# Patient Record
Sex: FEMALE | Race: WHITE | HISPANIC OR LATINO | Employment: UNEMPLOYED | URBAN - METROPOLITAN AREA
[De-identification: names, ages, dates, MRNs, and addresses within clinical notes are randomized per-mention and may not be internally consistent; named-entity substitution may affect disease eponyms.]

---

## 2017-01-09 DIAGNOSIS — L40.50 PSORIATIC ARTHRITIS: ICD-10-CM

## 2017-01-09 RX ORDER — CELECOXIB 200 MG/1
200 CAPSULE ORAL 2 TIMES DAILY
Qty: 60 CAPSULE | Refills: 2 | Status: SHIPPED | OUTPATIENT
Start: 2017-01-09 | End: 2017-04-13 | Stop reason: SDUPTHER

## 2017-02-03 RX ORDER — TRAMADOL HYDROCHLORIDE 50 MG/1
50 TABLET ORAL EVERY 8 HOURS PRN
Qty: 90 TABLET | Refills: 3 | Status: SHIPPED | OUTPATIENT
Start: 2017-02-03 | End: 2017-11-01 | Stop reason: SDUPTHER

## 2017-02-03 RX ORDER — TRAMADOL HYDROCHLORIDE 50 MG/1
50 TABLET ORAL 3 TIMES DAILY PRN
COMMUNITY
End: 2017-02-03 | Stop reason: SDUPTHER

## 2017-02-06 ENCOUNTER — OFFICE VISIT (OUTPATIENT)
Dept: RHEUMATOLOGY | Facility: CLINIC | Age: 37
End: 2017-02-06

## 2017-02-06 ENCOUNTER — LAB VISIT (OUTPATIENT)
Dept: LAB | Facility: HOSPITAL | Age: 37
End: 2017-02-06
Attending: INTERNAL MEDICINE

## 2017-02-06 VITALS
DIASTOLIC BLOOD PRESSURE: 81 MMHG | BODY MASS INDEX: 39.68 KG/M2 | SYSTOLIC BLOOD PRESSURE: 139 MMHG | HEART RATE: 75 BPM | WEIGHT: 293 LBS | HEIGHT: 72 IN

## 2017-02-06 DIAGNOSIS — G89.29 CHRONIC MIDLINE LOW BACK PAIN WITHOUT SCIATICA: ICD-10-CM

## 2017-02-06 DIAGNOSIS — E66.01 MORBID OBESITY WITH BMI OF 40.0-44.9, ADULT: Primary | ICD-10-CM

## 2017-02-06 DIAGNOSIS — E55.9 VITAMIN D DEFICIENCY: ICD-10-CM

## 2017-02-06 DIAGNOSIS — L40.50 PSORIATIC ARTHRITIS: ICD-10-CM

## 2017-02-06 DIAGNOSIS — M54.50 CHRONIC MIDLINE LOW BACK PAIN WITHOUT SCIATICA: ICD-10-CM

## 2017-02-06 PROCEDURE — 86480 TB TEST CELL IMMUN MEASURE: CPT

## 2017-02-06 PROCEDURE — 99999 PR PBB SHADOW E&M-EST. PATIENT-LVL III: CPT | Mod: PBBFAC,,, | Performed by: INTERNAL MEDICINE

## 2017-02-06 PROCEDURE — 99214 OFFICE O/P EST MOD 30 MIN: CPT | Mod: S$PBB,,, | Performed by: INTERNAL MEDICINE

## 2017-02-06 PROCEDURE — 99213 OFFICE O/P EST LOW 20 MIN: CPT | Mod: PBBFAC,PO | Performed by: INTERNAL MEDICINE

## 2017-02-06 PROCEDURE — 36415 COLL VENOUS BLD VENIPUNCTURE: CPT | Mod: PO

## 2017-02-06 RX ORDER — METHOTREXATE 25 MG/ML
25 INJECTION, SOLUTION INTRA-ARTERIAL; INTRAMUSCULAR; INTRAVENOUS
Qty: 10 ML | Refills: 2 | Status: SHIPPED | OUTPATIENT
Start: 2017-03-06 | End: 2017-11-01 | Stop reason: SDUPTHER

## 2017-02-06 RX ORDER — GABAPENTIN 300 MG/1
300 CAPSULE ORAL 2 TIMES DAILY
COMMUNITY
End: 2020-03-02 | Stop reason: ALTCHOICE

## 2017-02-06 NOTE — PATIENT INSTRUCTIONS
1. Increase methotrexate to 25mg/week (1 ml) , folic acid daily  2. See how you are in 3 months and if still flared we will discuss trial - BARICITINIB or Cosenytx at that time

## 2017-02-06 NOTE — MR AVS SNAPSHOT
[facility] Rheumatology  [address] [place] [address]  Phone: [phone]  Fax: [phone]                  Janice Lawrence   2017 10:30 AM   Office Visit    Description:  Female : 1980   Provider:  Nitza Walker MD   Department:  [facility] - Rheumatology           Reason for Visit     Psoriatic Arthritis     Psoriasis           Diagnoses this Visit        Comments    Morbid obesity with BMI of 40.0-44.9, adult    -  Primary     Psoriatic arthritis         Vitamin D deficiency         Chronic midline low back pain without sciatica                To Do List           Future Appointments        Provider Department Dept Phone    2017 12:40 PM SPECIMEN, [facility] [phone]    2017 1:15 PM LABORATORY, [facility] [phone]    2017 2:30 PM LABORATORY, [facility] [phone]    2017 3:00 PM Nitza Walker MD [facility] [phone]      Goals (5 Years of Data)     None       These Medications        Disp Refills Start End    methotrexate 25 mg/mL injection 10 mL 2 3/6/2017     Inject 1 mL (25 mg total) into the muscle every 7 days. - Intramuscular    Pharmacy: Prescriptions to [facility]  [place]lysses Ramon [place] [address] #: [phone]         [facility]     [facility] -  Assistance  Registered nurses in the [facility] provide clinical advisement, health education, appointment booking, and other advisory services.  Call for this free service at [phone].             Medications           Message regarding Medications     Verify the changes and/or additions to your medication regime listed below are the same as discussed with your clinician today.  If any of these changes or additions are incorrect, please notify your healthcare provider.        CHANGE how you are taking these medications     Start Taking  Instead of    methotrexate 25 mg/mL injection methotrexate 25 mg/mL injection    Dosage:  Inject 1 mL (25 mg total) into the muscle every 7 days. Dosage:  Inject 0.8 mLs (20 mg total) into the muscle every 7 days.    Reason for Change:  Reorder     Starting on: 3/6/2017            Verify that the below list of medications is an accurate representation of the medications you are currently taking.  If none reported, the list may be blank. If incorrect, please contact your healthcare provider. Carry this list with you in case of emergency.           Current Medications     adalimumab (HUMIRA) PnKt injection Inject 0.8 mLs (40 mg total) into the skin every 14 (fourteen) days.    celecoxib (CELEBREX) 200 MG capsule Take 1 capsule (200 mg total) by mouth 2 (two) times daily.    ergocalciferol (ERGOCALCIFEROL) 50,000 unit Cap Take 1 capsule (50,000 Units total) by mouth every 7 days.    esomeprazole (NEXIUM) 40 MG capsule Take 40 mg by mouth before breakfast.    fluoxetine (PROZAC) 40 MG capsule Take 1 capsule (40 mg total) by mouth once daily.    folic acid (FOLVITE) 1 MG tablet Take 1 tablet (1 mg total) by mouth once daily.    gabapentin (NEURONTIN) 300 MG capsule Take 300 mg by mouth 2 (two) times daily.    methotrexate 25 mg/mL injection Starting on Mar 06, 2017. Inject 1 mL (25 mg total) into the muscle every 7 days.    tramadol (ULTRAM) 50 mg tablet Take 1 tablet (50 mg total) by mouth every 8 (eight) hours as needed for Pain.           Clinical Reference Information           Your Vitals Were     BP Pulse Height Weight BMI    139/81 75 6' (1.829 m) 146.5 kg (322 lb 15.6 oz) 43.8 kg/m2      Blood Pressure          Most Recent Value    BP  139/81      Allergies as of 2/6/2017     No Known Allergies      Immunizations Administered on Date of Encounter - 2/6/2017     None      Orders Placed During Today's Visit     Future Labs/Procedures Expected by Expires    Quantiferon Gold TB  2/6/2017 4/7/2018      Instructions     1. Increase methotrexate to 25mg/week (1 ml) , folic acid daily  2. See how you are in 3 months and if still flared we will discuss trial - BARICITINIB or Cosenytx at that time       Smoking Cessation     If you would like to quit smoking:   You may be eligible for free services if you are a [place] resident and started smoking cigarettes before September 1, 1988.  Call the Smoking Cessation Trust ([facility]) toll free at ([phone] [phone] or ([phone] [phone].   Call 1-647-QUIT-NOW if you do not meet the above criteria.            Language Assistance Services     ATTENTION: Language assistance services are available, free of charge. Please call [phone].      ATENCIÓN: Si bartolo house, tiene a cole disposición servicios gratuitos de asistencia lingüística. Llame al [phone].     CHÚ Ý: N?u b?n nói Ti?ng Vi?t, có các d?ch v? h? tr? ngôn ng? mi?n phí dành cho b?n. G?i s? [phone].         [facility] - Rheumatology complies with applicable Federal civil rights laws and does not discriminate on the basis of race, color, national origin, age, disability, or sex.

## 2017-02-06 NOTE — PROGRESS NOTES
Subjective:       Patient ID: Janice Lawrence is a 36 y.o. female.    Chief Complaint: Psoriatic Arthritis and Psoriasis    HPI   Routine 3 month follow-up; slightly delayed because she had to reschedule appointment. Dr. Arellano diagnosed her with psoriatic arthritis and started treatment with MTX subq (failed PO MTX), failed enbrel and started Humira 7/2016. She was also on celebrex 200mg BID.    Last visit she noticed improvement but not 100% resolution in her joint pain and swelling in the hands  She has had at baseline minimal skin disease. Last visit she had swelling of MCPS, with PIP and DIP tenderness. AT this point I continued her Humira and methotrexate 20mg weekly, daily folic acid to give the Humira a little more time to work. Her sed rate and CRP were normal.     Since the last visit:  She hasn't noticed much more improvement with the humira. She has been taking it every 2 weeks. She has been doing the methotrexate injections 20ml weekly. Folic acid daily.  She continues to have joint pain, swelling in the hands, feet.  Feet constantly bother her all day. This is her main issue. She has been wearing plantar fasciitis inserts. She thinks she may have plantar fasciitis. She thinks the Humira wore off because after 2 months it started to wear off and she developed pain in her hands and feet again. She takes celebrex with improvement but feels it when it wears off.     Weight is stable.     Review of Systems   Constitutional: Negative for chills and fever.   HENT: Negative.    Eyes: Negative for redness.        Dry eyes     Gastrointestinal: Negative for abdominal pain, blood in stool, diarrhea and nausea.   Endocrine:        Increased hair loss    Genitourinary: Negative for dysuria.   Musculoskeletal: Positive for arthralgias (hand, foot pain.) and back pain (with herniated disk, started gabapentin for back pain. ).   Skin: Positive for rash (spots on the face and the lower scalp. ).          Objective:     Visit Vitals    /81    Pulse 75    Ht 6' (1.829 m)    Wt (!) 146.5 kg (322 lb 15.6 oz)    BMI 43.8 kg/m2        Physical Exam   Vitals reviewed.  Constitutional: She is oriented to person, place, and time and well-developed, well-nourished, and in no distress. No distress.   HENT:   Head: Normocephalic and atraumatic.   Right Ear: External ear normal.   Left Ear: External ear normal.   Mouth/Throat: Oropharynx is clear and moist.   Eyes: Conjunctivae are normal. Pupils are equal, round, and reactive to light. Right eye exhibits no discharge. Left eye exhibits no discharge. No scleral icterus.   Neck: Normal range of motion. Neck supple.   Cardiovascular: Normal rate, regular rhythm and normal heart sounds.    Pulmonary/Chest: Effort normal. No respiratory distress.   Abdominal: Soft. There is no tenderness.   Lymphadenopathy:     She has no cervical adenopathy.   Neurological: She is alert and oriented to person, place, and time. No cranial nerve deficit. She exhibits normal muscle tone.   Skin: Skin is warm and dry. Rash (left elbow dry patch (tiny), some lesions on the chin and the forehead) noted. She is not diaphoretic. No erythema.     Psychiatric: Mood, memory, affect and judgment normal.   Musculoskeletal: Normal range of motion. She exhibits tenderness (cmc joint right side, MCPs and PIPS and DIPS without any appreciable swelling). She exhibits no edema or deformity.   Bilateral positive MTP squeeze test  Shoulders, elbows normal  Left plantar fascia tenderness             LABS today ordered reviewed   Ref. Range 2/6/2017 12:00   WBC Latest Ref Range: 3.90 - 12.70 K/uL 9.35   RBC Latest Ref Range: 4.00 - 5.40 M/uL 4.46   Hemoglobin Latest Ref Range: 12.0 - 16.0 g/dL 11.8 (L)   Hematocrit Latest Ref Range: 37.0 - 48.5 % 36.4 (L)   MCV Latest Ref Range: 82 - 98 fL 82   MCH Latest Ref Range: 27.0 - 31.0 pg 26.5 (L)   MCHC Latest Ref Range: 32.0 - 36.0 % 32.4   RDW Latest Ref Range:  11.5 - 14.5 % 16.0 (H)   Platelets Latest Ref Range: 150 - 350 K/uL 363 (H)   MPV Latest Ref Range: 9.2 - 12.9 fL 8.4 (L)   Gran% Latest Ref Range: 38.0 - 73.0 % 53.1   Gran # Latest Ref Range: 1.8 - 7.7 K/uL 5.0   Lymph% Latest Ref Range: 18.0 - 48.0 % 36.5   Lymph # Latest Ref Range: 1.0 - 4.8 K/uL 3.4   Mono% Latest Ref Range: 4.0 - 15.0 % 5.5   Mono # Latest Ref Range: 0.3 - 1.0 K/uL 0.5   Eosinophil% Latest Ref Range: 0.0 - 8.0 % 4.4   Eos # Latest Ref Range: 0.0 - 0.5 K/uL 0.4   Basophil% Latest Ref Range: 0.0 - 1.9 % 0.5   Baso # Latest Ref Range: 0.00 - 0.20 K/uL 0.05   Sed Rate Latest Ref Range: 0 - 20 mm/Hr 4   Sodium Latest Ref Range: 136 - 145 mmol/L 140   Potassium Latest Ref Range: 3.5 - 5.1 mmol/L 4.4   Chloride Latest Ref Range: 95 - 110 mmol/L 106   CO2 Latest Ref Range: 23 - 29 mmol/L 24   Anion Gap Latest Ref Range: 8 - 16 mmol/L 10   BUN, Bld Latest Ref Range: 6 - 20 mg/dL 16   Creatinine Latest Ref Range: 0.5 - 1.4 mg/dL 0.8   eGFR if non African American Latest Ref Range: >60 mL/min/1.73 m^2 >60   eGFR if  Latest Ref Range: >60 mL/min/1.73 m^2 >60   Glucose Latest Ref Range: 70 - 110 mg/dL 87   Calcium Latest Ref Range: 8.7 - 10.5 mg/dL 9.7   Alkaline Phosphatase Latest Ref Range: 55 - 135 U/L 75   Total Protein Latest Ref Range: 6.0 - 8.4 g/dL 7.3   Albumin Latest Ref Range: 3.5 - 5.2 g/dL 4.1   Total Bilirubin Latest Ref Range: 0.1 - 1.0 mg/dL 0.2   AST Latest Ref Range: 10 - 40 U/L 24   ALT Latest Ref Range: 10 - 44 U/L 35     Sed rate 4      I personally viewed the xrays with my impressions below:  SI joints 5/2016 no involvement    Lumbar spine xrays normal    Arthritis survey no erosions      Assessment:       1. Morbid obesity with BMI of 40.0-44.9, adult    2. Psoriatic arthritis    3. Vitamin D deficiency    4. Chronic midline low back pain without sciatica          Psoriatic arthritis- currently with tenderness in the hands and feet without appreciable swelling,  plantar fasciitis, normal sed rate. Failed Enbrel, on Humira since 7/2016 and sub q methotrexate 20mg weekly, folic acid daily, celebrex 200mg BID.     Morbid obesity- worsening about inflammation and may be contributing to some of her fatigue, pain in her feet     Vit d deficiency- on ergo 50K weekly     Midline back pain -slightly aggravated, not related to PSA     Plan:   Increase methotrexate to 25mg weekly, increase folic acid to 2mg daily to combat hair loss    rtc in 3 months and if still having issue then will consider putting her on the Bar[place] trail. I am, however, confounded slightly because she has pain in setting of normalized inflammatory markers. Will need to re evaluate again in 3 months.    Aerobic exercise    Anti inflammatory diet discussed    Labs today and in 3 months to monitor for mtx toxicity     RTC in 3 months

## 2017-02-08 LAB
MITOGEN NIL: 9.98 IU/ML
NIL: 0.04 IU/ML
TB ANTIGEN NIL: 0.05 IU/ML
TB ANTIGEN: 0.09 IU/ML
TB GOLD: NEGATIVE

## 2017-02-09 ENCOUNTER — PATIENT MESSAGE (OUTPATIENT)
Dept: INTERNAL MEDICINE | Facility: CLINIC | Age: 37
End: 2017-02-09

## 2017-02-09 DIAGNOSIS — F41.9 ANXIETY: Primary | ICD-10-CM

## 2017-02-09 RX ORDER — BUSPIRONE HYDROCHLORIDE 10 MG/1
10 TABLET ORAL 2 TIMES DAILY
Qty: 60 TABLET | Refills: 1 | Status: SHIPPED | OUTPATIENT
Start: 2017-02-09 | End: 2017-04-13 | Stop reason: SDUPTHER

## 2017-04-13 DIAGNOSIS — F41.9 ANXIETY: ICD-10-CM

## 2017-04-13 DIAGNOSIS — L40.50 PSORIATIC ARTHRITIS: ICD-10-CM

## 2017-04-13 RX ORDER — BUSPIRONE HYDROCHLORIDE 10 MG/1
TABLET ORAL
Qty: 60 TABLET | Refills: 2 | Status: SHIPPED | OUTPATIENT
Start: 2017-04-13 | End: 2017-11-20 | Stop reason: DRUGHIGH

## 2017-04-13 RX ORDER — CELECOXIB 200 MG/1
CAPSULE ORAL
Qty: 60 CAPSULE | Refills: 1 | Status: SHIPPED | OUTPATIENT
Start: 2017-04-13 | End: 2017-07-20 | Stop reason: SDUPTHER

## 2017-05-01 ENCOUNTER — LAB VISIT (OUTPATIENT)
Dept: LAB | Facility: HOSPITAL | Age: 37
End: 2017-05-01
Attending: INTERNAL MEDICINE

## 2017-05-01 ENCOUNTER — OFFICE VISIT (OUTPATIENT)
Dept: RHEUMATOLOGY | Facility: CLINIC | Age: 37
End: 2017-05-01

## 2017-05-01 VITALS
HEART RATE: 67 BPM | BODY MASS INDEX: 39.68 KG/M2 | HEIGHT: 72 IN | DIASTOLIC BLOOD PRESSURE: 71 MMHG | WEIGHT: 293 LBS | SYSTOLIC BLOOD PRESSURE: 123 MMHG

## 2017-05-01 DIAGNOSIS — G89.29 CHRONIC MIDLINE LOW BACK PAIN WITHOUT SCIATICA: ICD-10-CM

## 2017-05-01 DIAGNOSIS — E66.01 MORBID OBESITY WITH BMI OF 40.0-44.9, ADULT: ICD-10-CM

## 2017-05-01 DIAGNOSIS — L40.50 PSORIATIC ARTHRITIS: ICD-10-CM

## 2017-05-01 DIAGNOSIS — Z79.899 HIGH RISK MEDICATION USE: ICD-10-CM

## 2017-05-01 DIAGNOSIS — M54.50 CHRONIC MIDLINE LOW BACK PAIN WITHOUT SCIATICA: ICD-10-CM

## 2017-05-01 DIAGNOSIS — L40.9 PSORIASIS: ICD-10-CM

## 2017-05-01 DIAGNOSIS — M65.9 TENOSYNOVITIS OF THUMB: ICD-10-CM

## 2017-05-01 DIAGNOSIS — E55.9 VITAMIN D DEFICIENCY: ICD-10-CM

## 2017-05-01 DIAGNOSIS — L40.50 PSORIATIC ARTHRITIS: Primary | ICD-10-CM

## 2017-05-01 LAB
ALBUMIN SERPL BCP-MCNC: 4 G/DL
ALP SERPL-CCNC: 71 U/L
ALT SERPL W/O P-5'-P-CCNC: 33 U/L
ANION GAP SERPL CALC-SCNC: 4 MMOL/L
AST SERPL-CCNC: 18 U/L
BASOPHILS # BLD AUTO: 0.04 K/UL
BASOPHILS NFR BLD: 0.4 %
BILIRUB SERPL-MCNC: 0.3 MG/DL
BUN SERPL-MCNC: 14 MG/DL
CALCIUM SERPL-MCNC: 9.3 MG/DL
CHLORIDE SERPL-SCNC: 106 MMOL/L
CO2 SERPL-SCNC: 28 MMOL/L
CREAT SERPL-MCNC: 0.8 MG/DL
CRP SERPL-MCNC: 4.1 MG/L
DIFFERENTIAL METHOD: ABNORMAL
EOSINOPHIL # BLD AUTO: 0.4 K/UL
EOSINOPHIL NFR BLD: 3.5 %
ERYTHROCYTE [DISTWIDTH] IN BLOOD BY AUTOMATED COUNT: 17.3 %
ERYTHROCYTE [SEDIMENTATION RATE] IN BLOOD BY WESTERGREN METHOD: 5 MM/HR
EST. GFR  (AFRICAN AMERICAN): >60 ML/MIN/1.73 M^2
EST. GFR  (NON AFRICAN AMERICAN): >60 ML/MIN/1.73 M^2
GLUCOSE SERPL-MCNC: 103 MG/DL
HCT VFR BLD AUTO: 36.4 %
HGB BLD-MCNC: 11.7 G/DL
LYMPHOCYTES # BLD AUTO: 3.1 K/UL
LYMPHOCYTES NFR BLD: 29.8 %
MCH RBC QN AUTO: 25.6 PG
MCHC RBC AUTO-ENTMCNC: 32.1 %
MCV RBC AUTO: 80 FL
MONOCYTES # BLD AUTO: 0.5 K/UL
MONOCYTES NFR BLD: 4.8 %
NEUTROPHILS # BLD AUTO: 6.5 K/UL
NEUTROPHILS NFR BLD: 61.8 %
PLATELET # BLD AUTO: 331 K/UL
PMV BLD AUTO: 8.4 FL
POTASSIUM SERPL-SCNC: 4.2 MMOL/L
PROT SERPL-MCNC: 7.2 G/DL
RBC # BLD AUTO: 4.57 M/UL
SODIUM SERPL-SCNC: 138 MMOL/L
WBC # BLD AUTO: 10.53 K/UL

## 2017-05-01 PROCEDURE — 85651 RBC SED RATE NONAUTOMATED: CPT | Mod: PO

## 2017-05-01 PROCEDURE — 99213 OFFICE O/P EST LOW 20 MIN: CPT | Mod: PBBFAC,PO | Performed by: INTERNAL MEDICINE

## 2017-05-01 PROCEDURE — 86140 C-REACTIVE PROTEIN: CPT

## 2017-05-01 PROCEDURE — 99999 PR PBB SHADOW E&M-EST. PATIENT-LVL III: CPT | Mod: PBBFAC,,, | Performed by: INTERNAL MEDICINE

## 2017-05-01 PROCEDURE — 80053 COMPREHEN METABOLIC PANEL: CPT | Mod: PO

## 2017-05-01 PROCEDURE — 36415 COLL VENOUS BLD VENIPUNCTURE: CPT | Mod: PO

## 2017-05-01 PROCEDURE — 85025 COMPLETE CBC W/AUTO DIFF WBC: CPT | Mod: PO

## 2017-05-01 PROCEDURE — 99214 OFFICE O/P EST MOD 30 MIN: CPT | Mod: S$PBB,,, | Performed by: INTERNAL MEDICINE

## 2017-05-01 RX ORDER — ERGOCALCIFEROL 1.25 MG/1
50000 CAPSULE ORAL
Qty: 12 CAPSULE | Refills: 2 | Status: SHIPPED | OUTPATIENT
Start: 2017-05-01 | End: 2019-02-18 | Stop reason: SDUPTHER

## 2017-05-01 NOTE — MR AVS SNAPSHOT
[facility] - Rheumatology  [address] [place] [address]  Phone: [phone]  Fax: [phone]                  Janice Lawrence   2017 3:00 PM   Office Visit    Description:  Female : 1980   Provider:  Nitza Walker MD   Department:  [facility] - Rheumatology           Reason for Visit     Psoriatic Arthritis     Vitamin D Deficiency           Diagnoses this Visit        Comments    Vitamin D deficiency                To Do List           Goals (5 Years of Data)     None       These Medications        Disp Refills Start End    ergocalciferol (ERGOCALCIFEROL) 50,000 unit Cap 12 capsule 2 2017     Take 1 capsule (50,000 Units total) by mouth every 7 days. - Oral    Pharmacy: Prescriptions to [facility] Ramon [place] [address] #: [phone]         [facility]     [facility] Care Line -  Assistance  Unless otherwise directed by your provider, please contact [facility], our nurse care line that is available for  assistance.     Registered nurses in the [facility] provide: appointment scheduling, clinical advisement, health education, and other advisory services.  Call: [phone] (toll free)               Medications           Message regarding Medications     Verify the changes and/or additions to your medication regime listed below are the same as discussed with your clinician today.  If any of these changes or additions are incorrect, please notify your healthcare provider.             Verify that the below list of medications is an accurate representation of the medications you are currently taking.  If none reported, the list may be blank. If incorrect, please contact your healthcare provider. Carry this list with you in case of emergency.           Current Medications     adalimumab (HUMIRA) PnKt injection Inject 0.8 mLs (40 mg total) into the skin every 14 (fourteen) days.    busPIRone (BUSPAR) 10 MG tablet  TAKE 1 TABLET (10 MG TOTAL) BY MOUTH 2 (TWO) TIMES DAILY.    celecoxib (CELEBREX) 200 MG capsule TAKE 1 CAPSULE (200 MG TOTAL) BY MOUTH 2 (TWO) TIMES DAILY.    ergocalciferol (ERGOCALCIFEROL) 50,000 unit Cap Take 1 capsule (50,000 Units total) by mouth every 7 days.    esomeprazole (NEXIUM) 40 MG capsule Take 40 mg by mouth before breakfast.    fluoxetine (PROZAC) 40 MG capsule Take 1 capsule (40 mg total) by mouth once daily.    folic acid (FOLVITE) 1 MG tablet Take 1 tablet (1 mg total) by mouth once daily.    gabapentin (NEURONTIN) 300 MG capsule Take 300 mg by mouth 2 (two) times daily.    methotrexate 25 mg/mL injection Inject 1 mL (25 mg total) into the muscle every 7 days.    tramadol (ULTRAM) 50 mg tablet Take 1 tablet (50 mg total) by mouth every 8 (eight) hours as needed for Pain.           Clinical Reference Information           Your Vitals Were     BP Pulse Height Weight BMI    123/71 67 6' (1.829 m) 141.6 kg (312 lb 2.7 oz) 42.34 kg/m2      Blood Pressure          Most Recent Value    BP  123/71      Allergies as of 5/1/2017     No Known Allergies      Immunizations Administered on Date of Encounter - 5/1/2017     None      Smoking Cessation     If you would like to quit smoking:   You may be eligible for free services if you are a [place] resident and started smoking cigarettes before September 1, 1988.  Call the Smoking Cessation Trust ([facility]) toll free at [phone] or (967) 411-6426.   Call 1-800-QUIT-NOW if you do not meet the above criteria.   Contact us via email: [email or web address]   View our website for more information: [email or web address]        Language Assistance Services     ATTENTION: Language assistance services are available, free of charge. Please call [phone].      ATENCIÓN: Si habla harsh, tiene a cole disposición servicios gratuitos de asistencia lingüística. Llame al [phone].     CHÚ Ý: N?u b?n nói Ti?ng Vi?t, có các d?ch v? h? tr? ngôn ng? mi?n  phí dành cho b?n. G?i s? [phone].         [facility] - Rheumatology complies with applicable Federal civil rights laws and does not discriminate on the basis of race, color, national origin, age, disability, or sex.

## 2017-05-01 NOTE — PROGRESS NOTES
"Subjective:       Patient ID: Janice Lawrence is a 37 y.o. female.    Chief Complaint: Psoriatic Arthritis and Vitamin D Deficiency    HPI   3 mo fup on psoriatic arthritis     Treatment course as follows:  2/2016 first visit with Dr. Arellano; had 2 years of symptoms prior treating with NSAIDS. Tipping point was development of shoulder and back pain- was found to have herniated disk and rotator cuff injury. 1 year PTA she developed l 2nd dactylitis, left knee pain. AM stiffness x 2 hours "all over" and "whole body pain."   Noted to have hx of psoriasis on face and scalp. Hx of tendinitis "Throughout." Dad with psoriatic arthritis .  He diagnosed her with PSA. Work-up showing -HLAB27, CCP, RF, JAVIER, elevated sed rate, crp. SI joint normal.   He started MTX.    2/21/2016 was on 15mg po MTX, continued to have pain in both hands and left knee and now ankle pain. 3 hours AM stiffness. Was on celebrex 200mg BID at the time.   Elbow tenderness and dactylitis 2nd digit    4/2016. Upped MTX to 20mg weekly, folic acid daily  Left ankle and left knee, dactylitis left finger.      5/2016 started on Enbrel and "Felt much better within first week" Stomach cramps with PO MTX.15 minutes of morning stiffness only. Dactylitis improving! Switched to sub q methotrexate.     6/30/2016  Feeling better but R hand down to the thumb still pain, skin not completely clear. Having some back pain.  Also had issues with dactylitis coming back. AM stiffness 60 minutes again. Swelling in the knee noted.   He stopped the Enbrel and began the Humira.    9/2016 Saw Dr. WASHINGTON  Some improvement with Humira after 2 months but not a significant improvement. No synovitis or dactylitis noted.     11/2016 Saw me  Noted skin to be better with Humira greatly improved morning stiffness but not 100% still  She had swollen R 2nd, 3rd, MCP and PIP with swelling, R wrist. Continued therapy     2/2017  Swelling in hands and feet. Plantar fasciitis was bothering " her. I increased MTX to 25mg weekly, folic acid 2mg daily for hair loss.     Since last visit:  She has noticed improvement with the hair falling out with folic acid daily. Pain unchanged at all and worsened. She has new pain in her knees, hands. No shoulder pain. She has foot pain she has been wearing shoes that the podiatrist recommended. IT has somewhat improved with the shoes but still not gone.   Visually she doesn't notice swelling as bad.   When she first started the humira she was able to tell the difference. She feels it is losing effectiveness.   Uninsured at the moment. She has been getting it for free with a prescription assistance program.     celebrex twice daily  Taking tramadol 3 times weekly   Doesn't like to take it because of addiction      Current Outpatient Prescriptions on File Prior to Visit   Medication Sig Dispense Refill    adalimumab (HUMIRA) PnKt injection Inject 0.8 mLs (40 mg total) into the skin every 14 (fourteen) days. 6 each 2    busPIRone (BUSPAR) 10 MG tablet TAKE 1 TABLET (10 MG TOTAL) BY MOUTH 2 (TWO) TIMES DAILY. 60 tablet 2    celecoxib (CELEBREX) 200 MG capsule TAKE 1 CAPSULE (200 MG TOTAL) BY MOUTH 2 (TWO) TIMES DAILY. 60 capsule 1    esomeprazole (NEXIUM) 40 MG capsule Take 40 mg by mouth before breakfast.      fluoxetine (PROZAC) 40 MG capsule Take 1 capsule (40 mg total) by mouth once daily. 90 capsule 3    folic acid (FOLVITE) 1 MG tablet Take 1 tablet (1 mg total) by mouth once daily. 90 tablet 2    gabapentin (NEURONTIN) 300 MG capsule Take 300 mg by mouth 2 (two) times daily.      methotrexate 25 mg/mL injection Inject 1 mL (25 mg total) into the muscle every 7 days. 10 mL 2    tramadol (ULTRAM) 50 mg tablet Take 1 tablet (50 mg total) by mouth every 8 (eight) hours as needed for Pain. 90 tablet 3    [DISCONTINUED] ergocalciferol (ERGOCALCIFEROL) 50,000 unit Cap Take 1 capsule (50,000 Units total) by mouth every 7 days. 12 capsule 2     No current  facility-administered medications on file prior to visit.          Review of Systems   Constitutional: Positive for unexpected weight change (lost 10 lbs since last visit!). Negative for chills and fever.   HENT: Negative.  Negative for mouth sores.    Eyes: Negative for redness.        Dry eyes     Gastrointestinal: Negative for abdominal pain, blood in stool, diarrhea and nausea.   Endocrine:        Hair loss improving with folic acid 2mg     Genitourinary: Negative for dysuria.   Musculoskeletal: Positive for arthralgias (hand, foot pain.) and back pain (with herniated disk, started gabapentin for back pain. ).   Skin: Positive for rash (posterior scalp). Negative for color change.         Objective:     /71  Pulse 67  Ht 6' (1.829 m)  Wt (!) 141.6 kg (312 lb 2.7 oz)  BMI 42.34 kg/m2     Physical Exam   Vitals reviewed.  Constitutional: She is oriented to person, place, and time and well-developed, well-nourished, and in no distress. No distress.   HENT:   Head: Normocephalic and atraumatic.   Right Ear: External ear normal.   Left Ear: External ear normal.   Eyes: Conjunctivae are normal. Pupils are equal, round, and reactive to light. Right eye exhibits no discharge. Left eye exhibits no discharge. No scleral icterus.   Neck: Normal range of motion. Neck supple.   Cardiovascular: Normal rate.    Pulmonary/Chest: Effort normal. No respiratory distress.   Neurological: She is alert and oriented to person, place, and time. No cranial nerve deficit. She exhibits normal muscle tone.   Skin: Skin is warm and dry. Rash (tiny patch of dry skin posterior scalp) noted. She is not diaphoretic. No erythema.     Psychiatric: Mood, memory, affect and judgment normal.   Musculoskeletal: Normal range of motion. She exhibits tenderness ( cmc joint right side, +finklesteins, tender R wrist, MCP and PIP 2,3 bilaterally. no other synovitis. ). She exhibits no edema or deformity.   Negative MTP Squeeze  Shoulders, elbows  normal  Bilateral plantar fascia and achilles tenderness     No tenderness at the epicondyle or patellar poles                 LABS today ordered reviewed       Ref. Range 5/1/2017 14:39   WBC Latest Ref Range: 3.90 - 12.70 K/uL 10.53   RBC Latest Ref Range: 4.00 - 5.40 M/uL 4.57   Hemoglobin Latest Ref Range: 12.0 - 16.0 g/dL 11.7 (L)   Hematocrit Latest Ref Range: 37.0 - 48.5 % 36.4 (L)   MCV Latest Ref Range: 82 - 98 fL 80 (L)   MCH Latest Ref Range: 27.0 - 31.0 pg 25.6 (L)   MCHC Latest Ref Range: 32.0 - 36.0 % 32.1   RDW Latest Ref Range: 11.5 - 14.5 % 17.3 (H)   Platelets Latest Ref Range: 150 - 350 K/uL 331   MPV Latest Ref Range: 9.2 - 12.9 fL 8.4 (L)   Sodium Latest Ref Range: 136 - 145 mmol/L 138   Potassium Latest Ref Range: 3.5 - 5.1 mmol/L 4.2   Chloride Latest Ref Range: 95 - 110 mmol/L 106   CO2 Latest Ref Range: 23 - 29 mmol/L 28   Anion Gap Latest Ref Range: 8 - 16 mmol/L 4 (L)   BUN, Bld Latest Ref Range: 6 - 20 mg/dL 14   Creatinine Latest Ref Range: 0.5 - 1.4 mg/dL 0.8   eGFR if non African American Latest Ref Range: >60 mL/min/1.73 m^2 >60   eGFR if  Latest Ref Range: >60 mL/min/1.73 m^2 >60   Glucose Latest Ref Range: 70 - 110 mg/dL 103   Calcium Latest Ref Range: 8.7 - 10.5 mg/dL 9.3   Alkaline Phosphatase Latest Ref Range: 55 - 135 U/L 71   Total Protein Latest Ref Range: 6.0 - 8.4 g/dL 7.2   Albumin Latest Ref Range: 3.5 - 5.2 g/dL 4.0   Total Bilirubin Latest Ref Range: 0.1 - 1.0 mg/dL 0.3   AST Latest Ref Range: 10 - 40 U/L 18   ALT Latest Ref Range: 10 - 44 U/L 33     X-ray Right Shoulder (2/15/16): There is no evidence for acute fracture or dislocation. The joint spaces appear to be well-maintained.      X-ray Hips (2/15/16): The bony pelvis is intact. Both femoral heads are well-seated within the acetabula. The joint spaces are well maintained. The SI joints are not widened.      X-ray Lumbar Spine (2/15/16): The vertebral bodies demonstrate normal height. There is  straightening of the normal lordotic curvature. The disk space heights are maintained. No significant facet arthropathy.      Arthritis Survey (2/15/16): The vertebral bodies of the cervical spine demonstrate normal height. No subluxation noted on the flexion view. The joint spaces of both hands, knees and both feet are well maintained. No erosive changes are suggested. No soft tissue calcifications are noted.      Abdominal US (2/24/16): Mild diffuse fatty infiltration of the liver. Mild hepatomegaly     MRI Lumbar spine (9/9/14): Broad midline and left-sided disc herniation with contact of the S1 nerve root to the left at L5-S1    Assessment:       1. Psoriatic arthritis    2. Vitamin D deficiency    3. Psoriasis    4. Morbid obesity with BMI of 40.0-44.9, adult    5. Chronic midline low back pain without sciatica    6. High risk medication use    7. Tenosynovitis of thumb          Psoriatic arthritis. HLA B27 negative. +fmhx, psoriasis (mild), dactylitis, asymmetric knee swelling (l knee), enthesitis at the plantar fascia and achilles tendon, shoulders.   Today, she continues to have skin, joint activity despite full dose MTX subcutaneous, Humira every other week and celebrex BID. Failed Enbrel. On Humira since 7/2016.  - Joints: tender bilateral wrists with R wrist swelling, MCP 2, PIP tender bilaterally without synovitis.  -Enthesis: achilles tendons and plantar fascia bilaterally  -Skin: minimal skin disease posterior scalp  -Dactylitis: no active dactylitis but may be developing L 2nd digit   -Sacroiliitis: none, normal SI joint films 2/2016  She has no insurance so I will try to get her into the Baricitinib study. I would like to do an MRI of her hand to evaluate if there is true inflammation given she has normalization of her inflammatory markers.     Morbid obesity- improving! Lost 10 lbs since last visit. Can cause worsening about inflammation and may be contributing to some of her fatigue, pain in her  feet     Vit d deficiency- on ergo 50K weekly, needs refills     Midline back pain -slightly aggravated, not related to PSA    R hand pain could be related to element of tenosynovitis which can also be due to active PSA. 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      Plan:   continue methotrexate to 25mg weekly sub q, folic acid 2mg daily  Stop Humira and evaluate enrollment into the study    Conservative management of thumb tenosynovitis-ice, splica, rest, nsaids for now  Follow-up pending arrangement of study    Keep losing weight!!    Refill vitamin D weekly    MB

## 2017-05-02 ENCOUNTER — PATIENT MESSAGE (OUTPATIENT)
Dept: RHEUMATOLOGY | Facility: CLINIC | Age: 37
End: 2017-05-02

## 2017-05-02 ENCOUNTER — TELEPHONE (OUTPATIENT)
Dept: RHEUMATOLOGY | Facility: CLINIC | Age: 37
End: 2017-05-02

## 2017-05-02 NOTE — TELEPHONE ENCOUNTER
----- Message from Kylee Guevara sent at 5/2/2017  1:39 PM CDT -----  Contact: pt  Please call pt at [phone] re the status of her script that was supposed to have been sent in yest for a steroid to her pharmacy [facility] [place] [address] - the pharmacy is stating it has not been received

## 2017-05-03 DIAGNOSIS — L40.50 PSORIATIC ARTHRITIS: Primary | ICD-10-CM

## 2017-05-03 RX ORDER — PREDNISONE 10 MG/1
TABLET ORAL
Qty: 32 TABLET | Refills: 0 | Status: SHIPPED | OUTPATIENT
Start: 2017-05-03 | End: 2017-07-31 | Stop reason: ALTCHOICE

## 2017-05-19 ENCOUNTER — PATIENT MESSAGE (OUTPATIENT)
Dept: RHEUMATOLOGY | Facility: CLINIC | Age: 37
End: 2017-05-19

## 2017-05-26 ENCOUNTER — PROCEDURE VISIT (OUTPATIENT)
Dept: RHEUMATOLOGY | Facility: CLINIC | Age: 37
End: 2017-05-26

## 2017-05-26 DIAGNOSIS — L40.50 PSORIATIC ARTHRITIS: Primary | ICD-10-CM

## 2017-05-26 PROCEDURE — 20600 DRAIN/INJ JOINT/BURSA W/O US: CPT | Mod: PBBFAC,PO | Performed by: PHYSICIAN ASSISTANT

## 2017-05-26 PROCEDURE — 20600 DRAIN/INJ JOINT/BURSA W/O US: CPT | Mod: S$PBB,,, | Performed by: PHYSICIAN ASSISTANT

## 2017-05-26 RX ORDER — BETAMETHASONE SODIUM PHOSPHATE AND BETAMETHASONE ACETATE 3; 3 MG/ML; MG/ML
1.5 INJECTION, SUSPENSION INTRA-ARTICULAR; INTRALESIONAL; INTRAMUSCULAR; SOFT TISSUE
Status: COMPLETED | OUTPATIENT
Start: 2017-05-26 | End: 2017-05-26

## 2017-05-26 RX ORDER — DICLOFENAC SODIUM 10 MG/G
2 GEL TOPICAL 4 TIMES DAILY
Qty: 300 G | Refills: 3 | Status: SHIPPED | OUTPATIENT
Start: 2017-05-26 | End: 2017-07-31 | Stop reason: ALTCHOICE

## 2017-05-26 RX ORDER — METHYLPREDNISOLONE ACETATE 80 MG/ML
10 INJECTION, SUSPENSION INTRA-ARTICULAR; INTRALESIONAL; INTRAMUSCULAR; SOFT TISSUE
Status: COMPLETED | OUTPATIENT
Start: 2017-05-26 | End: 2017-05-26

## 2017-05-26 RX ADMIN — BETAMETHASONE SODIUM PHOSPHATE AND BETAMETHASONE ACETATE 1.5 MG: 3; 3 INJECTION, SUSPENSION INTRA-ARTICULAR; INTRALESIONAL; INTRAMUSCULAR at 10:05

## 2017-05-26 RX ADMIN — METHYLPREDNISOLONE ACETATE 10.4 MG: 80 INJECTION, SUSPENSION INTRALESIONAL; INTRAMUSCULAR; INTRASYNOVIAL; SOFT TISSUE at 10:05

## 2017-05-26 NOTE — PROCEDURES
Procedures       Patient with PsA with right 1st mcp joint swelling and tenderness     Risk of joint injections discussed with the patient   Risks of joint injections, steroid injections, and aspirations include but are not limited to:   Allergic reaction  Infection  Bleeding  Bruising  Post injection flare of joint swelling and pain  Skin depigmentation  Local fat atrophy  Tendon rupture  Failure of symptoms to improve      Procedure note: After verbal consent was obtained. The right first  mcp joint  was prepared with sterile prep.  The skin was anesthetized with 1% ethyl chloride.  The mcp joint was then  injected with a combination of  1 mL of 1% lidocaine, 1.5mg celestone/soluspan , and 10 mg Depo-Medrol.  Hemostasis was obtained.  The patient tolerated procedure well with no complications.  discharge and icing instructions given to reina

## 2017-06-13 ENCOUNTER — TELEPHONE (OUTPATIENT)
Dept: RHEUMATOLOGY | Facility: CLINIC | Age: 37
End: 2017-06-13

## 2017-06-13 ENCOUNTER — PATIENT MESSAGE (OUTPATIENT)
Dept: RHEUMATOLOGY | Facility: CLINIC | Age: 37
End: 2017-06-13

## 2017-06-13 ENCOUNTER — LAB VISIT (OUTPATIENT)
Dept: LAB | Facility: HOSPITAL | Age: 37
End: 2017-06-13
Attending: INTERNAL MEDICINE

## 2017-06-13 DIAGNOSIS — L40.50 PSORIATIC ARTHRITIS: ICD-10-CM

## 2017-06-13 LAB
ALBUMIN SERPL BCP-MCNC: 3.9 G/DL
ALP SERPL-CCNC: 77 U/L
ALT SERPL W/O P-5'-P-CCNC: 32 U/L
ANION GAP SERPL CALC-SCNC: 10 MMOL/L
AST SERPL-CCNC: 27 U/L
BASOPHILS # BLD AUTO: 0.03 K/UL
BASOPHILS NFR BLD: 0.2 %
BILIRUB SERPL-MCNC: 0.6 MG/DL
BUN SERPL-MCNC: 13 MG/DL
CALCIUM SERPL-MCNC: 9.3 MG/DL
CHLORIDE SERPL-SCNC: 106 MMOL/L
CO2 SERPL-SCNC: 23 MMOL/L
CREAT SERPL-MCNC: 0.8 MG/DL
CRP SERPL-MCNC: 10.8 MG/L
DIFFERENTIAL METHOD: ABNORMAL
EOSINOPHIL # BLD AUTO: 0.5 K/UL
EOSINOPHIL NFR BLD: 3.8 %
ERYTHROCYTE [DISTWIDTH] IN BLOOD BY AUTOMATED COUNT: 18.2 %
ERYTHROCYTE [SEDIMENTATION RATE] IN BLOOD BY WESTERGREN METHOD: 9 MM/HR
EST. GFR  (AFRICAN AMERICAN): >60 ML/MIN/1.73 M^2
EST. GFR  (NON AFRICAN AMERICAN): >60 ML/MIN/1.73 M^2
GLUCOSE SERPL-MCNC: 105 MG/DL
HCT VFR BLD AUTO: 36.9 %
HGB BLD-MCNC: 12.1 G/DL
LYMPHOCYTES # BLD AUTO: 3 K/UL
LYMPHOCYTES NFR BLD: 25.2 %
MCH RBC QN AUTO: 26 PG
MCHC RBC AUTO-ENTMCNC: 32.8 %
MCV RBC AUTO: 79 FL
MONOCYTES # BLD AUTO: 0.6 K/UL
MONOCYTES NFR BLD: 4.6 %
NEUTROPHILS # BLD AUTO: 8 K/UL
NEUTROPHILS NFR BLD: 66.2 %
PLATELET # BLD AUTO: 361 K/UL
PMV BLD AUTO: 8.3 FL
POTASSIUM SERPL-SCNC: 4 MMOL/L
PROT SERPL-MCNC: 7.6 G/DL
RBC # BLD AUTO: 4.66 M/UL
SODIUM SERPL-SCNC: 139 MMOL/L
WBC # BLD AUTO: 12.05 K/UL

## 2017-06-13 PROCEDURE — 36415 COLL VENOUS BLD VENIPUNCTURE: CPT | Mod: PO

## 2017-06-13 PROCEDURE — 86140 C-REACTIVE PROTEIN: CPT

## 2017-06-13 PROCEDURE — 85025 COMPLETE CBC W/AUTO DIFF WBC: CPT | Mod: PO

## 2017-06-13 PROCEDURE — 85651 RBC SED RATE NONAUTOMATED: CPT | Mod: PO

## 2017-06-13 PROCEDURE — 80053 COMPREHEN METABOLIC PANEL: CPT | Mod: PO

## 2017-06-13 NOTE — TELEPHONE ENCOUNTER
----- Message from Wanda Arnold sent at 6/13/2017 11:17 AM CDT -----  Pt at [phone]//is calling regarding having blood work done today//she is off today//need lab work done before taking a medication//please call asap to discuss////vikas//khurram

## 2017-06-13 NOTE — TELEPHONE ENCOUNTER
----- Message from Nitza Walker MD sent at 6/13/2017 11:31 AM CDT -----  i just called nad left her a message indicating that you would call to schedule TERESA 4 to be done today. I will call her tomorrow with results and about the new medicine.  MB

## 2017-06-14 ENCOUNTER — TELEPHONE (OUTPATIENT)
Dept: RHEUMATOLOGY | Facility: CLINIC | Age: 37
End: 2017-06-14

## 2017-06-14 DIAGNOSIS — L40.50 PSORIATIC ARTHRITIS: Primary | ICD-10-CM

## 2017-06-14 RX ORDER — SULFASALAZINE 500 MG/1
TABLET, DELAYED RELEASE ORAL
Qty: 120 TABLET | Refills: 1 | Status: SHIPPED | OUTPATIENT
Start: 2017-06-14 | End: 2017-11-01

## 2017-06-14 NOTE — TELEPHONE ENCOUNTER
Labs returned  She has elevated CRP that corresponds to increase in activity           Plan:  Start sulfasalazine titrate up to 1000mg BID  She has no insurance for doctor's visits. Labs.  She will benefit from BARICITINIB study as she has minimal psoriasis and has 2 TN treatment failures, on methotrexate. I discussed with Natalia Ceron and study probably won't be open until July/August.  Patient will return in 3 months and if still active then she should enroll in the study.  Labs in 4 weeks  For sulfasalazine toxicity   MB

## 2017-06-14 NOTE — TELEPHONE ENCOUNTER
----- Message from Nitza Walker MD sent at 6/14/2017  8:07 AM CDT -----  Please schedule meera 4 in 4 weeks and fup with Nolvia Slaughter in 3 months.  MB    Labs scheduled 7-7.

## 2017-06-23 ENCOUNTER — PATIENT MESSAGE (OUTPATIENT)
Dept: RHEUMATOLOGY | Facility: CLINIC | Age: 37
End: 2017-06-23

## 2017-06-26 ENCOUNTER — TELEPHONE (OUTPATIENT)
Dept: RHEUMATOLOGY | Facility: CLINIC | Age: 37
End: 2017-06-26

## 2017-06-26 NOTE — TELEPHONE ENCOUNTER
----- Message from Nitza Walker MD sent at 6/26/2017  3:53 PM CDT -----  Can you set up appointment with Dr. WASHINGTON end august beginning September?   PSoriatic arthritis.

## 2017-07-07 ENCOUNTER — LAB VISIT (OUTPATIENT)
Dept: LAB | Facility: HOSPITAL | Age: 37
End: 2017-07-07
Attending: INTERNAL MEDICINE

## 2017-07-07 DIAGNOSIS — L40.50 PSORIATIC ARTHRITIS: ICD-10-CM

## 2017-07-07 LAB
ALBUMIN SERPL BCP-MCNC: 4 G/DL
ALP SERPL-CCNC: 89 U/L
ALT SERPL W/O P-5'-P-CCNC: 38 U/L
ANION GAP SERPL CALC-SCNC: 10 MMOL/L
AST SERPL-CCNC: 31 U/L
BASOPHILS # BLD AUTO: 0.03 K/UL
BASOPHILS NFR BLD: 0.3 %
BILIRUB SERPL-MCNC: 0.3 MG/DL
BUN SERPL-MCNC: 17 MG/DL
CALCIUM SERPL-MCNC: 9.4 MG/DL
CHLORIDE SERPL-SCNC: 104 MMOL/L
CO2 SERPL-SCNC: 24 MMOL/L
CREAT SERPL-MCNC: 0.8 MG/DL
CRP SERPL-MCNC: 7 MG/L
DIFFERENTIAL METHOD: ABNORMAL
EOSINOPHIL # BLD AUTO: 0.4 K/UL
EOSINOPHIL NFR BLD: 3.8 %
ERYTHROCYTE [DISTWIDTH] IN BLOOD BY AUTOMATED COUNT: 17.9 %
ERYTHROCYTE [SEDIMENTATION RATE] IN BLOOD BY WESTERGREN METHOD: 8 MM/HR
EST. GFR  (AFRICAN AMERICAN): >60 ML/MIN/1.73 M^2
EST. GFR  (NON AFRICAN AMERICAN): >60 ML/MIN/1.73 M^2
GLUCOSE SERPL-MCNC: 100 MG/DL
HCT VFR BLD AUTO: 37 %
HGB BLD-MCNC: 11.9 G/DL
LYMPHOCYTES # BLD AUTO: 2.3 K/UL
LYMPHOCYTES NFR BLD: 21 %
MCH RBC QN AUTO: 25.6 PG
MCHC RBC AUTO-ENTMCNC: 32.2 %
MCV RBC AUTO: 80 FL
MONOCYTES # BLD AUTO: 0.6 K/UL
MONOCYTES NFR BLD: 5.6 %
NEUTROPHILS # BLD AUTO: 7.5 K/UL
NEUTROPHILS NFR BLD: 69.3 %
PLATELET # BLD AUTO: 347 K/UL
PMV BLD AUTO: 8.2 FL
POTASSIUM SERPL-SCNC: 4.3 MMOL/L
PROT SERPL-MCNC: 7.6 G/DL
RBC # BLD AUTO: 4.65 M/UL
SODIUM SERPL-SCNC: 138 MMOL/L
WBC # BLD AUTO: 10.75 K/UL

## 2017-07-07 PROCEDURE — 36415 COLL VENOUS BLD VENIPUNCTURE: CPT | Mod: PO

## 2017-07-07 PROCEDURE — 86140 C-REACTIVE PROTEIN: CPT

## 2017-07-07 PROCEDURE — 85025 COMPLETE CBC W/AUTO DIFF WBC: CPT | Mod: PO

## 2017-07-07 PROCEDURE — 85651 RBC SED RATE NONAUTOMATED: CPT | Mod: PO

## 2017-07-07 PROCEDURE — 80053 COMPREHEN METABOLIC PANEL: CPT | Mod: PO

## 2017-07-20 DIAGNOSIS — L40.50 PSORIATIC ARTHRITIS: ICD-10-CM

## 2017-07-20 RX ORDER — CELECOXIB 200 MG/1
CAPSULE ORAL
Qty: 60 CAPSULE | Refills: 1 | Status: SHIPPED | OUTPATIENT
Start: 2017-07-20 | End: 2017-08-03

## 2017-07-26 ENCOUNTER — PATIENT MESSAGE (OUTPATIENT)
Dept: RHEUMATOLOGY | Facility: CLINIC | Age: 37
End: 2017-07-26

## 2017-07-27 NOTE — TELEPHONE ENCOUNTER
Please book an appointment to discuss different treatment options. If no openings available, please overbook with any new patient. Thanks.

## 2017-07-31 ENCOUNTER — OFFICE VISIT (OUTPATIENT)
Dept: RHEUMATOLOGY | Facility: CLINIC | Age: 37
End: 2017-07-31

## 2017-07-31 VITALS
HEART RATE: 77 BPM | DIASTOLIC BLOOD PRESSURE: 83 MMHG | BODY MASS INDEX: 41.8 KG/M2 | SYSTOLIC BLOOD PRESSURE: 143 MMHG | WEIGHT: 293 LBS

## 2017-07-31 DIAGNOSIS — Z79.899 HIGH RISK MEDICATION USE: ICD-10-CM

## 2017-07-31 DIAGNOSIS — L40.59 POLYARTICULAR PSORIATIC ARTHRITIS: Primary | ICD-10-CM

## 2017-07-31 DIAGNOSIS — E66.01 MORBID OBESITY WITH BMI OF 40.0-44.9, ADULT: ICD-10-CM

## 2017-07-31 PROCEDURE — 99999 PR PBB SHADOW E&M-EST. PATIENT-LVL III: CPT | Mod: PBBFAC,,, | Performed by: INTERNAL MEDICINE

## 2017-07-31 PROCEDURE — 99213 OFFICE O/P EST LOW 20 MIN: CPT | Mod: S$PBB,,, | Performed by: INTERNAL MEDICINE

## 2017-07-31 PROCEDURE — 99213 OFFICE O/P EST LOW 20 MIN: CPT | Mod: PBBFAC,PO | Performed by: INTERNAL MEDICINE

## 2017-07-31 NOTE — PROGRESS NOTES
RHEUMATOLOGY CLINIC FOLLOW UP VISIT  Chief complaints:-  My hands and back hurts.    HPI:-  Janice Nova a 37 y.o. pleasant female comes in for a follow up visit with above chief complaints. She is a patient of Dr. Arellano  With psoriatic arthritis who was seen by  in the past year . She was diagnosed with psoriasis and psoriatic arthritis by  and was started on MTX in 02/2016. Then started on enbrel which was d/c after treatment failure and was started on humira which seems to be ineffective.       Review of Systems   Constitutional: Positive for malaise/fatigue. Negative for chills, fever and weight loss.   HENT: Negative for ear discharge, ear pain, hearing loss, nosebleeds and sore throat.    Eyes: Negative for blurred vision, double vision, photophobia, discharge and redness.   Respiratory: Negative for cough, hemoptysis, sputum production and shortness of breath.    Cardiovascular: Negative for chest pain, palpitations and claudication.   Gastrointestinal: Negative for abdominal pain, constipation, diarrhea, melena, nausea and vomiting.   Genitourinary: Negative for dysuria, frequency, hematuria and urgency.   Musculoskeletal: Positive for back pain, joint pain, myalgias and neck pain. Negative for falls.   Skin: Positive for itching and rash.   Neurological: Positive for weakness. Negative for dizziness, tremors, sensory change, speech change, focal weakness, seizures, loss of consciousness and headaches.   Endo/Heme/Allergies: Negative for environmental allergies. Does not bruise/bleed easily.   Psychiatric/Behavioral: Negative for hallucinations and memory loss. The patient is nervous/anxious and has insomnia.        Past Medical History:   Diagnosis Date    Acid reflux     Anxiety     Arthritis     Depression        Past Surgical History:   Procedure Laterality Date    COSMETIC SURGERY          Social History   Substance  Use Topics    Smoking status: Current Every Day Smoker     Packs/day: 0.50     Years: 10.00     Types: Cigarettes    Smokeless tobacco: Never Used    Alcohol use 1.8 oz/week     3 Glasses of wine per week       Family History   Problem Relation Age of Onset    Cancer Mother     Breast cancer Mother     Diabetes Mellitus Maternal Grandfather     Cancer Paternal Grandmother     Psoriasis Father     Arthritis Father     Colon cancer Paternal Aunt     Ovarian cancer Neg Hx     Thrombophilia Neg Hx        No Known Allergies        Physical examination:-    Vitals:    07/31/17 1345   BP: (!) 143/83   Pulse: 77   Weight: (!) 139.8 kg (308 lb 3.3 oz)   PainSc:   7       Physical Exam   Constitutional: She is oriented to person, place, and time and well-developed, well-nourished, and in no distress. No distress.   HENT:   Head: Normocephalic and atraumatic.   Nose: Nose normal.   Mouth/Throat: Oropharynx is clear and moist. No oropharyngeal exudate.   Eyes: Conjunctivae and EOM are normal. Pupils are equal, round, and reactive to light. Right eye exhibits no discharge. Left eye exhibits no discharge. No scleral icterus.   Neck: Normal range of motion. Neck supple.   Cardiovascular: Normal rate and intact distal pulses.    Pulmonary/Chest: Effort normal. No respiratory distress. She exhibits no tenderness.   Abdominal: Soft. There is no tenderness.   Musculoskeletal:   Multiple tender points. No synovitis/dactylitis in small joints of hands or feet. Tender  Range of motion over large joints, but no effusion.    Lymphadenopathy:     She has no cervical adenopathy.   Neurological: She is alert and oriented to person, place, and time. No cranial nerve deficit.   Skin: Skin is warm. Rash noted. She is not diaphoretic. There is erythema. No pallor.   Rash present over the frontal area without involvement of the intertriginous areas. Some scaly rash over scalp. No rash over elbows/knee/umbilicus.    Psychiatric: Mood,  memory, affect and judgment normal.   Nursing note and vitals reviewed.      Labs:-  Results for JENNIFER MCCALLUM (MRN [id number]) as of 9/26/2016 17:42   Ref. Range 2/22/2016 12:27   Anti-SSA Antibody Latest Ref Range: 0.00 - 19.99 EU 2.05   Anti-SSA Interpretation Latest Ref Range: Negative  Negative   CCP Antibodies Latest Ref Range: <5.0 U/mL <0.5   Rheumatoid Factor Latest Ref Range: 0.0 - 15.0 IU/mL <10.0     Results for JENNIFER MCCALLUM (MRN [id number]) as of 9/26/2016 17:42   Ref. Range 7/25/2016 15:10 8/8/2016 13:39 8/8/2016 14:00   WBC Latest Ref Range: 3.90 - 12.70 K/uL 11.13     RBC Latest Ref Range: 4.00 - 5.40 M/uL 4.68     Hemoglobin Latest Ref Range: 12.0 - 16.0 g/dL 11.8 (L)     Hematocrit Latest Ref Range: 37.0 - 48.5 % 37.9     MCV Latest Ref Range: 82 - 98 fL 81 (L)     MCH Latest Ref Range: 27.0 - 31.0 pg 25.2 (L)     MCHC Latest Ref Range: 32.0 - 36.0 % 31.1 (L)     RDW Latest Ref Range: 11.5 - 14.5 % 17.6 (H)     Platelets Latest Ref Range: 150 - 350 K/uL 415 (H)     MPV Latest Ref Range: 9.2 - 12.9 fL 9.0 (L)     Gran% Latest Ref Range: 38.0 - 73.0 % 56.4     Gran # Latest Ref Range: 1.8 - 7.7 K/uL 6.3     Lymph% Latest Ref Range: 18.0 - 48.0 % 31.2     Lymph # Latest Ref Range: 1.0 - 4.8 K/uL 3.5     Mono% Latest Ref Range: 4.0 - 15.0 % 6.7     Mono # Latest Ref Range: 0.3 - 1.0 K/uL 0.8     Eosinophil% Latest Ref Range: 0.0 - 8.0 % 4.9     Eos # Latest Ref Range: 0.0 - 0.5 K/uL 0.5     Basophil% Latest Ref Range: 0.0 - 1.9 % 0.4     Baso # Latest Ref Range: 0.00 - 0.20 K/uL 0.04     Sed Rate Latest Ref Range: 0 - 20 mm/Hr 2     Sodium Latest Ref Range: 136 - 145 mmol/L 137     Potassium Latest Ref Range: 3.5 - 5.1 mmol/L 4.1     Chloride Latest Ref Range: 95 - 110 mmol/L 106     CO2 Latest Ref Range: 23 - 29 mmol/L 22 (L)     Anion Gap Latest Ref Range: 8 - 16 mmol/L 9     BUN, Bld Latest Ref Range: 6 - 20 mg/dL 17     Creatinine Latest Ref Range: 0.5 - 1.4 mg/dL 0.8      eGFR if non African American Latest Ref Range: >60 mL/min/1.73 m^2 >60.0     eGFR if African American Latest Ref Range: >60 mL/min/1.73 m^2 >60.0     Glucose Latest Ref Range: 70 - 110 mg/dL 83     Calcium Latest Ref Range: 8.7 - 10.5 mg/dL 9.4     Alkaline Phosphatase Latest Ref Range: 55 - 135 U/L 70     Total Protein Latest Ref Range: 6.0 - 8.4 g/dL 7.1     Albumin Latest Ref Range: 3.5 - 5.2 g/dL 3.9     Total Bilirubin Latest Ref Range: 0.1 - 1.0 mg/dL 0.3     AST Latest Ref Range: 10 - 40 U/L 23     ALT Latest Ref Range: 10 - 44 U/L 34     CRP Latest Ref Range: 0.0 - 8.2 mg/L 2.8     Hepatitis B Surface Ag Unknown   Negative   HIV 1/2 Ag/Ab Latest Ref Range: Negative    Negative   RPR Latest Ref Range: Non-reactive    Non-reactive   Chlamydia, Amplified DNA Unknown  Negative      Assessment/Plans:-

## 2017-07-31 NOTE — PROGRESS NOTES
"Subjective:       Patient ID: Janice Lawrence is a 37 y.o. female.    Chief Complaint: Psoriatic Arthritis    HPI     Janice Lawrence is a 37 y.o. female comes in for a follow up visit for psoriatic arthritis. She is a patient of Dr. Arellano  With psoriatic arthritis who was seen by  in the past year . She was diagnosed with psoriasis and psoriatic arthritis by  and was started on MTX in 02/2016. Then started on enbrel which was d/c after treatment failure and was started on Humira 40 mg q 14 days which has not been effective. Pt also uses MTX 25 mg SC weekly with folic acid daily. At the last f/u pt was been considered for Baricitinib trial but further enrollment was not completed. Pt sts that today she feels okay but for the past 1-2 months her left 2nd finger has been problematic but her elbows, ankles, toes have been an issue as well. Pt reports joint swelling as well. She uses Celebrex 200 mg which seems to not be as effective compared to the past.  Pt uses tramadol 50 mg prn. Pt was started on SSZ 2,000 mg daily in May but she has discontinued this due to gi upset and poor response. Her psoriasis does not appear active but pt notes spots along the hairline of her scalp and the left elbow.       Treatment course as follows:  2/2016 first visit with Dr. Arellano; had 2 years of symptoms prior treating with NSAIDS. Tipping point was development of shoulder and back pain- was found to have herniated disk and rotator cuff injury. 1 year PTA she developed l 2nd dactylitis, left knee pain. AM stiffness x 2 hours "all over" and "whole body pain."   Noted to have hx of psoriasis on face and scalp. Hx of tendinitis "Throughout." Dad with psoriatic arthritis .  He diagnosed her with PSA. Work-up showing -HLAB27, CCP, RF, JAVIER, elevated sed rate, crp. SI joint normal.   He started MTX.    2/21/2016 was on 15mg po MTX, continued to have pain in both hands and left knee and now ankle pain. 3 " hours AM stiffness. Was on celebrex 200mg BID at the time.   Elbow tenderness and dactylitis 2nd digit    4/2016. Upped MTX to 20mg weekly, folic acid daily  Left ankle and left knee, dactylitis left finger.         Current Outpatient Prescriptions on File Prior to Visit   Medication Sig Dispense Refill    busPIRone (BUSPAR) 10 MG tablet TAKE 1 TABLET (10 MG TOTAL) BY MOUTH 2 (TWO) TIMES DAILY. 60 tablet 2    celecoxib (CELEBREX) 200 MG capsule TAKE 1 CAPSULE (200 MG TOTAL) BY MOUTH 2 (TWO) TIMES DAILY. 60 capsule 1    ergocalciferol (ERGOCALCIFEROL) 50,000 unit Cap Take 1 capsule (50,000 Units total) by mouth every 7 days. 12 capsule 2    esomeprazole (NEXIUM) 40 MG capsule Take 40 mg by mouth before breakfast.      fluoxetine (PROZAC) 40 MG capsule Take 1 capsule (40 mg total) by mouth once daily. 90 capsule 3    folic acid (FOLVITE) 1 MG tablet Take 1 tablet (1 mg total) by mouth once daily. 90 tablet 2    gabapentin (NEURONTIN) 300 MG capsule Take 300 mg by mouth 2 (two) times daily.      methotrexate 25 mg/mL injection Inject 1 mL (25 mg total) into the muscle every 7 days. 10 mL 2    sulfaSALAzine (AZULFIDINE) 500 MG TbEC Week one: 1 tab daily, week 2: one tab twice daily, week 3: 2 tabs in am, 1 tab in irwin week 4: 2 tabs twice daily and continue 120 tablet 1    tramadol (ULTRAM) 50 mg tablet Take 1 tablet (50 mg total) by mouth every 8 (eight) hours as needed for Pain. 90 tablet 3    [DISCONTINUED] adalimumab (HUMIRA) PnKt injection Inject 0.8 mLs (40 mg total) into the skin every 14 (fourteen) days. 6 each 2    [DISCONTINUED] diclofenac sodium (VOLTAREN) 1 % Gel Apply 2 g topically 4 (four) times daily. 300 g 3    [DISCONTINUED] predniSONE (DELTASONE) 10 MG tablet 07xim3n, 16hyw1z, 71ema7u, 11qil0w, 5mgx3d and stop 32 tablet 0     No current facility-administered medications on file prior to visit.          Review of Systems   Constitutional: Negative for chills, fever and unexpected weight  change.   HENT: Negative.  Negative for mouth sores.    Eyes: Negative for redness.        Dry eyes     Gastrointestinal: Negative for abdominal pain, blood in stool, diarrhea and nausea.   Genitourinary: Negative for dysuria.   Musculoskeletal: Positive for arthralgias (hand, foot pain.) and back pain (with herniated disk, on gabapentin for back pain. ).   Skin: Positive for rash (posterior scalp and frontal area). Negative for color change.         Objective:     BP (!) 143/83   Pulse 77   Wt (!) 139.8 kg (308 lb 3.3 oz)   BMI 41.80 kg/m²      Physical Exam   Vitals reviewed.  Constitutional: She is oriented to person, place, and time and well-developed, well-nourished, and in no distress. No distress.   HENT:   Mouth/Throat: Oropharynx is clear and moist.   Eyes: Right eye exhibits no discharge. Left eye exhibits no discharge. No scleral icterus.   Neck: Normal range of motion. Neck supple.   Cardiovascular: Normal rate.    Pulmonary/Chest: Effort normal. No respiratory distress.   Neurological: She is alert and oriented to person, place, and time. No cranial nerve deficit. She exhibits normal muscle tone.   Skin: Skin is warm and dry. Rash (tiny patch of dry skin left elbow and frontal scalp) noted. She is not diaphoretic. No erythema.     Psychiatric: Mood, memory, affect and judgment normal.   Musculoskeletal: Normal range of motion. She exhibits deformity (pt's left 2nd finger with fusiform swelling from the mcp to the PIP but does not extend to the DIP). She exhibits no edema or tenderness.   Negative MTP Squeeze  Shoulders, elbows normal  Bilateral plantar fascia and achilles tenderness     No tenderness at the epicondyle or patellar poles             LABS reviewed    Results for JENNIFER MCCALLUM (MRN [id number]) as of 7/31/2017 17:38   Ref. Range 7/7/2017 08:39   WBC Latest Ref Range: 3.90 - 12.70 K/uL 10.75   RBC Latest Ref Range: 4.00 - 5.40 M/uL 4.65   Hemoglobin Latest Ref Range: 12.0 - 16.0  g/dL 11.9 (L)   Hematocrit Latest Ref Range: 37.0 - 48.5 % 37.0   MCV Latest Ref Range: 82 - 98 fL 80 (L)   MCH Latest Ref Range: 27.0 - 31.0 pg 25.6 (L)   MCHC Latest Ref Range: 32.0 - 36.0 % 32.2   RDW Latest Ref Range: 11.5 - 14.5 % 17.9 (H)   Platelets Latest Ref Range: 150 - 350 K/uL 347       Results for JENNIFER MCCALLUM (MRN [id number]) as of 7/31/2017 17:38   Ref. Range 7/7/2017 08:39   Sodium Latest Ref Range: 136 - 145 mmol/L 138   Potassium Latest Ref Range: 3.5 - 5.1 mmol/L 4.3   Chloride Latest Ref Range: 95 - 110 mmol/L 104   CO2 Latest Ref Range: 23 - 29 mmol/L 24   Anion Gap Latest Ref Range: 8 - 16 mmol/L 10   BUN, Bld Latest Ref Range: 6 - 20 mg/dL 17   Creatinine Latest Ref Range: 0.5 - 1.4 mg/dL 0.8   eGFR if non African American Latest Ref Range: >60 mL/min/1.73 m^2 >60   eGFR if  Latest Ref Range: >60 mL/min/1.73 m^2 >60   Glucose Latest Ref Range: 70 - 110 mg/dL 100   Calcium Latest Ref Range: 8.7 - 10.5 mg/dL 9.4   Alkaline Phosphatase Latest Ref Range: 55 - 135 U/L 89   Total Protein Latest Ref Range: 6.0 - 8.4 g/dL 7.6   Albumin Latest Ref Range: 3.5 - 5.2 g/dL 4.0   Total Bilirubin Latest Ref Range: 0.1 - 1.0 mg/dL 0.3   AST Latest Ref Range: 10 - 40 U/L 31   ALT Latest Ref Range: 10 - 44 U/L 38   CRP Latest Ref Range: 0.0 - 8.2 mg/L 7.0        Ref. Range 6/13/2017 12:45 6/13/2017 13:24 7/7/2017 08:39   Sed Rate Latest Ref Range: 0 - 20 mm/Hr 9  8       X-ray Right Shoulder (2/15/16): There is no evidence for acute fracture or dislocation. The joint spaces appear to be well-maintained.      X-ray Hips (2/15/16): The bony pelvis is intact. Both femoral heads are well-seated within the acetabula. The joint spaces are well maintained. The SI joints are not widened.      X-ray Lumbar Spine (2/15/16): The vertebral bodies demonstrate normal height. There is straightening of the normal lordotic curvature. The disk space heights are maintained. No significant facet  arthropathy.      Arthritis Survey (2/15/16): The vertebral bodies of the cervical spine demonstrate normal height. No subluxation noted on the flexion view. The joint spaces of both hands, knees and both feet are well maintained. No erosive changes are suggested. No soft tissue calcifications are noted.      Abdominal US (2/24/16): Mild diffuse fatty infiltration of the liver. Mild hepatomegaly     MRI Lumbar spine (9/9/14): Broad midline and left-sided disc herniation with contact of the S1 nerve root to the left at L5-S1    Assessment:       1. Polyarticular psoriatic arthritis    2. High risk medication use    3. Morbid obesity with BMI of 40.0-44.9, adult          Psoriatic arthritis. HLA B27 negative. +fmhx, psoriasis (mild), dactylitis, asymmetric knee swelling (l knee), enthesitis at the plantar fascia and achilles tendon, shoulders.    she continues to have skin, joint activity despite full dose MTX subcutaneous, Humira every other week and celebrex BID. Failed Enbrel. On Humira since 7/2016. Pt also reports GI side effects with SSZ, d/c'd after 2 months.   -Skin: minimal skin disease  <10% BSA  -Dactylitis?:  L 2nd digit     Morbid obesity   No medication side effects noted.   Vit d deficiency- on ergo 50K weekly       Plan:   continue methotrexate to 25mg weekly sub q, folic acid 2mg daily  Continue Humira but increase to weekly dosing for now. Will plan to enroll in Baricitinib trial, met with Antonia pisano. Pt will need to be off Humira 3 months prior to enrollement, will be notified when to d/c Humira  Continue Celebrex 200 mg daily for now  Keep up weight loss efforts   Continue gabapentin 300 mg in the am and 600 mg pm  RTC 3 months    Signed,   Jone Junior

## 2017-08-01 DIAGNOSIS — Z79.899 HIGH RISK MEDICATION USE: ICD-10-CM

## 2017-08-01 DIAGNOSIS — L40.59 POLYARTICULAR PSORIATIC ARTHRITIS: ICD-10-CM

## 2017-08-01 NOTE — TELEPHONE ENCOUNTER
LVM- Hello, this is [facility], we have received an order for Humira from your provider and are contacting you to see if you have insurance with active prescription coverage. If you do please provide us the processing information so that we may complete a benefits investigation and provide you with copay information, to set up pickup or shipment, and [facility] consultation. If you do not have coverage we can see what type of assistance you may qualify for and assist in enrolling you into the program.  feel free to give us a call with  any questions prior to hearing back from us at [phone]. thank you!_ 8/1/17MRN:   [id number]

## 2017-08-02 ENCOUNTER — PATIENT MESSAGE (OUTPATIENT)
Dept: RHEUMATOLOGY | Facility: CLINIC | Age: 37
End: 2017-08-02

## 2017-08-02 DIAGNOSIS — L40.50 PSORIATIC ARTHRITIS: Primary | ICD-10-CM

## 2017-08-02 DIAGNOSIS — F33.0 MILD EPISODE OF RECURRENT MAJOR DEPRESSIVE DISORDER: ICD-10-CM

## 2017-08-03 RX ORDER — IBUPROFEN AND FAMOTIDINE 26.6; 8 MG/1; MG/1
1 TABLET ORAL
Qty: 90 TABLET | Refills: 1 | Status: SHIPPED | OUTPATIENT
Start: 2017-08-03 | End: 2017-09-28 | Stop reason: SDUPTHER

## 2017-08-03 NOTE — TELEPHONE ENCOUNTER
Psych referral placed. Also check with patient if she wants to be seen at [facility] since she does not have insurance and paying out of pocket for her visits. She could always return back here once Antonia finds a suitable study for her. Thanks.

## 2017-08-17 ENCOUNTER — PATIENT MESSAGE (OUTPATIENT)
Dept: RHEUMATOLOGY | Facility: CLINIC | Age: 37
End: 2017-08-17

## 2017-08-18 ENCOUNTER — TELEPHONE (OUTPATIENT)
Dept: PSYCHIATRY | Facility: CLINIC | Age: 37
End: 2017-08-18

## 2017-08-18 NOTE — TELEPHONE ENCOUNTER
----- Message from Mehul Magana LPN sent at 8/17/2017  9:08 AM CDT -----  Good Morning,       Dr. WASHINGTON placed a referral for the patient on 8.3.17. If possible please assist her with scheduling an appointment.       Mehul MARRERO LPN

## 2017-09-12 ENCOUNTER — TELEPHONE (OUTPATIENT)
Dept: RHEUMATOLOGY | Facility: CLINIC | Age: 37
End: 2017-09-12

## 2017-09-12 NOTE — TELEPHONE ENCOUNTER
----- Message from Inocencia Nelson sent at 9/12/2017  9:37 AM CDT -----  Contact: self [phone]  States that a new dosage on humera needs to be sent to [facility] at [phone] option 4. Please call back at [phone]//thank you acc

## 2017-09-12 NOTE — TELEPHONE ENCOUNTER
Spoke with pt and since Dr. AWSHINGTON changed her Humira to once weekly at her last visit the Humira assistance program needs a new RX. Will check with program regarding RX sent in on 8.18.17

## 2017-09-12 NOTE — TELEPHONE ENCOUNTER
Spoke with [employer] [facility] and they did receive the new Humira RX on 8.18.17 for a weekly injection and patient will need to call in for a refill.

## 2017-09-28 DIAGNOSIS — L40.50 PSORIATIC ARTHRITIS: ICD-10-CM

## 2017-09-28 RX ORDER — CELECOXIB 200 MG/1
CAPSULE ORAL
Qty: 60 CAPSULE | Refills: 2 | OUTPATIENT
Start: 2017-09-28

## 2017-09-28 RX ORDER — IBUPROFEN AND FAMOTIDINE 26.6; 8 MG/1; MG/1
1 TABLET ORAL
Qty: 90 TABLET | Refills: 1 | Status: SHIPPED | OUTPATIENT
Start: 2017-09-28 | End: 2017-11-01

## 2017-10-31 ENCOUNTER — TELEPHONE (OUTPATIENT)
Dept: RHEUMATOLOGY | Facility: CLINIC | Age: 37
End: 2017-10-31

## 2017-11-01 ENCOUNTER — LAB VISIT (OUTPATIENT)
Dept: LAB | Facility: HOSPITAL | Age: 37
End: 2017-11-01
Attending: INTERNAL MEDICINE

## 2017-11-01 ENCOUNTER — OFFICE VISIT (OUTPATIENT)
Dept: RHEUMATOLOGY | Facility: CLINIC | Age: 37
End: 2017-11-01

## 2017-11-01 VITALS
BODY MASS INDEX: 41.74 KG/M2 | WEIGHT: 293 LBS | HEART RATE: 78 BPM | DIASTOLIC BLOOD PRESSURE: 80 MMHG | SYSTOLIC BLOOD PRESSURE: 137 MMHG

## 2017-11-01 DIAGNOSIS — L40.59 POLYARTICULAR PSORIATIC ARTHRITIS: ICD-10-CM

## 2017-11-01 DIAGNOSIS — Z79.899 HIGH RISK MEDICATION USE: ICD-10-CM

## 2017-11-01 DIAGNOSIS — L40.50 PSORIATIC ARTHRITIS: ICD-10-CM

## 2017-11-01 DIAGNOSIS — L40.9 PSORIASIS: ICD-10-CM

## 2017-11-01 DIAGNOSIS — L40.59 POLYARTICULAR PSORIATIC ARTHRITIS: Primary | ICD-10-CM

## 2017-11-01 LAB
ALBUMIN SERPL BCP-MCNC: 3.9 G/DL
ALP SERPL-CCNC: 98 U/L
ALT SERPL W/O P-5'-P-CCNC: 42 U/L
ANION GAP SERPL CALC-SCNC: 7 MMOL/L
AST SERPL-CCNC: 22 U/L
BASOPHILS # BLD AUTO: 0.03 K/UL
BASOPHILS NFR BLD: 0.3 %
BILIRUB SERPL-MCNC: 0.4 MG/DL
BUN SERPL-MCNC: 13 MG/DL
CALCIUM SERPL-MCNC: 9.8 MG/DL
CHLORIDE SERPL-SCNC: 104 MMOL/L
CO2 SERPL-SCNC: 26 MMOL/L
CREAT SERPL-MCNC: 0.8 MG/DL
CRP SERPL-MCNC: 9.7 MG/L
DIFFERENTIAL METHOD: ABNORMAL
EOSINOPHIL # BLD AUTO: 0.4 K/UL
EOSINOPHIL NFR BLD: 3.5 %
ERYTHROCYTE [DISTWIDTH] IN BLOOD BY AUTOMATED COUNT: 17.3 %
ERYTHROCYTE [SEDIMENTATION RATE] IN BLOOD BY WESTERGREN METHOD: 14 MM/HR
EST. GFR  (AFRICAN AMERICAN): >60 ML/MIN/1.73 M^2
EST. GFR  (NON AFRICAN AMERICAN): >60 ML/MIN/1.73 M^2
GLUCOSE SERPL-MCNC: 109 MG/DL
HCT VFR BLD AUTO: 37.4 %
HGB BLD-MCNC: 12.1 G/DL
LYMPHOCYTES # BLD AUTO: 3.1 K/UL
LYMPHOCYTES NFR BLD: 27 %
MCH RBC QN AUTO: 25.4 PG
MCHC RBC AUTO-ENTMCNC: 32.4 G/DL
MCV RBC AUTO: 78 FL
MONOCYTES # BLD AUTO: 0.5 K/UL
MONOCYTES NFR BLD: 4.1 %
NEUTROPHILS # BLD AUTO: 7.6 K/UL
NEUTROPHILS NFR BLD: 65.1 %
PLATELET # BLD AUTO: 396 K/UL
PMV BLD AUTO: 8.3 FL
POTASSIUM SERPL-SCNC: 4.6 MMOL/L
PROT SERPL-MCNC: 8.1 G/DL
RBC # BLD AUTO: 4.77 M/UL
SODIUM SERPL-SCNC: 137 MMOL/L
WBC # BLD AUTO: 11.59 K/UL

## 2017-11-01 PROCEDURE — 86140 C-REACTIVE PROTEIN: CPT

## 2017-11-01 PROCEDURE — 99999 PR PBB SHADOW E&M-EST. PATIENT-LVL III: CPT | Mod: PBBFAC,,, | Performed by: INTERNAL MEDICINE

## 2017-11-01 PROCEDURE — 99215 OFFICE O/P EST HI 40 MIN: CPT | Mod: S$PBB,,, | Performed by: INTERNAL MEDICINE

## 2017-11-01 PROCEDURE — 36415 COLL VENOUS BLD VENIPUNCTURE: CPT | Mod: PO

## 2017-11-01 PROCEDURE — 80053 COMPREHEN METABOLIC PANEL: CPT | Mod: PO

## 2017-11-01 PROCEDURE — 85025 COMPLETE CBC W/AUTO DIFF WBC: CPT | Mod: PO

## 2017-11-01 PROCEDURE — 99213 OFFICE O/P EST LOW 20 MIN: CPT | Mod: PBBFAC,PO | Performed by: INTERNAL MEDICINE

## 2017-11-01 PROCEDURE — 85651 RBC SED RATE NONAUTOMATED: CPT | Mod: PO

## 2017-11-01 RX ORDER — CELECOXIB 200 MG/1
200 CAPSULE ORAL 2 TIMES DAILY
Qty: 60 CAPSULE | Refills: 2 | Status: SHIPPED | OUTPATIENT
Start: 2017-11-01 | End: 2018-03-15 | Stop reason: SDUPTHER

## 2017-11-01 RX ORDER — ESOMEPRAZOLE MAGNESIUM 40 MG/1
40 CAPSULE, DELAYED RELEASE ORAL
Qty: 30 CAPSULE | Refills: 3 | Status: SHIPPED | OUTPATIENT
Start: 2017-11-01 | End: 2018-03-15 | Stop reason: SDUPTHER

## 2017-11-01 RX ORDER — CELECOXIB 200 MG/1
200 CAPSULE ORAL 2 TIMES DAILY
COMMUNITY
End: 2017-11-01 | Stop reason: SDUPTHER

## 2017-11-01 RX ORDER — TRAMADOL HYDROCHLORIDE 50 MG/1
50 TABLET ORAL
Qty: 30 TABLET | Refills: 0 | Status: SHIPPED | OUTPATIENT
Start: 2017-11-01 | End: 2018-02-22

## 2017-11-01 RX ORDER — METHOTREXATE 25 MG/ML
25 INJECTION, SOLUTION INTRA-ARTERIAL; INTRAMUSCULAR; INTRAVENOUS
Qty: 10 ML | Refills: 2 | Status: SHIPPED | OUTPATIENT
Start: 2017-11-01 | End: 2018-03-19 | Stop reason: SDUPTHER

## 2017-11-01 NOTE — PROGRESS NOTES
RHEUMATOLOGY CLINIC FOLLOW UP VISIT  Chief complaints:-  My hands and back hurts.    HPI:-  Janice Nova a 37 y.o. pleasant female comes in for a follow up visit with above chief complaints. She is a patient of Dr. Arellano  With psoriatic arthritis who was seen by  in the past year . She was diagnosed with psoriasis and psoriatic arthritis by  and was started on MTX in 02/2016. Then started on enbrel which was d/c after treatment failure and was started on humira which seems to be ineffective. So the dose was increased to every week . She has been taking the higher dose with 35 mg weekly methotrexate for 3 months . Reports some improvement , but not much. Still has pain, stiffness and swelling around joints and psoriasis rash under left elbow . Have been getting sudden scaly rash over vagina which was diagnosed as psoriasis by her Gyne.      Review of Systems   Constitutional: Positive for malaise/fatigue. Negative for chills, fever and weight loss.   HENT: Negative for ear discharge, ear pain, hearing loss, nosebleeds and sore throat.    Eyes: Negative for blurred vision, double vision, photophobia, discharge and redness.   Respiratory: Negative for cough, hemoptysis, sputum production and shortness of breath.    Cardiovascular: Negative for chest pain, palpitations and claudication.   Gastrointestinal: Negative for abdominal pain, constipation, diarrhea, melena, nausea and vomiting.   Genitourinary: Negative for dysuria, frequency, hematuria and urgency.   Musculoskeletal: Positive for back pain, joint pain, myalgias and neck pain. Negative for falls.   Skin: Positive for itching and rash.   Neurological: Positive for weakness. Negative for dizziness, tremors, sensory change, speech change, focal weakness, seizures, loss of consciousness and headaches.   Endo/Heme/Allergies: Negative for environmental allergies. Does not bruise/bleed  easily.   Psychiatric/Behavioral: Negative for hallucinations and memory loss. The patient is nervous/anxious and has insomnia.        Past Medical History:   Diagnosis Date    Acid reflux     Anxiety     Arthritis     Depression        Past Surgical History:   Procedure Laterality Date    COSMETIC SURGERY          Social History   Substance Use Topics    Smoking status: Current Every Day Smoker     Packs/day: 0.50     Years: 10.00     Types: Cigarettes    Smokeless tobacco: Never Used    Alcohol use 1.8 oz/week     3 Glasses of wine per week       Family History   Problem Relation Age of Onset    Cancer Mother     Breast cancer Mother     Diabetes Mellitus Maternal Grandfather     Cancer Paternal Grandmother     Psoriasis Father     Arthritis Father     Colon cancer Paternal Aunt     Ovarian cancer Neg Hx     Thrombophilia Neg Hx        No Known Allergies        Physical examination:-    Vitals:    11/01/17 1115   BP: 137/80   Pulse: 78   Weight: (!) 139.6 kg (307 lb 12.2 oz)   PainSc:   4   PainLoc: Generalized       Physical Exam   Constitutional: She is oriented to person, place, and time and well-developed, well-nourished, and in no distress. No distress.   HENT:   Head: Normocephalic and atraumatic.   Nose: Nose normal.   Mouth/Throat: Oropharynx is clear and moist. No oropharyngeal exudate.   Eyes: Conjunctivae and EOM are normal. Pupils are equal, round, and reactive to light. Right eye exhibits no discharge. Left eye exhibits no discharge. No scleral icterus.   Neck: Normal range of motion. Neck supple.   Cardiovascular: Normal rate and intact distal pulses.    Pulmonary/Chest: Effort normal. No respiratory distress. She exhibits no tenderness.   Abdominal: Soft. There is no tenderness.   Musculoskeletal:   Mild to moderate dactylitis over bilateral second finger. Tender  Range of motion over large joints, but no effusion.    Lymphadenopathy:     She has no cervical adenopathy.    Neurological: She is alert and oriented to person, place, and time. No cranial nerve deficit.   Skin: Skin is warm. Rash noted. She is not diaphoretic. There is erythema. No pallor.   Rash present over the frontal area without involvement of the intertriginous areas. Some scaly rash over scalp. No rash over elbows/knee/umbilicus.    Psychiatric: Mood, memory and affect normal.   Nursing note and vitals reviewed.    Assessment/Plans:-  # Polyarticular psoriatic arthritis:-  Active dactylitis. Discontinue humira and start otezla. Advised all adverse effects. Continue high dose methotrexate.   - CBC auto differential; Standing  - Comprehensive metabolic panel; Standing  - C-reactive protein; Standing  - Sedimentation rate, manual; Standing    # High risk medication use:-  High dose methotrexate injections every week . Hold medication if any infection. Safety and toxicity labs today.   - CBC auto differential; Standing  - Comprehensive metabolic panel; Standing  - C-reactive protein; Standing  - Sedimentation rate, manual; Standing     # RTC in 3 months.   Disclaimer: This note was prepared using voice recognition system and is likely to have sound alike errors .  Please call me with any questions

## 2017-11-01 NOTE — ASSESSMENT & PLAN NOTE
Active dactylitis. Discontinue humira and start otezla. Advised all adverse effects. Continue high dose methotrexate.

## 2017-11-03 ENCOUNTER — TELEPHONE (OUTPATIENT)
Dept: PHARMACY | Facility: CLINIC | Age: 37
End: 2017-11-03

## 2017-11-03 NOTE — TELEPHONE ENCOUNTER
Patient returned call placed.     Explained Otezla prescription was received with a very high copayment.    Told the patient I would be transferring her prescription to [facility] in [place] and they would be able assist her.    I emailed patient [facility] phone numbers, address, and email address as she was driving.  She is aware that she will need to speak to someone at [facility] concerning her prescription before it is shipped to her.    Patient's insurance did not require a PA as her prescription plan is only a discount plan.    Patient was very thankful for all the assistance and help provided to her.  It is my pleasure to assist Ms. Camacho!

## 2017-11-03 NOTE — TELEPHONE ENCOUNTER
[facility] received a prescription for Otezla from the [facility] in [place] as discussed with them. We will find out what assistance is available at this time and contact you. feel free to give us a call with  any questions at [phone]

## 2017-11-18 NOTE — PROGRESS NOTES
"Outpatient Psychiatry Initial Visit (MD/NP)    11/20/2017    Janice Lawrence, a 37 y.o. female, presenting for initial evaluation visit. Met with patient.    Reason for Encounter: Patient complains of anxiety, some depression    History of Present Illness: 38 y/o F presents for chronic anxiety, depression, for years. Endorses following symptoms daily/near daily - Nervous/anxious/edgy, not able to stop/control worrying, worrying too much about different things, trouble relaxing, restlessness, easily annoyed/irritable, feeling afraid as if something awful might happen. ALBERTO - 7 = 21. Also endorses problems falling & staying asleep, sad <1/2 time, driven to overeat, slight weight gain, concentration problems, decreased interest, anergia, restlessness. Has problems with chronic pain related to psoriatic arthritis. Has had pattern of pattern of brief relief from dmards then waning of effect. Describes anxiety triggered by pain and disability - work has gone down from 50 hours/week to 20 hours/week due to pain. Heart beats fast, sweats, might start talking too fast, concentration. To start otezla as next treatment + methotrexate IM. meds have been partially helpful.     PsychHx: Depression since teen then generally under control during 20's, worsened in 30's. Saw Dr. Wesley x 15 years then care through primary care. "prozac has always worked great" (though experienced some some emotional blunting). Other meds tried include wellbutrin, effexor. Remote SI, last months ago when in intense pain; Past suicide attempts in late teens (OD x 2 - never bad enough to end up in hospital) none since. Engaged in non-suicidal self-mutilation briefly as teen - cutting on legs. No psych admissions. No AVH. No delusions. Late teens - "super-great, super happy", promiscuous, drinking; all since early 20's. Never treated with mood stabilizers. "on/off treatment from [age]". 1x/year. Adherent with prozac - denies side effects.  "   Buspirone - prescribed/took x 7 months but didn't help.     Meds: psoriatic arthritis, obesity,     Social Hx: No developmental problems. Normal milestones. Single, no kids. 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 Difficulty with adjustment of lifestyle/living standard to finances with reduced work. Mom, good friends, 2 sisters, employees are supportive. Not  (never has been).     Review Of Systems:     GENERAL:  No weight gain or loss  SKIN:  No rashes or lacerations  HEAD:  No headaches  EYES:  No exophthalmos, jaundice or blindness  EARS:  No dizziness, tinnitus or hearing loss  NOSE:  No changes in smell  MOUTH & THROAT:  No dyskinetic movements or obvious goiter  CHEST:  No shortness of breath, hyperventilation or cough  CARDIOVASCULAR:  No tachycardia or chest pain  ABDOMEN:  No nausea, vomiting, pain, constipation or diarrhea  URINARY:  No frequency, dysuria or sexual dysfunction  ENDOCRINE:  No polydipsia, polyuria  MUSCULOSKELETAL:  No pain or stiffness of the joints  NEUROLOGIC:  No weakness, sensory changes, seizures, confusion, memory loss, tremor or other abnormal movements    Current Evaluation:     Nutritional Screening: Considering the patient's height and weight, medications, medical history and preferences, should a referral be made to the dietitian? no    Constitutional  Vitals:  Most recent vital signs, dated less than 90 days prior to this appointment, were not reviewed.  There were no vitals filed for this visit.     General:  unremarkable, age appropriate     Musculoskeletal  Muscle Strength/Tone:  no tremor, no tic   Gait & Station:  non-ataxic     Psychiatric  Appearance: casually dressed & groomed;   Behavior: calm,   Cooperation: cooperative with assessment  Speech: normal rate, volume, tone  Thought Process: linear, goal-directed  Thought Content: No suicidal or homicidal ideation; no delusions  Affect: normal range  Mood: euthymic  Perceptions: No  auditory or visual hallucinations  Level of Consciousness: alert throughout interview  Insight: fair  Cognition: Oriented to person, place, time, & situation  Memory: no apparent deficits to general clinical interview; not formally assessed  Attention/Concentration: no apparent deficits to general clinical interview; not formally assessed  Fund of Knowledge: average by vocabulary/education    Laboratory Data  Lab Visit on 11/01/2017   Component Date Value Ref Range Status    WBC 11/01/2017 11.59  3.90 - 12.70 K/uL Final    RBC 11/01/2017 4.77  4.00 - 5.40 M/uL Final    Hemoglobin 11/01/2017 12.1  12.0 - 16.0 g/dL Final    Hematocrit 11/01/2017 37.4  37.0 - 48.5 % Final    MCV 11/01/2017 78* 82 - 98 fL Final    MCH 11/01/2017 25.4* 27.0 - 31.0 pg Final    MCHC 11/01/2017 32.4  32.0 - 36.0 g/dL Final    RDW 11/01/2017 17.3* 11.5 - 14.5 % Final    Platelets 11/01/2017 396* 150 - 350 K/uL Final    MPV 11/01/2017 8.3* 9.2 - 12.9 fL Final    Gran # 11/01/2017 7.6  1.8 - 7.7 K/uL Final    Lymph # 11/01/2017 3.1  1.0 - 4.8 K/uL Final    Mono # 11/01/2017 0.5  0.3 - 1.0 K/uL Final    Eos # 11/01/2017 0.4  0.0 - 0.5 K/uL Final    Baso # 11/01/2017 0.03  0.00 - 0.20 K/uL Final    Gran% 11/01/2017 65.1  38.0 - 73.0 % Final    Lymph% 11/01/2017 27.0  18.0 - 48.0 % Final    Mono% 11/01/2017 4.1  4.0 - 15.0 % Final    Eosinophil% 11/01/2017 3.5  0.0 - 8.0 % Final    Basophil% 11/01/2017 0.3  0.0 - 1.9 % Final    Differential Method 11/01/2017 Automated   Final    Sodium 11/01/2017 137  136 - 145 mmol/L Final    Potassium 11/01/2017 4.6  3.5 - 5.1 mmol/L Final    Chloride 11/01/2017 104  95 - 110 mmol/L Final    CO2 11/01/2017 26  23 - 29 mmol/L Final    Glucose 11/01/2017 109  70 - 110 mg/dL Final    BUN, Bld 11/01/2017 13  6 - 20 mg/dL Final    Creatinine 11/01/2017 0.8  0.5 - 1.4 mg/dL Final    Calcium 11/01/2017 9.8  8.7 - 10.5 mg/dL Final    Total Protein 11/01/2017 8.1  6.0 - 8.4 g/dL Final     Albumin 11/01/2017 3.9  3.5 - 5.2 g/dL Final    Total Bilirubin 11/01/2017 0.4  0.1 - 1.0 mg/dL Final    Alkaline Phosphatase 11/01/2017 98  55 - 135 U/L Final    AST 11/01/2017 22  10 - 40 U/L Final    ALT 11/01/2017 42  10 - 44 U/L Final    Anion Gap 11/01/2017 7* 8 - 16 mmol/L Final    eGFR if African American 11/01/2017 >60  >60 mL/min/1.73 m^2 Final    eGFR if non African American 11/01/2017 >60  >60 mL/min/1.73 m^2 Final    CRP 11/01/2017 9.7* 0.0 - 8.2 mg/L Final    Sed Rate 11/01/2017 14  0 - 20 mm/Hr Final     Medications  Outpatient Encounter Prescriptions as of 11/20/2017   Medication Sig Dispense Refill    apremilast 10 mg (4)-20 mg (4)-30 mg (47) DsPk Initial: 10 mg in the morning. Day 2: 10 mg twice daily; Day 3: 10 mg in the morning and 20 mg in the evening; Day 4: 20 mg twice daily; Day 5: 20 mg in the morning and 30 mg in the evening. Maintenance dose: 30 mg twice daily starting on day 6 55 tablet 0    busPIRone (BUSPAR) 10 MG tablet TAKE 1 TABLET (10 MG TOTAL) BY MOUTH 2 (TWO) TIMES DAILY. 60 tablet 2    celecoxib (CELEBREX) 200 MG capsule Take 1 capsule (200 mg total) by mouth 2 (two) times daily. 60 capsule 2    ergocalciferol (ERGOCALCIFEROL) 50,000 unit Cap Take 1 capsule (50,000 Units total) by mouth every 7 days. 12 capsule 2    esomeprazole (NEXIUM) 40 MG capsule Take 1 capsule (40 mg total) by mouth before breakfast. 30 capsule 3    fluoxetine (PROZAC) 40 MG capsule Take 1 capsule (40 mg total) by mouth once daily. 90 capsule 3    folic acid (FOLVITE) 1 MG tablet Take 1 tablet (1 mg total) by mouth once daily. 90 tablet 2    gabapentin (NEURONTIN) 300 MG capsule Take 300 mg by mouth 2 (two) times daily.      methotrexate 25 mg/mL injection Inject 1 mL (25 mg total) into the muscle every 7 days. 10 mL 2    traMADol (ULTRAM) 50 mg tablet Take 1 tablet (50 mg total) by mouth every 24 hours as needed for Pain. 30 tablet 0     No facility-administered encounter medications on  file as of 11/20/2017.            Assessment - Diagnosis - Goals:     Impression: 36 y/o F with chronic depression, recently mild, worsening anxiety in recently years with symptoms tracking with physical symptoms of psoriatic arthritis/disability.     Dx: Generalized anxiety disorder; hx of MDD    Treatment Goals:  Specify outcomes written in observable, behavioral terms:   Improve coping skills, decrease anxiety by self-report    Treatment Plan/Recommendations:   · Trial of 60 mg fluoxetine daily.   · Consider psychotherapy for mood.   · Discussed risks, benefits, and alternatives to treatment plan documented above with patient. I answered all patient questions related to this plan and patient expressed understanding and agreement.   ·   Return to Clinic: 2 months    Counseling time: 10 minutes  Total time: 50 minutes    HENRRY Wolf MD

## 2017-11-20 ENCOUNTER — OFFICE VISIT (OUTPATIENT)
Dept: PSYCHIATRY | Facility: CLINIC | Age: 37
End: 2017-11-20

## 2017-11-20 DIAGNOSIS — F41.1 GAD (GENERALIZED ANXIETY DISORDER): Primary | ICD-10-CM

## 2017-11-20 PROCEDURE — 90792 PSYCH DIAG EVAL W/MED SRVCS: CPT | Mod: S$PBB,,, | Performed by: PSYCHIATRY & NEUROLOGY

## 2017-11-20 PROCEDURE — 90792 PSYCH DIAG EVAL W/MED SRVCS: CPT | Mod: PBBFAC,PO | Performed by: PSYCHIATRY & NEUROLOGY

## 2017-11-20 RX ORDER — FLUOXETINE HYDROCHLORIDE 20 MG/1
60 CAPSULE ORAL DAILY
Qty: 90 CAPSULE | Refills: 1 | Status: SHIPPED | OUTPATIENT
Start: 2017-11-20 | End: 2018-01-15 | Stop reason: ALTCHOICE

## 2018-01-10 ENCOUNTER — TELEPHONE (OUTPATIENT)
Dept: PHARMACY | Facility: CLINIC | Age: 38
End: 2018-01-10

## 2018-01-10 NOTE — TELEPHONE ENCOUNTER
Initial Otezla shipment 1/10/18. Address confirmed. Copay $0 (004). Planned start date: 1/12/18.   Reviewed [facility]'s process.

## 2018-01-10 NOTE — TELEPHONE ENCOUNTER
FOR DOCUMENTATION ONLY:  Otezla prior authorization approved x 1 year  1/10/18 through 1/10/19  Case ID: [id number]  $0 co-pay after co-pay savings card

## 2018-01-15 ENCOUNTER — OFFICE VISIT (OUTPATIENT)
Dept: PSYCHIATRY | Facility: CLINIC | Age: 38
End: 2018-01-15
Payer: COMMERCIAL

## 2018-01-15 DIAGNOSIS — F33.0 MILD EPISODE OF RECURRENT MAJOR DEPRESSIVE DISORDER: ICD-10-CM

## 2018-01-15 DIAGNOSIS — F41.9 ANXIETY: Primary | ICD-10-CM

## 2018-01-15 PROCEDURE — 99213 OFFICE O/P EST LOW 20 MIN: CPT | Mod: S$GLB,,, | Performed by: PSYCHIATRY & NEUROLOGY

## 2018-01-15 RX ORDER — SERTRALINE HYDROCHLORIDE 100 MG/1
TABLET, FILM COATED ORAL
Qty: 30 TABLET | Refills: 2 | Status: SHIPPED | OUTPATIENT
Start: 2018-01-15 | End: 2018-04-17 | Stop reason: SDUPTHER

## 2018-01-15 NOTE — PROGRESS NOTES
"Outpatient Psychiatry Follow-up Visit (MD/NP)    1/15/2018    Janice Lawrence, a 37 y.o. female, presenting for follow-up visit. Met with patient.    Reason for Encounter: Patient complains of anxiety, some depression    IntervalHx: Patient seen and interviewed for follow-up, about 2 months following initial visit. Endorses ongoing overworry, tension, apprehension. Worries about falling herself after seeing mother fall in October. Not getting better. Alberto-7 = 19; qids = 8. Generally minimally to modestly improved. Back to work. Mildly more motivated than previously. Adherent with higher dose of medication. "cloudy for a couple of weeks" then adjusted. Chilling out. Past trials - wellbutrin, lexapro, venlafaxine. Ongoing symptoms of psoriatic arthritis moderate. New meds - apremilast     Background: 38 y/o F presents for chronic anxiety, depression, for years. Endorses following symptoms daily/near daily - Nervous/anxious/edgy, not able to stop/control worrying, worrying too much about different things, trouble relaxing, restlessness, easily annoyed/irritable, feeling afraid as if something awful might happen. ALBERTO - 7 = 21. Also endorses problems falling & staying asleep, sad <1/2 time, driven to overeat, slight weight gain, concentration problems, decreased interest, anergia, restlessness. Has problems with chronic pain related to psoriatic arthritis. Has had pattern of pattern of brief relief from dmards then waning of effect. Describes anxiety triggered by pain and disability - work has gone down from 50 hours/week to 20 hours/week due to pain. Heart beats fast, sweats, might start talking too fast, concentration. To start otezla as next treatment + methotrexate IM. meds have been partially helpful. PsychHx: Depression since teen then generally under control during 20's, worsened in 30's. Saw Dr. Wesley x 15 years then care through primary care. "prozac has always worked great" (though experienced some " "some emotional blunting). Other meds tried include wellbutrin, effexor. Remote SI, last months ago when in intense pain; Past suicide attempts in late teens (OD x 2 - never bad enough to end up in hospital) none since. Engaged in non-suicidal self-mutilation briefly as teen - cutting on legs. No psych admissions. No AVH. No delusions. Late teens - "super-great, super happy", promiscuous, drinking; all since early 20's. Never treated with mood stabilizers. "on/off treatment from 17-20". 1x/year. Adherent with prozac - denies side effects.  Buspirone - prescribed/took x 7 months but didn't help. Meds: psoriatic arthritis, obesity, Social Hx: No developmental problems. Normal milestones. Single, no kids. 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 Difficulty with adjustment of lifestyle/living standard to finances with reduced work. Mom, good friends, 2 sisters, employees are supportive. Not  (never has been).     Review Of Systems:     GENERAL:  No weight gain or loss  SKIN:  No rashes or lacerations  HEAD:  No headaches  EYES:  No exophthalmos, jaundice or blindness  EARS:  No dizziness, tinnitus or hearing loss  NOSE:  No changes in smell  MOUTH & THROAT:  No dyskinetic movements or obvious goiter  CHEST:  No shortness of breath, hyperventilation or cough  CARDIOVASCULAR:  No tachycardia or chest pain  ABDOMEN:  No nausea, vomiting, pain, constipation or diarrhea  URINARY:  No frequency, dysuria or sexual dysfunction  ENDOCRINE:  No polydipsia, polyuria  MUSCULOSKELETAL:  No pain or stiffness of the joints  NEUROLOGIC:  No weakness, sensory changes, seizures, confusion, memory loss, tremor or other abnormal movements    Current Evaluation:     Nutritional Screening: Considering the patient's height and weight, medications, medical history and preferences, should a referral be made to the dietitian? no    Constitutional  Vitals:  Most recent vital signs, dated less than 90 days prior to " this appointment, were not reviewed.  There were no vitals filed for this visit.     General:  unremarkable, age appropriate     Musculoskeletal  Muscle Strength/Tone:  no tremor, no tic   Gait & Station:  non-ataxic     Psychiatric  Appearance: casually dressed & groomed;   Behavior: calm,   Cooperation: cooperative with assessment  Speech: normal rate, volume, tone  Thought Process: linear, goal-directed  Thought Content: No suicidal or homicidal ideation; no delusions  Affect: restricted  Mood: dysphoric  Perceptions: No auditory or visual hallucinations  Level of Consciousness: alert throughout interview  Insight: fair  Cognition: Oriented to person, place, time, & situation  Memory: no apparent deficits to general clinical interview; not formally assessed  Attention/Concentration: no apparent deficits to general clinical interview; not formally assessed  Fund of Knowledge: average by vocabulary/education    Laboratory Data  No visits with results within 1 Month(s) from this visit.   Latest known visit with results is:   Lab Visit on 11/01/2017   Component Date Value Ref Range Status    WBC 11/01/2017 11.59  3.90 - 12.70 K/uL Final    RBC 11/01/2017 4.77  4.00 - 5.40 M/uL Final    Hemoglobin 11/01/2017 12.1  12.0 - 16.0 g/dL Final    Hematocrit 11/01/2017 37.4  37.0 - 48.5 % Final    MCV 11/01/2017 78* 82 - 98 fL Final    MCH 11/01/2017 25.4* 27.0 - 31.0 pg Final    MCHC 11/01/2017 32.4  32.0 - 36.0 g/dL Final    RDW 11/01/2017 17.3* 11.5 - 14.5 % Final    Platelets 11/01/2017 396* 150 - 350 K/uL Final    MPV 11/01/2017 8.3* 9.2 - 12.9 fL Final    Gran # 11/01/2017 7.6  1.8 - 7.7 K/uL Final    Lymph # 11/01/2017 3.1  1.0 - 4.8 K/uL Final    Mono # 11/01/2017 0.5  0.3 - 1.0 K/uL Final    Eos # 11/01/2017 0.4  0.0 - 0.5 K/uL Final    Baso # 11/01/2017 0.03  0.00 - 0.20 K/uL Final    Gran% 11/01/2017 65.1  38.0 - 73.0 % Final    Lymph% 11/01/2017 27.0  18.0 - 48.0 % Final    Mono% 11/01/2017 4.1   4.0 - 15.0 % Final    Eosinophil% 11/01/2017 3.5  0.0 - 8.0 % Final    Basophil% 11/01/2017 0.3  0.0 - 1.9 % Final    Differential Method 11/01/2017 Automated   Final    Sodium 11/01/2017 137  136 - 145 mmol/L Final    Potassium 11/01/2017 4.6  3.5 - 5.1 mmol/L Final    Chloride 11/01/2017 104  95 - 110 mmol/L Final    CO2 11/01/2017 26  23 - 29 mmol/L Final    Glucose 11/01/2017 109  70 - 110 mg/dL Final    BUN, Bld 11/01/2017 13  6 - 20 mg/dL Final    Creatinine 11/01/2017 0.8  0.5 - 1.4 mg/dL Final    Calcium 11/01/2017 9.8  8.7 - 10.5 mg/dL Final    Total Protein 11/01/2017 8.1  6.0 - 8.4 g/dL Final    Albumin 11/01/2017 3.9  3.5 - 5.2 g/dL Final    Total Bilirubin 11/01/2017 0.4  0.1 - 1.0 mg/dL Final    Alkaline Phosphatase 11/01/2017 98  55 - 135 U/L Final    AST 11/01/2017 22  10 - 40 U/L Final    ALT 11/01/2017 42  10 - 44 U/L Final    Anion Gap 11/01/2017 7* 8 - 16 mmol/L Final    eGFR if African American 11/01/2017 >60  >60 mL/min/1.73 m^2 Final    eGFR if non African American 11/01/2017 >60  >60 mL/min/1.73 m^2 Final    CRP 11/01/2017 9.7* 0.0 - 8.2 mg/L Final    Sed Rate 11/01/2017 14  0 - 20 mm/Hr Final     Medications  Outpatient Encounter Prescriptions as of 1/15/2018   Medication Sig Dispense Refill    apremilast (OTEZLA) 30 mg Tab Take 30 mg by mouth 2 (two) times daily. 60 tablet 3    apremilast 10 mg (4)-20 mg (4)-30 mg (47) DsPk Initial: 10 mg in the morning. Day 2: 10 mg twice daily; Day 3: 10 mg in the morning and 20 mg in the evening; Day 4: 20 mg twice daily; Day 5: 20 mg in the morning and 30 mg in the evening. Maintenance dose: 30 mg twice daily starting on day 6 55 tablet 0    celecoxib (CELEBREX) 200 MG capsule Take 1 capsule (200 mg total) by mouth 2 (two) times daily. 60 capsule 2    ergocalciferol (ERGOCALCIFEROL) 50,000 unit Cap Take 1 capsule (50,000 Units total) by mouth every 7 days. 12 capsule 2    esomeprazole (NEXIUM) 40 MG capsule Take 1 capsule (40  mg total) by mouth before breakfast. 30 capsule 3    FLUoxetine (PROZAC) 20 MG capsule Take 3 capsules (60 mg total) by mouth once daily. 90 capsule 1    folic acid (FOLVITE) 1 MG tablet Take 1 tablet (1 mg total) by mouth once daily. 90 tablet 2    gabapentin (NEURONTIN) 300 MG capsule Take 300 mg by mouth 2 (two) times daily.      methotrexate 25 mg/mL injection Inject 1 mL (25 mg total) into the muscle every 7 days. 10 mL 2    traMADol (ULTRAM) 50 mg tablet Take 1 tablet (50 mg total) by mouth every 24 hours as needed for Pain. 30 tablet 0     No facility-administered encounter medications on file as of 1/15/2018.      Assessment - Diagnosis - Goals:     Impression: 38 y/o F with chronic depression, recently mild, worsening anxiety in recently years with symptoms tracking with physical symptoms of psoriatic arthritis/disability. Modest improvements from previously.      Dx: Generalized anxiety disorder; hx of MDD    Treatment Goals:  Specify outcomes written in observable, behavioral terms:   Improve coping skills, decrease anxiety by self-report    Treatment Plan/Recommendations:   · Trial of sertraline up to 100 daily.   · Consider psychotherapy for mood.   · Discussed risks, benefits, and alternatives to treatment plan documented above with patient. I answered all patient questions related to this plan and patient expressed understanding and agreement.     Return to Clinic: 2 months    Counseling time: 5 minutes  Total time: 20 minutes    HENRRY Wolf MD

## 2018-01-22 ENCOUNTER — DOCUMENTATION ONLY (OUTPATIENT)
Dept: RHEUMATOLOGY | Facility: CLINIC | Age: 38
End: 2018-01-22

## 2018-02-05 ENCOUNTER — HOSPITAL ENCOUNTER (OUTPATIENT)
Dept: RADIOLOGY | Facility: HOSPITAL | Age: 38
Discharge: HOME | End: 2018-02-05
Attending: INTERNAL MEDICINE
Payer: COMMERCIAL

## 2018-02-05 ENCOUNTER — OFFICE VISIT (OUTPATIENT)
Dept: RHEUMATOLOGY | Facility: CLINIC | Age: 38
End: 2018-02-05
Payer: COMMERCIAL

## 2018-02-05 VITALS
DIASTOLIC BLOOD PRESSURE: 77 MMHG | SYSTOLIC BLOOD PRESSURE: 129 MMHG | BODY MASS INDEX: 41.83 KG/M2 | WEIGHT: 293 LBS | HEART RATE: 76 BPM

## 2018-02-05 DIAGNOSIS — Z79.899 HIGH RISK MEDICATION USE: ICD-10-CM

## 2018-02-05 DIAGNOSIS — L40.59 POLYARTICULAR PSORIATIC ARTHRITIS: Primary | ICD-10-CM

## 2018-02-05 DIAGNOSIS — L40.59 POLYARTICULAR PSORIATIC ARTHRITIS: ICD-10-CM

## 2018-02-05 DIAGNOSIS — L40.9 PSORIASIS: ICD-10-CM

## 2018-02-05 PROCEDURE — 99999 PR PBB SHADOW E&M-EST. PATIENT-LVL III: CPT | Mod: PBBFAC,,, | Performed by: INTERNAL MEDICINE

## 2018-02-05 PROCEDURE — 3008F BODY MASS INDEX DOCD: CPT | Mod: S$GLB,,, | Performed by: INTERNAL MEDICINE

## 2018-02-05 PROCEDURE — 99214 OFFICE O/P EST MOD 30 MIN: CPT | Mod: S$GLB,,, | Performed by: INTERNAL MEDICINE

## 2018-02-05 PROCEDURE — 77077 JOINT SURVEY SINGLE VIEW: CPT | Mod: 26,,, | Performed by: RADIOLOGY

## 2018-02-05 PROCEDURE — 77077 JOINT SURVEY SINGLE VIEW: CPT | Mod: TC,PO

## 2018-02-05 NOTE — PROGRESS NOTES
RHEUMATOLOGY CLINIC FOLLOW UP VISIT  Chief complaints:-  My hands hurt.    HPI:-  Janice Nova a 37 y.o. pleasant female comes in for a follow up visit with above chief complaints. She was diagnosed with psoriatic arthritis by Dr. Arellano  Since she had skin psoriasis and inflammatory asymmteric polyarthritis. She was started on MTX in 02/2016. Then she failed both enbrel and humira. Couldn't go to otezla without trying 2 TNF's as advised by insurance company. She was started on otezla in last visit and could get it filled by pharmacy only 4 weeks back. She had mild GI upset from otezla which subsided after increasing the dose. She is tolerating it well now, but still has active PsA. Also has worsening skin rashes under left elbow and scalp.     Review of Systems   Constitutional: Positive for malaise/fatigue. Negative for chills, fever and weight loss.   HENT: Negative for ear discharge, ear pain, hearing loss, nosebleeds and sore throat.    Eyes: Negative for blurred vision, double vision, photophobia, discharge and redness.   Respiratory: Negative for cough, hemoptysis, sputum production and shortness of breath.    Cardiovascular: Negative for chest pain, palpitations and claudication.   Gastrointestinal: Negative for abdominal pain, constipation, diarrhea, melena, nausea and vomiting.   Genitourinary: Negative for dysuria, frequency, hematuria and urgency.   Musculoskeletal: Positive for back pain, joint pain, myalgias and neck pain. Negative for falls.   Skin: Positive for itching and rash.   Neurological: Positive for weakness. Negative for dizziness, tremors, sensory change, speech change, focal weakness, seizures, loss of consciousness and headaches.   Endo/Heme/Allergies: Negative for environmental allergies. Does not bruise/bleed easily.   Psychiatric/Behavioral: Negative for hallucinations and memory loss. The patient is nervous/anxious  and has insomnia.        Past Medical History:   Diagnosis Date    Acid reflux     Anxiety     Arthritis     Depression        Past Surgical History:   Procedure Laterality Date    COSMETIC SURGERY          Social History   Substance Use Topics    Smoking status: Current Every Day Smoker     Packs/day: 0.50     Years: 10.00     Types: Cigarettes    Smokeless tobacco: Never Used    Alcohol use 1.8 oz/week     3 Glasses of wine per week       Family History   Problem Relation Age of Onset    Cancer Mother     Breast cancer Mother     Diabetes Mellitus Maternal Grandfather     Cancer Paternal Grandmother     Psoriasis Father     Arthritis Father     Colon cancer Paternal Aunt     Ovarian cancer Neg Hx     Thrombophilia Neg Hx        No Known Allergies        Physical examination:-    Vitals:    02/05/18 1107   BP: 129/77   Pulse: 76   Weight: (!) 139.9 kg (308 lb 6.8 oz)   PainSc:   8   PainLoc: Generalized       Physical Exam   Constitutional: She is oriented to person, place, and time and well-developed, well-nourished, and in no distress. No distress.   HENT:   Head: Normocephalic and atraumatic.   Nose: Nose normal.   Mouth/Throat: Oropharynx is clear and moist. No oropharyngeal exudate.   Eyes: Conjunctivae and EOM are normal. Pupils are equal, round, and reactive to light. Right eye exhibits no discharge. Left eye exhibits no discharge. No scleral icterus.   Neck: Normal range of motion. Neck supple.   Cardiovascular: Normal rate and intact distal pulses.    Pulmonary/Chest: Effort normal. No respiratory distress. She exhibits no tenderness.   Abdominal: Soft. There is no tenderness.   Musculoskeletal:   Severe dactylitis over right thumb and left third finger.  Mild to moderate dactylitis over other fingers. Tenderness present over right wrists. Enthesitis over bilateral achilles and patellar tendons.  Tender  Range of motion over large joints, but no effusion.    Lymphadenopathy:     She has no  cervical adenopathy.   Neurological: She is alert and oriented to person, place, and time. No cranial nerve deficit.   Skin: Skin is warm. Rash noted. She is not diaphoretic. There is erythema. No pallor.   Rash present over the frontal area without involvement of the intertriginous areas. Some scaly rash over scalp. No rash over elbows/knee/umbilicus.    Psychiatric: Mood, memory and affect normal.   Nursing note and vitals reviewed.    Assessment/Plans:-  # Polyarticular psoriatic arthritis:-  Active dactylitis. Recently started on otezla. Couldn't evaluate efficacy since she hasn't been on it for long. Continue otezla maintenance dose for another month and if no improvement, switch to cosentyx. Continue high dose methotrexate. Arthritis survey to look for any erosions.   - CBC auto differential; Standing  - Comprehensive metabolic panel; Standing  - C-reactive protein; Standing  - Sedimentation rate, manual; Standing  - XR Arthritis Survey; Future    # High risk medication use:-  High dose methotrexate injections every week . Hold medication if any infection. Safety and toxicity labs today.   - CBC auto differential; Standing  - Comprehensive metabolic panel; Standing  - C-reactive protein; Standing  - Sedimentation rate, manual; Standing     # RTC in 6 weeks.   Disclaimer: This note was prepared using voice recognition system and is likely to have sound alike errors .  Please call me with any questions

## 2018-02-06 NOTE — TELEPHONE ENCOUNTER
Patient called to refill Otezla.  Patient could not provide dose count.  Ship 2/8/18 for 2/9/18 delivery.  Copay $0 in 004.  Patient confirmed no new allergies, or health conditions.  Verified address.  Patient started on Zoloft, so transferred call to [facility] for .  [place]

## 2018-02-20 ENCOUNTER — PATIENT MESSAGE (OUTPATIENT)
Dept: RHEUMATOLOGY | Facility: CLINIC | Age: 38
End: 2018-02-20

## 2018-02-20 DIAGNOSIS — L40.59 POLYARTICULAR PSORIATIC ARTHRITIS: Primary | ICD-10-CM

## 2018-02-20 DIAGNOSIS — L40.0 PLAQUE PSORIASIS: ICD-10-CM

## 2018-02-21 ENCOUNTER — PATIENT MESSAGE (OUTPATIENT)
Dept: RHEUMATOLOGY | Facility: CLINIC | Age: 38
End: 2018-02-21

## 2018-02-22 ENCOUNTER — TELEPHONE (OUTPATIENT)
Dept: PHARMACY | Facility: CLINIC | Age: 38
End: 2018-02-22

## 2018-02-22 ENCOUNTER — OFFICE VISIT (OUTPATIENT)
Dept: OBSTETRICS AND GYNECOLOGY | Facility: CLINIC | Age: 38
End: 2018-02-22
Payer: COMMERCIAL

## 2018-02-22 VITALS
SYSTOLIC BLOOD PRESSURE: 102 MMHG | DIASTOLIC BLOOD PRESSURE: 70 MMHG | WEIGHT: 293 LBS | BODY MASS INDEX: 39.68 KG/M2 | HEIGHT: 72 IN

## 2018-02-22 DIAGNOSIS — Z01.419 WELL WOMAN EXAM WITH ROUTINE GYNECOLOGICAL EXAM: Primary | ICD-10-CM

## 2018-02-22 DIAGNOSIS — Z11.3 SCREEN FOR STD (SEXUALLY TRANSMITTED DISEASE): ICD-10-CM

## 2018-02-22 PROCEDURE — 87491 CHLMYD TRACH DNA AMP PROBE: CPT

## 2018-02-22 PROCEDURE — 99395 PREV VISIT EST AGE 18-39: CPT | Mod: S$GLB,,, | Performed by: OBSTETRICS & GYNECOLOGY

## 2018-02-22 PROCEDURE — 99999 PR PBB SHADOW E&M-EST. PATIENT-LVL III: CPT | Mod: PBBFAC,,, | Performed by: OBSTETRICS & GYNECOLOGY

## 2018-02-22 NOTE — PROGRESS NOTES
Subjective:       Patient ID: Janice Lawrence is a 37 y.o. female.    Chief Complaint:  Annual Exam      History of Present Illness  HPI  Annual Exam-Premenopausal  Patient presents for annual exam. The patient has no complaints today. The patient is sexually active and uses condoms. GYN screening history: last pap: approximate date  and was normal and last mammogram: patient has never had a mammogram. Pt's mother was diagnosed with Stage IV Breast cancer at age 42 yo.  The patient wears seatbelts: yes. The patient participates in regular exercise: no. Has the patient ever been transfused or tattooed?: yes. The patient reports that there is not domestic violence in her life.  Menses are regular and periods last 5 days.  Denies excessive bleeding or cramping.  Pt was treated for Chlamydia (diagnosed by [facility] in 2017).  Requests IVAN.        GYN & OB History  Patient's last menstrual period was 2018.   Date of Last Pap: 2016    OB History    Para Term  AB Living   1 0     1     SAB TAB Ectopic Multiple Live Births                  # Outcome Date GA Lbr Nikolas/2nd Weight Sex Delivery Anes PTL Lv   1 AB                   Review of Systems  Review of Systems   Constitutional: Negative for activity change, appetite change, fatigue, fever and unexpected weight change.   Respiratory: Negative for shortness of breath.    Cardiovascular: Negative for chest pain, palpitations and leg swelling.   Gastrointestinal: Negative for abdominal pain, bloating, blood in stool, constipation, diarrhea, nausea and vomiting.   Genitourinary: Negative for dyspareunia, dysuria, flank pain, frequency, genital sores, hematuria, menorrhagia, menstrual problem, pelvic pain, vaginal bleeding, vaginal discharge, vaginal pain, dysmenorrhea, urinary incontinence and vaginal odor.   Musculoskeletal: Negative for back pain.   Neurological: Negative for syncope and headaches.   Breast: Negative for  breast mass, breast pain, nipple discharge and skin changes          Objective:    Physical Exam:   Constitutional: She is oriented to person, place, and time. She appears well-developed and well-nourished. No distress.    HENT:   Head: Normocephalic and atraumatic.    Eyes: EOM are normal. Pupils are equal, round, and reactive to light.    Neck: Normal range of motion. Neck supple.    Cardiovascular: Normal rate, regular rhythm and normal heart sounds.     Pulmonary/Chest: Effort normal and breath sounds normal.        Abdominal: Soft. Bowel sounds are normal. She exhibits no distension. There is no tenderness.     Genitourinary: Vagina normal and uterus normal. Pelvic exam was performed with patient supine. There is no rash, tenderness, lesion or injury on the right labia. There is no rash, tenderness, lesion or injury on the left labia. Uterus is not deviated, not enlarged and not tender. Cervix is normal. Right adnexum displays no mass, no tenderness and no fullness. Left adnexum displays no mass, no tenderness and no fullness. No erythema, tenderness or bleeding in the vagina. No foreign body in the vagina. No signs of injury around the vagina. No vaginal discharge found. Cervix exhibits no motion tenderness, no discharge and no friability.           Musculoskeletal: Normal range of motion and moves all extremeties. She exhibits no edema or tenderness.       Neurological: She is alert and oriented to person, place, and time.    Skin: Skin is warm and dry.    Psychiatric: She has a normal mood and affect. Her behavior is normal. Thought content normal.          Assessment:        1. Well woman exam with routine gynecological exam    2. Screen for STD (sexually transmitted disease)             Plan:      Well woman exam with routine gynecological exam  -     Mammo Digital Screening Bilat with CAD; Future; Expected date: 02/22/2018  -     Pt was counseled on cervical/vaginal screening guidelines and  recommendations.  Last pap NILM on 2016.  As per current ASCCP guidelines, next pap is due 2019.  -     Pt was advised on current breast cancer screening recommendations.  Mother was diagnosed with breast cancer (Stage IV) at age 40 yo.  Pt requests to proceed with breast exam today and baseline screening MMG.  -     Follow up with PCP for routine health maintenance needs.  -     Pt was counseled on contraception options.  Pt is taking potentially teratogenic/abortifacient medications (methotrexate) and is only using condoms as birth control.  Medications are being monitored by her Rheumatologist and pt reports that this MD is aware that pt is not using more reliable contraception.  Pt was encouraged to consider contraception.  Pt will think about options but declines contraception at this time.  Pt is aware of risks associated with pregnancy while using these medications.    Screen for STD (sexually transmitted disease)  -     C. trachomatis/N. gonorrhoeae by AMP DNA Cervix  -     S/p treatment for Chlamydia 12/2017.      Follow-up in about 1 year (around 2/22/2019).

## 2018-02-23 LAB
C TRACH DNA SPEC QL NAA+PROBE: NOT DETECTED
N GONORRHOEA DNA SPEC QL NAA+PROBE: NOT DETECTED

## 2018-03-05 NOTE — TELEPHONE ENCOUNTER
DOCUMENTATION ONLY:  Prior authorization for Cosentyx approved from 03/05/2018 to 03/05/2018.   Case ID# [id number]     Co-pay: $0    Patient Assistance IS NOT required.     Forward to clinical pharmacist for consult & shipment.       Cosentyx co-pay coupon card information:   RxBIN: [id number]  RxPCN: [id number]  RxGRP: [id number]  ID:  [id number]

## 2018-03-06 NOTE — TELEPHONE ENCOUNTER
Initial Cosentyx SensoReady Pens 300 mg consult completed on 3/6/18. Cosentyx SensoReady Pens 150mg will be shipped on 3/6/18 to arrive at patient's home on 3/7/18 via [employer]. $ 0 copay. Patient will start Cosentyx SensoReady Pens 300mg on 3/7/18. Address confirmed, CC on file. Confirmed 2 patient identifiers - name and . Therapy Appropriate.    - Cosentyx SensoReady Pens 150mg:  Inject 2 pen (300mg) every week x 5 weeks, then 2 pen (300mg) every 4 weeks.    -Storage: Refrigerate between 36-46 degrees Fahrenheit  Use within ONE HOUR of taking out of fridge.    -Injection technique:  - Wash hands before and after injection.  - Monthly RX will come with gauze, bandaids, and alcohol swabs.  - Patient may inject in either the tops of the thighs, abdomen- but at least 2 inches away from her belly button, or the outer part of her upper arm. Patient was instructed to rotate injections sites.  - Examine device to ensure no particulates, cloudiness, etc.  - Patient is to wipe down the injection site with the alcohol pad, wait to dry.  Gently squeeze the area of the cleaned skin and hold it firmly. Place the pen flat at a 90 degree angle against the raised area of skin that is being squeezed, and then push down firmly on the pen to start the injection. The 1st 'click' indicates the start and the second 'click' indicates that the injection is almost complete. A green indicator will fill the window when completed, and pen can be removed.  - Patient will use sharps container; once full, per [place] law, she/ he may lock the sharps container and place in her trash. She/ he can then contact the Pharmacy and we will replace the sharps at no additional charge.    -Potential Side effects:  Injection site reactions, diarrhea, cold like symptoms.  Patient advised to call if any signs of allergic reaction, infection, or use of live vaccines.    -DDI: Medication list reviewed and potential DDIs addressed.  Patient verbalized understanding.  Compliance stressed. Patient advised to keep a calendar marking dates of injections to ensure better compliance. Patient advised to call myself or provider should any questions arise. Patient plans to start Cosentyx SensoReady Pens on 3/7/18. Consultation included: indication; goals of treatment; administration; storage and handling; side effects; how to handle side effects; the importance of compliance; how to handle missed doses; the importance of laboratory monitoring; the importance of keeping all follow up appointments. Patient understands to report any medication changes to [facility] and provider. All questions answered and addressed to patients satisfaction. I will f/u with her in 1 week from start, [facility] to contact patient in 3 weeks for refills.

## 2018-03-15 RX ORDER — ESOMEPRAZOLE MAGNESIUM 40 MG/1
40 CAPSULE, DELAYED RELEASE ORAL
Qty: 30 CAPSULE | Refills: 3 | Status: SHIPPED | OUTPATIENT
Start: 2018-03-15 | End: 2018-07-17 | Stop reason: SDUPTHER

## 2018-03-15 RX ORDER — CELECOXIB 200 MG/1
200 CAPSULE ORAL 2 TIMES DAILY
Qty: 60 CAPSULE | Refills: 2 | Status: SHIPPED | OUTPATIENT
Start: 2018-03-15 | End: 2018-07-17 | Stop reason: SDUPTHER

## 2018-03-19 ENCOUNTER — OFFICE VISIT (OUTPATIENT)
Dept: RHEUMATOLOGY | Facility: CLINIC | Age: 38
End: 2018-03-19
Payer: COMMERCIAL

## 2018-03-19 ENCOUNTER — OFFICE VISIT (OUTPATIENT)
Dept: PSYCHIATRY | Facility: CLINIC | Age: 38
End: 2018-03-19
Payer: COMMERCIAL

## 2018-03-19 ENCOUNTER — HOSPITAL ENCOUNTER (OUTPATIENT)
Dept: RADIOLOGY | Facility: HOSPITAL | Age: 38
Discharge: HOME | End: 2018-03-19
Attending: OBSTETRICS & GYNECOLOGY
Payer: COMMERCIAL

## 2018-03-19 VITALS — WEIGHT: 293 LBS | BODY MASS INDEX: 39.68 KG/M2 | HEIGHT: 72 IN

## 2018-03-19 VITALS
WEIGHT: 293 LBS | BODY MASS INDEX: 39.68 KG/M2 | SYSTOLIC BLOOD PRESSURE: 155 MMHG | DIASTOLIC BLOOD PRESSURE: 70 MMHG | HEART RATE: 78 BPM | HEIGHT: 72 IN

## 2018-03-19 DIAGNOSIS — F41.1 GAD (GENERALIZED ANXIETY DISORDER): Primary | ICD-10-CM

## 2018-03-19 DIAGNOSIS — Z79.899 HIGH RISK MEDICATION USE: ICD-10-CM

## 2018-03-19 DIAGNOSIS — L40.50 PSORIATIC ARTHRITIS: ICD-10-CM

## 2018-03-19 DIAGNOSIS — Z01.419 WELL WOMAN EXAM WITH ROUTINE GYNECOLOGICAL EXAM: ICD-10-CM

## 2018-03-19 DIAGNOSIS — L40.59 POLYARTICULAR PSORIATIC ARTHRITIS: Primary | ICD-10-CM

## 2018-03-19 DIAGNOSIS — F33.0 MAJOR DEPRESSIVE DISORDER, RECURRENT EPISODE, MILD: ICD-10-CM

## 2018-03-19 DIAGNOSIS — L40.0 PLAQUE PSORIASIS: ICD-10-CM

## 2018-03-19 PROCEDURE — 77067 SCR MAMMO BI INCL CAD: CPT | Mod: TC,PO

## 2018-03-19 PROCEDURE — 99999 PR PBB SHADOW E&M-EST. PATIENT-LVL III: CPT | Mod: PBBFAC,,, | Performed by: INTERNAL MEDICINE

## 2018-03-19 PROCEDURE — 77067 SCR MAMMO BI INCL CAD: CPT | Mod: 26,,, | Performed by: RADIOLOGY

## 2018-03-19 PROCEDURE — 99214 OFFICE O/P EST MOD 30 MIN: CPT | Mod: S$GLB,,, | Performed by: INTERNAL MEDICINE

## 2018-03-19 PROCEDURE — 90791 PSYCH DIAGNOSTIC EVALUATION: CPT | Mod: S$GLB,,, | Performed by: SOCIAL WORKER

## 2018-03-19 RX ORDER — METHOTREXATE 25 MG/ML
25 INJECTION, SOLUTION INTRA-ARTERIAL; INTRAMUSCULAR; INTRAVENOUS
Qty: 10 ML | Refills: 2 | Status: SHIPPED | OUTPATIENT
Start: 2018-03-19 | End: 2018-08-08 | Stop reason: SDUPTHER

## 2018-03-19 RX ORDER — FOLIC ACID 1 MG/1
1 TABLET ORAL DAILY
Qty: 90 TABLET | Refills: 2 | Status: SHIPPED | OUTPATIENT
Start: 2018-03-19 | End: 2019-01-03 | Stop reason: SDUPTHER

## 2018-03-19 NOTE — ASSESSMENT & PLAN NOTE
Shows mild improvement with active dactylitis . Had been on cosentyx since 3 weeks. Completed two doses. Continue 300 mg cosentyx .

## 2018-03-19 NOTE — PROGRESS NOTES
RHEUMATOLOGY CLINIC FOLLOW UP VISIT  Chief complaints:-  arthritis    HPI:-  Janice Nova a 37 y.o. pleasant female comes in for a follow up visit with above chief complaints.    She was diagnosed with psoriatic arthritis by Dr. Arellano  Since she had skin psoriasis and inflammatory asymmteric polyarthritis. She was started on MTX in 02/2016. Then she failed both Enbrel and Humira. Most recently she tried Otezla but reported worsening arthritis and psoriasis.  She is now on Cosentyx 300 mg weekly loading dose ( has taken 2 injections so far). Pt reports feeling better and notes her psoriasis has improved but she is unsure if this is related to the medication or a trip to the beach.   No issues with Cosentyx.     Review of Systems   Constitutional: Negative for chills, diaphoresis, fever, malaise/fatigue and weight loss.   HENT: Negative for sore throat.    Eyes: Negative for blurred vision, double vision, photophobia, pain and redness.   Respiratory: Negative for cough, shortness of breath and wheezing.    Cardiovascular: Negative for chest pain and leg swelling.   Gastrointestinal: Negative for abdominal pain, diarrhea, nausea and vomiting.   Musculoskeletal: Positive for joint pain. Negative for back pain, falls, myalgias and neck pain.   Skin: Positive for rash.   Neurological: Negative for dizziness, tingling, seizures, weakness and headaches.       Past Medical History:   Diagnosis Date    Acid reflux     Anxiety     Arthritis     Depression        Past Surgical History:   Procedure Laterality Date    COSMETIC SURGERY          Social History   Substance Use Topics    Smoking status: Current Every Day Smoker     Packs/day: 0.50     Years: 10.00     Types: Cigarettes    Smokeless tobacco: Never Used    Alcohol use 1.8 oz/week     3 Glasses of wine per week       Family History   Problem Relation Age of Onset    Cancer Mother     Breast  cancer Mother     Diabetes Mellitus Maternal Grandfather     Cancer Paternal Grandmother     Psoriasis Father     Arthritis Father     Colon cancer Paternal Aunt     Ovarian cancer Neg Hx     Thrombophilia Neg Hx        Review of patient's allergies indicates:  No Known Allergies        Physical examination:-    Vitals:    03/19/18 1309   BP: (!) 155/70   Pulse: 78   Weight: (!) 140.9 kg (310 lb 10.1 oz)   Height: 6' (1.829 m)   PainSc:   4       Physical Exam   Constitutional: She is oriented to person, place, and time and well-developed, well-nourished, and in no distress. No distress.   HENT:   Head: Normocephalic and atraumatic.   Nose: Nose normal.   Mouth/Throat: Oropharynx is clear and moist. No oropharyngeal exudate.   Eyes: Conjunctivae and EOM are normal. Pupils are equal, round, and reactive to light. Right eye exhibits no discharge. Left eye exhibits no discharge. No scleral icterus.   Neck: Normal range of motion. Neck supple.   Cardiovascular: Normal rate and intact distal pulses.    Pulmonary/Chest: Effort normal. No respiratory distress. She exhibits no tenderness.   Abdominal: Soft. There is no tenderness.   Musculoskeletal:   No enthesitis.   No appreciable dactylitis but pt has right 2nd finger swelling around the PIP.    Lymphadenopathy:     She has no cervical adenopathy.   Neurological: She is alert and oriented to person, place, and time. No cranial nerve deficit.   Skin: Skin is warm. Rash noted. She is not diaphoretic. No erythema. No pallor.   Rash present over the elbows   Psychiatric: Mood, memory and affect normal.   Nursing note and vitals reviewed.      Labs:-  Results for orders placed or performed in visit on 02/05/18   CBC auto differential   Result Value Ref Range    WBC 11.03 3.90 - 12.70 K/uL    RBC 4.64 4.00 - 5.40 M/uL    Hemoglobin 11.5 (L) 12.0 - 16.0 g/dL    Hematocrit 36.0 (L) 37.0 - 48.5 %    MCV 78 (L) 82 - 98 fL    MCH 24.8 (L) 27.0 - 31.0 pg    MCHC 31.9 (L) 32.0  - 36.0 g/dL    RDW 17.1 (H) 11.5 - 14.5 %    Platelets 441 (H) 150 - 350 K/uL    MPV 8.3 (L) 9.2 - 12.9 fL    Gran # (ANC) 7.8 (H) 1.8 - 7.7 K/uL    Lymph # 2.3 1.0 - 4.8 K/uL    Mono # 0.4 0.3 - 1.0 K/uL    Eos # 0.5 0.0 - 0.5 K/uL    Baso # 0.03 0.00 - 0.20 K/uL    Gran% 70.5 38.0 - 73.0 %    Lymph% 20.8 18.0 - 48.0 %    Mono% 3.9 (L) 4.0 - 15.0 %    Eosinophil% 4.5 0.0 - 8.0 %    Basophil% 0.3 0.0 - 1.9 %    Differential Method Automated      Results for orders placed or performed in visit on 02/05/18   Comprehensive metabolic panel   Result Value Ref Range    Sodium 137 136 - 145 mmol/L    Potassium 4.2 3.5 - 5.1 mmol/L    Chloride 106 95 - 110 mmol/L    CO2 21 (L) 23 - 29 mmol/L    Glucose 97 70 - 110 mg/dL    BUN, Bld 14 6 - 20 mg/dL    Creatinine 0.7 0.5 - 1.4 mg/dL    Calcium 9.7 8.7 - 10.5 mg/dL    Total Protein 7.8 6.0 - 8.4 g/dL    Albumin 3.7 3.5 - 5.2 g/dL    Total Bilirubin 0.2 0.1 - 1.0 mg/dL    Alkaline Phosphatase 112 55 - 135 U/L    AST 24 10 - 40 U/L    ALT 32 10 - 44 U/L    Anion Gap 10 8 - 16 mmol/L    eGFR if African American >60 >60 mL/min/1.73 m^2    eGFR if non African American >60 >60 mL/min/1.73 m^2     Results for orders placed or performed in visit on 02/22/16   Vitamin D   Result Value Ref Range    Vit D, 25-Hydroxy 11 (L) 30 - 96 ng/mL     Results for orders placed or performed in visit on 02/22/16   Cyclic citrul peptide antibody, IgG   Result Value Ref Range    CCP Antibodies <0.5 <5.0 U/mL     Results for orders placed or performed in visit on 02/06/17   Quantiferon Gold TB   Result Value Ref Range    NIL 0.04 See text IU/mL    TB Antigen 0.09 See text IU/mL    TB Antigen - Nil 0.05 See text IU/mL    Mitogen - Nil 9.98 See text IU/mL    TB Gold Negative      Results for orders placed or performed in visit on 02/05/18   Sedimentation rate, manual   Result Value Ref Range    Sed Rate 21 (H) 0 - 20 mm/Hr     Results for orders placed or performed in visit on 02/05/18   C-reactive  protein   Result Value Ref Range    CRP 17.5 (H) 0.0 - 8.2 mg/L     Results for orders placed or performed in visit on 02/22/16   JAVIER   Result Value Ref Range    JAVIER Screen Negative <1:160 Negative <1:160     Results for orders placed or performed in visit on 02/22/16   Rheumatoid factor   Result Value Ref Range    Rheumatoid Factor <10.0 0.0 - 15.0 IU/mL     Results for orders placed or performed in visit on 02/22/16   Sjogrens syndrome-A extractable nuclear antibody   Result Value Ref Range    Anti-SSA Antibody 2.05 0.00 - 19.99 EU    Anti-SSA Interpretation Negative Negative       Results for orders placed or performed in visit on 02/22/16   Cyclic citrul peptide antibody, IgG   Result Value Ref Range    CCP Antibodies <0.5 <5.0 U/mL       Results for orders placed or performed in visit on 02/22/16   Urinalysis   Result Value Ref Range    Specimen UA Urine, Clean Catch     Color, UA Yellow Yellow, Straw, Ewa    Appearance, UA Clear Clear    pH, UA 6.0 5.0 - 8.0    Specific Gravity, UA 1.025 1.005 - 1.030    Protein, UA Negative Negative    Glucose, UA Negative Negative    Ketones, UA Negative Negative    Bilirubin (UA) Negative Negative    Occult Blood UA Trace (A) Negative    Nitrite, UA Negative Negative    Leukocytes, UA 1+ (A) Negative           Medication List with Changes/Refills   Current Medications    CELECOXIB (CELEBREX) 200 MG CAPSULE    TAKE 1 CAPSULE (200 MG TOTAL) BY MOUTH 2 (TWO) TIMES DAILY.    ERGOCALCIFEROL (ERGOCALCIFEROL) 50,000 UNIT CAP    Take 1 capsule (50,000 Units total) by mouth every 7 days.    ESOMEPRAZOLE (NEXIUM) 40 MG CAPSULE    Take 1 capsule (40 mg total) by mouth before breakfast.    GABAPENTIN (NEURONTIN) 300 MG CAPSULE    Take 300 mg by mouth 2 (two) times daily.    SECUKINUMAB (COSENTYX PEN, 2 PENS,) 150 MG/ML PNIJ    300 mg once weekly at weeks 0, 1, 2, 3, and 4 followed by 300 mg every 4 weeks    SERTRALINE (ZOLOFT) 100 MG TABLET    Take 1/2 tablet daily for the first  seven days then 1 tablet daily thereafter   Changed and/or Refilled Medications    Modified Medication Previous Medication    FOLIC ACID (FOLVITE) 1 MG TABLET folic acid (FOLVITE) 1 MG tablet       Take 1 tablet (1 mg total) by mouth once daily.    Take 1 tablet (1 mg total) by mouth once daily.    METHOTREXATE 25 MG/ML INJECTION methotrexate 25 mg/mL injection       Inject 1 mL (25 mg total) into the muscle every 7 days.    Inject 1 mL (25 mg total) into the muscle every 7 days.       Assessment/Plans:-  1. Polyarticular psoriatic arthritis  Pt reports improvement in skin and joint symptoms after starting Cosentyx 300 mg, she is currently on week 2 of the 5 week loading dose.   Previously reported dactylitis seems to have improved but still mild swelling around the PIP of the 2nd finger.   Continue MTX 25 mg weekly with daily folic acid  - folic acid (FOLVITE) 1 MG tablet; Take 1 tablet (1 mg total) by mouth once daily.  Dispense: 90 tablet; Refill: 2  - methotrexate 25 mg/mL injection; Inject 1 mL (25 mg total) into the muscle every 7 days.  Dispense: 10 mL; Refill: 2    2. Plaque psoriasis  Less than 25% BSA. Appears to be improving after stopping Otezla and starting Cosentyx     3. High risk medication use  Medication monitoring labs do not show toxicity  Repeat in 3 months ( May 2018)   Hold medication if any infection     # Follow-up in about 4 months (around 7/19/2018).

## 2018-03-20 ENCOUNTER — TELEPHONE (OUTPATIENT)
Dept: OBSTETRICS AND GYNECOLOGY | Facility: CLINIC | Age: 38
End: 2018-03-20

## 2018-03-20 NOTE — TELEPHONE ENCOUNTER
----- Message from Vaibhav Stinson MD sent at 3/20/2018  7:23 AM CDT -----  MMG NL but lifetime risk elevated.  Will need General Surgery referral to Mammogram clinic.  Will have nurse contact pt to assist in scheduling.

## 2018-03-21 ENCOUNTER — TELEPHONE (OUTPATIENT)
Dept: OBSTETRICS AND GYNECOLOGY | Facility: CLINIC | Age: 38
End: 2018-03-21

## 2018-03-21 NOTE — TELEPHONE ENCOUNTER
----- Message from Kathleen Rodriguez sent at 3/21/2018 10:36 AM CDT -----  Contact: Pt  Pt request call back to get name and number of provider she was referred to. .[phone] (home)

## 2018-03-22 ENCOUNTER — PATIENT MESSAGE (OUTPATIENT)
Dept: HEMATOLOGY/ONCOLOGY | Facility: CLINIC | Age: 38
End: 2018-03-22

## 2018-03-27 ENCOUNTER — TELEPHONE (OUTPATIENT)
Dept: PHARMACY | Facility: CLINIC | Age: 38
End: 2018-03-27

## 2018-03-28 NOTE — TELEPHONE ENCOUNTER
"Refill readiness for Cosentyx confirmed with patient; name/ confirmed; no missed doses; no new medications; no side effects noted; address confirmed for  shipment and 4/3 delivery    clinical follow-up conducted for cosentyx. Name/ confirmed. no missed doses; no new medications; no side effects noted. Patient understands to report any medication changes to [facility] and provider. All questions answered and addressed to patients satisfaction.  Patient states that "only 3 weeks in" she is seeing significant improvement and is very happy "elated".      NELSON Coronel.Ph.  Clinical Pharmacist  [facility]  Phone: [phone]        "

## 2018-04-12 NOTE — PROGRESS NOTES
"Psychiatry Initial Visit (PhD/LCSW)  Diagnostic Interview - CPT 94793    Date: 3/19/2018    Site: [place]    Referral source:  [facility] psychiatrist HENRRY Arguelles MD    Clinical status of patient: Outpatient    Janice Lawrence, a 38 y.o. female, for initial evaluation visit.  Met with patient.    Chief complaint/reason for encounter: depression, suicidal ideation, anxiety, sleep and somatic    History of present illness:   (Late entry for 3/19/18) 38 year old  female patient presented to establish psychotherapy; current patient of Dr. Blane MD for psychiatric medication management.  Patient reported depression stability with Prozac for many years.  Anxiety more prominent, not as well controlled.  Anxiety appearing generalized, ruminating worries about a wide range of things.  Reporting frequent symptoms of pressured speech, incessant worry, poor concentration, periods of racing heart rate, loss of interest, restlessness, and some period of depression; mild depression in recent years.  Significant stressor for patient is health-- psoriatic arthritis with chronic pain issues.  Reported history of brief period of cutting behavior in her teens; a couple of overdose suicide attempts also in her teens, but never requiring hospitalization or resulting in serious health damage.  Reported also history of various substance use, alcohol recurrently, from her teens, saying she now avoids it "when ill," but still drinks at times; marijuana endorsed as still occasional use; self-medicating.  History of ecstasy in her mid-teens.  Cigarettes from age 15 to present.  Said she has recently joined a smoking cessation program.  Denied any si/hi, psychosis, cognitive deficit, or substance abuse currently.  Identified therapeutic goals include reducing overall anxiety and depression and improving coping skills.      Pain: moderate levels of arthritis pain, not enumerated    Symptoms:   · Mood: " "depressed mood, diminished interest, fatigue and poor concentration  · Anxiety: decreased memory, excessive anxiety/worry, restlessness/keyed up, irritability, muscle tension and panic attacks  · Substance abuse: some history of use and potential abuse; denied any current abuse  · Cognitive functioning: denied  · Health behaviors:  Tobacco smoking; is trying to quit    Psychiatric history: prior suicide attempt(s), has participated in counseling/psychotherapy on an outpatient basis in the past and currently under psychiatric care    Medical history: See above; see medical encounter notes    Family history of psychiatric illness: not known    Social history (marriage, employment, etc.):  Grew up in [facility], the second of four siblings.  Raise by both biological parents, who remain together an in [place].  Described generally happy bus hectic childhood.  Father local; mother from [place].  Reported a Latin L[employer] upbringing; still active in her home [employer] [facility].  Denied any childhood abuse.  Education:  Two and a half semesters of college at [employer].  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  Single, never , no children.  "Plenty of nieces and nephews."  Remains connected with family.      Substance use:   Alcohol: heavy use mid-teens to mid-twenties; reported stopping but more recently having an occasional drink.  Said concern for medication interaction discourages her from drinking   Drugs: endorsed current occasional marijuana use.  Remote history of "party uppers" including ecstasy--teens to mid-twenties.    Tobacco: started smoking age 15.  Variable rate reported, but average of a half pack per day; reported current cessation program.   Caffeine: not reported    Current medications and drug reactions (include OTC, herbal): see medication list      Strengths and liabilities:  Strengths:  Expressive; some family support; " expresses motivation for change  Liability:  Patient has difficulty coping with stress of medication problems, including chronic pain.        Current Evaluation:     Mental Status Exam:  General Appearance:  age appropriate, well nourished, casually dressed, neatly groomed, obese   Speech: normal tone, normal rate, normal pitch, normal volume      Level of Cooperation: cooperative      Thought Processes: concrete, goal-directed   Mood: anxious      Thought Content: normal, no suicidality, no homicidality, delusions, or paranoia   Affect: shallow   Orientation: Oriented x3   Memory: recent and remote memory intact   Attention Span & Concentration: intact   Fund of General Knowledge: intact and appropriate to age and level of education   Abstract Reasoning: not formally assessed   Judgment & Insight: limited     Language  intact     Diagnostic Impression - Plan:       ICD-10-CM ICD-9-CM   1. ALBERTO (generalized anxiety disorder) F41.1 300.02   2. Major depressive disorder, recurrent episode, mild F33.0 296.31       Plan:individual psychotherapy and medication management by physician    Return to Clinic: as scheduled    Length of Service (minutes): 45

## 2018-04-17 RX ORDER — SERTRALINE HYDROCHLORIDE 100 MG/1
TABLET, FILM COATED ORAL
Qty: 30 TABLET | Refills: 1 | Status: SHIPPED | OUTPATIENT
Start: 2018-04-17 | End: 2018-06-22 | Stop reason: SDUPTHER

## 2018-04-23 ENCOUNTER — PATIENT MESSAGE (OUTPATIENT)
Dept: RHEUMATOLOGY | Facility: CLINIC | Age: 38
End: 2018-04-23

## 2018-04-23 ENCOUNTER — TELEPHONE (OUTPATIENT)
Dept: PHARMACY | Facility: CLINIC | Age: 38
End: 2018-04-23

## 2018-04-23 NOTE — TELEPHONE ENCOUNTER
Janice,   It is not uncommon for immune system to try and flare in between the doses. Please try to use NSAID medications if symptoms are severe until the next dose starts its action.   Regards

## 2018-04-25 ENCOUNTER — TELEPHONE (OUTPATIENT)
Dept: PHARMACY | Facility: CLINIC | Age: 38
End: 2018-04-25

## 2018-04-25 ENCOUNTER — OFFICE VISIT (OUTPATIENT)
Dept: PSYCHIATRY | Facility: CLINIC | Age: 38
End: 2018-04-25
Payer: COMMERCIAL

## 2018-04-25 DIAGNOSIS — F41.1 GAD (GENERALIZED ANXIETY DISORDER): Primary | ICD-10-CM

## 2018-04-25 DIAGNOSIS — F33.0 MAJOR DEPRESSIVE DISORDER, RECURRENT EPISODE, MILD: ICD-10-CM

## 2018-04-25 PROCEDURE — 90834 PSYTX W PT 45 MINUTES: CPT | Mod: S$GLB,,, | Performed by: SOCIAL WORKER

## 2018-04-26 NOTE — TELEPHONE ENCOUNTER
Refill readiness for Cosentyx confirmed with patient; name/ confirmed; no missed doses; no new medications; no side effects noted; address confirmed for  shipment and  delivery    NELSON Coronel.Ph.  Clinical Pharmacist  [facility]  Phone: [phone]

## 2018-04-30 ENCOUNTER — LAB VISIT (OUTPATIENT)
Dept: LAB | Facility: HOSPITAL | Age: 38
End: 2018-04-30
Attending: INTERNAL MEDICINE
Payer: COMMERCIAL

## 2018-04-30 DIAGNOSIS — L40.50 PSORIATIC ARTHRITIS: ICD-10-CM

## 2018-04-30 DIAGNOSIS — L40.9 PSORIASIS: ICD-10-CM

## 2018-04-30 DIAGNOSIS — Z79.899 HIGH RISK MEDICATION USE: ICD-10-CM

## 2018-04-30 DIAGNOSIS — L40.59 POLYARTICULAR PSORIATIC ARTHRITIS: ICD-10-CM

## 2018-04-30 LAB
ALBUMIN SERPL BCP-MCNC: 3.9 G/DL
ALP SERPL-CCNC: 77 U/L
ALT SERPL W/O P-5'-P-CCNC: 29 U/L
ANION GAP SERPL CALC-SCNC: 11 MMOL/L
AST SERPL-CCNC: 20 U/L
BASOPHILS # BLD AUTO: 0.05 K/UL
BASOPHILS NFR BLD: 0.5 %
BILIRUB SERPL-MCNC: 0.4 MG/DL
BUN SERPL-MCNC: 11 MG/DL
CALCIUM SERPL-MCNC: 9.3 MG/DL
CHLORIDE SERPL-SCNC: 105 MMOL/L
CO2 SERPL-SCNC: 21 MMOL/L
CREAT SERPL-MCNC: 0.8 MG/DL
CRP SERPL-MCNC: 7 MG/L
DIFFERENTIAL METHOD: ABNORMAL
EOSINOPHIL # BLD AUTO: 0.5 K/UL
EOSINOPHIL NFR BLD: 5.2 %
ERYTHROCYTE [DISTWIDTH] IN BLOOD BY AUTOMATED COUNT: 18.7 %
ERYTHROCYTE [SEDIMENTATION RATE] IN BLOOD BY WESTERGREN METHOD: 10 MM/HR
EST. GFR  (AFRICAN AMERICAN): >60 ML/MIN/1.73 M^2
EST. GFR  (NON AFRICAN AMERICAN): >60 ML/MIN/1.73 M^2
GLUCOSE SERPL-MCNC: 145 MG/DL
HCT VFR BLD AUTO: 35.5 %
HGB BLD-MCNC: 11.2 G/DL
LYMPHOCYTES # BLD AUTO: 2.9 K/UL
LYMPHOCYTES NFR BLD: 30.1 %
MCH RBC QN AUTO: 24.1 PG
MCHC RBC AUTO-ENTMCNC: 31.5 G/DL
MCV RBC AUTO: 76 FL
MONOCYTES # BLD AUTO: 0.2 K/UL
MONOCYTES NFR BLD: 2.4 %
NEUTROPHILS # BLD AUTO: 6 K/UL
NEUTROPHILS NFR BLD: 61.8 %
PLATELET # BLD AUTO: 386 K/UL
PMV BLD AUTO: 8.5 FL
POTASSIUM SERPL-SCNC: 3.9 MMOL/L
PROT SERPL-MCNC: 7.3 G/DL
RBC # BLD AUTO: 4.65 M/UL
SODIUM SERPL-SCNC: 137 MMOL/L
WBC # BLD AUTO: 9.65 K/UL

## 2018-04-30 PROCEDURE — 85651 RBC SED RATE NONAUTOMATED: CPT | Mod: PO

## 2018-04-30 PROCEDURE — 80053 COMPREHEN METABOLIC PANEL: CPT | Mod: PO

## 2018-04-30 PROCEDURE — 36415 COLL VENOUS BLD VENIPUNCTURE: CPT | Mod: PO

## 2018-04-30 PROCEDURE — 85025 COMPLETE CBC W/AUTO DIFF WBC: CPT | Mod: PO

## 2018-04-30 PROCEDURE — 86140 C-REACTIVE PROTEIN: CPT

## 2018-05-21 DIAGNOSIS — L40.0 PLAQUE PSORIASIS: ICD-10-CM

## 2018-05-21 DIAGNOSIS — L40.59 POLYARTICULAR PSORIATIC ARTHRITIS: ICD-10-CM

## 2018-05-23 ENCOUNTER — TELEPHONE (OUTPATIENT)
Dept: PHARMACY | Facility: CLINIC | Age: 38
End: 2018-05-23

## 2018-05-28 NOTE — PROGRESS NOTES
"Individual Psychotherapy (PhD/LCSW)    4/25/2018    Site:  [facility]         Therapeutic Intervention: Met with patient.  Outpatient - Insight oriented psychotherapy 45 min - CPT code 47829 and Outpatient - Supportive psychotherapy 45 min - CPT Code 26021    Chief complaint/reason for encounter: depression and anxiety and somatic complaints    Interval history and content of current session:   38 year old female patient returned for follow up psychotherapy to address generalized anxiety and recurrent depression.   Patient with major life stressor of chronic paian; trying to quit smoking.  Patient reported "life is actually going well" but psoriatric arthritis pain remains, though improved.  Said it appears to be responding well to immune suppressant shots she received April 5th.  Pain and rash resumed after about two and a half weeks, however.  Shots scheduled monthly.  She said her doctor told her cumulatively it should improve month to month.  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  Patient described her spiritual robinson as strong.  Denied any si/hi, psychosis, mood swings, or current substance abuse.  Supportive therapy provided.     Treatment plan:  · Target symptoms: depression, anxiety , somatic complaints--coping skills  · Why chosen therapy is appropriate versus another modality: relevant to diagnosis, patient responds to this modality  · Outcome monitoring methods: self-report, observation  · Therapeutic intervention type: insight oriented psychotherapy, supportive psychotherapy    Risk parameters:  Patient reports no suicidal ideation  Patient reports no homicidal ideation  Patient reports no self-injurious behavior  Patient reports no violent behavior    Verbal deficits: None    Patient's response to intervention:  The patient's response to intervention is accepting.    Progress toward goals and other mental status " changes:  The patient's progress toward goals is fair .    Diagnosis:     ICD-10-CM ICD-9-CM   1. ALBERTO (generalized anxiety disorder) F41.1 300.02   2. Major depressive disorder, recurrent episode, mild F33.0 296.31       Plan:  individual psychotherapy and medication management by physician    Return to clinic: as scheduled    Length of Service (minutes): 45

## 2018-05-29 ENCOUNTER — TELEPHONE (OUTPATIENT)
Dept: PHARMACY | Facility: CLINIC | Age: 38
End: 2018-05-29

## 2018-06-21 ENCOUNTER — TELEPHONE (OUTPATIENT)
Dept: PHARMACY | Facility: CLINIC | Age: 38
End: 2018-06-21

## 2018-06-22 RX ORDER — SERTRALINE HYDROCHLORIDE 100 MG/1
TABLET, FILM COATED ORAL
Qty: 30 TABLET | Refills: 1 | Status: SHIPPED | OUTPATIENT
Start: 2018-06-22 | End: 2018-08-20 | Stop reason: SDUPTHER

## 2018-06-26 ENCOUNTER — PATIENT MESSAGE (OUTPATIENT)
Dept: RHEUMATOLOGY | Facility: CLINIC | Age: 38
End: 2018-06-26

## 2018-06-27 ENCOUNTER — TELEPHONE (OUTPATIENT)
Dept: PHARMACY | Facility: CLINIC | Age: 38
End: 2018-06-27

## 2018-06-27 DIAGNOSIS — L40.0 PLAQUE PSORIASIS: ICD-10-CM

## 2018-06-27 DIAGNOSIS — L40.59 POLYARTICULAR PSORIATIC ARTHRITIS: Primary | ICD-10-CM

## 2018-06-27 NOTE — PROGRESS NOTES
Severe polyarticular psoriatic arthritis and moderate to severe plaque psoriasis failed methotrexate, enbrel, humira, otezla and now cosentyx. Switch to stelara.

## 2018-06-29 NOTE — TELEPHONE ENCOUNTER
DOCUMENTATION ONLY:  Prior authorization for Kelly approved from 6/29/18 to 6/29/19    Case Id: [id number]    Co-pay: $0

## 2018-07-17 ENCOUNTER — OFFICE VISIT (OUTPATIENT)
Dept: RHEUMATOLOGY | Facility: CLINIC | Age: 38
End: 2018-07-17
Payer: COMMERCIAL

## 2018-07-17 ENCOUNTER — LAB VISIT (OUTPATIENT)
Dept: LAB | Facility: HOSPITAL | Age: 38
End: 2018-07-17
Attending: INTERNAL MEDICINE
Payer: COMMERCIAL

## 2018-07-17 VITALS
DIASTOLIC BLOOD PRESSURE: 78 MMHG | SYSTOLIC BLOOD PRESSURE: 133 MMHG | HEART RATE: 80 BPM | WEIGHT: 293 LBS | HEIGHT: 72 IN | BODY MASS INDEX: 39.68 KG/M2

## 2018-07-17 DIAGNOSIS — Z79.899 HIGH RISK MEDICATION USE: ICD-10-CM

## 2018-07-17 DIAGNOSIS — L40.0 PLAQUE PSORIASIS: ICD-10-CM

## 2018-07-17 DIAGNOSIS — L40.59 POLYARTICULAR PSORIATIC ARTHRITIS: Primary | ICD-10-CM

## 2018-07-17 DIAGNOSIS — L40.59 POLYARTICULAR PSORIATIC ARTHRITIS: ICD-10-CM

## 2018-07-17 DIAGNOSIS — L40.50 PSORIATIC ARTHRITIS: ICD-10-CM

## 2018-07-17 DIAGNOSIS — L40.9 PSORIASIS: ICD-10-CM

## 2018-07-17 LAB
ALBUMIN SERPL BCP-MCNC: 4 G/DL
ALP SERPL-CCNC: 72 U/L
ALT SERPL W/O P-5'-P-CCNC: 32 U/L
ANION GAP SERPL CALC-SCNC: 10 MMOL/L
AST SERPL-CCNC: 23 U/L
BASOPHILS # BLD AUTO: 0.05 K/UL
BASOPHILS NFR BLD: 0.4 %
BILIRUB SERPL-MCNC: 0.3 MG/DL
BUN SERPL-MCNC: 15 MG/DL
CALCIUM SERPL-MCNC: 9.4 MG/DL
CHLORIDE SERPL-SCNC: 106 MMOL/L
CO2 SERPL-SCNC: 23 MMOL/L
CREAT SERPL-MCNC: 0.8 MG/DL
CRP SERPL-MCNC: 11.4 MG/L
DIFFERENTIAL METHOD: ABNORMAL
EOSINOPHIL # BLD AUTO: 0.5 K/UL
EOSINOPHIL NFR BLD: 4.7 %
ERYTHROCYTE [DISTWIDTH] IN BLOOD BY AUTOMATED COUNT: 17.5 %
ERYTHROCYTE [SEDIMENTATION RATE] IN BLOOD BY WESTERGREN METHOD: 10 MM/HR
EST. GFR  (AFRICAN AMERICAN): >60 ML/MIN/1.73 M^2
EST. GFR  (NON AFRICAN AMERICAN): >60 ML/MIN/1.73 M^2
GLUCOSE SERPL-MCNC: 114 MG/DL
HCT VFR BLD AUTO: 34.8 %
HGB BLD-MCNC: 11 G/DL
LYMPHOCYTES # BLD AUTO: 3 K/UL
LYMPHOCYTES NFR BLD: 27 %
MCH RBC QN AUTO: 24.2 PG
MCHC RBC AUTO-ENTMCNC: 31.6 G/DL
MCV RBC AUTO: 77 FL
MONOCYTES # BLD AUTO: 0.4 K/UL
MONOCYTES NFR BLD: 3.5 %
NEUTROPHILS # BLD AUTO: 7.3 K/UL
NEUTROPHILS NFR BLD: 64.4 %
PLATELET # BLD AUTO: 414 K/UL
PMV BLD AUTO: 8.2 FL
POTASSIUM SERPL-SCNC: 4.2 MMOL/L
PROT SERPL-MCNC: 7.6 G/DL
RBC # BLD AUTO: 4.54 M/UL
SODIUM SERPL-SCNC: 139 MMOL/L
WBC # BLD AUTO: 11.26 K/UL

## 2018-07-17 PROCEDURE — 3008F BODY MASS INDEX DOCD: CPT | Mod: CPTII,S$GLB,, | Performed by: INTERNAL MEDICINE

## 2018-07-17 PROCEDURE — 85025 COMPLETE CBC W/AUTO DIFF WBC: CPT | Mod: PO

## 2018-07-17 PROCEDURE — 80053 COMPREHEN METABOLIC PANEL: CPT | Mod: PO

## 2018-07-17 PROCEDURE — 99999 PR PBB SHADOW E&M-EST. PATIENT-LVL III: CPT | Mod: PBBFAC,,, | Performed by: INTERNAL MEDICINE

## 2018-07-17 PROCEDURE — 36415 COLL VENOUS BLD VENIPUNCTURE: CPT | Mod: PO

## 2018-07-17 PROCEDURE — 86140 C-REACTIVE PROTEIN: CPT

## 2018-07-17 PROCEDURE — 99214 OFFICE O/P EST MOD 30 MIN: CPT | Mod: S$GLB,,, | Performed by: INTERNAL MEDICINE

## 2018-07-17 PROCEDURE — 85651 RBC SED RATE NONAUTOMATED: CPT | Mod: PO

## 2018-07-17 RX ORDER — TACROLIMUS 1 MG/G
OINTMENT TOPICAL 2 TIMES DAILY
Qty: 30 G | Refills: 2 | Status: SHIPPED | OUTPATIENT
Start: 2018-07-17 | End: 2020-03-02 | Stop reason: SDUPTHER

## 2018-07-17 RX ORDER — CELECOXIB 200 MG/1
200 CAPSULE ORAL 2 TIMES DAILY
Qty: 60 CAPSULE | Refills: 3 | Status: SHIPPED | OUTPATIENT
Start: 2018-07-17 | End: 2018-11-12 | Stop reason: SDUPTHER

## 2018-07-17 RX ORDER — METHOTREXATE 25 MG/ML
INJECTION INTRA-ARTERIAL; INTRAMUSCULAR; INTRATHECAL; INTRAVENOUS
Refills: 2 | COMMUNITY
Start: 2018-07-12 | End: 2018-08-08 | Stop reason: SDUPTHER

## 2018-07-17 RX ORDER — ESOMEPRAZOLE MAGNESIUM 40 MG/1
40 CAPSULE, DELAYED RELEASE ORAL
Qty: 30 CAPSULE | Refills: 2 | Status: SHIPPED | OUTPATIENT
Start: 2018-07-17 | End: 2018-10-15 | Stop reason: SDUPTHER

## 2018-07-17 NOTE — ASSESSMENT & PLAN NOTE
Active dactylitis. Failed multiple medications, now on stelara . Give 8 more weeks on stelara before considering xeljanz. Continue high dose methotrexate.

## 2018-07-17 NOTE — PROGRESS NOTES
RHEUMATOLOGY CLINIC FOLLOW UP VISIT  Chief complaints:-  My hands hurt.    HPI:-  Janice Nova a 38 y.o. pleasant female comes in for a follow up visit with above chief complaints. She was diagnosed with psoriatic arthritis by Dr. Arellano  since she had skin psoriasis and inflammatory asymmteric polyarthritis. She was started on MTX in 02/2016. Then she failed both enbrel , humira, otezla, cosentyx and recently started stelara. Took the first dose last week. No allergic reactions. Haven't seen any improvement yet. In past she uses shows great responsewith a new biologic for a couple of months before she starts flaring again. She complains of worsening pain over her hands, wrists, shoulders, knees and feet. Her psoriasis is worsening with new patches over elbows.      Review of Systems   Constitutional: Positive for malaise/fatigue. Negative for chills, fever and weight loss.   HENT: Negative for ear discharge, ear pain, hearing loss, nosebleeds and sore throat.    Eyes: Negative for blurred vision, double vision, photophobia, discharge and redness.   Respiratory: Negative for cough, hemoptysis, sputum production and shortness of breath.    Cardiovascular: Negative for chest pain, palpitations and claudication.   Gastrointestinal: Negative for abdominal pain, constipation, diarrhea, melena, nausea and vomiting.   Genitourinary: Negative for dysuria, frequency, hematuria and urgency.   Musculoskeletal: Positive for back pain, joint pain, myalgias and neck pain. Negative for falls.   Skin: Positive for itching and rash.   Neurological: Positive for weakness. Negative for dizziness, tremors, sensory change, speech change, focal weakness, seizures, loss of consciousness and headaches.   Endo/Heme/Allergies: Negative for environmental allergies. Does not bruise/bleed easily.   Psychiatric/Behavioral: Negative for hallucinations and memory loss. The patient  is nervous/anxious and has insomnia.        Past Medical History:   Diagnosis Date    Acid reflux     Anxiety     Arthritis     Depression        Past Surgical History:   Procedure Laterality Date    COSMETIC SURGERY          Social History   Substance Use Topics    Smoking status: Current Every Day Smoker     Packs/day: 0.50     Years: 10.00     Types: Cigarettes    Smokeless tobacco: Never Used    Alcohol use 1.8 oz/week     3 Glasses of wine per week       Family History   Problem Relation Age of Onset    Cancer Mother     Breast cancer Mother     Diabetes Mellitus Maternal Grandfather     Cancer Paternal Grandmother     Psoriasis Father     Arthritis Father     Colon cancer Paternal Aunt     Ovarian cancer Neg Hx     Thrombophilia Neg Hx        No Known Allergies        Physical examination:-    Vitals:    07/17/18 1039   BP: 133/78   Pulse: 80   Weight: (!) 143 kg (315 lb 4.1 oz)   Height: 6' (1.829 m)   PainSc:   7   PainLoc: Wrist       Physical Exam   Constitutional: She is oriented to person, place, and time and well-developed, well-nourished, and in no distress. No distress.   HENT:   Head: Normocephalic and atraumatic.   Nose: Nose normal.   Mouth/Throat: Oropharynx is clear and moist. No oropharyngeal exudate.   Eyes: Conjunctivae and EOM are normal. Pupils are equal, round, and reactive to light. Right eye exhibits no discharge. Left eye exhibits no discharge. No scleral icterus.   Neck: Normal range of motion. Neck supple.   Cardiovascular: Normal rate and intact distal pulses.    Pulmonary/Chest: Effort normal. No respiratory distress. She exhibits no tenderness.   Abdominal: Soft. There is no tenderness.   Musculoskeletal:   Mild dactylitis over right thumb and left third finger.Tenderness present over right wrists. Enthesitis over bilateral achilles and patellar tendons.  Tender  Range of motion over large joints, but no effusion.    Lymphadenopathy:     She has no cervical  adenopathy.   Neurological: She is alert and oriented to person, place, and time. No cranial nerve deficit.   Skin: Skin is warm. Rash noted. She is not diaphoretic. There is erythema. No pallor.   Rash present over the frontal area without involvement of the intertriginous areas, bilateral elbows . Some scaly rash over scalp.    Psychiatric: Mood, memory and affect normal.   Nursing note and vitals reviewed.    Assessment/Plans:-  Polyarticular psoriatic arthritis  Active dactylitis. Failed multiple medications, now on stelara . Give 8 more weeks on stelara before considering xeljanz. Continue high dose methotrexate.     Plaque psoriasis  Active worsening rash.  Try Protopic instead of of triamcinolone cream.  - tacrolimus (PROTOPIC) 0.1 % ointment; Apply topically 2 (two) times daily.  Dispense: 30 g; Refill: 2    High risk medication use  High dose methotrexate injections every week along with stelara. Hold medication if any infection. Safety labs today.   # RTC in 12 weeks.   Disclaimer: This note was prepared using voice recognition system and is likely to have sound alike errors .  Please call me with any questions

## 2018-07-17 NOTE — ASSESSMENT & PLAN NOTE
High dose methotrexate injections every week along with stelara. Hold medication if any infection. Safety labs today.

## 2018-07-19 ENCOUNTER — PATIENT MESSAGE (OUTPATIENT)
Dept: RHEUMATOLOGY | Facility: CLINIC | Age: 38
End: 2018-07-19

## 2018-07-23 NOTE — TELEPHONE ENCOUNTER
Patient HIPPA validated (name/). Goals of therapy and [facility] service and refill policy declined. Medical history verified with patient. This is a new therapy for the patient. She will self administer the 1st dose and has been advised to have someone present in case of an allergic reaction. Co-morbidities or risk factors reviewed. Concurrent therapy dose verified. Pain level reported @ 7/10. Overall wellness as 3/10. The patient reports pain mostly in her hands and feet. The patient reports 4 days of missed work at this time. Patient does not seem to be high risk for adherence. Patient has been advised to keep follow-up MD and lab appointments. Side effects common and serious have been covered with the patient and he/she verbalized understanding. Administration technique and how to handle missed doses information provided.     QOL initial screen reported by patient as:   (1) Dress yourself, including tying shoelaces and doing buttons? With MUCH difficulty  (2) Get in and out of bed? With MUCH difficulty  (3) Lift a full cup or glass to your mouth? With NO difficulty  (4) Walk outdoors on flat ground? With NO difficulty  (5) Wash and dry your entire body? With NO difficulty  (6) Bend down to  clothing from the floor? With NOdifficulty  (7) Turn regular faucets (taps) on and off? With SOME difficulty  (8) Get in and out of a car? With NO difficult

## 2018-07-24 ENCOUNTER — TELEPHONE (OUTPATIENT)
Dept: PHARMACY | Facility: CLINIC | Age: 38
End: 2018-07-24

## 2018-07-24 NOTE — TELEPHONE ENCOUNTER
Called and spoke with patient notifying her PA approved for Tacrolimus 0.1% Ointment resulting in a $0.00 copayment.    Explained to patient we were ordering medication for Wednesday.  Patient would like prescription shipped to address on file, confirmed address and would like signature required.    Will ship Wednesday 7/25/18 for delivery 7/16/18.    PA Information:  [facility]  [phone]  PA Case ID # [id number]  Approval Dates: 7/24/18-7/24/19  Approved for Tacrolimus 0.1% Ointment 30 grams per 15 days    ThanksLaxmi  Patient Care Advocate   [facility]  Phone: [phone]  Fax: [phone]

## 2018-07-24 NOTE — TELEPHONE ENCOUNTER
Called and spoke with patient notifying her that PA required for generic Protopic Ointment.    Patient stated in the past she has tried Clobetasol on her scalp for the rash, Traimcinolone, Lotrisone cream and Fluocinonide for her skin rashes.  She said they always help for a few days then resume.    Notified patient I would notify her and provider once PA approval / denial returned.    Thanks,   Laxmi Lundberg  Patient Care Advocate   [facility]  Phone: [phone]  Fax: [phone]

## 2018-07-31 ENCOUNTER — TELEPHONE (OUTPATIENT)
Dept: PHARMACY | Facility: CLINIC | Age: 38
End: 2018-07-31

## 2018-07-31 NOTE — TELEPHONE ENCOUNTER
Stelara refill. $0(004). Shipping out 8/2/18. Address confirmed. Next dose due 8/9/18.   Declined full F/U. But did review administration. Her hands are too swollen and painful to self administer the medication. Her moth completes them for her. No issue with first dose. Stated it was painless. Mentioned she is just waiting to see some result (only 1 dose in) advised a fair trial on the medication is 3 months, so hopefully we will start to see some good results after this next injection. Advised that from here on out it with be every 12 weeks dosing, she acknowledged.

## 2018-08-08 RX ORDER — METHOTREXATE 25 MG/ML
INJECTION INTRA-ARTERIAL; INTRAMUSCULAR; INTRATHECAL; INTRAVENOUS
Qty: 10 ML | Refills: 2 | Status: SHIPPED | OUTPATIENT
Start: 2018-08-08 | End: 2019-02-18 | Stop reason: SDUPTHER

## 2018-08-20 RX ORDER — SERTRALINE HYDROCHLORIDE 100 MG/1
TABLET, FILM COATED ORAL
Qty: 30 TABLET | Refills: 1 | Status: SHIPPED | OUTPATIENT
Start: 2018-08-20 | End: 2018-10-26 | Stop reason: SDUPTHER

## 2018-09-18 RX ORDER — SERTRALINE HYDROCHLORIDE 100 MG/1
TABLET, FILM COATED ORAL
Qty: 30 TABLET | Refills: 1 | OUTPATIENT
Start: 2018-09-18

## 2018-09-27 ENCOUNTER — PATIENT MESSAGE (OUTPATIENT)
Dept: RHEUMATOLOGY | Facility: CLINIC | Age: 38
End: 2018-09-27

## 2018-10-09 ENCOUNTER — OFFICE VISIT (OUTPATIENT)
Dept: OPHTHALMOLOGY | Facility: CLINIC | Age: 38
End: 2018-10-09
Payer: COMMERCIAL

## 2018-10-09 DIAGNOSIS — H40.013 OPEN ANGLE WITH BORDERLINE FINDINGS OF BOTH EYES: Primary | ICD-10-CM

## 2018-10-09 DIAGNOSIS — L40.59 POLYARTICULAR PSORIATIC ARTHRITIS: ICD-10-CM

## 2018-10-09 DIAGNOSIS — Z83.511 FAMILY HISTORY OF OPEN-ANGLE GLAUCOMA: ICD-10-CM

## 2018-10-09 PROCEDURE — 92133 CPTRZD OPH DX IMG PST SGM ON: CPT | Mod: S$GLB,,, | Performed by: OPHTHALMOLOGY

## 2018-10-09 PROCEDURE — 99999 PR PBB SHADOW E&M-EST. PATIENT-LVL I: CPT | Mod: PBBFAC,,, | Performed by: OPHTHALMOLOGY

## 2018-10-09 PROCEDURE — 92004 COMPRE OPH EXAM NEW PT 1/>: CPT | Mod: S$GLB,,, | Performed by: OPHTHALMOLOGY

## 2018-10-09 NOTE — PROGRESS NOTES
HPI     Glaucoma Suspect      Additional comments: NP glaucoma eval, no drops              Comments     Pt was referred by her optometrist for glaucoma eval. Grandfather has   glaucoma but no other family members. Wearing contacts today. Was told her   ONH were large and her pressure was up. No ocular pain or irritation.          Last edited by Norbert Cespedes on 10/9/2018  2:26 PM. (History)            Assessment /Plan     For exam results, see Encounter Report.      ICD-10-CM ICD-9-CM    1. 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      2. Family history of open-angle glaucoma Z83.511 V19.11 Posterior Segment OCT Optic Nerve- Both eyes    No disease now.    3. Polyarticular psoriatic arthritis L40.59 696.0        Return to clinic 6 months with IOP check and CCT

## 2018-10-15 ENCOUNTER — TELEPHONE (OUTPATIENT)
Dept: PHARMACY | Facility: CLINIC | Age: 38
End: 2018-10-15

## 2018-10-15 RX ORDER — ESOMEPRAZOLE MAGNESIUM 40 MG/1
CAPSULE, DELAYED RELEASE ORAL
Qty: 30 CAPSULE | Refills: 2 | Status: SHIPPED | OUTPATIENT
Start: 2018-10-15 | End: 2019-01-10 | Stop reason: SDUPTHER

## 2018-10-16 RX ORDER — SERTRALINE HYDROCHLORIDE 100 MG/1
TABLET, FILM COATED ORAL
Qty: 30 TABLET | Refills: 1 | OUTPATIENT
Start: 2018-10-16

## 2018-10-27 RX ORDER — SERTRALINE HYDROCHLORIDE 100 MG/1
100 TABLET, FILM COATED ORAL DAILY
Qty: 30 TABLET | Refills: 1 | Status: SHIPPED | OUTPATIENT
Start: 2018-10-27 | End: 2018-10-31 | Stop reason: SDUPTHER

## 2018-10-30 ENCOUNTER — TELEPHONE (OUTPATIENT)
Dept: PHARMACY | Facility: CLINIC | Age: 38
End: 2018-10-30

## 2018-10-31 ENCOUNTER — OFFICE VISIT (OUTPATIENT)
Dept: PSYCHIATRY | Facility: CLINIC | Age: 38
End: 2018-10-31
Payer: COMMERCIAL

## 2018-10-31 DIAGNOSIS — F32.4 MAJOR DEPRESSIVE DISORDER IN PARTIAL REMISSION, UNSPECIFIED WHETHER RECURRENT: Primary | ICD-10-CM

## 2018-10-31 DIAGNOSIS — T43.205A ANTIDEPRESSANT DISCONTINUATION SYNDROME, INITIAL ENCOUNTER: ICD-10-CM

## 2018-10-31 PROCEDURE — 99999 PR PBB SHADOW E&M-EST. PATIENT-LVL I: CPT | Mod: PBBFAC,,, | Performed by: PSYCHIATRY & NEUROLOGY

## 2018-10-31 PROCEDURE — 99214 OFFICE O/P EST MOD 30 MIN: CPT | Mod: S$GLB,,, | Performed by: PSYCHIATRY & NEUROLOGY

## 2018-10-31 RX ORDER — SERTRALINE HYDROCHLORIDE 100 MG/1
100 TABLET, FILM COATED ORAL DAILY
Qty: 60 TABLET | Refills: 5 | Status: SHIPPED | OUTPATIENT
Start: 2018-10-31 | End: 2018-12-05 | Stop reason: SDUPTHER

## 2018-10-31 NOTE — PROGRESS NOTES
"Outpatient Psychiatry Follow-up Visit (MD/NP)    10/31/2018    Janice Lawrence, a 38 y.o. female, presenting for follow-up visit. Met with patient.    Reason for Encounter: Patient complains of anxiety, some depression    IntervalHx: Patient seen and interviewed for follow-up, about 9 months following last visit. Reports moods have been worse in past week, constitutional symptoms also present. Had discontinuation syndrome. Prior to this moods were generally ok. Has ongoing arthritis problems. Started stelara which has helped her psoriasis, but not arthritis. On it for about 3 months. No other new health problems. Denies medication side effects while on meds, only discontinuation syndrome off them. No new stressors.     Background: 36 y/o F presents for chronic anxiety, depression, for years. Endorses following symptoms daily/near daily - Nervous/anxious/edgy, not able to stop/control worrying, worrying too much about different things, trouble relaxing, restlessness, easily annoyed/irritable, feeling afraid as if something awful might happen. ALBERTO - 7 = 21. Also endorses problems falling & staying asleep, sad <1/2 time, driven to overeat, slight weight gain, concentration problems, decreased interest, anergia, restlessness. Has problems with chronic pain related to psoriatic arthritis. Has had pattern of pattern of brief relief from dmards then waning of effect. Describes anxiety triggered by pain and disability - work has gone down from 50 hours/week to 20 hours/week due to pain. Heart beats fast, sweats, might start talking too fast, concentration. To start otezla as next treatment + methotrexate IM. meds have been partially helpful. PsychHx: Depression since teen then generally under control during 20's, worsened in 30's. Saw Dr. Wesley x 15 years then care through primary care. "prozac has always worked great" (though experienced some some emotional blunting). Other meds tried include wellbutrin, " "effexor. Remote SI, last months ago when in intense pain; Past suicide attempts in late teens (OD x 2 - never bad enough to end up in hospital) none since. Engaged in non-suicidal self-mutilation briefly as teen - cutting on legs. No psych admissions. No AVH. No delusions. Late teens - "super-great, super happy", promiscuous, drinking; all since early 20's. Never treated with mood stabilizers. "on/off treatment from [age]". 1x/year. Adherent with prozac - denies side effects.  Buspirone - prescribed/took x 7 months but didn't help. Meds: psoriatic arthritis, obesity, Social Hx: No developmental problems. Normal milestones. Single, no kids. 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 Difficulty with adjustment of lifestyle/living standard to finances with reduced work. Mom, good friends, 2 sisters, employees are supportive. Not  (never has been).     Review Of Systems:     GENERAL:  No weight gain or loss  SKIN:  No rashes or lacerations  HEAD:  No headaches  CHEST:  No shortness of breath, hyperventilation or cough  CARDIOVASCULAR:  No tachycardia or chest pain  ABDOMEN:  No nausea, vomiting, pain, constipation or diarrhea  URINARY:  No frequency, dysuria or sexual dysfunction  ENDOCRINE:  No polydipsia, polyuria  MUSCULOSKELETAL:  No pain or stiffness of the joints  NEUROLOGIC:  No weakness, sensory changes, seizures, confusion, memory loss, tremor or other abnormal movements    Current Evaluation:     Nutritional Screening: Considering the patient's height and weight, medications, medical history and preferences, should a referral be made to the dietitian? no    Constitutional  Vitals:  Most recent vital signs, dated less than 90 days prior to this appointment, were not reviewed.  There were no vitals filed for this visit.     General:  unremarkable, age appropriate     Musculoskeletal  Muscle Strength/Tone:  no tremor, no tic   Gait & Station:  non-ataxic     Psychiatric  Appearance: " casually dressed & groomed;   Behavior: calm,   Cooperation: cooperative with assessment  Speech: normal rate, volume, tone  Thought Process: linear, goal-directed  Thought Content: No suicidal or homicidal ideation; no delusions  Affect: restricted  Mood: dysphoric  Perceptions: No auditory or visual hallucinations  Level of Consciousness: alert throughout interview  Insight: fair  Cognition: Oriented to person, place, time, & situation  Memory: no apparent deficits to general clinical interview; not formally assessed  Attention/Concentration: no apparent deficits to general clinical interview; not formally assessed  Fund of Knowledge: average by vocabulary/education    Laboratory Data  No visits with results within 1 Month(s) from this visit.   Latest known visit with results is:   Lab Visit on 07/17/2018   Component Date Value Ref Range Status    WBC 07/17/2018 11.26  3.90 - 12.70 K/uL Final    RBC 07/17/2018 4.54  4.00 - 5.40 M/uL Final    Hemoglobin 07/17/2018 11.0* 12.0 - 16.0 g/dL Final    Hematocrit 07/17/2018 34.8* 37.0 - 48.5 % Final    MCV 07/17/2018 77* 82 - 98 fL Final    MCH 07/17/2018 24.2* 27.0 - 31.0 pg Final    MCHC 07/17/2018 31.6* 32.0 - 36.0 g/dL Final    RDW 07/17/2018 17.5* 11.5 - 14.5 % Final    Platelets 07/17/2018 414* 150 - 350 K/uL Final    MPV 07/17/2018 8.2* 9.2 - 12.9 fL Final    Gran # (ANC) 07/17/2018 7.3  1.8 - 7.7 K/uL Final    Lymph # 07/17/2018 3.0  1.0 - 4.8 K/uL Final    Mono # 07/17/2018 0.4  0.3 - 1.0 K/uL Final    Eos # 07/17/2018 0.5  0.0 - 0.5 K/uL Final    Baso # 07/17/2018 0.05  0.00 - 0.20 K/uL Final    Gran% 07/17/2018 64.4  38.0 - 73.0 % Final    Lymph% 07/17/2018 27.0  18.0 - 48.0 % Final    Mono% 07/17/2018 3.5* 4.0 - 15.0 % Final    Eosinophil% 07/17/2018 4.7  0.0 - 8.0 % Final    Basophil% 07/17/2018 0.4  0.0 - 1.9 % Final    Differential Method 07/17/2018 Automated   Final    Sodium 07/17/2018 139  136 - 145 mmol/L Final    Potassium  07/17/2018 4.2  3.5 - 5.1 mmol/L Final    Chloride 07/17/2018 106  95 - 110 mmol/L Final    CO2 07/17/2018 23  23 - 29 mmol/L Final    Glucose 07/17/2018 114* 70 - 110 mg/dL Final    BUN, Bld 07/17/2018 15  6 - 20 mg/dL Final    Creatinine 07/17/2018 0.8  0.5 - 1.4 mg/dL Final    Calcium 07/17/2018 9.4  8.7 - 10.5 mg/dL Final    Total Protein 07/17/2018 7.6  6.0 - 8.4 g/dL Final    Albumin 07/17/2018 4.0  3.5 - 5.2 g/dL Final    Total Bilirubin 07/17/2018 0.3  0.1 - 1.0 mg/dL Final    Alkaline Phosphatase 07/17/2018 72  55 - 135 U/L Final    AST 07/17/2018 23  10 - 40 U/L Final    ALT 07/17/2018 32  10 - 44 U/L Final    Anion Gap 07/17/2018 10  8 - 16 mmol/L Final    eGFR if African American 07/17/2018 >60  >60 mL/min/1.73 m^2 Final    eGFR if non African American 07/17/2018 >60  >60 mL/min/1.73 m^2 Final    CRP 07/17/2018 11.4* 0.0 - 8.2 mg/L Final    Sed Rate 07/17/2018 10  0 - 20 mm/Hr Final     Medications  Outpatient Encounter Medications as of 10/31/2018   Medication Sig Dispense Refill    celecoxib (CELEBREX) 200 MG capsule TAKE 1 CAPSULE (200 MG TOTAL) BY MOUTH 2 (TWO) TIMES DAILY. 60 capsule 3    ergocalciferol (ERGOCALCIFEROL) 50,000 unit Cap Take 1 capsule (50,000 Units total) by mouth every 7 days. 12 capsule 2    esomeprazole (NEXIUM) 40 MG capsule TAKE ONE CAPSULE BY MOUTH EVERY DAY BEFORE breakfast 30 capsule 2    folic acid (FOLVITE) 1 MG tablet Take 1 tablet (1 mg total) by mouth once daily. 90 tablet 2    gabapentin (NEURONTIN) 300 MG capsule Take 300 mg by mouth 2 (two) times daily.      methotrexate, PF, 25 mg/mL Soln INJECT 1 ML (25 MG TOTAL) INTO THE MUSCLE EVERY 7 DAYS. 10 mL 2    sertraline (ZOLOFT) 100 MG tablet Take 1 tablet (100 mg total) by mouth once daily. 30 tablet 1    tacrolimus (PROTOPIC) 0.1 % ointment Apply topically 2 (two) times daily. 30 g 2    ustekinumab (STELARA) 90 mg/mL Syrg syringe Inject 90 mg into the skin at 0 and 4 weeks, and then inject into  the skin every 12 weeks thereafter 3 mL 2     No facility-administered encounter medications on file as of 10/31/2018.      Assessment - Diagnosis - Goals:     Impression: 36 y/o F with chronic depression, recently mild, worsening anxiety in recently years with symptoms tracking with physical symptoms of psoriatic arthritis/disability. Modest improvements from previously. Had discontinuation syndrome off antidepressant. Moods euthymic on meds.     Dx: Generalized anxiety disorder; hx of MDD    Treatment Goals:  Specify outcomes written in observable, behavioral terms:   Improve coping skills, decrease anxiety by self-report    Treatment Plan/Recommendations:   · sertraline 100 daily.   · Discussed risks, benefits, and alternatives to treatment plan documented above with patient. I answered all patient questions related to this plan and patient expressed understanding and agreement.     Return to Clinic: 2 months    Counseling time: 10 minutes  Total time: 25 minutes    HENRRY Wolf MD

## 2018-11-12 RX ORDER — CELECOXIB 200 MG/1
CAPSULE ORAL
Qty: 60 CAPSULE | Refills: 3 | Status: SHIPPED | OUTPATIENT
Start: 2018-11-12 | End: 2019-03-07 | Stop reason: SDUPTHER

## 2018-11-13 ENCOUNTER — OFFICE VISIT (OUTPATIENT)
Dept: RHEUMATOLOGY | Facility: CLINIC | Age: 38
End: 2018-11-13
Payer: COMMERCIAL

## 2018-11-13 ENCOUNTER — LAB VISIT (OUTPATIENT)
Dept: LAB | Facility: HOSPITAL | Age: 38
End: 2018-11-13
Attending: INTERNAL MEDICINE
Payer: COMMERCIAL

## 2018-11-13 VITALS
SYSTOLIC BLOOD PRESSURE: 133 MMHG | WEIGHT: 293 LBS | BODY MASS INDEX: 39.68 KG/M2 | DIASTOLIC BLOOD PRESSURE: 71 MMHG | HEIGHT: 72 IN | HEART RATE: 89 BPM

## 2018-11-13 DIAGNOSIS — L40.0 PLAQUE PSORIASIS: ICD-10-CM

## 2018-11-13 DIAGNOSIS — G89.29 CHRONIC MIDLINE LOW BACK PAIN WITHOUT SCIATICA: ICD-10-CM

## 2018-11-13 DIAGNOSIS — D84.9 IMMUNOSUPPRESSED STATUS: ICD-10-CM

## 2018-11-13 DIAGNOSIS — L40.59 POLYARTICULAR PSORIATIC ARTHRITIS: Primary | ICD-10-CM

## 2018-11-13 DIAGNOSIS — M54.50 CHRONIC MIDLINE LOW BACK PAIN WITHOUT SCIATICA: ICD-10-CM

## 2018-11-13 DIAGNOSIS — L40.59 POLYARTICULAR PSORIATIC ARTHRITIS: ICD-10-CM

## 2018-11-13 LAB
ALBUMIN SERPL BCP-MCNC: 3.9 G/DL
ALP SERPL-CCNC: 74 U/L
ALT SERPL W/O P-5'-P-CCNC: 23 U/L
ANION GAP SERPL CALC-SCNC: 9 MMOL/L
AST SERPL-CCNC: 17 U/L
BASOPHILS # BLD AUTO: 0.04 K/UL
BASOPHILS NFR BLD: 0.3 %
BILIRUB SERPL-MCNC: 0.4 MG/DL
BUN SERPL-MCNC: 13 MG/DL
CALCIUM SERPL-MCNC: 9.7 MG/DL
CHLORIDE SERPL-SCNC: 105 MMOL/L
CO2 SERPL-SCNC: 25 MMOL/L
CREAT SERPL-MCNC: 0.8 MG/DL
CRP SERPL-MCNC: 7.9 MG/L
DIFFERENTIAL METHOD: ABNORMAL
EOSINOPHIL # BLD AUTO: 0.6 K/UL
EOSINOPHIL NFR BLD: 4.2 %
ERYTHROCYTE [DISTWIDTH] IN BLOOD BY AUTOMATED COUNT: 17.2 %
ERYTHROCYTE [SEDIMENTATION RATE] IN BLOOD BY WESTERGREN METHOD: 12 MM/HR
EST. GFR  (AFRICAN AMERICAN): >60 ML/MIN/1.73 M^2
EST. GFR  (NON AFRICAN AMERICAN): >60 ML/MIN/1.73 M^2
GLUCOSE SERPL-MCNC: 85 MG/DL
HCT VFR BLD AUTO: 33.9 %
HGB BLD-MCNC: 10.7 G/DL
LYMPHOCYTES # BLD AUTO: 3.6 K/UL
LYMPHOCYTES NFR BLD: 26.9 %
MCH RBC QN AUTO: 23.8 PG
MCHC RBC AUTO-ENTMCNC: 31.6 G/DL
MCV RBC AUTO: 76 FL
MONOCYTES # BLD AUTO: 0.5 K/UL
MONOCYTES NFR BLD: 3.5 %
NEUTROPHILS # BLD AUTO: 8.8 K/UL
NEUTROPHILS NFR BLD: 65.1 %
PLATELET # BLD AUTO: 449 K/UL
PMV BLD AUTO: 8.3 FL
POTASSIUM SERPL-SCNC: 4 MMOL/L
PROT SERPL-MCNC: 7.6 G/DL
RBC # BLD AUTO: 4.49 M/UL
SODIUM SERPL-SCNC: 139 MMOL/L
WBC # BLD AUTO: 13.54 K/UL

## 2018-11-13 PROCEDURE — 99999 PR PBB SHADOW E&M-EST. PATIENT-LVL IV: CPT | Mod: PBBFAC,,, | Performed by: INTERNAL MEDICINE

## 2018-11-13 PROCEDURE — 90471 IMMUNIZATION ADMIN: CPT | Mod: S$GLB,,, | Performed by: INTERNAL MEDICINE

## 2018-11-13 PROCEDURE — 90662 IIV NO PRSV INCREASED AG IM: CPT | Mod: S$GLB,,, | Performed by: INTERNAL MEDICINE

## 2018-11-13 PROCEDURE — 85651 RBC SED RATE NONAUTOMATED: CPT | Mod: PO

## 2018-11-13 PROCEDURE — 36415 COLL VENOUS BLD VENIPUNCTURE: CPT | Mod: PO

## 2018-11-13 PROCEDURE — 99215 OFFICE O/P EST HI 40 MIN: CPT | Mod: 25,S$GLB,, | Performed by: INTERNAL MEDICINE

## 2018-11-13 PROCEDURE — 80053 COMPREHEN METABOLIC PANEL: CPT | Mod: PO

## 2018-11-13 PROCEDURE — 86140 C-REACTIVE PROTEIN: CPT

## 2018-11-13 PROCEDURE — 85025 COMPLETE CBC W/AUTO DIFF WBC: CPT | Mod: PO

## 2018-11-13 PROCEDURE — 3008F BODY MASS INDEX DOCD: CPT | Mod: CPTII,S$GLB,, | Performed by: INTERNAL MEDICINE

## 2018-11-13 RX ORDER — TRAMADOL HYDROCHLORIDE 50 MG/1
50 TABLET ORAL EVERY 12 HOURS PRN
Qty: 60 TABLET | Refills: 0 | Status: SHIPPED | OUTPATIENT
Start: 2018-11-13 | End: 2018-12-21 | Stop reason: SDUPTHER

## 2018-11-13 NOTE — PROGRESS NOTES
RHEUMATOLOGY CLINIC FOLLOW UP VISIT  Chief complaints:-  My hands hurt.    HPI:-  Janice Nova a 38 y.o. pleasant female comes in for a follow up visit with above chief complaints. She was diagnosed with psoriatic arthritis by Dr. Arellano  since she had skin psoriasis and inflammatory asymmteric polyarthritis. She was started on MTX in 02/2016. Then she failed both enbrel , humira, otezla, cosentyx and recently started stelara- completed 12 weeks.  Her skin has responded very well with only 1 patch left on her left leg.  But the joints are still hurting.  They are not swelling that much but the pain is still present.      Review of Systems   Constitutional: Positive for malaise/fatigue. Negative for chills, fever and weight loss.   HENT: Negative for ear discharge, ear pain, hearing loss, nosebleeds and sore throat.    Eyes: Negative for blurred vision, double vision, photophobia, discharge and redness.   Respiratory: Negative for cough, hemoptysis, sputum production and shortness of breath.    Cardiovascular: Negative for chest pain, palpitations and claudication.   Gastrointestinal: Negative for abdominal pain, constipation, diarrhea, melena, nausea and vomiting.   Genitourinary: Negative for dysuria, frequency, hematuria and urgency.   Musculoskeletal: Positive for back pain, joint pain, myalgias and neck pain. Negative for falls.   Skin: Positive for itching and rash.   Neurological: Positive for weakness. Negative for dizziness, tremors, sensory change, speech change, focal weakness, seizures, loss of consciousness and headaches.   Endo/Heme/Allergies: Negative for environmental allergies. Does not bruise/bleed easily.   Psychiatric/Behavioral: Negative for hallucinations and memory loss. The patient is nervous/anxious and has insomnia.        Past Medical History:   Diagnosis Date    Acid reflux     Anxiety     Arthritis     Depression         Past Surgical History:   Procedure Laterality Date    COSMETIC SURGERY          Social History     Tobacco Use    Smoking status: Current Every Day Smoker     Packs/day: 0.50     Years: 10.00     Pack years: 5.00     Types: Cigarettes    Smokeless tobacco: Never Used   Substance Use Topics    Alcohol use: Yes     Alcohol/week: 1.8 oz     Types: 3 Glasses of wine per week    Drug use: Yes     Frequency: 2.0 times per week     Types: Marijuana       Family History   Problem Relation Age of Onset    Cancer Mother     Breast cancer Mother     Diabetes Mellitus Maternal Grandfather     Cancer Paternal Grandmother     Psoriasis Father     Arthritis Father     Colon cancer Paternal Aunt     Ovarian cancer Neg Hx     Thrombophilia Neg Hx        No Known Allergies        Physical examination:-    Vitals:    11/13/18 1442   BP: 133/71   Pulse: 89   Weight: (!) 146.4 kg (322 lb 12.1 oz)   Height: 6' (1.829 m)   PainSc:   8       Physical Exam   Constitutional: She is oriented to person, place, and time and well-developed, well-nourished, and in no distress. No distress.   HENT:   Head: Normocephalic and atraumatic.   Nose: Nose normal.   Mouth/Throat: Oropharynx is clear and moist. No oropharyngeal exudate.   Eyes: Conjunctivae and EOM are normal. Pupils are equal, round, and reactive to light. Right eye exhibits no discharge. Left eye exhibits no discharge. No scleral icterus.   Neck: Normal range of motion. Neck supple.   Cardiovascular: Normal rate and intact distal pulses.   Pulmonary/Chest: Effort normal. No respiratory distress. She exhibits no tenderness.   Abdominal: Soft. There is no tenderness.   Musculoskeletal:   Mild dactylitis over right thumb and left third finger.Tenderness present over right wrists. Enthesitis over bilateral achilles and patellar tendons.  Tender  Range of motion over large joints, but no effusion.    Lymphadenopathy:     She has no cervical adenopathy.   Neurological: She  is alert and oriented to person, place, and time. No cranial nerve deficit.   Skin: Skin is warm. Rash noted. She is not diaphoretic. No erythema. No pallor.   All rashes resolved except one patch over left leg.    Psychiatric: Mood, memory and affect normal.   Nursing note and vitals reviewed.    Assessment/Plans:-  # Polyarticular psoriatic arthritis  20-30% improvement since starting Stelara.  Since it has been only 12 weeks I advised her to wait until 24 weeks based on the clinical trials on Stelara.  Advised to take tramadol as needed for pain along with methotrexate and Celebrex.  If no improvement then consider switching to Xeljanz.    - traMADol (ULTRAM) 50 mg tablet; Take 1 tablet (50 mg total) by mouth every 12 (twelve) hours as needed for Pain.  Dispense: 60 tablet; Refill: 0  - CBC auto differential; Standing  - Comprehensive metabolic panel; Standing  - C-reactive protein; Standing  - Sedimentation rate; Standing    # Immunosuppressed status  HD flu vaccine today.   - CBC auto differential; Standing  - Influenza - High Dose (65+) (PF) (IM)     # Plaque psoriasis  Significant improvement on Stelara .  Continue the same.     # High risk medication use  High dose methotrexate injections every week along with stelara. Hold medication if any infection. Safety labs today.   # RTC in 12 weeks.   Disclaimer: This note was prepared using voice recognition system and is likely to have sound alike errors .  Please call me with any questions

## 2018-11-13 NOTE — PROGRESS NOTES
Administered 0.5cc Influenza  Vaccination to  left Deltoid. Pt. Tolerated well. No acute reaction noted to site. Pt. Instructed on S/S to report. Pt. Verbalized understanding. Pt waited 15 minutes.    Lot: [id number]  Exp: 06/19

## 2018-12-05 RX ORDER — SERTRALINE HYDROCHLORIDE 100 MG/1
TABLET, FILM COATED ORAL
Qty: 30 TABLET | Refills: 3 | Status: SHIPPED | OUTPATIENT
Start: 2018-12-05 | End: 2019-04-29 | Stop reason: SDUPTHER

## 2018-12-11 ENCOUNTER — OFFICE VISIT (OUTPATIENT)
Dept: OBSTETRICS AND GYNECOLOGY | Facility: CLINIC | Age: 38
End: 2018-12-11
Payer: COMMERCIAL

## 2018-12-11 VITALS
DIASTOLIC BLOOD PRESSURE: 86 MMHG | HEIGHT: 72 IN | SYSTOLIC BLOOD PRESSURE: 130 MMHG | BODY MASS INDEX: 39.68 KG/M2 | WEIGHT: 293 LBS

## 2018-12-11 DIAGNOSIS — B96.89 BV (BACTERIAL VAGINOSIS): Primary | ICD-10-CM

## 2018-12-11 DIAGNOSIS — N76.0 BV (BACTERIAL VAGINOSIS): Primary | ICD-10-CM

## 2018-12-11 DIAGNOSIS — Z11.3 SCREEN FOR STD (SEXUALLY TRANSMITTED DISEASE): ICD-10-CM

## 2018-12-11 PROCEDURE — 87491 CHLMYD TRACH DNA AMP PROBE: CPT

## 2018-12-11 PROCEDURE — 99213 OFFICE O/P EST LOW 20 MIN: CPT | Mod: S$GLB,,, | Performed by: OBSTETRICS & GYNECOLOGY

## 2018-12-11 PROCEDURE — 87210 SMEAR WET MOUNT SALINE/INK: CPT | Mod: QW,S$GLB,, | Performed by: OBSTETRICS & GYNECOLOGY

## 2018-12-11 PROCEDURE — 99999 PR PBB SHADOW E&M-EST. PATIENT-LVL II: CPT | Mod: PBBFAC,,, | Performed by: OBSTETRICS & GYNECOLOGY

## 2018-12-11 PROCEDURE — 3008F BODY MASS INDEX DOCD: CPT | Mod: CPTII,S$GLB,, | Performed by: OBSTETRICS & GYNECOLOGY

## 2018-12-11 RX ORDER — METRONIDAZOLE 500 MG/1
500 TABLET ORAL 2 TIMES DAILY
Qty: 14 TABLET | Refills: 0 | Status: SHIPPED | OUTPATIENT
Start: 2018-12-11 | End: 2018-12-18

## 2018-12-11 NOTE — PROGRESS NOTES
Subjective:       Patient ID: Janice Lawrence is a 38 y.o. female.    Chief Complaint:  Vaginal Discharge and STD CHECK      History of Present Illness  HPI  Pt complains of vaginal discharge and discomfort for the past week.  Requests STD screening.  Pt is not sexually active any more and denies known exposure.    GYN & OB History  Patient's last menstrual period was 2018.   Date of Last Pap: 2016    OB History    Para Term  AB Living   1 0     1     SAB TAB Ectopic Multiple Live Births                  # Outcome Date GA Lbr Nikolas/2nd Weight Sex Delivery Anes PTL Lv   1 AB                   Review of Systems  Review of Systems   Constitutional: Negative for activity change, appetite change, fatigue, fever and unexpected weight change.   Respiratory: Negative for shortness of breath.    Cardiovascular: Negative for chest pain, palpitations and leg swelling.   Gastrointestinal: Negative for abdominal pain, bloating, blood in stool, constipation, diarrhea, nausea and vomiting.   Genitourinary: Positive for urgency, vaginal discharge and vaginal pain. Negative for dyspareunia, dysuria, flank pain, frequency, genital sores, menorrhagia, menstrual problem, pelvic pain, vaginal bleeding, dysmenorrhea, urinary incontinence and vaginal odor.   Musculoskeletal: Negative for back pain.   Neurological: Negative for syncope and headaches.           Objective:    Physical Exam:   Constitutional: She is oriented to person, place, and time. She appears well-developed and well-nourished. No distress.       Cardiovascular: Normal rate, regular rhythm and normal heart sounds.     Pulmonary/Chest: Effort normal and breath sounds normal.        Abdominal: Soft. Bowel sounds are normal. She exhibits no distension. There is no tenderness.     Genitourinary: Uterus normal. Pelvic exam was performed with patient supine. There is no rash, tenderness, lesion or injury on the right labia. There is no rash,  tenderness, lesion or injury on the left labia. Uterus is not deviated, not enlarged and not tender. Cervix is normal. Right adnexum displays no mass, no tenderness and no fullness. Left adnexum displays no mass, no tenderness and no fullness. There is erythema in the vagina. No tenderness or bleeding in the vagina. No foreign body in the vagina. No signs of injury around the vagina. Vaginal discharge found. Cervix exhibits no motion tenderness, no discharge and no friability.   Genitourinary Comments: Wet prep: many clue cells, negative for trichomonas or yeast           Musculoskeletal: Normal range of motion and moves all extremeties. She exhibits no edema or tenderness.       Neurological: She is alert and oriented to person, place, and time.    Skin: Skin is warm and dry.    Psychiatric: She has a normal mood and affect. Her behavior is normal. Thought content normal.          Assessment:        1. BV (bacterial vaginosis)    2. Screen for STD (sexually transmitted disease)             Plan:      BV (bacterial vaginosis)  -     POCT Wet Prep  -     metroNIDAZOLE (FLAGYL) 500 MG tablet; Take 1 tablet (500 mg total) by mouth 2 (two) times daily. for 7 days  Dispense: 14 tablet; Refill: 0  -     Medication details, dosing, risks, side-effects, and interactions were discussed.    Screen for STD (sexually transmitted disease)  -     C. trachomatis/N. gonorrhoeae by AMP DNA      Follow-up if symptoms worsen or fail to improve.

## 2018-12-13 ENCOUNTER — PATIENT MESSAGE (OUTPATIENT)
Dept: OBSTETRICS AND GYNECOLOGY | Facility: CLINIC | Age: 38
End: 2018-12-13

## 2018-12-13 LAB
C TRACH DNA SPEC QL NAA+PROBE: NOT DETECTED
N GONORRHOEA DNA SPEC QL NAA+PROBE: NOT DETECTED

## 2018-12-21 DIAGNOSIS — L40.59 POLYARTICULAR PSORIATIC ARTHRITIS: ICD-10-CM

## 2018-12-21 DIAGNOSIS — G89.29 CHRONIC MIDLINE LOW BACK PAIN WITHOUT SCIATICA: ICD-10-CM

## 2018-12-21 DIAGNOSIS — M54.50 CHRONIC MIDLINE LOW BACK PAIN WITHOUT SCIATICA: ICD-10-CM

## 2018-12-21 RX ORDER — TRAMADOL HYDROCHLORIDE 50 MG/1
50 TABLET ORAL EVERY 12 HOURS PRN
Qty: 60 TABLET | Refills: 0 | Status: SHIPPED | OUTPATIENT
Start: 2018-12-21 | End: 2019-01-23 | Stop reason: SDUPTHER

## 2019-01-03 DIAGNOSIS — L40.59 POLYARTICULAR PSORIATIC ARTHRITIS: ICD-10-CM

## 2019-01-03 RX ORDER — FOLIC ACID 1 MG/1
1 TABLET ORAL DAILY
Qty: 90 TABLET | Refills: 3 | Status: SHIPPED | OUTPATIENT
Start: 2019-01-03 | End: 2019-12-20

## 2019-01-10 RX ORDER — ESOMEPRAZOLE MAGNESIUM 40 MG/1
CAPSULE, DELAYED RELEASE ORAL
Qty: 30 CAPSULE | Refills: 2 | Status: SHIPPED | OUTPATIENT
Start: 2019-01-10 | End: 2019-04-01 | Stop reason: SDUPTHER

## 2019-01-17 ENCOUNTER — TELEPHONE (OUTPATIENT)
Dept: PHARMACY | Facility: CLINIC | Age: 39
End: 2019-01-17

## 2019-01-23 DIAGNOSIS — L40.59 POLYARTICULAR PSORIATIC ARTHRITIS: ICD-10-CM

## 2019-01-23 DIAGNOSIS — M54.50 CHRONIC MIDLINE LOW BACK PAIN WITHOUT SCIATICA: ICD-10-CM

## 2019-01-23 DIAGNOSIS — G89.29 CHRONIC MIDLINE LOW BACK PAIN WITHOUT SCIATICA: ICD-10-CM

## 2019-01-23 RX ORDER — TRAMADOL HYDROCHLORIDE 50 MG/1
TABLET ORAL
Qty: 60 TABLET | Refills: 0 | Status: SHIPPED | OUTPATIENT
Start: 2019-01-23 | End: 2019-03-01 | Stop reason: SDUPTHER

## 2019-01-24 ENCOUNTER — TELEPHONE (OUTPATIENT)
Dept: PHARMACY | Facility: CLINIC | Age: 39
End: 2019-01-24

## 2019-02-18 ENCOUNTER — OFFICE VISIT (OUTPATIENT)
Dept: RHEUMATOLOGY | Facility: CLINIC | Age: 39
End: 2019-02-18
Payer: COMMERCIAL

## 2019-02-18 ENCOUNTER — LAB VISIT (OUTPATIENT)
Dept: LAB | Facility: HOSPITAL | Age: 39
End: 2019-02-18
Attending: INTERNAL MEDICINE
Payer: COMMERCIAL

## 2019-02-18 VITALS
HEART RATE: 80 BPM | DIASTOLIC BLOOD PRESSURE: 88 MMHG | SYSTOLIC BLOOD PRESSURE: 151 MMHG | HEIGHT: 72 IN | BODY MASS INDEX: 39.68 KG/M2 | WEIGHT: 293 LBS

## 2019-02-18 DIAGNOSIS — E66.01 MORBID OBESITY WITH BMI OF 40.0-44.9, ADULT: ICD-10-CM

## 2019-02-18 DIAGNOSIS — Z79.899 HIGH RISK MEDICATION USE: ICD-10-CM

## 2019-02-18 DIAGNOSIS — M25.561 CHRONIC PAIN OF BOTH KNEES: ICD-10-CM

## 2019-02-18 DIAGNOSIS — L40.59 POLYARTICULAR PSORIATIC ARTHRITIS: ICD-10-CM

## 2019-02-18 DIAGNOSIS — L40.0 PLAQUE PSORIASIS: ICD-10-CM

## 2019-02-18 DIAGNOSIS — D84.9 IMMUNOSUPPRESSED STATUS: ICD-10-CM

## 2019-02-18 DIAGNOSIS — L40.59 POLYARTICULAR PSORIATIC ARTHRITIS: Primary | ICD-10-CM

## 2019-02-18 DIAGNOSIS — G89.29 CHRONIC PAIN OF BOTH KNEES: ICD-10-CM

## 2019-02-18 DIAGNOSIS — M25.562 CHRONIC PAIN OF BOTH KNEES: ICD-10-CM

## 2019-02-18 DIAGNOSIS — E55.9 VITAMIN D DEFICIENCY: ICD-10-CM

## 2019-02-18 LAB
ALBUMIN SERPL BCP-MCNC: 4.2 G/DL
ALP SERPL-CCNC: 84 U/L
ALT SERPL W/O P-5'-P-CCNC: 28 U/L
ANION GAP SERPL CALC-SCNC: 11 MMOL/L
AST SERPL-CCNC: 25 U/L
BASOPHILS # BLD AUTO: 0.07 K/UL
BASOPHILS NFR BLD: 0.6 %
BILIRUB SERPL-MCNC: 0.4 MG/DL
BUN SERPL-MCNC: 14 MG/DL
CALCIUM SERPL-MCNC: 9.8 MG/DL
CHLORIDE SERPL-SCNC: 105 MMOL/L
CO2 SERPL-SCNC: 23 MMOL/L
CREAT SERPL-MCNC: 0.7 MG/DL
CRP SERPL-MCNC: 10 MG/L
DIFFERENTIAL METHOD: ABNORMAL
EOSINOPHIL # BLD AUTO: 0.6 K/UL
EOSINOPHIL NFR BLD: 5.2 %
ERYTHROCYTE [DISTWIDTH] IN BLOOD BY AUTOMATED COUNT: 18.2 %
ERYTHROCYTE [SEDIMENTATION RATE] IN BLOOD BY WESTERGREN METHOD: 46 MM/HR
EST. GFR  (AFRICAN AMERICAN): >60 ML/MIN/1.73 M^2
EST. GFR  (NON AFRICAN AMERICAN): >60 ML/MIN/1.73 M^2
GLUCOSE SERPL-MCNC: 85 MG/DL
HCT VFR BLD AUTO: 36.6 %
HGB BLD-MCNC: 11.1 G/DL
IMM GRANULOCYTES # BLD AUTO: 0.05 K/UL
IMM GRANULOCYTES NFR BLD AUTO: 0.4 %
LYMPHOCYTES # BLD AUTO: 3.6 K/UL
LYMPHOCYTES NFR BLD: 32 %
MCH RBC QN AUTO: 22.9 PG
MCHC RBC AUTO-ENTMCNC: 30.3 G/DL
MCV RBC AUTO: 76 FL
MONOCYTES # BLD AUTO: 0.5 K/UL
MONOCYTES NFR BLD: 4.5 %
NEUTROPHILS # BLD AUTO: 6.5 K/UL
NEUTROPHILS NFR BLD: 57.7 %
NRBC BLD-RTO: 0 /100 WBC
PLATELET # BLD AUTO: 509 K/UL
PMV BLD AUTO: 8.1 FL
POTASSIUM SERPL-SCNC: 4.1 MMOL/L
PROT SERPL-MCNC: 8.2 G/DL
RBC # BLD AUTO: 4.84 M/UL
SODIUM SERPL-SCNC: 139 MMOL/L
WBC # BLD AUTO: 11.17 K/UL

## 2019-02-18 PROCEDURE — 85652 RBC SED RATE AUTOMATED: CPT

## 2019-02-18 PROCEDURE — 80053 COMPREHEN METABOLIC PANEL: CPT

## 2019-02-18 PROCEDURE — 85025 COMPLETE CBC W/AUTO DIFF WBC: CPT

## 2019-02-18 PROCEDURE — 99215 PR OFFICE/OUTPT VISIT, EST, LEVL V, 40-54 MIN: ICD-10-PCS | Mod: 25,S$GLB,, | Performed by: INTERNAL MEDICINE

## 2019-02-18 PROCEDURE — 99999 PR PBB SHADOW E&M-EST. PATIENT-LVL III: ICD-10-PCS | Mod: PBBFAC,,, | Performed by: INTERNAL MEDICINE

## 2019-02-18 PROCEDURE — 20610 DRAIN/INJ JOINT/BURSA W/O US: CPT | Mod: 50,S$GLB,, | Performed by: INTERNAL MEDICINE

## 2019-02-18 PROCEDURE — 3008F BODY MASS INDEX DOCD: CPT | Mod: CPTII,S$GLB,, | Performed by: INTERNAL MEDICINE

## 2019-02-18 PROCEDURE — 36415 COLL VENOUS BLD VENIPUNCTURE: CPT

## 2019-02-18 PROCEDURE — 99215 OFFICE O/P EST HI 40 MIN: CPT | Mod: 25,S$GLB,, | Performed by: INTERNAL MEDICINE

## 2019-02-18 PROCEDURE — 99999 PR PBB SHADOW E&M-EST. PATIENT-LVL III: CPT | Mod: PBBFAC,,, | Performed by: INTERNAL MEDICINE

## 2019-02-18 PROCEDURE — 20610 PR DRAIN/INJECT LARGE JOINT/BURSA: ICD-10-PCS | Mod: 50,S$GLB,, | Performed by: INTERNAL MEDICINE

## 2019-02-18 PROCEDURE — 3008F PR BODY MASS INDEX (BMI) DOCUMENTED: ICD-10-PCS | Mod: CPTII,S$GLB,, | Performed by: INTERNAL MEDICINE

## 2019-02-18 PROCEDURE — 86140 C-REACTIVE PROTEIN: CPT

## 2019-02-18 RX ORDER — TRIAMCINOLONE ACETONIDE 40 MG/ML
40 INJECTION, SUSPENSION INTRA-ARTICULAR; INTRAMUSCULAR ONCE
Status: COMPLETED | OUTPATIENT
Start: 2019-02-18 | End: 2019-02-18

## 2019-02-18 RX ORDER — ERGOCALCIFEROL 1.25 MG/1
50000 CAPSULE ORAL
Qty: 12 CAPSULE | Refills: 3 | Status: SHIPPED | OUTPATIENT
Start: 2019-02-18 | End: 2020-01-10

## 2019-02-18 RX ORDER — METHOTREXATE 25 MG/ML
INJECTION INTRA-ARTERIAL; INTRAMUSCULAR; INTRATHECAL; INTRAVENOUS
Qty: 10 ML | Refills: 2 | Status: SHIPPED | OUTPATIENT
Start: 2019-02-18 | End: 2019-05-13 | Stop reason: SDUPTHER

## 2019-02-18 RX ADMIN — TRIAMCINOLONE ACETONIDE 40 MG: 40 INJECTION, SUSPENSION INTRA-ARTICULAR; INTRAMUSCULAR at 06:02

## 2019-02-19 NOTE — PROGRESS NOTES
RHEUMATOLOGY CLINIC FOLLOW UP VISIT  Chief complaints:-  My knees hurt.    HPI:-  Janice Nova a 38 y.o. pleasant female comes in for a follow up visit with above chief complaints.  She reports that the Stelara has been showing significant improvement in both her psoriasis and psoriatic arthritis.  She has failed methotrexate, Enbrel, Humira, Otezla, cosentyx.  Other than pain around her knee joints she reports doing really today.  Knee pain-chronic, achy, progressive, stiff, associated with swelling, predominant pain over the suprapatellar region.  Her psoriasis have been improving with very minor patches her elbows.      Review of Systems   Constitutional: Negative for chills, fever, malaise/fatigue and weight loss.   HENT: Negative for ear discharge, ear pain, hearing loss, nosebleeds and sore throat.    Eyes: Negative for blurred vision, double vision, photophobia, discharge and redness.   Respiratory: Negative for cough, hemoptysis, sputum production and shortness of breath.    Cardiovascular: Negative for chest pain, palpitations and claudication.   Gastrointestinal: Negative for abdominal pain, constipation, diarrhea, melena, nausea and vomiting.   Genitourinary: Negative for dysuria, frequency, hematuria and urgency.   Musculoskeletal: Positive for joint pain. Negative for back pain, falls, myalgias and neck pain.   Skin: Negative for itching and rash.   Neurological: Negative for dizziness, tremors, sensory change, speech change, focal weakness, seizures, loss of consciousness, weakness and headaches.   Endo/Heme/Allergies: Negative for environmental allergies. Does not bruise/bleed easily.   Psychiatric/Behavioral: Negative for hallucinations and memory loss. The patient is not nervous/anxious and does not have insomnia.        Past Medical History:   Diagnosis Date    Acid reflux     Anxiety     Arthritis     Depression        Past  Surgical History:   Procedure Laterality Date    COSMETIC SURGERY          Social History     Tobacco Use    Smoking status: Former Smoker     Packs/day: 0.50     Years: 10.00     Pack years: 5.00     Types: Cigarettes    Smokeless tobacco: Never Used   Substance Use Topics    Alcohol use: Yes     Alcohol/week: 1.8 oz     Types: 3 Glasses of wine per week    Drug use: Yes     Frequency: 2.0 times per week     Types: Marijuana       Family History   Problem Relation Age of Onset    Cancer Mother     Breast cancer Mother     Diabetes Mellitus Maternal Grandfather     Cancer Paternal Grandmother     Psoriasis Father     Arthritis Father     Colon cancer Paternal Aunt     Ovarian cancer Neg Hx     Thrombophilia Neg Hx        No Known Allergies        Physical examination:-    Vitals:    02/18/19 1224   BP: (!) 151/88   Pulse: 80   Weight: (!) 145.1 kg (319 lb 14.2 oz)   Height: 6' (1.829 m)   PainSc:   5   PainLoc: Knee       Physical Exam   Constitutional: She is oriented to person, place, and time and well-developed, well-nourished, and in no distress. No distress.   HENT:   Head: Normocephalic and atraumatic.   Nose: Nose normal.   Mouth/Throat: Oropharynx is clear and moist. No oropharyngeal exudate.   Eyes: Conjunctivae and EOM are normal. Pupils are equal, round, and reactive to light. Right eye exhibits no discharge. Left eye exhibits no discharge. No scleral icterus.   Neck: Normal range of motion. Neck supple.   Cardiovascular: Normal rate and intact distal pulses.   Pulmonary/Chest: Effort normal. No respiratory distress. She exhibits no tenderness.   Abdominal: Soft. There is no tenderness.   Musculoskeletal:   Significant improvement of dactylitis and enthesitis of small joints except persistent enthesitis quadriceps tendon insertion knees with mild effusion bilateral knees.   Lymphadenopathy:     She has no cervical adenopathy.   Neurological: She is alert and oriented to person, place, and  time. No cranial nerve deficit.   Skin: Skin is warm. Rash noted. She is not diaphoretic. No erythema. No pallor.   All rashes resolved except one patch over left leg.    Psychiatric: Mood, memory and affect normal.   Nursing note and vitals reviewed.    Assessment/Plans:-  # Polyarticular psoriatic arthritis  Looks like Stelara is working on both her psoriasis and psoriatic arthritis since there is dactylitis and skin rash.  But she still has multiple enthesitis.  Since the knee joints have effusion I inject them with Kenalog today.  If no improvement in 4 weeks then I will consider starting Taltz.  - CBC auto differential; Standing  - Comprehensive metabolic panel; Standing  - C-reactive protein; Standing  - Sedimentation rate; Standing  - ustekinumab (STELARA) 90 mg/mL Syrg syringe; Inject 90 mg into the skin at 0 and 4 weeks, and then inject into the skin every 12 weeks thereafter  Dispense: 3 mL; Refill: 2  - methotrexate, PF, 25 mg/mL Soln; INJECT 1 ML (25 MG TOTAL) INTO THE MUSCLE EVERY 7 DAYS.  Dispense: 10 mL; Refill: 2  - QUANTIFERON GOLD TB; Standing    # Immunosuppressed status  Compromised immune system secondary to autoimmune disease and use of immunosuppressive drugs. Monitor carefully for infections. Advised to get immediate medical care if any infection. Also advised strict adherence to age appropriate vaccinations and cancer screenings with PCP.      # Plaque psoriasis  Significant improvement on Stelara .  Continue the same.   - ustekinumab (STELARA) 90 mg/mL Syrg syringe; Inject 90 mg into the skin at 0 and 4 weeks, and then inject into the skin every 12 weeks thereafter  Dispense: 3 mL; Refill: 2  - methotrexate, PF, 25 mg/mL Soln; INJECT 1 ML (25 MG TOTAL) INTO THE MUSCLE EVERY 7 DAYS.  Dispense: 10 mL; Refill: 2  - QUANTIFERON GOLD TB; Standing    # High risk medication use  High dose methotrexate injections every week along with stelara. Hold medication if any infection. Safety labs today.      PROCEDURE NOTE:-  Name of the procedure: Injection of bilateral knees with kenalog .   Patient consent:   Indication, risks(including skin depigmentation, fat atrophy, infection, bleeding, nerve or tendon injury) and alternative to the procedure were explained to to the patient. Patient was given opportunity to ask questions . Then patient gave a verbal consent for the procedure.   Pertinent lab values: None indicated  Type of anesthesia: 2% Lidocaine   Description of procedure : Using sterile technique, the skin over both knees were cleaned with alcohol swab and then with chlorhexidine solution. After applying cold spray , the needle was inserted into the skin and into the joint space without any resistance with intermittent lidocaine injection and then 40 mg kenalog was injected into the joint space without any resistance.   Complication: None  Estimated blood loss: None  Disposition: Patient tolerated the procedure without any complains and the site was dressed.There were no complications.  Discharge instructions of icing and stretching given.    # RTC in 12 weeks.   Disclaimer: This note was prepared using voice recognition system and is likely to have sound alike errors .  Please call me with any questions

## 2019-02-20 ENCOUNTER — PATIENT MESSAGE (OUTPATIENT)
Dept: RHEUMATOLOGY | Facility: CLINIC | Age: 39
End: 2019-02-20

## 2019-03-01 DIAGNOSIS — G89.29 CHRONIC MIDLINE LOW BACK PAIN WITHOUT SCIATICA: ICD-10-CM

## 2019-03-01 DIAGNOSIS — L40.59 POLYARTICULAR PSORIATIC ARTHRITIS: ICD-10-CM

## 2019-03-01 DIAGNOSIS — M54.50 CHRONIC MIDLINE LOW BACK PAIN WITHOUT SCIATICA: ICD-10-CM

## 2019-03-04 RX ORDER — TRAMADOL HYDROCHLORIDE 50 MG/1
50 TABLET ORAL EVERY 12 HOURS PRN
Qty: 60 TABLET | Refills: 0 | Status: SHIPPED | OUTPATIENT
Start: 2019-03-04 | End: 2019-04-01 | Stop reason: SDUPTHER

## 2019-03-07 ENCOUNTER — PATIENT MESSAGE (OUTPATIENT)
Dept: OBSTETRICS AND GYNECOLOGY | Facility: CLINIC | Age: 39
End: 2019-03-07

## 2019-03-07 RX ORDER — CELECOXIB 200 MG/1
CAPSULE ORAL
Qty: 60 CAPSULE | Refills: 3 | Status: SHIPPED | OUTPATIENT
Start: 2019-03-07 | End: 2019-07-02 | Stop reason: SDUPTHER

## 2019-04-01 DIAGNOSIS — L40.59 POLYARTICULAR PSORIATIC ARTHRITIS: ICD-10-CM

## 2019-04-01 DIAGNOSIS — M54.50 CHRONIC MIDLINE LOW BACK PAIN WITHOUT SCIATICA: ICD-10-CM

## 2019-04-01 DIAGNOSIS — G89.29 CHRONIC MIDLINE LOW BACK PAIN WITHOUT SCIATICA: ICD-10-CM

## 2019-04-01 RX ORDER — TRAMADOL HYDROCHLORIDE 50 MG/1
TABLET ORAL
Qty: 60 TABLET | Refills: 0 | Status: SHIPPED | OUTPATIENT
Start: 2019-04-01 | End: 2019-05-28 | Stop reason: SDUPTHER

## 2019-04-01 RX ORDER — ESOMEPRAZOLE MAGNESIUM 40 MG/1
CAPSULE, DELAYED RELEASE ORAL
Qty: 30 CAPSULE | Refills: 2 | Status: SHIPPED | OUTPATIENT
Start: 2019-04-01 | End: 2019-07-02 | Stop reason: SDUPTHER

## 2019-04-29 ENCOUNTER — OFFICE VISIT (OUTPATIENT)
Dept: OPHTHALMOLOGY | Facility: CLINIC | Age: 39
End: 2019-04-29
Payer: COMMERCIAL

## 2019-04-29 ENCOUNTER — OFFICE VISIT (OUTPATIENT)
Dept: PSYCHIATRY | Facility: CLINIC | Age: 39
End: 2019-04-29
Payer: COMMERCIAL

## 2019-04-29 VITALS
WEIGHT: 293 LBS | DIASTOLIC BLOOD PRESSURE: 79 MMHG | HEART RATE: 85 BPM | BODY MASS INDEX: 43.35 KG/M2 | SYSTOLIC BLOOD PRESSURE: 105 MMHG

## 2019-04-29 DIAGNOSIS — L40.59 POLYARTICULAR PSORIATIC ARTHRITIS: ICD-10-CM

## 2019-04-29 DIAGNOSIS — F41.9 ANXIETY: ICD-10-CM

## 2019-04-29 DIAGNOSIS — H40.013 OPEN ANGLE WITH BORDERLINE FINDINGS OF BOTH EYES: Primary | ICD-10-CM

## 2019-04-29 DIAGNOSIS — F32.4 MAJOR DEPRESSIVE DISORDER IN PARTIAL REMISSION, UNSPECIFIED WHETHER RECURRENT: Primary | ICD-10-CM

## 2019-04-29 DIAGNOSIS — Z83.511 FAMILY HISTORY OF OPEN-ANGLE GLAUCOMA: ICD-10-CM

## 2019-04-29 PROCEDURE — 76514 PR  US, EYE, FOR CORNEAL THICKNESS: ICD-10-PCS | Mod: S$GLB,,, | Performed by: OPHTHALMOLOGY

## 2019-04-29 PROCEDURE — 3008F PR BODY MASS INDEX (BMI) DOCUMENTED: ICD-10-PCS | Mod: CPTII,S$GLB,, | Performed by: PSYCHIATRY & NEUROLOGY

## 2019-04-29 PROCEDURE — 99213 PR OFFICE/OUTPT VISIT, EST, LEVL III, 20-29 MIN: ICD-10-PCS | Mod: S$GLB,,, | Performed by: PSYCHIATRY & NEUROLOGY

## 2019-04-29 PROCEDURE — 99999 PR PBB SHADOW E&M-EST. PATIENT-LVL II: ICD-10-PCS | Mod: PBBFAC,,, | Performed by: PSYCHIATRY & NEUROLOGY

## 2019-04-29 PROCEDURE — 76514 ECHO EXAM OF EYE THICKNESS: CPT | Mod: S$GLB,,, | Performed by: OPHTHALMOLOGY

## 2019-04-29 PROCEDURE — 99213 OFFICE O/P EST LOW 20 MIN: CPT | Mod: S$GLB,,, | Performed by: PSYCHIATRY & NEUROLOGY

## 2019-04-29 PROCEDURE — 92012 INTRM OPH EXAM EST PATIENT: CPT | Mod: S$GLB,,, | Performed by: OPHTHALMOLOGY

## 2019-04-29 PROCEDURE — 99999 PR PBB SHADOW E&M-EST. PATIENT-LVL II: CPT | Mod: PBBFAC,,, | Performed by: PSYCHIATRY & NEUROLOGY

## 2019-04-29 PROCEDURE — 99999 PR PBB SHADOW E&M-EST. PATIENT-LVL II: CPT | Mod: PBBFAC,,, | Performed by: OPHTHALMOLOGY

## 2019-04-29 PROCEDURE — 92012 PR EYE EXAM, EST PATIENT,INTERMED: ICD-10-PCS | Mod: S$GLB,,, | Performed by: OPHTHALMOLOGY

## 2019-04-29 PROCEDURE — 3008F BODY MASS INDEX DOCD: CPT | Mod: CPTII,S$GLB,, | Performed by: PSYCHIATRY & NEUROLOGY

## 2019-04-29 PROCEDURE — 99999 PR PBB SHADOW E&M-EST. PATIENT-LVL II: ICD-10-PCS | Mod: PBBFAC,,, | Performed by: OPHTHALMOLOGY

## 2019-04-29 RX ORDER — SERTRALINE HYDROCHLORIDE 100 MG/1
100 TABLET, FILM COATED ORAL DAILY
Qty: 30 TABLET | Refills: 5 | Status: SHIPPED | OUTPATIENT
Start: 2019-04-29 | End: 2020-03-09

## 2019-04-29 NOTE — PROGRESS NOTES
"Outpatient Psychiatry Follow-up Visit (MD/NP)    4/29/2019    Janice Lawrence, a 39 y.o. female, presenting for follow-up visit. Met with patient.    Reason for Encounter: Follow-up    IntervalHx: Patient seen and interviewed for follow-up, about 6 months following last visit. Anxiety mostly mild, occasionally moderate, especially   Psoriatic arthritis medication seems to be working well. 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 No new meds. No other new health problems. Sertraline adherent. Denies side effects. Continues to participate in psychotherapy.     Background: 38 y/o F presents for chronic anxiety, depression, for years. Endorses following symptoms daily/near daily - Nervous/anxious/edgy, not able to stop/control worrying, worrying too much about different things, trouble relaxing, restlessness, easily annoyed/irritable, feeling afraid as if something awful might happen. ALBERTO - 7 = 21. Also endorses problems falling & staying asleep, sad <1/2 time, driven to overeat, slight weight gain, concentration problems, decreased interest, anergia, restlessness. Has problems with chronic pain related to psoriatic arthritis. Has had pattern of pattern of brief relief from dmards then waning of effect. Describes anxiety triggered by pain and disability - work has gone down from 50 hours/week to 20 hours/week due to pain. Heart beats fast, sweats, might start talking too fast, concentration. To start otezla as next treatment + methotrexate IM. meds have been partially helpful. PsychHx: Depression since teen then generally under control during 20's, worsened in 30's. Saw Dr. Wesley x 15 years then care through primary care. "prozac has always worked great" (though experienced some some emotional blunting). Other meds tried include wellbutrin, effexor. Remote SI, last months ago when in intense " "pain; Past suicide attempts in late teens (OD x 2 - never bad enough to end up in hospital) none since. Engaged in non-suicidal self-mutilation briefly as teen - cutting on legs. No psych admissions. No AVH. No delusions. Late teens - "super-great, super happy", promiscuous, drinking; all since early 20's. Never treated with mood stabilizers. "on/off treatment from [age]". 1x/year. Adherent with prozac - denies side effects.  Buspirone - prescribed/took x 7 months but didn't help. Meds: psoriatic arthritis, obesity, Social Hx: No developmental problems. Normal milestones. Single, no kids. 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 Difficulty with adjustment of lifestyle/living standard to finances with reduced work. Mom, good friends, 2 sisters, employees are supportive. Not  (never has been).     Review Of Systems:     GENERAL:  No weight gain or loss  SKIN:  No rashes or lacerations  HEAD:  No headaches  CHEST:  No shortness of breath, hyperventilation or cough  CARDIOVASCULAR:  No tachycardia or chest pain  ABDOMEN:  No nausea, vomiting, pain, constipation or diarrhea  URINARY:  No frequency, dysuria or sexual dysfunction  ENDOCRINE:  No polydipsia, polyuria  MUSCULOSKELETAL:  No pain or stiffness of the joints  NEUROLOGIC:  No weakness, sensory changes, seizures, confusion, memory loss, tremor or other abnormal movements    Current Evaluation:     Nutritional Screening: Considering the patient's height and weight, medications, medical history and preferences, should a referral be made to the dietitian? no    Constitutional  Vitals:  Most recent vital signs, dated less than 90 days prior to this appointment, were not reviewed.    Vitals:    04/29/19 1349   BP: 105/79   Pulse: 85   Weight: (!) 145 kg (319 lb 10.7 oz)        General:  unremarkable, age appropriate     Musculoskeletal  Muscle Strength/Tone:  no tremor, no tic   Gait & Station:  non-ataxic     Psychiatric  Appearance: " casually dressed & groomed;   Behavior: calm,   Cooperation: cooperative with assessment  Speech: normal rate, volume, tone  Thought Process: linear, goal-directed  Thought Content: No suicidal or homicidal ideation; no delusions  Affect: restricted  Mood: dysphoric  Perceptions: No auditory or visual hallucinations  Level of Consciousness: alert throughout interview  Insight: fair  Cognition: Oriented to person, place, time, & situation  Memory: no apparent deficits to general clinical interview; not formally assessed  Attention/Concentration: no apparent deficits to general clinical interview; not formally assessed  Fund of Knowledge: average by vocabulary/education    Laboratory Data  No visits with results within 1 Month(s) from this visit.   Latest known visit with results is:   Lab Visit on 02/18/2019   Component Date Value Ref Range Status    NIL 02/18/2019 0.010  See text IU/mL Final    TB1 - Nil 02/18/2019 0.010  See text IU/mL Final    TB2 - Nil 02/18/2019 0.010  See text IU/mL Final    Mitogen - Nil 02/18/2019 0.340  See text IU/mL Final    TB Gold Plus 02/18/2019 Indeterminate*  Final     Medications  Outpatient Encounter Medications as of 4/29/2019   Medication Sig Dispense Refill    celecoxib (CELEBREX) 200 MG capsule TAKE ONE CAPSULE BY MOUTH TWICE DAILY 60 capsule 3    ergocalciferol (ERGOCALCIFEROL) 50,000 unit Cap Take 1 capsule (50,000 Units total) by mouth every 7 days. 12 capsule 3    esomeprazole (NEXIUM) 40 MG capsule TAKE ONE CAPSULE BY MOUTH EVERY DAY BEFORE breakfast 30 capsule 2    folic acid (FOLVITE) 1 MG tablet Take 1 tablet (1 mg total) by mouth once daily. 90 tablet 3    gabapentin (NEURONTIN) 300 MG capsule Take 300 mg by mouth 2 (two) times daily.      methotrexate, PF, 25 mg/mL Soln INJECT 1 ML (25 MG TOTAL) INTO THE MUSCLE EVERY 7 DAYS. 10 mL 2    sertraline (ZOLOFT) 100 MG tablet TAKE ONE TABLET BY MOUTH EVERY DAY 30 tablet 3    tacrolimus (PROTOPIC) 0.1 % ointment  Apply topically 2 (two) times daily. 30 g 2    traMADol (ULTRAM) 50 mg tablet TAKE ONE TABLET BY MOUTH EVERY TWELVE HOURS AS NEEDED FOR PAIN 60 tablet 0    ustekinumab (STELARA) 90 mg/mL Syrg syringe Inject 90 mg into the skin at 0 and 4 weeks, and then inject into the skin every 12 weeks thereafter 3 mL 2     No facility-administered encounter medications on file as of 4/29/2019.      Assessment - Diagnosis - Goals:     Impression: 38 y/o F with chronic depression, recently mild, worsening anxiety in recently years with symptoms tracking with physical symptoms of psoriatic arthritis/disability. Modest improvements from previously. Had discontinuation syndrome off antidepressant. Moods euthymic on meds and tolerating well.     Dx: Generalized anxiety disorder; hx of MDD    Treatment Goals:  Specify outcomes written in observable, behavioral terms:   Improve coping skills, decrease anxiety by self-report    Treatment Plan/Recommendations:   · sertraline 100 daily.   · Discussed risks, benefits, and alternatives to treatment plan documented above with patient. I answered all patient questions related to this plan and patient expressed understanding and agreement.   · Continue psychotherapy prn.     Return to Clinic: 2 months    Counseling time: 5 minutes  Total time: 20 minutes    HENRRY Wolf MD

## 2019-04-29 NOTE — PROGRESS NOTES
HPI     Glaucoma Suspect      Additional comments: 6 Months IOP CHECK & CCT              Comments     Patient States Vision Stable & No Discomfort.    Fhx glaucoma-grandfather          Last edited by Michelle Lomeli on 4/29/2019 11:29 AM. (History)            Assessment /Plan     For exam results, see Encounter Report.      ICD-10-CM ICD-9-CM    1. Open angle with borderline findings of both eyes H40.013 365.01 iop , CCT , and gOCT all normal - follow   2. Family history of open-angle glaucoma Z83.511 V19.11 follow   3. Polyarticular psoriatic arthritis L40.59 696.0 stable   Advised to use gel q hs and systane ultra when not wears lens, rewetting drops also 2-3 times per day    Will release back to [facility] on Dr Martina Stovall

## 2019-05-13 DIAGNOSIS — L40.59 POLYARTICULAR PSORIATIC ARTHRITIS: ICD-10-CM

## 2019-05-13 DIAGNOSIS — L40.0 PLAQUE PSORIASIS: ICD-10-CM

## 2019-05-13 RX ORDER — METHOTREXATE 25 MG/ML
INJECTION, SOLUTION INTRA-ARTERIAL; INTRAMUSCULAR; INTRAVENOUS
Qty: 10 ML | Refills: 1 | Status: SHIPPED | OUTPATIENT
Start: 2019-05-13 | End: 2019-07-07 | Stop reason: SDUPTHER

## 2019-05-20 ENCOUNTER — OFFICE VISIT (OUTPATIENT)
Dept: RHEUMATOLOGY | Facility: CLINIC | Age: 39
End: 2019-05-20
Payer: COMMERCIAL

## 2019-05-20 ENCOUNTER — LAB VISIT (OUTPATIENT)
Dept: LAB | Facility: HOSPITAL | Age: 39
End: 2019-05-20
Attending: INTERNAL MEDICINE
Payer: COMMERCIAL

## 2019-05-20 VITALS
HEIGHT: 72 IN | HEART RATE: 86 BPM | SYSTOLIC BLOOD PRESSURE: 141 MMHG | DIASTOLIC BLOOD PRESSURE: 82 MMHG | WEIGHT: 293 LBS | BODY MASS INDEX: 39.68 KG/M2

## 2019-05-20 DIAGNOSIS — L40.0 PLAQUE PSORIASIS: ICD-10-CM

## 2019-05-20 DIAGNOSIS — L40.59 POLYARTICULAR PSORIATIC ARTHRITIS: ICD-10-CM

## 2019-05-20 DIAGNOSIS — D84.9 IMMUNOSUPPRESSED STATUS: ICD-10-CM

## 2019-05-20 DIAGNOSIS — Z79.899 HIGH RISK MEDICATION USE: ICD-10-CM

## 2019-05-20 DIAGNOSIS — L40.59 POLYARTICULAR PSORIATIC ARTHRITIS: Primary | ICD-10-CM

## 2019-05-20 DIAGNOSIS — E66.01 MORBID OBESITY WITH BMI OF 40.0-44.9, ADULT: ICD-10-CM

## 2019-05-20 LAB
ALBUMIN SERPL BCP-MCNC: 4 G/DL (ref 3.5–5.2)
ALP SERPL-CCNC: 104 U/L (ref 55–135)
ALT SERPL W/O P-5'-P-CCNC: 31 U/L (ref 10–44)
ANION GAP SERPL CALC-SCNC: 8 MMOL/L (ref 8–16)
AST SERPL-CCNC: 22 U/L (ref 10–40)
BASOPHILS # BLD AUTO: 0.05 K/UL (ref 0–0.2)
BASOPHILS NFR BLD: 0.4 % (ref 0–1.9)
BILIRUB SERPL-MCNC: 0.3 MG/DL (ref 0.1–1)
BUN SERPL-MCNC: 15 MG/DL (ref 6–20)
CALCIUM SERPL-MCNC: 9.6 MG/DL (ref 8.7–10.5)
CHLORIDE SERPL-SCNC: 105 MMOL/L (ref 95–110)
CO2 SERPL-SCNC: 25 MMOL/L (ref 23–29)
CREAT SERPL-MCNC: 0.8 MG/DL (ref 0.5–1.4)
CRP SERPL-MCNC: 7.1 MG/L (ref 0–8.2)
DIFFERENTIAL METHOD: ABNORMAL
EOSINOPHIL # BLD AUTO: 0.5 K/UL (ref 0–0.5)
EOSINOPHIL NFR BLD: 4.3 % (ref 0–8)
ERYTHROCYTE [DISTWIDTH] IN BLOOD BY AUTOMATED COUNT: 19.1 % (ref 11.5–14.5)
ERYTHROCYTE [SEDIMENTATION RATE] IN BLOOD BY WESTERGREN METHOD: 17 MM/HR (ref 0–36)
EST. GFR  (AFRICAN AMERICAN): >60 ML/MIN/1.73 M^2
EST. GFR  (NON AFRICAN AMERICAN): >60 ML/MIN/1.73 M^2
GLUCOSE SERPL-MCNC: 80 MG/DL (ref 70–110)
HCT VFR BLD AUTO: 33.9 % (ref 37–48.5)
HGB BLD-MCNC: 10.1 G/DL (ref 12–16)
IMM GRANULOCYTES # BLD AUTO: 0.05 K/UL (ref 0–0.04)
IMM GRANULOCYTES NFR BLD AUTO: 0.4 % (ref 0–0.5)
LYMPHOCYTES # BLD AUTO: 3.3 K/UL (ref 1–4.8)
LYMPHOCYTES NFR BLD: 27.8 % (ref 18–48)
MCH RBC QN AUTO: 22.5 PG (ref 27–31)
MCHC RBC AUTO-ENTMCNC: 29.8 G/DL (ref 32–36)
MCV RBC AUTO: 76 FL (ref 82–98)
MONOCYTES # BLD AUTO: 0.5 K/UL (ref 0.3–1)
MONOCYTES NFR BLD: 4.4 % (ref 4–15)
NEUTROPHILS # BLD AUTO: 7.5 K/UL (ref 1.8–7.7)
NEUTROPHILS NFR BLD: 63.1 % (ref 38–73)
NRBC BLD-RTO: 0 /100 WBC
PLATELET # BLD AUTO: 515 K/UL (ref 150–350)
PMV BLD AUTO: 8.3 FL (ref 9.2–12.9)
POTASSIUM SERPL-SCNC: 4.5 MMOL/L (ref 3.5–5.1)
PROT SERPL-MCNC: 7.5 G/DL (ref 6–8.4)
RBC # BLD AUTO: 4.49 M/UL (ref 4–5.4)
SODIUM SERPL-SCNC: 138 MMOL/L (ref 136–145)
WBC # BLD AUTO: 11.89 K/UL (ref 3.9–12.7)

## 2019-05-20 PROCEDURE — 36415 COLL VENOUS BLD VENIPUNCTURE: CPT

## 2019-05-20 PROCEDURE — 86140 C-REACTIVE PROTEIN: CPT

## 2019-05-20 PROCEDURE — 99999 PR PBB SHADOW E&M-EST. PATIENT-LVL III: ICD-10-PCS | Mod: PBBFAC,,, | Performed by: INTERNAL MEDICINE

## 2019-05-20 PROCEDURE — 3008F PR BODY MASS INDEX (BMI) DOCUMENTED: ICD-10-PCS | Mod: CPTII,S$GLB,, | Performed by: INTERNAL MEDICINE

## 2019-05-20 PROCEDURE — 80053 COMPREHEN METABOLIC PANEL: CPT

## 2019-05-20 PROCEDURE — 99215 OFFICE O/P EST HI 40 MIN: CPT | Mod: S$GLB,,, | Performed by: INTERNAL MEDICINE

## 2019-05-20 PROCEDURE — 85652 RBC SED RATE AUTOMATED: CPT

## 2019-05-20 PROCEDURE — 3008F BODY MASS INDEX DOCD: CPT | Mod: CPTII,S$GLB,, | Performed by: INTERNAL MEDICINE

## 2019-05-20 PROCEDURE — 85025 COMPLETE CBC W/AUTO DIFF WBC: CPT

## 2019-05-20 PROCEDURE — 99999 PR PBB SHADOW E&M-EST. PATIENT-LVL III: CPT | Mod: PBBFAC,,, | Performed by: INTERNAL MEDICINE

## 2019-05-20 PROCEDURE — 99215 PR OFFICE/OUTPT VISIT, EST, LEVL V, 40-54 MIN: ICD-10-PCS | Mod: S$GLB,,, | Performed by: INTERNAL MEDICINE

## 2019-05-20 RX ORDER — MUPIROCIN 20 MG/G
OINTMENT TOPICAL 3 TIMES DAILY
Qty: 1 TUBE | Refills: 3 | Status: SHIPPED | OUTPATIENT
Start: 2019-05-20 | End: 2020-03-02 | Stop reason: ALTCHOICE

## 2019-05-20 NOTE — PROGRESS NOTES
RHEUMATOLOGY CLINIC FOLLOW UP VISIT  Chief complaints:-  My left knee hurts    HPI:-  Janice Nova a 39 y.o. pleasant female comes in for a follow up visit with above chief complaints.  She reports that the Stelara has been showing significant improvement in both her psoriasis and psoriatic arthritis.  She has failed methotrexate, Enbrel, Humira, Otezla, cosentyx.  Other than pain around her left knee she reports doing really today.  Knee pain-chronic, achy, progressive, stiff, associated with swelling, predominant pain over the suprapatellar region, not responding to kenalog injection.  Her psoriasis have been improving with very minor patches her elbows.      Review of Systems   Constitutional: Negative for chills, fever, malaise/fatigue and weight loss.   HENT: Negative for ear discharge, ear pain, hearing loss, nosebleeds and sore throat.    Eyes: Negative for blurred vision, double vision, photophobia, discharge and redness.   Respiratory: Negative for cough, hemoptysis, sputum production and shortness of breath.    Cardiovascular: Negative for chest pain, palpitations and claudication.   Gastrointestinal: Negative for abdominal pain, constipation, diarrhea, melena, nausea and vomiting.   Genitourinary: Negative for dysuria, frequency, hematuria and urgency.   Musculoskeletal: Positive for joint pain. Negative for back pain, falls, myalgias and neck pain.   Skin: Negative for itching and rash.   Neurological: Negative for dizziness, tremors, sensory change, speech change, focal weakness, seizures, loss of consciousness, weakness and headaches.   Endo/Heme/Allergies: Negative for environmental allergies. Does not bruise/bleed easily.   Psychiatric/Behavioral: Negative for hallucinations and memory loss. The patient is not nervous/anxious and does not have insomnia.        Past Medical History:   Diagnosis Date    Acid reflux     Anxiety      Arthritis     Depression        Past Surgical History:   Procedure Laterality Date    COSMETIC SURGERY          Social History     Tobacco Use    Smoking status: Former Smoker     Packs/day: 0.50     Years: 10.00     Pack years: 5.00     Types: Cigarettes    Smokeless tobacco: Never Used   Substance Use Topics    Alcohol use: Yes     Alcohol/week: 1.8 oz     Types: 3 Glasses of wine per week    Drug use: Yes     Frequency: 2.0 times per week     Types: Marijuana       Family History   Problem Relation Age of Onset    Cancer Mother     Breast cancer Mother     Diabetes Mellitus Maternal Grandfather     Cancer Paternal Grandmother     Psoriasis Father     Arthritis Father     Colon cancer Paternal Aunt     Ovarian cancer Neg Hx     Thrombophilia Neg Hx        No Known Allergies        Physical examination:-    Vitals:    05/20/19 1501   BP: (!) 141/82   Pulse: 86   Weight: (!) 144.7 kg (319 lb 0.1 oz)   Height: 6' (1.829 m)   PainSc:   6       Physical Exam   Constitutional: She is oriented to person, place, and time and well-developed, well-nourished, and in no distress. No distress.   HENT:   Head: Normocephalic and atraumatic.   Nose: Nose normal.   Mouth/Throat: Oropharynx is clear and moist. No oropharyngeal exudate.   Eyes: Pupils are equal, round, and reactive to light. Conjunctivae and EOM are normal. Right eye exhibits no discharge. Left eye exhibits no discharge. No scleral icterus.   Neck: Normal range of motion. Neck supple.   Cardiovascular: Normal rate and intact distal pulses.   Pulmonary/Chest: Effort normal. No respiratory distress. She exhibits no tenderness.   Abdominal: Soft. There is no tenderness.   Musculoskeletal:   Significant improvement of dactylitis and enthesitis of small joints except persistent enthesitis quadriceps tendon insertion knees with mild effusion bilateral knees.   Lymphadenopathy:     She has no cervical adenopathy.   Neurological: She is alert and oriented to  person, place, and time. No cranial nerve deficit.   Skin: Skin is warm. Rash noted. She is not diaphoretic. No erythema. No pallor.   All rashes resolved except one patch over left leg.    Psychiatric: Mood, memory and affect normal.   Nursing note and vitals reviewed.    Assessment/Plans:-  # Polyarticular psoriatic arthritis  Looks like Stelara is working on both her psoriasis and psoriatic arthritis since there is no dactylitis and skin rash. Continue stelara with methotrexate and celebrex . Activity labs today.     # Immunosuppressed status  Compromised immune system secondary to autoimmune disease and use of immunosuppressive drugs. Monitor carefully for infections. Advised to get immediate medical care if any infection. Also advised strict adherence to age appropriate vaccinations and cancer screenings with PCP.      # Plaque psoriasis  Significant improvement on Stelara .  Continue the same.     # High risk medication use  High dose methotrexate injections every week along with stelara. Hold medication if any infection. Safety labs today.     # Morbid obesity with BMI of 40.0-44.9, adult  Reinforced importance of weight loss. Advised low carb diet.     # RTC in 12 weeks.   Disclaimer: This note was prepared using voice recognition system and is likely to have sound alike errors .  Please call me with any questions

## 2019-05-21 ENCOUNTER — PATIENT MESSAGE (OUTPATIENT)
Dept: RHEUMATOLOGY | Facility: CLINIC | Age: 39
End: 2019-05-21

## 2019-05-28 ENCOUNTER — TELEPHONE (OUTPATIENT)
Dept: RHEUMATOLOGY | Facility: CLINIC | Age: 39
End: 2019-05-28

## 2019-05-28 DIAGNOSIS — G89.29 CHRONIC MIDLINE LOW BACK PAIN WITHOUT SCIATICA: ICD-10-CM

## 2019-05-28 DIAGNOSIS — M54.50 CHRONIC MIDLINE LOW BACK PAIN WITHOUT SCIATICA: ICD-10-CM

## 2019-05-28 DIAGNOSIS — L40.59 POLYARTICULAR PSORIATIC ARTHRITIS: ICD-10-CM

## 2019-05-28 RX ORDER — TRAMADOL HYDROCHLORIDE 50 MG/1
50 TABLET ORAL EVERY 12 HOURS PRN
Qty: 50 TABLET | Refills: 0 | Status: SHIPPED | OUTPATIENT
Start: 2019-05-28 | End: 2019-07-08 | Stop reason: SDUPTHER

## 2019-06-14 ENCOUNTER — TELEPHONE (OUTPATIENT)
Dept: PHARMACY | Facility: CLINIC | Age: 39
End: 2019-06-14

## 2019-06-17 ENCOUNTER — DOCUMENTATION ONLY (OUTPATIENT)
Dept: RHEUMATOLOGY | Facility: CLINIC | Age: 39
End: 2019-06-17

## 2019-07-02 RX ORDER — ESOMEPRAZOLE MAGNESIUM 40 MG/1
CAPSULE, DELAYED RELEASE ORAL
Qty: 30 CAPSULE | Refills: 2 | Status: SHIPPED | OUTPATIENT
Start: 2019-07-02 | End: 2019-09-26 | Stop reason: SDUPTHER

## 2019-07-02 RX ORDER — CELECOXIB 200 MG/1
CAPSULE ORAL
Qty: 60 CAPSULE | Refills: 3 | Status: SHIPPED | OUTPATIENT
Start: 2019-07-02 | End: 2019-10-21 | Stop reason: SDUPTHER

## 2019-07-07 DIAGNOSIS — L40.0 PLAQUE PSORIASIS: ICD-10-CM

## 2019-07-07 DIAGNOSIS — L40.59 POLYARTICULAR PSORIATIC ARTHRITIS: ICD-10-CM

## 2019-07-08 DIAGNOSIS — M54.50 CHRONIC MIDLINE LOW BACK PAIN WITHOUT SCIATICA: ICD-10-CM

## 2019-07-08 DIAGNOSIS — G89.29 CHRONIC MIDLINE LOW BACK PAIN WITHOUT SCIATICA: ICD-10-CM

## 2019-07-08 DIAGNOSIS — L40.59 POLYARTICULAR PSORIATIC ARTHRITIS: ICD-10-CM

## 2019-07-09 RX ORDER — METHOTREXATE 25 MG/ML
INJECTION, SOLUTION INTRA-ARTERIAL; INTRAMUSCULAR; INTRAVENOUS
Qty: 10 ML | Refills: 1 | Status: SHIPPED | OUTPATIENT
Start: 2019-07-09 | End: 2019-09-11 | Stop reason: SDUPTHER

## 2019-07-09 RX ORDER — TRAMADOL HYDROCHLORIDE 50 MG/1
TABLET ORAL
Qty: 50 TABLET | Refills: 0 | Status: SHIPPED | OUTPATIENT
Start: 2019-07-09 | End: 2020-03-02 | Stop reason: ALTCHOICE

## 2019-07-16 ENCOUNTER — TELEPHONE (OUTPATIENT)
Dept: PHARMACY | Facility: CLINIC | Age: 39
End: 2019-07-16

## 2019-07-16 NOTE — TELEPHONE ENCOUNTER
Patient confirmed shipping of Stelara on  to arrive . Address and  verified. $0 copay in 004. Patient requested a new sharps container. Patient has 0 doses on hand. Next dose is due . Patient reproted 0 missed doses. Patient did not start any new medications. Patient had no further questions or concerns.

## 2019-09-11 DIAGNOSIS — L40.0 PLAQUE PSORIASIS: ICD-10-CM

## 2019-09-11 DIAGNOSIS — L40.59 POLYARTICULAR PSORIATIC ARTHRITIS: ICD-10-CM

## 2019-09-11 RX ORDER — METHOTREXATE 25 MG/ML
INJECTION, SOLUTION INTRA-ARTERIAL; INTRAMUSCULAR; INTRAVENOUS
Qty: 10 ML | Refills: 1 | Status: SHIPPED | OUTPATIENT
Start: 2019-09-11 | End: 2020-03-02 | Stop reason: SDUPTHER

## 2019-09-26 RX ORDER — ESOMEPRAZOLE MAGNESIUM 40 MG/1
CAPSULE, DELAYED RELEASE ORAL
Qty: 30 CAPSULE | Refills: 2 | Status: SHIPPED | OUTPATIENT
Start: 2019-09-26 | End: 2019-12-16 | Stop reason: SDUPTHER

## 2019-10-07 ENCOUNTER — TELEPHONE (OUTPATIENT)
Dept: PHARMACY | Facility: CLINIC | Age: 39
End: 2019-10-07

## 2019-10-21 RX ORDER — CELECOXIB 200 MG/1
CAPSULE ORAL
Qty: 60 CAPSULE | Refills: 3 | Status: SHIPPED | OUTPATIENT
Start: 2019-10-21 | End: 2020-02-10

## 2019-11-06 DIAGNOSIS — L40.0 PLAQUE PSORIASIS: ICD-10-CM

## 2019-11-06 DIAGNOSIS — L40.59 POLYARTICULAR PSORIATIC ARTHRITIS: ICD-10-CM

## 2019-11-06 RX ORDER — METHOTREXATE 25 MG/ML
INJECTION, SOLUTION INTRA-ARTERIAL; INTRAMUSCULAR; INTRAVENOUS
Qty: 10 ML | Refills: 1 | OUTPATIENT
Start: 2019-11-06

## 2019-11-06 NOTE — TELEPHONE ENCOUNTER
Medication refused due to failing protocol.    Requested Prescriptions   Pending Prescriptions Disp Refills    methotrexate 25 mg/mL injection [Pharmacy Med Name: METHOTREXATE 25 MG/ML VIAL] 10 mL 1     Sig: INJECT 1ML INTO THE MUSCLE EVERY SEVEN DAYS       DMARD Refill Protocol Failed - 11/6/2019  8:38 AM        Failed - ALT within 3 months     Lab Results   Component Value Date    ALT 31 05/20/2019    ALT 28 02/18/2019    ALT 23 11/13/2018              Failed - AST within 3 months     Lab Results   Component Value Date    AST 22 05/20/2019    AST 25 02/18/2019    AST 17 11/13/2018              Failed - Creatinine within 3 months     Lab Results   Component Value Date    CREATININE 0.8 05/20/2019              Failed - Platelet count within 3 months     Lab Results   Component Value Date     (H) 05/20/2019              Failed - Lymphocyte count within 3 months     Lab Results   Component Value Date    LYMPH 3.3 05/20/2019    LYMPH 27.8 05/20/2019              Failed - Neutrophil count within 3 months     Lab Results   Component Value Date    GRAN 7.5 05/20/2019    GRAN 63.1 05/20/2019              Failed - WBC within 3 months     Lab Results   Component Value Date    WBC 11.89 05/20/2019    WBC 11.17 02/18/2019    WBC 13.54 (H) 11/13/2018

## 2019-12-16 RX ORDER — ESOMEPRAZOLE MAGNESIUM 40 MG/1
CAPSULE, DELAYED RELEASE ORAL
Qty: 30 CAPSULE | Refills: 2 | Status: SHIPPED | OUTPATIENT
Start: 2019-12-16 | End: 2020-03-09

## 2019-12-19 DIAGNOSIS — L40.59 POLYARTICULAR PSORIATIC ARTHRITIS: ICD-10-CM

## 2019-12-20 RX ORDER — FOLIC ACID 1 MG/1
TABLET ORAL
Qty: 90 TABLET | Refills: 3 | Status: SHIPPED | OUTPATIENT
Start: 2019-12-20 | End: 2020-12-07

## 2019-12-30 ENCOUNTER — TELEPHONE (OUTPATIENT)
Dept: PHARMACY | Facility: CLINIC | Age: 39
End: 2019-12-30

## 2020-01-10 DIAGNOSIS — E55.9 VITAMIN D DEFICIENCY: ICD-10-CM

## 2020-01-10 RX ORDER — ERGOCALCIFEROL 1.25 MG/1
50000 CAPSULE ORAL
Qty: 12 CAPSULE | Refills: 3 | Status: SHIPPED | OUTPATIENT
Start: 2020-01-10 | End: 2020-12-14

## 2020-02-10 RX ORDER — CELECOXIB 200 MG/1
CAPSULE ORAL
Qty: 60 CAPSULE | Refills: 3 | Status: SHIPPED | OUTPATIENT
Start: 2020-02-10 | End: 2020-06-15

## 2020-03-02 ENCOUNTER — OFFICE VISIT (OUTPATIENT)
Dept: RHEUMATOLOGY | Facility: CLINIC | Age: 40
End: 2020-03-02
Payer: COMMERCIAL

## 2020-03-02 ENCOUNTER — LAB VISIT (OUTPATIENT)
Dept: LAB | Facility: HOSPITAL | Age: 40
End: 2020-03-02
Attending: INTERNAL MEDICINE
Payer: COMMERCIAL

## 2020-03-02 ENCOUNTER — PATIENT MESSAGE (OUTPATIENT)
Dept: OBSTETRICS AND GYNECOLOGY | Facility: CLINIC | Age: 40
End: 2020-03-02

## 2020-03-02 VITALS
WEIGHT: 293 LBS | HEART RATE: 75 BPM | BODY MASS INDEX: 41.02 KG/M2 | SYSTOLIC BLOOD PRESSURE: 119 MMHG | HEIGHT: 71 IN | DIASTOLIC BLOOD PRESSURE: 81 MMHG

## 2020-03-02 DIAGNOSIS — D84.9 IMMUNOSUPPRESSED STATUS: ICD-10-CM

## 2020-03-02 DIAGNOSIS — L40.0 PLAQUE PSORIASIS: ICD-10-CM

## 2020-03-02 DIAGNOSIS — Z79.899 HIGH RISK MEDICATION USE: ICD-10-CM

## 2020-03-02 DIAGNOSIS — E66.01 MORBID OBESITY WITH BMI OF 40.0-44.9, ADULT: ICD-10-CM

## 2020-03-02 DIAGNOSIS — L40.59 POLYARTICULAR PSORIATIC ARTHRITIS: Primary | ICD-10-CM

## 2020-03-02 DIAGNOSIS — L40.59 POLYARTICULAR PSORIATIC ARTHRITIS: ICD-10-CM

## 2020-03-02 LAB
ALBUMIN SERPL BCP-MCNC: 4.1 G/DL (ref 3.5–5.2)
ALP SERPL-CCNC: 66 U/L (ref 55–135)
ALT SERPL W/O P-5'-P-CCNC: 29 U/L (ref 10–44)
ANION GAP SERPL CALC-SCNC: 10 MMOL/L (ref 8–16)
AST SERPL-CCNC: 21 U/L (ref 10–40)
BASOPHILS # BLD AUTO: 0.06 K/UL (ref 0–0.2)
BASOPHILS NFR BLD: 0.6 % (ref 0–1.9)
BILIRUB SERPL-MCNC: 0.4 MG/DL (ref 0.1–1)
BUN SERPL-MCNC: 17 MG/DL (ref 6–20)
CALCIUM SERPL-MCNC: 9.4 MG/DL (ref 8.7–10.5)
CHLORIDE SERPL-SCNC: 105 MMOL/L (ref 95–110)
CO2 SERPL-SCNC: 24 MMOL/L (ref 23–29)
CREAT SERPL-MCNC: 0.8 MG/DL (ref 0.5–1.4)
DIFFERENTIAL METHOD: ABNORMAL
EOSINOPHIL # BLD AUTO: 0.6 K/UL (ref 0–0.5)
EOSINOPHIL NFR BLD: 5.9 % (ref 0–8)
ERYTHROCYTE [DISTWIDTH] IN BLOOD BY AUTOMATED COUNT: 18.9 % (ref 11.5–14.5)
EST. GFR  (AFRICAN AMERICAN): >60 ML/MIN/1.73 M^2
EST. GFR  (NON AFRICAN AMERICAN): >60 ML/MIN/1.73 M^2
GLUCOSE SERPL-MCNC: 82 MG/DL (ref 70–110)
HCT VFR BLD AUTO: 31.6 % (ref 37–48.5)
HGB BLD-MCNC: 9.7 G/DL (ref 12–16)
IMM GRANULOCYTES # BLD AUTO: 0.04 K/UL (ref 0–0.04)
IMM GRANULOCYTES NFR BLD AUTO: 0.4 % (ref 0–0.5)
LYMPHOCYTES # BLD AUTO: 3.2 K/UL (ref 1–4.8)
LYMPHOCYTES NFR BLD: 29.9 % (ref 18–48)
MCH RBC QN AUTO: 22.2 PG (ref 27–31)
MCHC RBC AUTO-ENTMCNC: 30.7 G/DL (ref 32–36)
MCV RBC AUTO: 73 FL (ref 82–98)
MONOCYTES # BLD AUTO: 0.5 K/UL (ref 0.3–1)
MONOCYTES NFR BLD: 4.9 % (ref 4–15)
NEUTROPHILS # BLD AUTO: 6.3 K/UL (ref 1.8–7.7)
NEUTROPHILS NFR BLD: 58.7 % (ref 38–73)
NRBC BLD-RTO: 0 /100 WBC
PLATELET # BLD AUTO: 539 K/UL (ref 150–350)
PMV BLD AUTO: 8 FL (ref 9.2–12.9)
POTASSIUM SERPL-SCNC: 4.1 MMOL/L (ref 3.5–5.1)
PROT SERPL-MCNC: 7.5 G/DL (ref 6–8.4)
RBC # BLD AUTO: 4.36 M/UL (ref 4–5.4)
SODIUM SERPL-SCNC: 139 MMOL/L (ref 136–145)
WBC # BLD AUTO: 10.68 K/UL (ref 3.9–12.7)

## 2020-03-02 PROCEDURE — 3008F PR BODY MASS INDEX (BMI) DOCUMENTED: ICD-10-PCS | Mod: CPTII,S$GLB,, | Performed by: INTERNAL MEDICINE

## 2020-03-02 PROCEDURE — 3008F BODY MASS INDEX DOCD: CPT | Mod: CPTII,S$GLB,, | Performed by: INTERNAL MEDICINE

## 2020-03-02 PROCEDURE — 99215 PR OFFICE/OUTPT VISIT, EST, LEVL V, 40-54 MIN: ICD-10-PCS | Mod: S$GLB,,, | Performed by: INTERNAL MEDICINE

## 2020-03-02 PROCEDURE — 80053 COMPREHEN METABOLIC PANEL: CPT

## 2020-03-02 PROCEDURE — 99999 PR PBB SHADOW E&M-EST. PATIENT-LVL III: CPT | Mod: PBBFAC,,, | Performed by: INTERNAL MEDICINE

## 2020-03-02 PROCEDURE — 85025 COMPLETE CBC W/AUTO DIFF WBC: CPT

## 2020-03-02 PROCEDURE — 99999 PR PBB SHADOW E&M-EST. PATIENT-LVL III: ICD-10-PCS | Mod: PBBFAC,,, | Performed by: INTERNAL MEDICINE

## 2020-03-02 PROCEDURE — 36415 COLL VENOUS BLD VENIPUNCTURE: CPT

## 2020-03-02 PROCEDURE — 99215 OFFICE O/P EST HI 40 MIN: CPT | Mod: S$GLB,,, | Performed by: INTERNAL MEDICINE

## 2020-03-02 RX ORDER — METHOTREXATE 25 MG/ML
INJECTION, SOLUTION INTRA-ARTERIAL; INTRAMUSCULAR; INTRAVENOUS
Qty: 10 ML | Refills: 1 | Status: SHIPPED | OUTPATIENT
Start: 2020-03-02 | End: 2021-01-31 | Stop reason: SDUPTHER

## 2020-03-02 RX ORDER — TACROLIMUS 1 MG/G
OINTMENT TOPICAL 2 TIMES DAILY
Qty: 30 G | Refills: 11 | Status: SHIPPED | OUTPATIENT
Start: 2020-03-02 | End: 2020-03-03 | Stop reason: SDUPTHER

## 2020-03-02 NOTE — PROGRESS NOTES
RHEUMATOLOGY CLINIC FOLLOW UP VISIT  Chief complaints:-  To follow-up for psoriatic arthritis and psoriasis.    HPI:-  Janice Nova a 39 y.o. pleasant female comes in for a follow up visit with above chief complaints.  She reports that the Stelara with methotrexate has been controlling both her psoriasis and psoriatic arthritis.  She has failed methotrexate, Enbrel, Humira, Otezla, cosentyx.  She missed the appointment in August 2019. Her psoriasis have been improving with very minor patches her elbows.      Review of Systems   Constitutional: Negative for chills, fever, malaise/fatigue and weight loss.   HENT: Negative for ear discharge, ear pain, hearing loss, nosebleeds and sore throat.    Eyes: Negative for blurred vision, double vision, photophobia, discharge and redness.   Respiratory: Negative for cough, hemoptysis, sputum production and shortness of breath.    Cardiovascular: Negative for chest pain, palpitations and claudication.   Gastrointestinal: Negative for abdominal pain, constipation, diarrhea, melena, nausea and vomiting.   Genitourinary: Negative for dysuria, frequency, hematuria and urgency.   Musculoskeletal: Negative for back pain, falls, joint pain, myalgias and neck pain.   Skin: Negative for itching and rash.   Neurological: Negative for dizziness, tremors, sensory change, speech change, focal weakness, seizures, loss of consciousness, weakness and headaches.   Endo/Heme/Allergies: Negative for environmental allergies. Does not bruise/bleed easily.   Psychiatric/Behavioral: Negative for hallucinations and memory loss. The patient is not nervous/anxious and does not have insomnia.        Past Medical History:   Diagnosis Date    Acid reflux     Anxiety     Arthritis     Depression        Past Surgical History:   Procedure Laterality Date    COSMETIC SURGERY          Social History     Tobacco Use    Smoking status:  "Former Smoker     Packs/day: 0.50     Years: 10.00     Pack years: 5.00     Types: Cigarettes    Smokeless tobacco: Never Used   Substance Use Topics    Alcohol use: Yes     Alcohol/week: 3.0 standard drinks     Types: 3 Glasses of wine per week    Drug use: Yes     Frequency: 2.0 times per week     Types: Marijuana       Family History   Problem Relation Age of Onset    Cancer Mother     Breast cancer Mother     Diabetes Mellitus Maternal Grandfather     Cancer Paternal Grandmother     Psoriasis Father     Arthritis Father     Colon cancer Paternal Aunt     Ovarian cancer Neg Hx     Thrombophilia Neg Hx        No Known Allergies        Physical examination:-    Vitals:    03/02/20 1403   BP: 119/81   Pulse: 75   Weight: (!) 148.9 kg (328 lb 4.2 oz)   Height: 5' 11" (1.803 m)   PainSc: 0-No pain       Physical Exam   Constitutional: She is oriented to person, place, and time and well-developed, well-nourished, and in no distress. No distress.   HENT:   Head: Normocephalic and atraumatic.   Nose: Nose normal.   Mouth/Throat: Oropharynx is clear and moist. No oropharyngeal exudate.   Eyes: Pupils are equal, round, and reactive to light. Conjunctivae and EOM are normal. Right eye exhibits no discharge. Left eye exhibits no discharge. No scleral icterus.   Neck: Normal range of motion. Neck supple.   Cardiovascular: Normal rate and intact distal pulses.   Pulmonary/Chest: Effort normal. No respiratory distress. She exhibits no tenderness.   Abdominal: Soft. There is no tenderness.   Musculoskeletal:   Significant improvement of dactylitis and enthesitis of small joints except persistent enthesitis quadriceps tendon insertion knees with mild effusion bilateral knees.   Lymphadenopathy:     She has no cervical adenopathy.   Neurological: She is alert and oriented to person, place, and time. No cranial nerve deficit.   Skin: Skin is warm. No rash noted. She is not diaphoretic. No erythema. No pallor.   All " rashes resolved except one patch over left leg.    Psychiatric: Mood, memory and affect normal.   Nursing note and vitals reviewed.    Assessment/Plans:-  1. Polyarticular psoriatic arthritis    2. Plaque psoriasis    3. Immunosuppressed status    4. High risk medication use    5. Morbid obesity with BMI of 40.0-44.9, adult      Problem List Items Addressed This Visit        Derm    Plaque psoriasis    Current Assessment & Plan     Very few rash present today.  Refill provided on topical tacrolimus ointment.         Relevant Medications    tacrolimus (PROTOPIC) 0.1 % ointment    ustekinumab (STELARA) 90 mg/mL Syrg syringe    methotrexate 25 mg/mL injection    Other Relevant Orders    CBC auto differential    Comprehensive metabolic panel       Immunology/Multi System    Immunosuppressed status    Current Assessment & Plan     Compromised immune system secondary to autoimmune disease and use of immunosuppressive drugs. Monitor carefully for infections. Advised to get immediate medical care if any infection. Also advised strict adherence to age appropriate vaccinations and cancer screenings with PCP.          Relevant Orders    CBC auto differential    Comprehensive metabolic panel       Endocrine    Morbid obesity with BMI of 40.0-44.9, adult    Current Assessment & Plan     Advised low carb diet            Orthopedic    Polyarticular psoriatic arthritis - Primary    Current Assessment & Plan     Well controlled psoriatic arthritis without any dactylitis or enthesitis.  Stelara and methotrexate combination is working for her after failing all other medications.  Continue.         Relevant Medications    ustekinumab (STELARA) 90 mg/mL Syrg syringe    methotrexate 25 mg/mL injection    Other Relevant Orders    Ambulatory referral/consult to Occupational Therapy    CBC auto differential    Comprehensive metabolic panel       Other    High risk medication use    Current Assessment & Plan     Hold Stelara and methotrexate  if any infection.  Safety labs today.  Reinforced the importance of getting safety labs at least every 12 weeks even if she is not following up with me and she voiced understanding.         Relevant Orders    CBC auto differential    Comprehensive metabolic panel          # Follow up in about 6 months (around 9/2/2020).  Disclaimer: This note was prepared using voice recognition system and is likely to have sound alike errors .  Please call me with any questions

## 2020-03-03 DIAGNOSIS — L40.0 PLAQUE PSORIASIS: ICD-10-CM

## 2020-03-03 DIAGNOSIS — L40.0 PLAQUE PSORIASIS: Primary | ICD-10-CM

## 2020-03-03 RX ORDER — TACROLIMUS 1 MG/G
OINTMENT TOPICAL 2 TIMES DAILY
Qty: 30 G | Refills: 11 | Status: SHIPPED | OUTPATIENT
Start: 2020-03-03 | End: 2020-03-03 | Stop reason: HOSPADM

## 2020-03-03 RX ORDER — TACROLIMUS 1 MG/G
OINTMENT TOPICAL 2 TIMES DAILY
Qty: 30 G | Refills: 11 | Status: SHIPPED | OUTPATIENT
Start: 2020-03-03 | End: 2020-09-23

## 2020-03-03 NOTE — ASSESSMENT & PLAN NOTE
Hold Stelara and methotrexate if any infection.  Safety labs today.  Reinforced the importance of getting safety labs at least every 12 weeks even if she is not following up with me and she voiced understanding.

## 2020-03-03 NOTE — ASSESSMENT & PLAN NOTE
Well controlled psoriatic arthritis without any dactylitis or enthesitis.  Stelara and methotrexate combination is working for her after failing all other medications.  Continue.

## 2020-03-04 ENCOUNTER — TELEPHONE (OUTPATIENT)
Dept: RHEUMATOLOGY | Facility: CLINIC | Age: 40
End: 2020-03-04

## 2020-03-04 DIAGNOSIS — L20.89 FLEXURAL ATOPIC DERMATITIS: Primary | ICD-10-CM

## 2020-03-04 RX ORDER — TACROLIMUS 1 MG/G
OINTMENT TOPICAL 2 TIMES DAILY
Qty: 30 G | Refills: 11 | Status: SHIPPED | OUTPATIENT
Start: 2020-03-04 | End: 2020-09-23

## 2020-03-04 NOTE — TELEPHONE ENCOUNTER
Tacrolimus denied from insurance since it is not being used for the FDA approved DX of atopic dermatitis. Please Advise.

## 2020-03-06 NOTE — TELEPHONE ENCOUNTER
[facility] - Rep: Sammie, Ded: $3500 met in full, OOP: $8350 with an accumulator of $7357.09, there's a PA already on file 6/14/2019 through 6/13/2020, case#[id number], copay $7241.66, plan only allows 30 d/s, per rep Sammie we can run for 30 as long as PT is compliant to the 90 d/s directions, call ref#[id number] FA required, [facility] in network.-HBR

## 2020-03-09 ENCOUNTER — LAB VISIT (OUTPATIENT)
Dept: LAB | Facility: HOSPITAL | Age: 40
End: 2020-03-09
Attending: OBSTETRICS & GYNECOLOGY
Payer: COMMERCIAL

## 2020-03-09 ENCOUNTER — OFFICE VISIT (OUTPATIENT)
Dept: OBSTETRICS AND GYNECOLOGY | Facility: CLINIC | Age: 40
End: 2020-03-09
Payer: COMMERCIAL

## 2020-03-09 VITALS
SYSTOLIC BLOOD PRESSURE: 120 MMHG | BODY MASS INDEX: 41.02 KG/M2 | HEIGHT: 71 IN | DIASTOLIC BLOOD PRESSURE: 72 MMHG | WEIGHT: 293 LBS

## 2020-03-09 DIAGNOSIS — Z01.419 WELL WOMAN EXAM WITH ROUTINE GYNECOLOGICAL EXAM: Primary | ICD-10-CM

## 2020-03-09 DIAGNOSIS — Z11.3 SCREEN FOR STD (SEXUALLY TRANSMITTED DISEASE): ICD-10-CM

## 2020-03-09 PROCEDURE — 88175 CYTOPATH C/V AUTO FLUID REDO: CPT

## 2020-03-09 PROCEDURE — 86592 SYPHILIS TEST NON-TREP QUAL: CPT

## 2020-03-09 PROCEDURE — 99999 PR PBB SHADOW E&M-EST. PATIENT-LVL III: ICD-10-PCS | Mod: PBBFAC,,, | Performed by: OBSTETRICS & GYNECOLOGY

## 2020-03-09 PROCEDURE — 99395 PREV VISIT EST AGE 18-39: CPT | Mod: S$GLB,,, | Performed by: OBSTETRICS & GYNECOLOGY

## 2020-03-09 PROCEDURE — 87624 HPV HI-RISK TYP POOLED RSLT: CPT

## 2020-03-09 PROCEDURE — 99395 PR PREVENTIVE VISIT,EST,18-39: ICD-10-PCS | Mod: S$GLB,,, | Performed by: OBSTETRICS & GYNECOLOGY

## 2020-03-09 PROCEDURE — 99999 PR PBB SHADOW E&M-EST. PATIENT-LVL III: CPT | Mod: PBBFAC,,, | Performed by: OBSTETRICS & GYNECOLOGY

## 2020-03-09 PROCEDURE — 36415 COLL VENOUS BLD VENIPUNCTURE: CPT

## 2020-03-09 PROCEDURE — 87491 CHLMYD TRACH DNA AMP PROBE: CPT

## 2020-03-09 PROCEDURE — 86703 HIV-1/HIV-2 1 RESULT ANTBDY: CPT

## 2020-03-09 RX ORDER — SERTRALINE HYDROCHLORIDE 100 MG/1
TABLET, FILM COATED ORAL
Qty: 30 TABLET | Refills: 5 | Status: SHIPPED | OUTPATIENT
Start: 2020-03-09 | End: 2020-06-01 | Stop reason: SDUPTHER

## 2020-03-09 RX ORDER — ESOMEPRAZOLE MAGNESIUM 40 MG/1
CAPSULE, DELAYED RELEASE ORAL
Qty: 30 CAPSULE | Refills: 2 | Status: SHIPPED | OUTPATIENT
Start: 2020-03-09 | End: 2020-06-15

## 2020-03-09 NOTE — PROGRESS NOTES
Subjective:       Patient ID: Janice Lawrence is a 39 y.o. female.    Chief Complaint:  Annual Exam      History of Present Illness  HPI  Annual Exam-Premenopausal  Patient presents for annual exam. The patient has no complaints today. The patient is not currently sexually active. Pt had a recent breakup and requests STD screening.  Denies known exposure. GYN screening history: last pap: approximate date  and was normal and last mammogram: approximate date 2018 and was normal. The patient wears seatbelts: yes. The patient participates in regular exercise: no. Has the patient ever been transfused or tattooed?: yes. The patient reports that there is not domestic violence in her life.  Menses are regular with periods lasting 3-4 days.  Denies excessive bleeding or cramping.  However, reports occasional sharp pains in her lower abdomen around her ovulation.  This does not happen every month and normally resolves within minutes to hours.      GYN & OB History  Patient's last menstrual period was 2020.   Date of Last Pap: 2016    OB History    Para Term  AB Living   1 0     1     SAB TAB Ectopic Multiple Live Births                  # Outcome Date GA Lbr Nikolas/2nd Weight Sex Delivery Anes PTL Lv   1 AB                Review of Systems  Review of Systems   Constitutional: Negative for activity change, appetite change, chills, fatigue, fever and unexpected weight change.   Respiratory: Negative for shortness of breath.    Cardiovascular: Negative for chest pain, palpitations and leg swelling.   Gastrointestinal: Negative for abdominal pain, bloating, blood in stool, constipation, diarrhea, nausea and vomiting.   Genitourinary: Negative for dysmenorrhea, dyspareunia, dysuria, flank pain, frequency, genital sores, hematuria, menorrhagia, menstrual problem, pelvic pain, urgency, vaginal bleeding, vaginal discharge, vaginal pain, urinary incontinence, postcoital bleeding, vaginal dryness  and vaginal odor.   Musculoskeletal: Negative for back pain.   Integumentary:  Negative for breast mass, nipple discharge, breast skin changes and breast tenderness.   Neurological: Negative for syncope and headaches.   Breast: Negative for asymmetry, lump, mass, mastodynia, nipple discharge, skin changes and tenderness          Objective:    Physical Exam:   Constitutional: She is oriented to person, place, and time. She appears well-developed and well-nourished. No distress.    HENT:   Head: Normocephalic and atraumatic.    Eyes: Pupils are equal, round, and reactive to light. EOM are normal.    Neck: Normal range of motion.    Cardiovascular: Normal rate, regular rhythm and normal heart sounds.     Pulmonary/Chest: Effort normal and breath sounds normal. Right breast exhibits no inverted nipple, no mass, no nipple discharge, no skin change, no tenderness, no bleeding and no swelling. Left breast exhibits no inverted nipple, no mass, no nipple discharge, no skin change, no tenderness, no bleeding and no swelling. Breasts are symmetrical.        Abdominal: Soft. Bowel sounds are normal. She exhibits no distension. There is no tenderness.     Genitourinary: Vagina normal and uterus normal. Pelvic exam was performed with patient supine. There is no rash, tenderness, lesion or injury on the right labia. There is no rash, tenderness, lesion or injury on the left labia. Uterus is not deviated, not enlarged and not tender. Cervix is normal. Right adnexum displays no mass, no tenderness and no fullness. Left adnexum displays no mass, no tenderness and no fullness. No erythema, tenderness or bleeding in the vagina. No foreign body in the vagina. No signs of injury around the vagina. No vaginal discharge found. Cervix exhibits no motion tenderness, no discharge and no friability. Additional cervical findings: pap smear done          Musculoskeletal: Normal range of motion and moves all extremeties. She exhibits no edema or  tenderness.       Neurological: She is alert and oriented to person, place, and time.    Skin: Skin is warm and dry.    Psychiatric: She has a normal mood and affect. Her behavior is normal. Thought content normal.          Assessment:        1. Well woman exam with routine gynecological exam    2. Screen for STD (sexually transmitted disease)             Plan:      Well woman exam with routine gynecological exam  -     Liquid-Based Pap Smear, Screening  -     HPV High Risk Genotypes, PCR  -     Pt was counseled on cervical/vaginal screening guidelines and recommendations.  Last pap NILM on 2016.  If today's pap smear result is negative, next pap smear will be due in 3-5 yrs.  -     Follow up with PCP for routine health maintenance needs.  -     Mammo Digital Screening Bilat; Future; Expected date: 03/09/2020  -     Pt was advised on current breast cancer screening recommendations.  Pt desires to proceed with breast exam today and screening MMG.  Last MMG was normal but with elevated breast cancer risk %.  Pt did not follow up as recommended.  Pt was again counseled on this result.  Will await MMG results for further recommendations.  Pt was advised on importance of follow up with Shirin Lujan if % still elevated or if MMG abnormal.  Pt voiced understanding.    Screen for STD (sexually transmitted disease)  -     C. trachomatis/N. gonorrhoeae by AMP DNA  -     HIV 1/2 Ag/Ab (4th Gen); Future; Expected date: 03/09/2020  -     RPR; Future; Expected date: 03/09/2020      Follow up in about 1 year (around 3/9/2021).

## 2020-03-11 LAB
C TRACH DNA SPEC QL NAA+PROBE: NOT DETECTED
HIV 1+2 AB+HIV1 P24 AG SERPL QL IA: NEGATIVE
N GONORRHOEA DNA SPEC QL NAA+PROBE: NOT DETECTED
RPR SER QL: NORMAL

## 2020-03-15 ENCOUNTER — PATIENT MESSAGE (OUTPATIENT)
Dept: RHEUMATOLOGY | Facility: CLINIC | Age: 40
End: 2020-03-15

## 2020-03-16 LAB
HPV HR 12 DNA SPEC QL NAA+PROBE: NEGATIVE
HPV16 AG SPEC QL: NEGATIVE
HPV18 DNA SPEC QL NAA+PROBE: NEGATIVE

## 2020-03-23 ENCOUNTER — TELEPHONE (OUTPATIENT)
Dept: PHARMACY | Facility: CLINIC | Age: 40
End: 2020-03-23

## 2020-04-01 LAB
FINAL PATHOLOGIC DIAGNOSIS: NORMAL
Lab: NORMAL

## 2020-04-28 ENCOUNTER — PATIENT MESSAGE (OUTPATIENT)
Dept: OPHTHALMOLOGY | Facility: CLINIC | Age: 40
End: 2020-04-28

## 2020-05-07 ENCOUNTER — PATIENT MESSAGE (OUTPATIENT)
Dept: OPHTHALMOLOGY | Facility: CLINIC | Age: 40
End: 2020-05-07

## 2020-05-07 ENCOUNTER — PATIENT MESSAGE (OUTPATIENT)
Dept: RHEUMATOLOGY | Facility: CLINIC | Age: 40
End: 2020-05-07

## 2020-05-07 DIAGNOSIS — L40.0 PLAQUE PSORIASIS: ICD-10-CM

## 2020-05-07 DIAGNOSIS — L20.89 FLEXURAL ATOPIC DERMATITIS: Primary | ICD-10-CM

## 2020-05-07 DIAGNOSIS — L40.59 POLYARTICULAR PSORIATIC ARTHRITIS: ICD-10-CM

## 2020-05-07 NOTE — TELEPHONE ENCOUNTER
Please schedule Mychart video visit next week. CBC, CMP, ESR, CRP one day before the visit.  Thanks.

## 2020-05-08 ENCOUNTER — LAB VISIT (OUTPATIENT)
Dept: LAB | Facility: HOSPITAL | Age: 40
End: 2020-05-08
Attending: INTERNAL MEDICINE
Payer: COMMERCIAL

## 2020-05-08 DIAGNOSIS — L40.59 POLYARTICULAR PSORIATIC ARTHRITIS: ICD-10-CM

## 2020-05-08 DIAGNOSIS — L40.0 PLAQUE PSORIASIS: ICD-10-CM

## 2020-05-08 LAB
ALBUMIN SERPL BCP-MCNC: 3.9 G/DL (ref 3.5–5.2)
ALP SERPL-CCNC: 83 U/L (ref 55–135)
ALT SERPL W/O P-5'-P-CCNC: 34 U/L (ref 10–44)
ANION GAP SERPL CALC-SCNC: 9 MMOL/L (ref 8–16)
AST SERPL-CCNC: 25 U/L (ref 10–40)
BASOPHILS # BLD AUTO: 0.06 K/UL (ref 0–0.2)
BASOPHILS NFR BLD: 0.6 % (ref 0–1.9)
BILIRUB SERPL-MCNC: 0.3 MG/DL (ref 0.1–1)
BUN SERPL-MCNC: 12 MG/DL (ref 6–20)
CALCIUM SERPL-MCNC: 9.3 MG/DL (ref 8.7–10.5)
CHLORIDE SERPL-SCNC: 103 MMOL/L (ref 95–110)
CO2 SERPL-SCNC: 26 MMOL/L (ref 23–29)
CREAT SERPL-MCNC: 0.8 MG/DL (ref 0.5–1.4)
CRP SERPL-MCNC: 15.2 MG/L (ref 0–8.2)
DIFFERENTIAL METHOD: ABNORMAL
EOSINOPHIL # BLD AUTO: 0.6 K/UL (ref 0–0.5)
EOSINOPHIL NFR BLD: 5.6 % (ref 0–8)
ERYTHROCYTE [DISTWIDTH] IN BLOOD BY AUTOMATED COUNT: 18.9 % (ref 11.5–14.5)
ERYTHROCYTE [SEDIMENTATION RATE] IN BLOOD BY WESTERGREN METHOD: 38 MM/HR (ref 0–36)
EST. GFR  (AFRICAN AMERICAN): >60 ML/MIN/1.73 M^2
EST. GFR  (NON AFRICAN AMERICAN): >60 ML/MIN/1.73 M^2
GLUCOSE SERPL-MCNC: 84 MG/DL (ref 70–110)
HCT VFR BLD AUTO: 32.1 % (ref 37–48.5)
HGB BLD-MCNC: 10 G/DL (ref 12–16)
IMM GRANULOCYTES # BLD AUTO: 0.06 K/UL (ref 0–0.04)
IMM GRANULOCYTES NFR BLD AUTO: 0.6 % (ref 0–0.5)
LYMPHOCYTES # BLD AUTO: 3.3 K/UL (ref 1–4.8)
LYMPHOCYTES NFR BLD: 31.7 % (ref 18–48)
MCH RBC QN AUTO: 22.5 PG (ref 27–31)
MCHC RBC AUTO-ENTMCNC: 31.2 G/DL (ref 32–36)
MCV RBC AUTO: 72 FL (ref 82–98)
MONOCYTES # BLD AUTO: 0.4 K/UL (ref 0.3–1)
MONOCYTES NFR BLD: 4.3 % (ref 4–15)
NEUTROPHILS # BLD AUTO: 6 K/UL (ref 1.8–7.7)
NEUTROPHILS NFR BLD: 57.8 % (ref 38–73)
NRBC BLD-RTO: 0 /100 WBC
PLATELET # BLD AUTO: 509 K/UL (ref 150–350)
PMV BLD AUTO: 8.1 FL (ref 9.2–12.9)
POTASSIUM SERPL-SCNC: 4.5 MMOL/L (ref 3.5–5.1)
PROT SERPL-MCNC: 7.5 G/DL (ref 6–8.4)
RBC # BLD AUTO: 4.44 M/UL (ref 4–5.4)
SODIUM SERPL-SCNC: 138 MMOL/L (ref 136–145)
WBC # BLD AUTO: 10.32 K/UL (ref 3.9–12.7)

## 2020-05-08 PROCEDURE — 86140 C-REACTIVE PROTEIN: CPT

## 2020-05-08 PROCEDURE — 80053 COMPREHEN METABOLIC PANEL: CPT

## 2020-05-08 PROCEDURE — 36415 COLL VENOUS BLD VENIPUNCTURE: CPT

## 2020-05-08 PROCEDURE — 85025 COMPLETE CBC W/AUTO DIFF WBC: CPT

## 2020-05-08 PROCEDURE — 85652 RBC SED RATE AUTOMATED: CPT

## 2020-05-11 ENCOUNTER — OFFICE VISIT (OUTPATIENT)
Dept: RHEUMATOLOGY | Facility: CLINIC | Age: 40
End: 2020-05-11
Payer: COMMERCIAL

## 2020-05-11 ENCOUNTER — TELEPHONE (OUTPATIENT)
Dept: RHEUMATOLOGY | Facility: CLINIC | Age: 40
End: 2020-05-11

## 2020-05-11 ENCOUNTER — OFFICE VISIT (OUTPATIENT)
Dept: OPHTHALMOLOGY | Facility: CLINIC | Age: 40
End: 2020-05-11
Payer: COMMERCIAL

## 2020-05-11 DIAGNOSIS — L40.0 PLAQUE PSORIASIS: ICD-10-CM

## 2020-05-11 DIAGNOSIS — Z79.899 HIGH RISK MEDICATION USE: ICD-10-CM

## 2020-05-11 DIAGNOSIS — H40.013 OPEN ANGLE WITH BORDERLINE FINDINGS OF BOTH EYES: Primary | ICD-10-CM

## 2020-05-11 DIAGNOSIS — E66.01 MORBID OBESITY WITH BMI OF 40.0-44.9, ADULT: ICD-10-CM

## 2020-05-11 DIAGNOSIS — Z23 NEED FOR SHINGLES VACCINE: ICD-10-CM

## 2020-05-11 DIAGNOSIS — H52.13 MYOPIA, BILATERAL: ICD-10-CM

## 2020-05-11 DIAGNOSIS — D84.9 IMMUNOSUPPRESSED STATUS: ICD-10-CM

## 2020-05-11 DIAGNOSIS — L40.59 POLYARTICULAR PSORIATIC ARTHRITIS: Primary | ICD-10-CM

## 2020-05-11 PROCEDURE — 99999 PR PBB SHADOW E&M-EST. PATIENT-LVL II: ICD-10-PCS | Mod: PBBFAC,,, | Performed by: OPTOMETRIST

## 2020-05-11 PROCEDURE — 99214 PR OFFICE/OUTPT VISIT, EST, LEVL IV, 30-39 MIN: ICD-10-PCS | Mod: 95,,, | Performed by: INTERNAL MEDICINE

## 2020-05-11 PROCEDURE — 92015 PR REFRACTION: ICD-10-PCS | Mod: S$GLB,,, | Performed by: OPTOMETRIST

## 2020-05-11 PROCEDURE — 99999 PR PBB SHADOW E&M-EST. PATIENT-LVL II: CPT | Mod: PBBFAC,,, | Performed by: OPTOMETRIST

## 2020-05-11 PROCEDURE — 92014 COMPRE OPH EXAM EST PT 1/>: CPT | Mod: S$GLB,,, | Performed by: OPTOMETRIST

## 2020-05-11 PROCEDURE — 92015 DETERMINE REFRACTIVE STATE: CPT | Mod: S$GLB,,, | Performed by: OPTOMETRIST

## 2020-05-11 PROCEDURE — 92014 PR EYE EXAM, EST PATIENT,COMPREHESV: ICD-10-PCS | Mod: S$GLB,,, | Performed by: OPTOMETRIST

## 2020-05-11 PROCEDURE — 92310 CONTACT LENS FITTING OU: CPT | Mod: CSM,S$GLB,, | Performed by: OPTOMETRIST

## 2020-05-11 PROCEDURE — 92310 PR CONTACT LENS FITTING (NO CHANGE): ICD-10-PCS | Mod: CSM,S$GLB,, | Performed by: OPTOMETRIST

## 2020-05-11 PROCEDURE — 99214 OFFICE O/P EST MOD 30 MIN: CPT | Mod: 95,,, | Performed by: INTERNAL MEDICINE

## 2020-05-11 NOTE — TELEPHONE ENCOUNTER
----- Message from Saul Cui MD sent at 5/11/2020  4:20 PM CDT -----  Switch stelara to xeljanz. No labs unless needed by pharmacy for TB and hepatitis. Advised to get shingles vaccine from downstairs pharmacy. Schedule TB and hepatitis labs if needed when she comes for shingles shot. Thanks.

## 2020-05-11 NOTE — ASSESSMENT & PLAN NOTE
Mild flare-up on Stelara.  Already failed multiple other medications including methotrexate, Enbrel, Otezla, Cosentyx.  Try Xeljanz.  Advised all adverse effects.  Shingles vaccine before starting Xeljanz.

## 2020-05-11 NOTE — ASSESSMENT & PLAN NOTE
Moderate flare-up on Stelara.  Already failed multiple other medications including methotrexate, Enbrel, Otezla, Cosentyx.  Try Xeljanz.  Advised all adverse effects.  Shingles vaccine before starting Xeljanz.

## 2020-05-11 NOTE — PROGRESS NOTES
HPI     Eye Exam      Additional comments: Yearly              Comments     NP to [facility]. Last seen by [facility] on 4/29/19 for POAG. PAtient here today for   yearly eye exam  HPI    Any vision changes since last exam: No   Eye pain: No  Other ocular symptoms: No    Do you wear currently wear glasses or contacts? CTL    Interested in contacts today? Yes    Do you plan on getting new glasses today? If needed                Last edited by Roxie Ward, PCT on 5/11/2020 10:44 AM. (History)              Assessment /Plan     For exam results, see Encounter Report.    Open angle with borderline findings of both eyes  Stable IOPs today OU with thick pachs  Monitor 12 months    Myopia, bilateral  Eyeglass Final Rx     Eyeglass Final Rx       Sphere    Right -0.50    Left -0.50    Expiration Date:  5/12/2021              Contact Lens Prescription (5/11/2020)        Brand Base Curve Diameter Sphere    Right Acuvue Oasys 1 Day 8.50 14.3 -0.50    Left Acuvue Oasys 1 Day 8.50 14.3 -0.50    Expiration Date:  5/12/2021    Replacement:  Daily    Wearing Schedule:  Daily wear        Dispensed trial contact lenses today. Patient is to wear lenses for 1 week.   Ok to order supply if no problems. RTC PRN if any problems arise.    Otherwise, RTC 1 yr for dilated eye exam with gOCT.  Discussed above and answered questions.

## 2020-05-11 NOTE — PROGRESS NOTES
RHEUMATOLOGY FOLLOW UP - TELE VISIT     The patient location is: [place]  The chief complaint leading to consultation is:  Flare-up of psoriasis and psoriatic arthritis  Visit type: Virtual visit with synchronous audio and video  Total time spent with patient:  20 minutes  Each patient to whom he or she provides medical services by telemedicine is:  (1) informed of the relationship between the physician and patient and the respective role of any other health care provider with respect to management of the patient; and (2) notified that he or she may decline to receive medical services by telemedicine and may withdraw from such care at any time.    HPI:-  Janice Nova a 40 y.o. pleasant female seen today through My chart video visit for follow up.  She reports flare-up of her psoriasis and psoriatic arthritis.  Her last Stelara injection was 3 weeks ago.  She has been flaring in the past month.  Her rash have not planned as bad as her arthritis.  Prolonged morning stiffness around her wrists and large joints.  Inflammatory low back pain present.  Continues to take methotrexate without any problems.  Review of Systems   Constitutional: Negative for chills and fever.   HENT: Negative for congestion and sore throat.    Eyes: Negative for blurred vision and redness.   Respiratory: Negative for cough and shortness of breath.    Cardiovascular: Negative for chest pain and leg swelling.   Gastrointestinal: Negative for abdominal pain.   Genitourinary: Negative for dysuria.   Musculoskeletal: Positive for back pain, joint pain and neck pain. Negative for falls and myalgias.   Skin: Positive for rash.   Neurological: Negative for headaches.   Endo/Heme/Allergies: Does not bruise/bleed easily.   Psychiatric/Behavioral: Negative for memory loss. The patient does not have insomnia.        Past Medical History:   Diagnosis Date    Acid reflux     Anxiety     Arthritis     Depression        Past Surgical History:    Procedure Laterality Date    COSMETIC SURGERY          Social History     Tobacco Use    Smoking status: Former Smoker     Packs/day: 0.50     Years: 10.00     Pack years: 5.00     Types: Cigarettes    Smokeless tobacco: Never Used   Substance Use Topics    Alcohol use: Yes     Alcohol/week: 3.0 standard drinks     Types: 3 Glasses of wine per week    Drug use: Yes     Frequency: 2.0 times per week     Types: Marijuana       Family History   Problem Relation Age of Onset    Cancer Mother     Breast cancer Mother     Diabetes Mellitus Maternal Grandfather     Cancer Paternal Grandmother     Psoriasis Father     Arthritis Father     Colon cancer Paternal Aunt     Ovarian cancer Neg Hx     Thrombophilia Neg Hx        Review of patient's allergies indicates:  No Known Allergies    Physical exam:-    GEN: awake, alert, non-diaphoretic, no psychomotor agitation, no acute distress    HEENT :Head: atraumatic, normocephalic, no rashes noted, no lesions noted;    Eyes: NO redness, discharge, swelling, or lesions    Nose: NO redness, swelling, discharge, deformity, or impetigo/crusting    Skin:  Mild erythematous rash around frontal scalp.    Cardiopulmonary: no increased respiratory effort, speaking in clear sentences    MSK:  Mild dactylitis of right 5th finger and swelling around right wrist.    Good ambulation in front of the camera    Neuro: cranial nerves grossly normal, speech normal rate and rhythm, orientation arrived to appointment on time with no prompting, moved both upper extremities equally    Pysch:  appearance, behavior, and attitude- well groomed, pleasant, cooperative    Medication List with Changes/Refills   New Medications    TOFACITINIB (XELJANZ XR) 11 MG TB24    Take 11 mg by mouth once daily.    VARICELLA-ZOSTER GE-AS01B, PF, (SHINGRIX, PF,) 50 MCG/0.5 ML INJECTION    Inject 0.5 mLs into the muscle every 3 (three) months. for 2 doses   Current Medications    CELECOXIB (CELEBREX) 200 MG  CAPSULE    TAKE ONE CAPSULE BY MOUTH TWICE DAILY    ERGOCALCIFEROL (ERGOCALCIFEROL) 50,000 UNIT CAP    Take 1 capsule (50,000 Units total) by mouth every 7 days.    ESOMEPRAZOLE (NEXIUM) 40 MG CAPSULE    TAKE ONE CAPSULE BY MOUTH EVERY DAY BEFORE BREAKFAST    FOLIC ACID (FOLVITE) 1 MG TABLET    TAKE ONE TABLET BY MOUTH DAILY    METHOTREXATE 25 MG/ML INJECTION    INJECT 1ML INTO THE MUSCLE EVERY SEVEN DAYS    SERTRALINE (ZOLOFT) 100 MG TABLET    TAKE ONE TABLET BY MOUTH DAILY    TACROLIMUS (PROTOPIC) 0.1 % OINTMENT    Apply topically 2 (two) times daily.    TACROLIMUS (PROTOPIC) 0.1 % OINTMENT    Apply topically 2 (two) times daily.   Discontinued Medications    USTEKINUMAB (STELARA) 90 MG/ML SYRG SYRINGE    Inject 90 mg into the skin every 12 weeks thereafter       Assessment/Plans:-  1. Polyarticular psoriatic arthritis    2. Plaque psoriasis    3. Immunosuppressed status    4. Morbid obesity with BMI of 40.0-44.9, adult    5. High risk medication use    6. Need for shingles vaccine      Problem List Items Addressed This Visit        Derm    Plaque psoriasis    Current Assessment & Plan     Mild flare-up on Stelara.  Already failed multiple other medications including methotrexate, Enbrel, Otezla, Cosentyx.  Try Xeljanz.  Advised all adverse effects.  Shingles vaccine before starting Xeljanz.         Relevant Medications    tofacitinib (XELJANZ XR) 11 mg Tb24       Immunology/Multi System    Immunosuppressed status    Current Assessment & Plan     Compromised immune system secondary to autoimmune disease and use of immunosuppressive drugs. Monitor carefully for infections. Advised to get immediate medical care if any infection. Also advised strict adherence to age appropriate vaccinations and cancer screenings with PCP.          Relevant Medications    varicella-zoster gE-AS01B, PF, (SHINGRIX, PF,) 50 mcg/0.5 mL injection    Other Relevant Orders    Hepatitis B Core Antibody, Total    Hepatitis B Surface Ab,  Qualitative    Hepatitis B Surface Antigen    Hepatitis C Antibody    Quantiferon Gold TB       Endocrine    Morbid obesity with BMI of 40.0-44.9, adult    Current Assessment & Plan     Advised carbohydrate restriction            Orthopedic    Polyarticular psoriatic arthritis - Primary    Current Assessment & Plan     Moderate flare-up on Stelara.  Already failed multiple other medications including methotrexate, Enbrel, Otezla, Cosentyx.  Try Xeljanz.  Advised all adverse effects.  Shingles vaccine before starting Xeljanz.           Relevant Medications    tofacitinib (XELJANZ XR) 11 mg Tb24    varicella-zoster gE-AS01B, PF, (SHINGRIX, PF,) 50 mcg/0.5 mL injection       Other    High risk medication use    Current Assessment & Plan     Hold methotrexate and Xeljanz if any infection.           Other Visit Diagnoses     Need for shingles vaccine        Relevant Medications    varicella-zoster gE-AS01B, PF, (SHINGRIX, PF,) 50 mcg/0.5 mL injection          Follow up in about 3 months (around 8/11/2020).    Disclaimer: This note was prepared using voice recognition system and is likely to have sound alike errors and is not proof read.  Please call me with any questions.

## 2020-05-11 NOTE — Clinical Note
Switch stelara to xeljanz. No labs unless needed by pharmacy for TB and hepatitis. Advised to get shingles vaccine from downstairs pharmacy. Schedule TB and hepatitis labs if needed when she comes for shingles shot. Thanks.

## 2020-05-14 ENCOUNTER — LAB VISIT (OUTPATIENT)
Dept: LAB | Facility: HOSPITAL | Age: 40
End: 2020-05-14
Payer: COMMERCIAL

## 2020-05-14 ENCOUNTER — IMMUNIZATION (OUTPATIENT)
Dept: PHARMACY | Facility: CLINIC | Age: 40
End: 2020-05-14
Payer: COMMERCIAL

## 2020-05-14 ENCOUNTER — LAB VISIT (OUTPATIENT)
Dept: LAB | Facility: HOSPITAL | Age: 40
End: 2020-05-14
Attending: INTERNAL MEDICINE
Payer: COMMERCIAL

## 2020-05-14 DIAGNOSIS — D84.9 IMMUNOSUPPRESSED STATUS: ICD-10-CM

## 2020-05-14 DIAGNOSIS — L40.0 PLAQUE PSORIASIS: ICD-10-CM

## 2020-05-14 DIAGNOSIS — L40.59 POLYARTICULAR PSORIATIC ARTHRITIS: ICD-10-CM

## 2020-05-14 LAB
ALBUMIN SERPL BCP-MCNC: 3.7 G/DL (ref 3.5–5.2)
ALP SERPL-CCNC: 90 U/L (ref 55–135)
ALT SERPL W/O P-5'-P-CCNC: 21 U/L (ref 10–44)
ANION GAP SERPL CALC-SCNC: 8 MMOL/L (ref 8–16)
AST SERPL-CCNC: 17 U/L (ref 10–40)
BASOPHILS # BLD AUTO: 0.06 K/UL (ref 0–0.2)
BASOPHILS NFR BLD: 0.6 % (ref 0–1.9)
BILIRUB SERPL-MCNC: 0.3 MG/DL (ref 0.1–1)
BUN SERPL-MCNC: 15 MG/DL (ref 6–20)
CALCIUM SERPL-MCNC: 8.7 MG/DL (ref 8.7–10.5)
CHLORIDE SERPL-SCNC: 106 MMOL/L (ref 95–110)
CO2 SERPL-SCNC: 24 MMOL/L (ref 23–29)
CREAT SERPL-MCNC: 0.7 MG/DL (ref 0.5–1.4)
CRP SERPL-MCNC: 10.8 MG/L (ref 0–8.2)
DIFFERENTIAL METHOD: ABNORMAL
EOSINOPHIL # BLD AUTO: 0.6 K/UL (ref 0–0.5)
EOSINOPHIL NFR BLD: 5.8 % (ref 0–8)
ERYTHROCYTE [DISTWIDTH] IN BLOOD BY AUTOMATED COUNT: 18.8 % (ref 11.5–14.5)
ERYTHROCYTE [SEDIMENTATION RATE] IN BLOOD BY WESTERGREN METHOD: 24 MM/HR (ref 0–36)
EST. GFR  (AFRICAN AMERICAN): >60 ML/MIN/1.73 M^2
EST. GFR  (NON AFRICAN AMERICAN): >60 ML/MIN/1.73 M^2
GLUCOSE SERPL-MCNC: 99 MG/DL (ref 70–110)
HCT VFR BLD AUTO: 31.8 % (ref 37–48.5)
HGB BLD-MCNC: 9.7 G/DL (ref 12–16)
IMM GRANULOCYTES # BLD AUTO: 0.06 K/UL (ref 0–0.04)
IMM GRANULOCYTES NFR BLD AUTO: 0.6 % (ref 0–0.5)
LYMPHOCYTES # BLD AUTO: 3 K/UL (ref 1–4.8)
LYMPHOCYTES NFR BLD: 29.5 % (ref 18–48)
MCH RBC QN AUTO: 21.9 PG (ref 27–31)
MCHC RBC AUTO-ENTMCNC: 30.5 G/DL (ref 32–36)
MCV RBC AUTO: 72 FL (ref 82–98)
MONOCYTES # BLD AUTO: 0.5 K/UL (ref 0.3–1)
MONOCYTES NFR BLD: 4.4 % (ref 4–15)
NEUTROPHILS # BLD AUTO: 6.1 K/UL (ref 1.8–7.7)
NEUTROPHILS NFR BLD: 59.7 % (ref 38–73)
NRBC BLD-RTO: 0 /100 WBC
PLATELET # BLD AUTO: 510 K/UL (ref 150–350)
PMV BLD AUTO: 7.9 FL (ref 9.2–12.9)
POTASSIUM SERPL-SCNC: 4.2 MMOL/L (ref 3.5–5.1)
PROT SERPL-MCNC: 7.2 G/DL (ref 6–8.4)
RBC # BLD AUTO: 4.43 M/UL (ref 4–5.4)
SODIUM SERPL-SCNC: 138 MMOL/L (ref 136–145)
WBC # BLD AUTO: 10.22 K/UL (ref 3.9–12.7)

## 2020-05-14 PROCEDURE — 86706 HEP B SURFACE ANTIBODY: CPT

## 2020-05-14 PROCEDURE — 86803 HEPATITIS C AB TEST: CPT

## 2020-05-14 PROCEDURE — 36415 COLL VENOUS BLD VENIPUNCTURE: CPT

## 2020-05-14 PROCEDURE — 86140 C-REACTIVE PROTEIN: CPT

## 2020-05-14 PROCEDURE — 80053 COMPREHEN METABOLIC PANEL: CPT

## 2020-05-14 PROCEDURE — 86480 TB TEST CELL IMMUN MEASURE: CPT

## 2020-05-14 PROCEDURE — 85025 COMPLETE CBC W/AUTO DIFF WBC: CPT

## 2020-05-14 PROCEDURE — 85652 RBC SED RATE AUTOMATED: CPT

## 2020-05-14 PROCEDURE — 87340 HEPATITIS B SURFACE AG IA: CPT

## 2020-05-14 PROCEDURE — 86704 HEP B CORE ANTIBODY TOTAL: CPT

## 2020-05-14 NOTE — TELEPHONE ENCOUNTER
CaseId:[id number];Status:Approved;Review Type:Prior Auth;Coverage Start Date:05/12/2020;Coverage End Date:05/12/2021; ry

## 2020-05-15 LAB
HBV CORE AB SERPL QL IA: NEGATIVE
HBV SURFACE AB SER-ACNC: NEGATIVE M[IU]/ML
HBV SURFACE AG SERPL QL IA: NEGATIVE
HCV AB SERPL QL IA: NEGATIVE

## 2020-05-18 LAB
GAMMA INTERFERON BACKGROUND BLD IA-ACNC: 0.02 IU/ML
M TB IFN-G CD4+ BCKGRND COR BLD-ACNC: -0.01 IU/ML
MITOGEN IGNF BCKGRD COR BLD-ACNC: 6.9 IU/ML
TB GOLD PLUS: NEGATIVE
TB2 - NIL: 0 IU/ML

## 2020-05-20 ENCOUNTER — TELEPHONE (OUTPATIENT)
Dept: PHARMACY | Facility: CLINIC | Age: 40
End: 2020-05-20

## 2020-05-28 ENCOUNTER — PATIENT MESSAGE (OUTPATIENT)
Dept: RHEUMATOLOGY | Facility: CLINIC | Age: 40
End: 2020-05-28

## 2020-05-28 DIAGNOSIS — L40.59 POLYARTICULAR PSORIATIC ARTHRITIS: ICD-10-CM

## 2020-05-28 DIAGNOSIS — E66.01 MORBID OBESITY WITH BMI OF 40.0-44.9, ADULT: ICD-10-CM

## 2020-05-28 DIAGNOSIS — Z79.899 HIGH RISK MEDICATION USE: Primary | ICD-10-CM

## 2020-06-01 RX ORDER — SERTRALINE HYDROCHLORIDE 100 MG/1
100 TABLET, FILM COATED ORAL DAILY
Qty: 30 TABLET | Refills: 5 | Status: SHIPPED | OUTPATIENT
Start: 2020-06-01 | End: 2020-11-16

## 2020-06-16 ENCOUNTER — TELEPHONE (OUTPATIENT)
Dept: PHARMACY | Facility: CLINIC | Age: 40
End: 2020-06-16

## 2020-06-17 NOTE — TELEPHONE ENCOUNTER
Patient called to refill Xeljanz 11 mg Tablet #30/30, patient took medication today, she has about 4-5 left.  Ship 06/18 for 06/19 delivery.  Verified address.  Copay $0.00 in 004.  Patient has not started any new medications including OTC drugs. Patient has not had any medication/ dose or instruction changes. No new allergies or side effects reported with this shipment. Medication is being taken as prescribed by physician and properly stored.  Declined [facility] . -SHEELA

## 2020-07-13 ENCOUNTER — TELEPHONE (OUTPATIENT)
Dept: PHARMACY | Facility: CLINIC | Age: 40
End: 2020-07-13

## 2020-07-13 NOTE — TELEPHONE ENCOUNTER
RX outgoing call regarding Xeljanz @ [facility]. Shipping out 7/15 for  arrival with patients consent. Copay of $0.00 @ 004. Address and  confirmed. Patient has 7 tablets on hand at this time. Patient has not started any new medications, has had no missed doses and no side effects present. Patient is currently taking the medication as directed by doctors instruction. Patient states they do not have any questions or concerns at this time.

## 2020-08-10 ENCOUNTER — TELEPHONE (OUTPATIENT)
Dept: PHARMACY | Facility: CLINIC | Age: 40
End: 2020-08-10

## 2020-08-10 NOTE — TELEPHONE ENCOUNTER
RX outgoing call regarding Xeljanz @ [facility]. Shipping out  for  arrival with patients consent. Copay of $0.00 @ 004. Address and  confirmed. Patient has 9 tablets on hand at this time. Patient has not started any new medications, has had no missed doses and no side effects present. Patient is currently taking the medication as directed by doctors instruction. Patient states they do not have any questions or concerns at this time.

## 2020-08-14 ENCOUNTER — TELEPHONE (OUTPATIENT)
Dept: RHEUMATOLOGY | Facility: CLINIC | Age: 40
End: 2020-08-14

## 2020-08-14 NOTE — TELEPHONE ENCOUNTER
Spoke with pt and confirmed appointment with Dr. Cui for 8.17.20 at 3.15 pm. Changed to virtual visit.

## 2020-08-17 ENCOUNTER — OFFICE VISIT (OUTPATIENT)
Dept: RHEUMATOLOGY | Facility: CLINIC | Age: 40
End: 2020-08-17
Payer: COMMERCIAL

## 2020-08-17 ENCOUNTER — TELEPHONE (OUTPATIENT)
Dept: RHEUMATOLOGY | Facility: CLINIC | Age: 40
End: 2020-08-17

## 2020-08-17 DIAGNOSIS — L40.0 PLAQUE PSORIASIS: ICD-10-CM

## 2020-08-17 DIAGNOSIS — Z23 NEED FOR SHINGLES VACCINE: ICD-10-CM

## 2020-08-17 DIAGNOSIS — G89.29 CHRONIC PAIN OF BOTH KNEES: ICD-10-CM

## 2020-08-17 DIAGNOSIS — M25.562 CHRONIC PAIN OF BOTH KNEES: ICD-10-CM

## 2020-08-17 DIAGNOSIS — M25.561 CHRONIC PAIN OF BOTH KNEES: ICD-10-CM

## 2020-08-17 DIAGNOSIS — Z79.899 HIGH RISK MEDICATION USE: ICD-10-CM

## 2020-08-17 DIAGNOSIS — L40.59 POLYARTICULAR PSORIATIC ARTHRITIS: Primary | ICD-10-CM

## 2020-08-17 DIAGNOSIS — D84.9 IMMUNOSUPPRESSED STATUS: ICD-10-CM

## 2020-08-17 PROCEDURE — 99214 OFFICE O/P EST MOD 30 MIN: CPT | Mod: 95,,, | Performed by: INTERNAL MEDICINE

## 2020-08-17 PROCEDURE — 99214 PR OFFICE/OUTPT VISIT, EST, LEVL IV, 30-39 MIN: ICD-10-PCS | Mod: 95,,, | Performed by: INTERNAL MEDICINE

## 2020-08-17 RX ORDER — ZOSTER VACCINE RECOMBINANT, ADJUVANTED 50 MCG/0.5
0.5 KIT INTRAMUSCULAR
Qty: 0.5 ML | Refills: 0 | Status: SHIPPED | OUTPATIENT
Start: 2020-08-17 | End: 2020-08-18

## 2020-08-17 NOTE — PROGRESS NOTES
RHEUMATOLOGY FOLLOW UP - TELE VISIT     The patient location is: [place]  The chief complaint leading to consultation is:  Flare-up of psoriasis and psoriatic arthritis  Visit type: Virtual visit with synchronous audio and video  Total time spent with patient:  20 minutes  Each patient to whom he or she provides medical services by telemedicine is:  (1) informed of the relationship between the physician and patient and the respective role of any other health care provider with respect to management of the patient; and (2) notified that he or she may decline to receive medical services by telemedicine and may withdraw from such care at any time.    HPI:-  Janice Nova a 40 y.o. pleasant female seen today through My chart video visit for follow up.  She reports doing really well on xeljanz and methotrexate.  She had a mild flare-up of psoriasis when she missed a couple of methotrexate doses but no flare-up of psoriatic arthritis.  When she restarted methotrexate the psoriasis resolved.  She denies any morning stiffness.  Significant improvement of psoriatic arthritis on Xeljanz.  Review of Systems   Constitutional: Negative for chills and fever.   HENT: Negative for congestion and sore throat.    Eyes: Negative for blurred vision and redness.   Respiratory: Negative for cough and shortness of breath.    Cardiovascular: Negative for chest pain and leg swelling.   Gastrointestinal: Negative for abdominal pain.   Genitourinary: Negative for dysuria.   Musculoskeletal: Negative for back pain, falls, joint pain, myalgias and neck pain.   Skin: Negative for rash.   Neurological: Negative for headaches.   Endo/Heme/Allergies: Does not bruise/bleed easily.   Psychiatric/Behavioral: Negative for memory loss. The patient does not have insomnia.        Past Medical History:   Diagnosis Date    Acid reflux     Anxiety     Arthritis     Depression        Past Surgical History:   Procedure Laterality Date     COSMETIC SURGERY          Social History     Tobacco Use    Smoking status: Former Smoker     Packs/day: 0.50     Years: 10.00     Pack years: 5.00     Types: Cigarettes    Smokeless tobacco: Never Used   Substance Use Topics    Alcohol use: Yes     Alcohol/week: 3.0 standard drinks     Types: 3 Glasses of wine per week    Drug use: Yes     Frequency: 2.0 times per week     Types: Marijuana       Family History   Problem Relation Age of Onset    Cancer Mother     Breast cancer Mother     Diabetes Mellitus Maternal Grandfather     Cancer Paternal Grandmother     Psoriasis Father     Arthritis Father     Colon cancer Paternal Aunt     Ovarian cancer Neg Hx     Thrombophilia Neg Hx        Review of patient's allergies indicates:  No Known Allergies    Physical exam:-    GEN: awake, alert, non-diaphoretic, no psychomotor agitation, no acute distress    HEENT :Head: atraumatic, normocephalic, no rashes noted, no lesions noted;    Eyes: NO redness, discharge, swelling, or lesions    Nose: NO redness, swelling, discharge, deformity, or impetigo/crusting    Skin:  Mild erythematous rash around frontal scalp.    Cardiopulmonary: no increased respiratory effort, speaking in clear sentences    MSK:  No synovitis, dactylitis or effusion.     Good ambulation in front of the camera    Neuro: cranial nerves grossly normal, speech normal rate and rhythm, orientation arrived to appointment on time with no prompting, moved both upper extremities equally    Pysch:  appearance, behavior, and attitude- well groomed, pleasant, cooperative    Medication List with Changes/Refills   New Medications    VARICELLA-ZOSTER GE-AS01B, PF, (SHINGRIX, PF,) 50 MCG/0.5 ML INJECTION    Inject 0.5 mLs into the muscle every 3 (three) months. for 1 dose   Current Medications    CELECOXIB (CELEBREX) 200 MG CAPSULE    TAKE ONE CAPSULE BY MOUTH TWICE DAILY    ERGOCALCIFEROL (ERGOCALCIFEROL) 50,000 UNIT CAP    Take 1 capsule (50,000 Units total)  by mouth every 7 days.    ESOMEPRAZOLE (NEXIUM) 40 MG CAPSULE    TAKE ONE CAPSULE BY MOUTH EVERY DAY BEFORE BREAKFAST    FOLIC ACID (FOLVITE) 1 MG TABLET    TAKE ONE TABLET BY MOUTH DAILY    METHOTREXATE 25 MG/ML INJECTION    INJECT 1ML INTO THE MUSCLE EVERY SEVEN DAYS    NALTREXONE-BUPROPION (CONTRAVE) 8-90 MG TBSR    Take 1 tablet by mouth 2 (two) times daily.    SERTRALINE (ZOLOFT) 100 MG TABLET    Take 1 tablet (100 mg total) by mouth once daily.    TACROLIMUS (PROTOPIC) 0.1 % OINTMENT    Apply topically 2 (two) times daily.    TACROLIMUS (PROTOPIC) 0.1 % OINTMENT    Apply topically 2 (two) times daily.    TOFACITINIB (XELJANZ XR) 11 MG TB24    Take 11 mg by mouth once daily.       Assessment/Plans:-  1. Polyarticular psoriatic arthritis    2. Plaque psoriasis    3. High risk medication use    4. Immunosuppressed status    5. Chronic pain of both knees    6. Need for shingles vaccine    ·  Significant improvement since adding Xeljanz to methotrexate.  Well controlled disease.  Continue Xeljanz and consider reducing methotrexate dose gradually.  · Well controlled plaques or rashes on Xeljanz and methotrexate.  · Hold Xeljanz and methotrexate if any infection.  Safety labs this week.  · Compromised immune system secondary to autoimmune disease and use of immunosuppressive drugs. Monitor carefully for infections. Advised to get immediate medical care if any infection. Also advised strict adherence to age appropriate vaccinations and cancer screenings with PCP.  · Due for 2nd dose of shingles vaccine.  ·     Problem List Items Addressed This Visit     Plaque psoriasis    Polyarticular psoriatic arthritis - Primary    High risk medication use    Chronic pain of both knees    Immunosuppressed status    Relevant Orders    COVID-19 (SARS CoV-2) IgG Antibody      Other Visit Diagnoses     Need for shingles vaccine        Relevant Medications    varicella-zoster gE-AS01B, PF, (SHINGRIX, PF,) 50 mcg/0.5 mL injection           Follow up in about 3 months (around 11/17/2020).    Disclaimer: This note was prepared using voice recognition system and is likely to have sound alike errors and is not proof read.  Please call me with any questions.

## 2020-08-17 NOTE — TELEPHONE ENCOUNTER
----- Message from Saul Cui MD sent at 8/17/2020  3:33 PM CDT -----  FAB4 , COVID Antibody and shingrix  this week.  RTC 3 months

## 2020-08-19 ENCOUNTER — LAB VISIT (OUTPATIENT)
Dept: LAB | Facility: HOSPITAL | Age: 40
End: 2020-08-19
Attending: INTERNAL MEDICINE
Payer: COMMERCIAL

## 2020-08-19 ENCOUNTER — IMMUNIZATION (OUTPATIENT)
Dept: PHARMACY | Facility: CLINIC | Age: 40
End: 2020-08-19
Payer: COMMERCIAL

## 2020-08-19 DIAGNOSIS — D84.9 IMMUNOSUPPRESSED STATUS: ICD-10-CM

## 2020-08-19 DIAGNOSIS — L40.59 POLYARTICULAR PSORIATIC ARTHRITIS: ICD-10-CM

## 2020-08-19 DIAGNOSIS — L40.0 PLAQUE PSORIASIS: ICD-10-CM

## 2020-08-19 DIAGNOSIS — Z79.899 HIGH RISK MEDICATION USE: ICD-10-CM

## 2020-08-19 LAB
ALBUMIN SERPL BCP-MCNC: 3.9 G/DL (ref 3.5–5.2)
ALP SERPL-CCNC: 83 U/L (ref 55–135)
ALT SERPL W/O P-5'-P-CCNC: 19 U/L (ref 10–44)
ANION GAP SERPL CALC-SCNC: 10 MMOL/L (ref 8–16)
AST SERPL-CCNC: 18 U/L (ref 10–40)
BASOPHILS # BLD AUTO: 0.05 K/UL (ref 0–0.2)
BASOPHILS NFR BLD: 0.5 % (ref 0–1.9)
BILIRUB SERPL-MCNC: 0.3 MG/DL (ref 0.1–1)
BUN SERPL-MCNC: 14 MG/DL (ref 6–20)
CALCIUM SERPL-MCNC: 9.1 MG/DL (ref 8.7–10.5)
CHLORIDE SERPL-SCNC: 106 MMOL/L (ref 95–110)
CO2 SERPL-SCNC: 23 MMOL/L (ref 23–29)
CREAT SERPL-MCNC: 0.7 MG/DL (ref 0.5–1.4)
CRP SERPL-MCNC: 6.4 MG/L (ref 0–8.2)
DIFFERENTIAL METHOD: ABNORMAL
EOSINOPHIL # BLD AUTO: 0.2 K/UL (ref 0–0.5)
EOSINOPHIL NFR BLD: 2.2 % (ref 0–8)
ERYTHROCYTE [DISTWIDTH] IN BLOOD BY AUTOMATED COUNT: 19.9 % (ref 11.5–14.5)
ERYTHROCYTE [SEDIMENTATION RATE] IN BLOOD BY WESTERGREN METHOD: 22 MM/HR (ref 0–36)
EST. GFR  (AFRICAN AMERICAN): >60 ML/MIN/1.73 M^2
EST. GFR  (NON AFRICAN AMERICAN): >60 ML/MIN/1.73 M^2
GLUCOSE SERPL-MCNC: 109 MG/DL (ref 70–110)
HCT VFR BLD AUTO: 32.6 % (ref 37–48.5)
HGB BLD-MCNC: 9.7 G/DL (ref 12–16)
IMM GRANULOCYTES # BLD AUTO: 0.06 K/UL (ref 0–0.04)
IMM GRANULOCYTES NFR BLD AUTO: 0.5 % (ref 0–0.5)
LYMPHOCYTES # BLD AUTO: 3.1 K/UL (ref 1–4.8)
LYMPHOCYTES NFR BLD: 27.9 % (ref 18–48)
MCH RBC QN AUTO: 22.2 PG (ref 27–31)
MCHC RBC AUTO-ENTMCNC: 29.8 G/DL (ref 32–36)
MCV RBC AUTO: 75 FL (ref 82–98)
MONOCYTES # BLD AUTO: 0.6 K/UL (ref 0.3–1)
MONOCYTES NFR BLD: 5 % (ref 4–15)
NEUTROPHILS # BLD AUTO: 7 K/UL (ref 1.8–7.7)
NEUTROPHILS NFR BLD: 64.4 % (ref 38–73)
NRBC BLD-RTO: 0 /100 WBC
PLATELET # BLD AUTO: 458 K/UL (ref 150–350)
PMV BLD AUTO: 8.1 FL (ref 9.2–12.9)
POTASSIUM SERPL-SCNC: 4.1 MMOL/L (ref 3.5–5.1)
PROT SERPL-MCNC: 7.4 G/DL (ref 6–8.4)
RBC # BLD AUTO: 4.37 M/UL (ref 4–5.4)
SARS-COV-2 IGG SERPLBLD QL IA.RAPID: NEGATIVE
SODIUM SERPL-SCNC: 139 MMOL/L (ref 136–145)
WBC # BLD AUTO: 10.92 K/UL (ref 3.9–12.7)

## 2020-08-19 PROCEDURE — 85652 RBC SED RATE AUTOMATED: CPT

## 2020-08-19 PROCEDURE — 86140 C-REACTIVE PROTEIN: CPT

## 2020-08-19 PROCEDURE — 80053 COMPREHEN METABOLIC PANEL: CPT

## 2020-08-19 PROCEDURE — 36415 COLL VENOUS BLD VENIPUNCTURE: CPT

## 2020-08-19 PROCEDURE — 85025 COMPLETE CBC W/AUTO DIFF WBC: CPT

## 2020-08-19 PROCEDURE — 86769 SARS-COV-2 COVID-19 ANTIBODY: CPT

## 2020-09-11 ENCOUNTER — TELEPHONE (OUTPATIENT)
Dept: PHARMACY | Facility: CLINIC | Age: 40
End: 2020-09-11

## 2020-09-23 ENCOUNTER — OFFICE VISIT (OUTPATIENT)
Dept: DERMATOLOGY | Facility: CLINIC | Age: 40
End: 2020-09-23
Payer: COMMERCIAL

## 2020-09-23 DIAGNOSIS — L40.9 PSORIASIS: Primary | ICD-10-CM

## 2020-09-23 DIAGNOSIS — L30.4 INTERTRIGO: ICD-10-CM

## 2020-09-23 PROCEDURE — 99202 PR OFFICE/OUTPT VISIT, NEW, LEVL II, 15-29 MIN: ICD-10-PCS | Mod: S$GLB,,, | Performed by: STUDENT IN AN ORGANIZED HEALTH CARE EDUCATION/TRAINING PROGRAM

## 2020-09-23 PROCEDURE — 99999 PR PBB SHADOW E&M-EST. PATIENT-LVL III: ICD-10-PCS | Mod: PBBFAC,,, | Performed by: STUDENT IN AN ORGANIZED HEALTH CARE EDUCATION/TRAINING PROGRAM

## 2020-09-23 PROCEDURE — 99999 PR PBB SHADOW E&M-EST. PATIENT-LVL III: CPT | Mod: PBBFAC,,, | Performed by: STUDENT IN AN ORGANIZED HEALTH CARE EDUCATION/TRAINING PROGRAM

## 2020-09-23 PROCEDURE — 99202 OFFICE O/P NEW SF 15 MIN: CPT | Mod: S$GLB,,, | Performed by: STUDENT IN AN ORGANIZED HEALTH CARE EDUCATION/TRAINING PROGRAM

## 2020-09-23 RX ORDER — TRIAMCINOLONE ACETONIDE 0.25 MG/G
CREAM TOPICAL
Qty: 80 G | Refills: 0 | Status: SHIPPED | OUTPATIENT
Start: 2020-09-23 | End: 2021-04-20 | Stop reason: SDUPTHER

## 2020-09-23 RX ORDER — KETOCONAZOLE 20 MG/G
CREAM TOPICAL 2 TIMES DAILY
Qty: 60 G | Refills: 1 | Status: SHIPPED | OUTPATIENT
Start: 2020-09-23 | End: 2022-05-04

## 2020-09-23 RX ORDER — BETAMETHASONE VALERATE 1.2 MG/G
CREAM TOPICAL 2 TIMES DAILY
Qty: 45 G | Refills: 1 | Status: SHIPPED | OUTPATIENT
Start: 2020-09-23 | End: 2021-01-13 | Stop reason: SDUPTHER

## 2020-09-23 NOTE — PROGRESS NOTES
Subjective:       Patient ID:  Janice Lawrence is a 40 y.o. female who presents for   Chief Complaint   Patient presents with    Psoriasis    Skin Tags     History of Present Illness: The patient presents with chief complaint of a rash.  Location: under breasts  Duration: months  Signs/Symptoms: redness, pain, itching  Prior treatments: none          Review of Systems   Skin: Positive for rash (Patient also with a history of psoriasis, not currently using any topicals for her symptoms. Psoriasis located on elbows.).        Objective:    Physical Exam   Constitutional: She appears well-developed and well-nourished. No distress.   Neurological: She is alert and oriented to person, place, and time. She is not disoriented.   Psychiatric: She has a normal mood and affect.   Skin:   Areas Examined (abnormalities noted in diagram):   Head / Face Inspection Performed  Neck Inspection Performed  Chest / Axilla Inspection Performed  Abdomen Inspection Performed  RUE Inspected  LUE Inspection Performed              Diagram Legend     Erythematous scaling macule/papule c/w actinic keratosis       Vascular papule c/w angioma      Pigmented verrucoid papule/plaque c/w seborrheic keratosis      Yellow umbilicated papule c/w sebaceous hyperplasia      Irregularly shaped tan macule c/w lentigo     1-2 mm smooth white papules consistent with Milia      Movable subcutaneous cyst with punctum c/w epidermal inclusion cyst      Subcutaneous movable cyst c/w pilar cyst      Firm pink to brown papule c/w dermatofibroma      Pedunculated fleshy papule(s) c/w skin tag(s)      Evenly pigmented macule c/w junctional nevus     Mildly variegated pigmented, slightly irregular-bordered macule c/w mildly atypical nevus      Flesh colored to evenly pigmented papule c/w intradermal nevus       Pink pearly papule/plaque c/w basal cell carcinoma      Erythematous hyperkeratotic cursted plaque c/w SCC      Surgical scar with no sign of  skin cancer recurrence      Open and closed comedones      Inflammatory papules and pustules      Verrucoid papule consistent consistent with wart     Erythematous eczematous patches and plaques     Dystrophic onycholytic nail with subungual debris c/w onychomycosis     Umbilicated papule    Erythematous-base heme-crusted tan verrucoid plaque consistent with inflamed seborrheic keratosis     Erythematous Silvery Scaling Plaque c/w Psoriasis     See annotation      Assessment / Plan:        Psoriasis  -     betamethasone valerate 0.1% (VALISONE) 0.1 % Crea; Apply topically 2 (two) times daily. Apply to the elbows.  Dispense: 45 g; Refill: 1    Intertrigo  -     triamcinolone acetonide 0.025% (KENALOG) 0.025 % cream; AAA bid  Dispense: 80 g; Refill: 0  -     ketoconazole (NIZORAL) 2 % cream; Apply topically 2 (two) times daily.  Dispense: 60 g; Refill: 1             Follow up in about 3 months (around 12/23/2020).

## 2020-09-28 ENCOUNTER — PATIENT MESSAGE (OUTPATIENT)
Dept: RHEUMATOLOGY | Facility: CLINIC | Age: 40
End: 2020-09-28

## 2020-09-28 ENCOUNTER — TELEPHONE (OUTPATIENT)
Dept: RHEUMATOLOGY | Facility: CLINIC | Age: 40
End: 2020-09-28

## 2020-09-28 NOTE — TELEPHONE ENCOUNTER
Left message on voicemail to call back to change appt with Dr WASHINGTON on 11/16/2020. Dr WASHINGTON is now in [place] on Mondays

## 2020-10-05 ENCOUNTER — TELEPHONE (OUTPATIENT)
Dept: RHEUMATOLOGY | Facility: CLINIC | Age: 40
End: 2020-10-05

## 2020-10-05 NOTE — TELEPHONE ENCOUNTER
Left message on voicemail to call back to change appt with Dr WASHINGTON on 11/16/2020. Dr WASHINGTON is now in [place] on Mondays

## 2020-10-06 ENCOUNTER — PATIENT MESSAGE (OUTPATIENT)
Dept: DERMATOLOGY | Facility: CLINIC | Age: 40
End: 2020-10-06

## 2020-10-14 ENCOUNTER — TELEPHONE (OUTPATIENT)
Dept: PHARMACY | Facility: CLINIC | Age: 40
End: 2020-10-14

## 2020-10-21 ENCOUNTER — PATIENT MESSAGE (OUTPATIENT)
Dept: RHEUMATOLOGY | Facility: CLINIC | Age: 40
End: 2020-10-21

## 2020-10-27 ENCOUNTER — PATIENT MESSAGE (OUTPATIENT)
Dept: DERMATOLOGY | Facility: CLINIC | Age: 40
End: 2020-10-27

## 2020-11-05 ENCOUNTER — PATIENT MESSAGE (OUTPATIENT)
Dept: RHEUMATOLOGY | Facility: CLINIC | Age: 40
End: 2020-11-05

## 2020-11-05 ENCOUNTER — LAB VISIT (OUTPATIENT)
Dept: LAB | Facility: HOSPITAL | Age: 40
End: 2020-11-05
Attending: INTERNAL MEDICINE
Payer: COMMERCIAL

## 2020-11-05 ENCOUNTER — LAB VISIT (OUTPATIENT)
Dept: INTERNAL MEDICINE | Facility: CLINIC | Age: 40
End: 2020-11-05
Payer: COMMERCIAL

## 2020-11-05 DIAGNOSIS — R06.02 SHORTNESS OF BREATH: ICD-10-CM

## 2020-11-05 DIAGNOSIS — L40.0 PLAQUE PSORIASIS: ICD-10-CM

## 2020-11-05 DIAGNOSIS — L40.59 POLYARTICULAR PSORIATIC ARTHRITIS: ICD-10-CM

## 2020-11-05 DIAGNOSIS — M10.9 PODAGRA: Primary | ICD-10-CM

## 2020-11-05 DIAGNOSIS — M10.9 PODAGRA: ICD-10-CM

## 2020-11-05 DIAGNOSIS — L40.59 POLYARTICULAR PSORIATIC ARTHRITIS: Primary | ICD-10-CM

## 2020-11-05 LAB
ALBUMIN SERPL BCP-MCNC: 4 G/DL (ref 3.5–5.2)
ALP SERPL-CCNC: 77 U/L (ref 55–135)
ALT SERPL W/O P-5'-P-CCNC: 31 U/L (ref 10–44)
ANION GAP SERPL CALC-SCNC: 10 MMOL/L (ref 8–16)
AST SERPL-CCNC: 25 U/L (ref 10–40)
BASOPHILS # BLD AUTO: 0.07 K/UL (ref 0–0.2)
BASOPHILS NFR BLD: 0.5 % (ref 0–1.9)
BILIRUB SERPL-MCNC: 0.4 MG/DL (ref 0.1–1)
BUN SERPL-MCNC: 14 MG/DL (ref 6–20)
CALCIUM SERPL-MCNC: 9 MG/DL (ref 8.7–10.5)
CHLORIDE SERPL-SCNC: 105 MMOL/L (ref 95–110)
CO2 SERPL-SCNC: 24 MMOL/L (ref 23–29)
CREAT SERPL-MCNC: 0.8 MG/DL (ref 0.5–1.4)
CRP SERPL-MCNC: 7.1 MG/L (ref 0–8.2)
DIFFERENTIAL METHOD: ABNORMAL
EOSINOPHIL # BLD AUTO: 0.2 K/UL (ref 0–0.5)
EOSINOPHIL NFR BLD: 1.5 % (ref 0–8)
ERYTHROCYTE [DISTWIDTH] IN BLOOD BY AUTOMATED COUNT: 19.5 % (ref 11.5–14.5)
ERYTHROCYTE [SEDIMENTATION RATE] IN BLOOD BY WESTERGREN METHOD: 13 MM/HR (ref 0–36)
EST. GFR  (AFRICAN AMERICAN): >60 ML/MIN/1.73 M^2
EST. GFR  (NON AFRICAN AMERICAN): >60 ML/MIN/1.73 M^2
GLUCOSE SERPL-MCNC: 86 MG/DL (ref 70–110)
HCT VFR BLD AUTO: 31.6 % (ref 37–48.5)
HGB BLD-MCNC: 9.5 G/DL (ref 12–16)
IMM GRANULOCYTES # BLD AUTO: 0.07 K/UL (ref 0–0.04)
IMM GRANULOCYTES NFR BLD AUTO: 0.5 % (ref 0–0.5)
LYMPHOCYTES # BLD AUTO: 3.4 K/UL (ref 1–4.8)
LYMPHOCYTES NFR BLD: 24.9 % (ref 18–48)
MCH RBC QN AUTO: 22.1 PG (ref 27–31)
MCHC RBC AUTO-ENTMCNC: 30.1 G/DL (ref 32–36)
MCV RBC AUTO: 74 FL (ref 82–98)
MONOCYTES # BLD AUTO: 0.8 K/UL (ref 0.3–1)
MONOCYTES NFR BLD: 6 % (ref 4–15)
NEUTROPHILS # BLD AUTO: 9.1 K/UL (ref 1.8–7.7)
NEUTROPHILS NFR BLD: 66.6 % (ref 38–73)
NRBC BLD-RTO: 0 /100 WBC
PLATELET # BLD AUTO: 536 K/UL (ref 150–350)
PMV BLD AUTO: 8.2 FL (ref 9.2–12.9)
POTASSIUM SERPL-SCNC: 4.3 MMOL/L (ref 3.5–5.1)
PROT SERPL-MCNC: 7.3 G/DL (ref 6–8.4)
RBC # BLD AUTO: 4.29 M/UL (ref 4–5.4)
SODIUM SERPL-SCNC: 139 MMOL/L (ref 136–145)
URATE SERPL-MCNC: 6 MG/DL (ref 2.4–5.7)
WBC # BLD AUTO: 13.61 K/UL (ref 3.9–12.7)

## 2020-11-05 PROCEDURE — 85652 RBC SED RATE AUTOMATED: CPT

## 2020-11-05 PROCEDURE — U0003 INFECTIOUS AGENT DETECTION BY NUCLEIC ACID (DNA OR RNA); SEVERE ACUTE RESPIRATORY SYNDROME CORONAVIRUS 2 (SARS-COV-2) (CORONAVIRUS DISEASE [COVID-19]), AMPLIFIED PROBE TECHNIQUE, MAKING USE OF HIGH THROUGHPUT TECHNOLOGIES AS DESCRIBED BY CMS-2020-01-R: HCPCS

## 2020-11-05 PROCEDURE — 84550 ASSAY OF BLOOD/URIC ACID: CPT

## 2020-11-05 PROCEDURE — 36415 COLL VENOUS BLD VENIPUNCTURE: CPT

## 2020-11-05 PROCEDURE — 80053 COMPREHEN METABOLIC PANEL: CPT

## 2020-11-05 PROCEDURE — 86140 C-REACTIVE PROTEIN: CPT

## 2020-11-05 PROCEDURE — 85025 COMPLETE CBC W/AUTO DIFF WBC: CPT

## 2020-11-06 ENCOUNTER — PATIENT MESSAGE (OUTPATIENT)
Dept: RHEUMATOLOGY | Facility: CLINIC | Age: 40
End: 2020-11-06

## 2020-11-06 DIAGNOSIS — L40.59 POLYARTICULAR PSORIATIC ARTHRITIS: Primary | ICD-10-CM

## 2020-11-06 LAB — SARS-COV-2 RNA RESP QL NAA+PROBE: NOT DETECTED

## 2020-11-09 RX ORDER — METHYLPREDNISOLONE 4 MG/1
TABLET ORAL
Qty: 1 PACKAGE | Refills: 0 | Status: SHIPPED | OUTPATIENT
Start: 2020-11-09 | End: 2020-11-19

## 2020-11-11 ENCOUNTER — PROCEDURE VISIT (OUTPATIENT)
Dept: DERMATOLOGY | Facility: CLINIC | Age: 40
End: 2020-11-11
Payer: COMMERCIAL

## 2020-11-11 DIAGNOSIS — L91.8 SKIN TAG: ICD-10-CM

## 2020-11-11 DIAGNOSIS — L02.91 ABSCESS: Primary | ICD-10-CM

## 2020-11-11 PROCEDURE — 17110 PR DESTRUCTION BENIGN LESIONS UP TO 14: ICD-10-PCS | Mod: CSM,S$GLB,, | Performed by: STUDENT IN AN ORGANIZED HEALTH CARE EDUCATION/TRAINING PROGRAM

## 2020-11-11 PROCEDURE — 99499 NO LOS: ICD-10-PCS | Mod: ,,, | Performed by: STUDENT IN AN ORGANIZED HEALTH CARE EDUCATION/TRAINING PROGRAM

## 2020-11-11 PROCEDURE — 99499 UNLISTED E&M SERVICE: CPT | Mod: ,,, | Performed by: STUDENT IN AN ORGANIZED HEALTH CARE EDUCATION/TRAINING PROGRAM

## 2020-11-11 PROCEDURE — 17110 DESTRUCTION B9 LES UP TO 14: CPT | Mod: CSM,S$GLB,, | Performed by: STUDENT IN AN ORGANIZED HEALTH CARE EDUCATION/TRAINING PROGRAM

## 2020-11-11 RX ORDER — DOXYCYCLINE 100 MG/1
100 CAPSULE ORAL EVERY 12 HOURS
Qty: 14 CAPSULE | Refills: 0 | Status: SHIPPED | OUTPATIENT
Start: 2020-11-11 | End: 2020-11-19

## 2020-11-11 NOTE — PROGRESS NOTES
Verbal consent obtained. 10 lesions removed with scissor snip removal after anesthesia with 1% lidocaine with epinephrine. Hemostasis achieved with aluminum chloride and hyfrecation. No complications.    Patient with an inflamed cyst on the inner thigh; will treat with a short course of doxycycline.

## 2020-11-16 ENCOUNTER — SPECIALTY PHARMACY (OUTPATIENT)
Dept: PHARMACY | Facility: CLINIC | Age: 40
End: 2020-11-16

## 2020-11-16 DIAGNOSIS — L40.50 PSORIATIC ARTHRITIS: Primary | ICD-10-CM

## 2020-11-16 NOTE — TELEPHONE ENCOUNTER
Specialty Pharmacy - Clinical Reassessment  Specialty Pharmacy - Refill Coordination    Specialty Medication Orders Linked to Encounter      Most Recent Value   Medication #1  tofacitinib (XELJANZ XR) 11 mg Tb24 (Order#[id number], Rx#[id number])        Subjective    Janice Lawrence is a 40 y.o. female, who is followed by the specialty pharmacy service for management and education.    Recent Encounters     Date Type Provider Description    11/16/2020 Specialty Pharmacy Laxmi Thomas PharmD Follow-up Clinical Reassessment; Refill Coordination        Clinical call attempts since last clinical assessment   No call attempts found.     Today she received follow up education for her specialty medication(s).    Current Outpatient Medications   Medication Sig    betamethasone valerate 0.1% (VALISONE) 0.1 % Crea Apply topically 2 (two) times daily. Apply to the elbows.    celecoxib (CELEBREX) 200 MG capsule TAKE ONE CAPSULE BY MOUTH TWICE DAILY    doxycycline (VIBRAMYCIN) 100 MG Cap Take 1 capsule (100 mg total) by mouth every 12 (twelve) hours. for 7 days    ergocalciferol (ERGOCALCIFEROL) 50,000 unit Cap Take 1 capsule (50,000 Units total) by mouth every 7 days.    esomeprazole (NEXIUM) 40 MG capsule TAKE ONE CAPSULE BY MOUTH EVERY DAY BEFORE BREAKFAST    folic acid (FOLVITE) 1 MG tablet TAKE ONE TABLET BY MOUTH DAILY    ketoconazole (NIZORAL) 2 % cream Apply topically 2 (two) times daily.    methotrexate 25 mg/mL injection INJECT 1ML INTO THE MUSCLE EVERY SEVEN DAYS    methylPREDNISolone (MEDROL DOSEPACK) 4 mg tablet use as directed    sertraline (ZOLOFT) 100 MG tablet TAKE ONE TABLET BY MOUTH ONCE DAILY    triamcinolone acetonide 0.025% (KENALOG) 0.025 % cream AAA bid    tofacitinib (XELJANZ XR) 11 mg Tb24 Take 11 mg by mouth once daily.   Last reviewed on 11/16/2020  5:35 PM by Laxmi Thomas PharmD    Review of patient's allergies indicates:  No Known AllergiesLast reviewed on  11/16/2020 5:34  PM by Laxmi Thomas    Drug Interactions    Drug interactions evaluated: yes  Clinically relevant drug interactions identified: no  Provided the patient with educational material regarding drug interactions: not applicable       Medication Adherence    What concerns does the patient have in regards to their medications: Patient reported 0 missed Xeljanz doses. atient stated she is taking Doxycycline for acne on her thigh. She said she started Doxycycline last Friday and should finish this Friday 11/20. Patient is still continuing with Xeljanz while taking Doxycycline for now.  She stated she has finished her Medrol for flare up, but feels she is still flaring at this time. She said Xeljanz has significantly improved her hands, but over the last 2 months her foot has newly been in pain and she has noticed new psoriasis patches on her back.     Patient reported X missed doses in the last month: 0  Any gaps in refill history greater than 2 weeks in the last 3 months: no  Demonstrates understanding of importance of adherence: no  Informant: patient  Reliability of informant: reliable  Provider-estimated medication adherence level: good  Reasons for non-adherence: no problems identified  Confirmed plan for next specialty medication refill: not applicable  Refills needed for supportive medications: not needed       Adverse Effects    Pruritus: Pos  Arthralgias: Pos       Assessment Questions - Documented Responses      Most Recent Value   Assessment   Medication Reconciliation completed for patient  Yes   During the past 4 weeks, has patient missed any activities due to condition or medication?  Missed Planned Activities, Missed Work   Number of Days missed  4   During the past 4 weeks, did patient have any of the following urgent care visits?  None   Goals of Therapy Status  Partially achieving   Welcome packet contents reviewed and discussed with patient?  No   Assesment completed?  Yes   Plan  Therapy continued   Do  you need to open a clinical intervention (i-vent)?  No   Do you want to schedule first shipment?  No   Medication #1 Assessment Info   Patient status  Existing medication, Exisiting to [facility]   Is this medication appropriate for the patient?  Yes   Is this medication effective?  Yes        Refill Questions - Documented Responses      Most Recent Value   Relationship to patient of person spoken to?  Self   HIPAA/medical authority confirmed?  (!) No   Any changes in contact preferences or allowed representatives?  No   Has the patient had any insurance changes?  No   Has the patient had any changes to specialty medication, dose, or instructions?  No   Has the patient started taking any new medications, herbals, or supplements?  (!) Yes   Has the patient been diagnosed with any new medical conditions?  No   Does the patient have any new allergies to medications or foods?  No   Does the patient have any concerns about side effects?  No   Can the patient store medication/sharps container properly (at the correct temperature, away from children/pets, etc.)?  Yes   Can the patient call emergency services (911) in the event of an emergency?  Yes   Does the patient have any concerns or questions about taking or administering this medication as prescribed?  No   How many doses did the patient miss in the past 4 weeks or since the last fill?  0   How many doses does the patient have on hand?  6   How many days does the patient report on hand quantity will last?  6   Does the number of doses/days supply remaining match pharmacy expected amounts?  Yes   Does the patient feel that this medication is effective?  Yes   During the past 4 weeks, has patient missed any activities due to condition or medication?  Missed Planned Activities, Missed Work   Number of Days missed  4   During the past 4 weeks, did patient have any of the following urgent care visits?  None   How will the patient receive the medication?  Mail   When does the  "patient need to receive the medication?  11/18/20   Shipping Address  Home   Address in [facility] confirmed and updated if neccessary?  Yes   Expected Copay ($)  0   Is the patient able to afford the medication copay?  Yes   Payment Method  zero copay   Days supply of Refill  30   Would patient like to speak to a pharmacist?  Yes   Do you want to trigger an intervention?  Yes   Do you want to trigger an additional referral task?  No   Refill activity completed?  Yes   Refill activity plan  Refill scheduled          Objective    She has a past medical history of Acid reflux, Anxiety, Arthritis, and Depression.      BP Readings from Last 4 Encounters:   03/09/20 120/72   03/02/20 119/81   05/20/19 (!) 141/82   02/18/19 (!) 151/88     Ht Readings from Last 4 Encounters:   03/09/20 5' 11" (1.803 m)   03/02/20 5' 11" (1.803 m)   05/20/19 6' (1.829 m)   02/18/19 6' (1.829 m)     Wt Readings from Last 4 Encounters:   03/09/20 (!) 149.8 kg (330 lb 4 oz)   03/02/20 (!) 148.9 kg (328 lb 4.2 oz)   05/20/19 (!) 144.7 kg (319 lb 0.1 oz)   02/18/19 (!) 145.1 kg (319 lb 14.2 oz)     Recent Labs   Lab Result Units 11/05/20  1345 08/19/20  1245   Creatinine mg/dL 0.8 0.7   ALT U/L 31 19   AST U/L 25 18     The goals of prescribed drug therapy management include:  · Supporting patient to meet the prescriber's medical treatment objectives  · Improving or maintaining quality of life  · Maintaining optimal therapy adherence  · Minimizing and managing side effects      Goals of Therapy Status: Partially achieving    Assessment/Plan  Patient plans to continue therapy without changes      Indication, dosage, appropriateness, effectiveness, safety and convenience of her specialty medication(s) were reviewed today.     Patient Counseling    Counseled the patient on the following: doses and administration discussed, safe handling, storage, and disposal discussed, possible adverse effects and management discussed, possible drug and " prescription drug interactions discussed, therapeutic rationale discussed, adherence and missed doses discussed, pharmacy contact information discussed       Called patient on 11/16 for Xeljanz refill and follow up.     Med list was reviewed. No DDIs of concern upon review.   Patient stated she is taking Doxycycline for acne on her thigh. She said she started Doxycycline last Friday and should finish this Friday 11/20. Patient is still continuing with Xeljanz while taking Doxycycline for now.    She stated she has finished her Medrol for flare up, but feels PsAis still flaring at this time. She said Xeljanz has significantly improved her hands, but over the last 2 months her foot has newly been in pain and she has noticed new psoriasis patches on her back.   She rated pain lately 6-7/10. She stated she has psoriasis plaques on her elbows and scalp. She described skin as itchy, cracked, and bleeding. She said she is able to sleep through itching at least.     Next Rheum appt is 12/10. Messaged Rheum to inform patient is still flaring in case further advisement is necessary. Also mentioned patient reported starting Doxycycline for acne and stated she is still taking Xeljanz at this time.     Patient stated she has missed plans about once a week the last 3-4 weeks. She has not been to ER/UC per chart review. Patient reported no new allergies. She denies any side effects from Xeljanz. Patient had no further questions or concerns.     Platelets appear to have been elevated since 2016, per chart review. TB and Hep B negative 5/14/2020      Tasks added this encounter   No tasks added.   Tasks due within next 3 months   11/15/2020 - Clinical - Follow Up Assesement (90 day)  11/13/2020 - Refill Call     Laxmi Thomas PharmD  [facility]  [address] [place] [id number]  Phone: [phone]  Fax: [phone]

## 2020-11-17 ENCOUNTER — PATIENT MESSAGE (OUTPATIENT)
Dept: RHEUMATOLOGY | Facility: CLINIC | Age: 40
End: 2020-11-17

## 2020-11-17 ENCOUNTER — SPECIALTY PHARMACY (OUTPATIENT)
Dept: PHARMACY | Facility: CLINIC | Age: 40
End: 2020-11-17

## 2020-11-17 DIAGNOSIS — L40.0 PLAQUE PSORIASIS: Primary | ICD-10-CM

## 2020-11-17 DIAGNOSIS — L40.59 POLYARTICULAR PSORIATIC ARTHRITIS: ICD-10-CM

## 2020-11-17 RX ORDER — IXEKIZUMAB 80 MG/ML
80 INJECTION, SOLUTION SUBCUTANEOUS
Qty: 1 ML | Refills: 3 | Status: SHIPPED | OUTPATIENT
Start: 2020-11-17 | End: 2021-04-12

## 2020-11-17 RX ORDER — IXEKIZUMAB 80 MG/ML
INJECTION, SOLUTION SUBCUTANEOUS
Qty: 2 ML | Refills: 4 | Status: SHIPPED | OUTPATIENT
Start: 2020-11-17 | End: 2020-11-25

## 2020-11-17 NOTE — TELEPHONE ENCOUNTER
Switch to taltz. Please call specialty pharmacy to expedite the process. Also give patient their phone number to check with them in a couple days. Thanks.

## 2020-11-17 NOTE — TELEPHONE ENCOUNTER
Prior Authorization is required for Taltz.     Called patient's plan on 11/17 (P: [phone]) to submit PA request for Taltz (LD and maintenance) - Spoke to Rep Nolasco.     Prior Authorization submitted 11/17 -   PA for Taltz approved.  Case ID: [id number]  Coverage dates 11/17/2020 - 11/17/2021  $0 copay     Approval paperwork to be faxed over today. Alerted BI team to complete BI.

## 2020-11-17 NOTE — TELEPHONE ENCOUNTER
Called patient to inform Xeljanz XR was discontinued by Rheum and new prescription sent for Taltz.   Xeljanz refill previously arranged was discontinued as a result. She voiced understanding. Pt reported about 5 tablets of Xeljanz on hand. Patient had no further questions or concerns.     Messaged Rheum to request Xeljanz Rx refill to bridge time for Taltz to be approved, if appropriate.    Prior Authorization is required for Taltz.     Taltz dose (LD: 160 mg once, followed by 80 mg at weeks 2, 4, 6, 8, 10, and 12. 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 Patient also has PsA.   Tried/failed: Xeljanz, methotrexate, Enbrel, Humira, Otezla, Cosentyx, Stelara  TB and Hep B negative 5/14/2020.

## 2020-11-19 ENCOUNTER — SPECIALTY PHARMACY (OUTPATIENT)
Dept: PHARMACY | Facility: CLINIC | Age: 40
End: 2020-11-19

## 2020-11-19 DIAGNOSIS — L40.50 PSORIATIC ARTHRITIS: Primary | ICD-10-CM

## 2020-11-19 NOTE — TELEPHONE ENCOUNTER
Despite being informed by plan the PA was approved, the Rina LD was rejecting due to qty limits. Called patient's plan on 11/19 (P: [phone]) regarding rejection of LD and spoke with Rep Care. She stated the plan only approved 1ml/21 days and stated she would ransfer call to Benefits Review. Call was transferred to [facility] Reggie (P: [phone]). Rep submitted appeal over the phone for Taltz LD coverage on 11/19 and stated appeal denied.   She stated Taltz Level 1 appeal (Case ID: [id number], Fax: [phone]) is required. She stated Urgent submission turn around time is 24-72 hours.

## 2020-11-19 NOTE — TELEPHONE ENCOUNTER
Taltz initial consult completed 11/19. Initial fill pending insurance determination. Tom appeal over the phone denied. Urgent Level 1 appeal submitted 11/19 via fax to fax# [phone] and pending insurance determination.

## 2020-11-19 NOTE — TELEPHONE ENCOUNTER
Specialty Pharmacy - Initial Clinical Assessment     Taltz appeal now needed for LD - pending initial fill until approved.  Informed pt that the insurance reported PA for Taltz was approved, but had qty limit (1ml/21 days). Reviewed that Level 1 appeal is now needed, and this will be submitted today. She voiced understanding. [facility] refraining from arranging Taltz initial fill until covered.   Taltz initial consult was still reviewed with the patient, per pt request.      [facility] will submit Urgent appeal today and will be in touch with the patient regarding Taltz initial fill, depending on insurance determination.    Specialty Medication Orders Linked to Encounter      Most Recent Value   Medication #1  ixekizumab (TALTZ AUTOINJECTOR) 80 mg/mL AtIn (Order#[id number], Rx#[id number])   Medication #2  ixekizumab (TALTZ AUTOINJECTOR) 80 mg/mL AtIn (Order#[id number], Rx#[id number])        Subjective    Janice Lawrence is a 40 y.o. female, who is followed by the specialty pharmacy service for management and education.    Recent Encounters     Date Type Provider Description    11/19/2020 Specialty Pharmacy Laxmi Thomas PharmD Initial Clinical Assessment    11/17/2020 Specialty Pharmacy Laxmi Thomas PharmD Clinical Intervention; Referral Authorization    11/16/2020 Specialty Pharmacy Laxmi Thomas PharmD Follow-up Clinical Reassessment; Refill Coordination        Clinical call attempts since last clinical assessment   No call attempts found.     Today she received education before her first fill with [facility].    Current Outpatient Medications   Medication Sig    betamethasone valerate 0.1% (VALISONE) 0.1 % Crea Apply topically 2 (two) times daily. Apply to the elbows.    celecoxib (CELEBREX) 200 MG capsule TAKE ONE CAPSULE BY MOUTH TWICE DAILY    doxycycline (VIBRAMYCIN) 100 MG Cap Take 1 capsule (100 mg total) by mouth every 12 (twelve) hours. for 7 days    ergocalciferol  (ERGOCALCIFEROL) 50,000 unit Cap Take 1 capsule (50,000 Units total) by mouth every 7 days.    esomeprazole (NEXIUM) 40 MG capsule TAKE ONE CAPSULE BY MOUTH EVERY DAY BEFORE BREAKFAST    folic acid (FOLVITE) 1 MG tablet TAKE ONE TABLET BY MOUTH DAILY    ixekizumab (TALTZ AUTOINJECTOR) 80 mg/mL AtIn Inject 80 mg into the skin every 28 days.    ixekizumab (TALTZ AUTOINJECTOR) 80 mg/mL AtIn 160 mg once, followed by 80 mg at weeks 2, 4, 6, 8, 10, and 12.    ketoconazole (NIZORAL) 2 % cream Apply topically 2 (two) times daily.    methotrexate 25 mg/mL injection INJECT 1ML INTO THE MUSCLE EVERY SEVEN DAYS    sertraline (ZOLOFT) 100 MG tablet TAKE ONE TABLET BY MOUTH ONCE DAILY    triamcinolone acetonide 0.025% (KENALOG) 0.025 % cream AAA bid    methylPREDNISolone (MEDROL DOSEPACK) 4 mg tablet use as directed   Last reviewed on 11/16/2020  5:35 PM by Laxmi Thomas, [place]    Review of patient's allergies indicates:  No Known AllergiesLast reviewed on  11/19/2020 8:12 AM by Laxmi Thomas    Drug Interactions    Drug interactions evaluated: yes  Clinically relevant drug interactions identified: no  Provided the patient with educational material regarding drug interactions: not applicable         Adverse Effects    Pruritus: Pos  Arthralgias: Pos  Joint swelling: Pos       Assessment Questions - Documented Responses      Most Recent Value   Assessment   Medication Reconciliation completed for patient  Yes   During the past 4 weeks, has patient missed any activities due to condition or medication?  Missed Work   Number of Days missed  4   During the past 4 weeks, did patient have any of the following urgent care visits?  None   Goals of Therapy Status  Discussed (new start)   Welcome packet contents reviewed and discussed with patient?  No   Assesment completed?  Yes   Plan  Therapy being initiated   Do you want to schedule first shipment?  Yes   Medication #1 Assessment Info   Patient status  New medication, New  "to [facility]   Is this medication appropriate for the patient?  Yes   Is this medication effective?  Yes          Objective    She has a past medical history of Acid reflux, Anxiety, Arthritis, and Depression.    Tried/failed medications: Xeljanz, methotrexate, Enbrel, Humira, Otezla, Cosentyx, Stelara    BP Readings from Last 4 Encounters:   03/09/20 120/72   03/02/20 119/81   05/20/19 (!) 141/82   02/18/19 (!) 151/88     Ht Readings from Last 4 Encounters:   03/09/20 5' 11" (1.803 m)   03/02/20 5' 11" (1.803 m)   05/20/19 6' (1.829 m)   02/18/19 6' (1.829 m)     Wt Readings from Last 4 Encounters:   03/09/20 (!) 149.8 kg (330 lb 4 oz)   03/02/20 (!) 148.9 kg (328 lb 4.2 oz)   05/20/19 (!) 144.7 kg (319 lb 0.1 oz)   02/18/19 (!) 145.1 kg (319 lb 14.2 oz)     Recent Labs   Lab Result Units 11/05/20  1345   Creatinine mg/dL 0.8   ALT U/L 31   AST U/L 25     The goals of prescribed drug therapy management include:  · Supporting patient to meet the prescriber's medical treatment objectives  · Improving or maintaining quality of life  · Maintaining optimal therapy adherence  · Minimizing and managing side effects      Goals of Therapy Status: Discussed (new start)    Assessment/Plan  Patient called [facility] for initial consult. Reviewed that Taltz maintenance dose is approved for $0 copay but [facility] will need to contact insurance regarding why LD claim rejecitng. Reviewed Taltz initial consult with patient since now was best for patient. Called insurance to review rejection and was informed Level 1 appeal is needed for Taltz due to qty limits with LD. Called patient to inform and she voiced understanding. Holding Taltz initial fill and proceeding with Level 1 appeal.     Indication, dosage, appropriateness, effectiveness, safety and convenience of her specialty medication(s) were reviewed today.     Patient Counseling    Counseled the patient on the following: doses and administration discussed, safe handling, storage, and disposal " "discussed, possible adverse effects and management discussed, possible drug and prescription drug interactions discussed, lab monitoring and follow-up discussed, therapeutic rationale discussed, cost of medications and cost implications discussed, adherence and missed doses discussed, pharmacy contact information discussed       Spoke with patient on 11/19 for Taltz initial consult. She stated she is experienced to injections, but she has "mental block" with injecting herself now. She stated her mom and friend will likely be injecting for her. She stated her PsA is flaring. She stated she cannot walk on her right foot. Patient rated her pain 7/10. She stated pain involves her toes and soles of feet. She stated she cannot bend her toes. Patient reported swelling of toes as well. She said she has plaques on elbows and scalp, and she reported 5 new spots on her back. She described skin as cracked, bleeding, and itching. She stated pain and itching has disrupted sleep. She said she is a  and has missed work about once a week. She has not been to ER/UC in the last month per chart review.     Medication list was reviewed. Patient stated she will complete Doxycycline tomorrow. Pt stated she completed Medrol. She said she takes MTX on Mondays and FA daily. No DDIs of concern upon review. She reported no known allergies. No ER/UC visits in the last month, per chart review.     Reviewed Taltz dosing:  Taltz LD: Inject 160 mg once, followed by 80 mg at weeks 2, 4, 6, 8, 10, and 12.  Taltz Maintenance: Inject 80 mg into the skin every 28 days.    Reviewed Taltz storage: Store in fridge and doses stable up to 5 days at RT.     Reviewed Taltz administration:  - Take out of fridge for 30 min   - Monthly RX will come with gauze, bandaids, and alcohol swabs.  - may inject in either the tops of the thighs, abdomen- but at least 2 inches away from her belly button, or the outer part of her upper arm.    - Rotate injections " sites.  - Do not give an injection in an area of the skin that is tender, bruised, red or hard, or in an area of skin that is affected by psoriasis.    - Patient is to wipe down the injection site with the alcohol pad, wait to dry.     - Make sure the device is in the lock position (at the top of the device by the button)  - Twist off the base cap in the direction of the arrows.  - Place the pen flat and firmly against the injection site. Then unlock the lock ring (by turning it to the unlock position),   - then push down on the green injection button-you will hear a loud click--button and hold for 10-15 seconds, until youve heard the second click.   - Sharps    Reviewed side effects and when to report to [facility]- injection site rxns, nausea, infection risks and precautions, fungal infxns, TB risk, blood abnormalities and signs, anaphylaxis precautions, signs of IBD.     Advised pt to avoid live vaccines. Emphasized importance of labs and appts. Advised use of calendar or phone alarms to help with adherence.     Patient voiced understanding.     Will submit Level 1 appeal for Taltz and follow up with patient with update regarding insurance determination. Messaged Rheum to inform.   Tasks added this encounter   No tasks added.   Tasks due within next 3 months   11/17/2020 - Clinical - Initial Assessment     Laxmi Thomas PharmD  [facility]  [address] [place] [id number]  Phone: [phone]  Fax: [phone]

## 2020-11-20 ENCOUNTER — TELEPHONE (OUTPATIENT)
Dept: RHEUMATOLOGY | Facility: CLINIC | Age: 40
End: 2020-11-20

## 2020-11-20 NOTE — TELEPHONE ENCOUNTER
----- Message from Blake Kruse LPN sent at 11/19/2020  9:53 AM CST -----  Regarding: RE: Taltz LD not covered  Thank you.   ----- Message -----  From: Laxmi Thomas PharmD  Sent: 11/19/2020   9:11 AM CST  To: Saul Cui MD, #  Subject: Taltz LD not covered                             Good afternoon Dr. Cui and staff,    Despite being informed by plan that the PA was approved, the Tatlz LD PA was denied due to qty limits.  Rep submitted an appeal over the phone for Taltz LD coverage this morning, and stated appeal denied.   She stated Taltz Level 1 appeal is required - turn around time is 24-72 hours.    Patient had called for initial consult this morning so drug has been reviewed with the patient. Patient is aware appeal is required. [facility] will submit Urgent appeal today and will be in touch with the patient regarding insurance determination. I just wanted to update you. Thank you    Best,    Laxmi Thomas, PharmD  Clinical Pharmacist  [facility]  [phone]

## 2020-11-23 NOTE — TELEPHONE ENCOUNTER
Called patient's plan and spoke with Rep on 11/23 (P: [phone]) for status check on Taltz Level 1 appeal and to review if Cosentyx (alternative IL-17 inhibitor) would be covered.    Rep stated Cosentyx is covered but requires PA. She informed Taltz LD for PsO Level 1 appeal was denied on 11/19 and stated denial paperwork would be faxed over.     Messaged Rheum to inform and suggested sending order for Taltz LD (160 mg once, followed by 80 mg every 4 weeks) to treat PsA instead of PsO dosing.

## 2020-11-25 ENCOUNTER — PATIENT MESSAGE (OUTPATIENT)
Dept: RHEUMATOLOGY | Facility: CLINIC | Age: 40
End: 2020-11-25

## 2020-11-25 ENCOUNTER — SPECIALTY PHARMACY (OUTPATIENT)
Dept: PHARMACY | Facility: CLINIC | Age: 40
End: 2020-11-25

## 2020-11-25 DIAGNOSIS — M25.50 ACUTE JOINT PAIN: ICD-10-CM

## 2020-11-25 DIAGNOSIS — L40.50 PSORIATIC ARTHRITIS: ICD-10-CM

## 2020-11-25 DIAGNOSIS — L40.59 POLYARTICULAR PSORIATIC ARTHRITIS: ICD-10-CM

## 2020-11-25 DIAGNOSIS — L40.0 PLAQUE PSORIASIS: Primary | ICD-10-CM

## 2020-11-25 RX ORDER — TRAMADOL HYDROCHLORIDE 50 MG/1
50 TABLET ORAL 4 TIMES DAILY PRN
Qty: 28 TABLET | Refills: 0 | Status: SHIPPED | OUTPATIENT
Start: 2020-11-25 | End: 2021-04-20

## 2020-11-25 RX ORDER — IXEKIZUMAB 80 MG/ML
INJECTION, SOLUTION SUBCUTANEOUS
Qty: 8 ML | Refills: 0 | Status: SHIPPED | OUTPATIENT
Start: 2020-11-25 | End: 2021-02-25

## 2020-11-26 NOTE — TELEPHONE ENCOUNTER
"Level 1 appeal for Taltz LD was denied because the wrong form was submitted.      On 11/17 plan had informed Taltz was approved over the phone. On 11/19 plan informed only maintenance dose was approved, not the loading dose (due to qty limits of 1ml/21 days). Appeal was then submitted over the phone and denied. Rep informed Level 1 appeal option was available, stating I may fax appeal to [phone] and only mentioned need to note "Level 1 Appeal" with Case ID and patient information - No mention of a form. Level 1 appeal was submitted on 11/19 following Rep's directions. Now plan states Level 1 appeal is denied because "wrong form" submitted.    Called plan (P: [phone]) on 11/25 to review Level 1 appeal denial, to file a grievance. Rep tried to transfer call to discuss further and notified department is closed today. [facility] will attempt again Friday morning 11/27 (since closed 11/26).  "

## 2020-11-27 ENCOUNTER — PATIENT MESSAGE (OUTPATIENT)
Dept: RHEUMATOLOGY | Facility: CLINIC | Age: 40
End: 2020-11-27

## 2020-11-27 NOTE — TELEPHONE ENCOUNTER
Called plan (P: [phone]) on 11/27 to review Level 1 appeal denial. Rep Ewa stated that PA will need to be submitted again for Taltz LD using [employer] form - plan had not mentioned need for this over the phone until now. Maintenance dose still approved.    Submitted Prior Authorization request for Taltz LD on 11/27 via CMM using [employer] [place] form (Key: [id number]).

## 2020-11-27 NOTE — TELEPHONE ENCOUNTER
MD Christy Coleman MA; Milad Munguia Staff; Nolvia Carver PA-C; Kasandra Willingham PA-C 2 days ago     I was informed both Ms. Mehul and MsMarco Antonio Maulik are covering Dr. Barrett while he is out this week.  Thank you.    Message text

## 2020-12-03 ENCOUNTER — SPECIALTY PHARMACY (OUTPATIENT)
Dept: PHARMACY | Facility: CLINIC | Age: 40
End: 2020-12-03

## 2020-12-03 DIAGNOSIS — L40.50 PSORIATIC ARTHRITIS: Primary | ICD-10-CM

## 2020-12-03 NOTE — TELEPHONE ENCOUNTER
Additional information for Taltz Prior Authorization submitted to plan on 12/3 via [facility] (Key: [id number])     Prior Authorization approved on 12/3 for Taltz maintenance dose only.   Case ID: [id number]  Loading dose: QTY limit 1mL/21 days - Cost exceeds maximum  Start Date:12/03/2020;Coverage End Date:12/03/2021

## 2020-12-03 NOTE — TELEPHONE ENCOUNTER
Called plan on 12/3 (P: [phone]) regarding Cost exceeds maximum rejection and spoke with Rep Ronald. Rejection was resolved with Rep.     Taltz Loading dose and Taltz Maintenance dose approved   Effective dates: 12/3/2020 - 12/3/2021.   PA ID# [id number]

## 2020-12-03 NOTE — TELEPHONE ENCOUNTER
Specialty Pharmacy - Initial Clinical Assessment    Reached patient on 12/3 regarding Taltz initial fill -  Initial consult was completed on 11/19.   Patient stated she obtained Taltz samples (four 80mg pens).     She said she started Taltz LD on Thursday 11/26, injecting 160 mg. She reported 2 pens remaining, to cover week 2 and week 4 doses.     If patient stays on schedule - Loading dose schedule is as follows:  Week 2 dose: Thursday 12/10  Week 4 dose: Thursday 12/24  Week 6 Dose: Thursday 1/7  Week 8 Dose: Thursday 1/21  Week 10 dose: Thursday 2/4  Week 12 dose: Thursday 2/18       Patient has 2 pens on hand, but requested Taltz LD fill today.Patient stated she is comfortable with the injection process. She stated her mother injected for her. She reported no side effects or injection site rxns. Patient stated she has not started any new medications since initial consult. She stated she has not developed any new allergies.     Patient had no further questions or concerns        Specialty Medication Orders Linked to Encounter      Most Recent Value   Medication #1  ixekizumab (TALTZ AUTOINJECTOR, 2 PACK,) 80 mg/mL AtIn (Order#[id number], Rx#[id number])          Refill Questions - Documented Responses      Most Recent Value   Relationship to patient of person spoken to?  Self   HIPAA/medical authority confirmed?  Yes   Can the patient store medication/sharps container properly (at the correct temperature, away from children/pets, etc.)?  Yes   Does the patient have any concerns or questions about taking or administering this medication as prescribed?  No   How many doses does the patient have on hand?  2 [alyx reported 2 sample pens but requested to proceed with Taltz LD fill]   How will the patient receive the medication?  Mail   When does the patient need to receive the medication?  12/10/20   Shipping Address  Home   Address in [facility] confirmed and updated if neccessary?  Yes   Expected Copay ($)   0   Is the patient able to afford the medication copay?  Yes   Payment Method  zero copay   Days supply of Refill  28   Refill activity completed?  Yes   Refill activity plan  Refill scheduled   Shipment/Pickup Date:  12/03/20          Current Outpatient Medications   Medication Sig    betamethasone valerate 0.1% (VALISONE) 0.1 % Crea Apply topically 2 (two) times daily. Apply to the elbows.    celecoxib (CELEBREX) 200 MG capsule TAKE ONE CAPSULE BY MOUTH TWICE DAILY    ergocalciferol (ERGOCALCIFEROL) 50,000 unit Cap Take 1 capsule (50,000 Units total) by mouth every 7 days.    esomeprazole (NEXIUM) 40 MG capsule TAKE ONE CAPSULE BY MOUTH EVERY DAY BEFORE BREAKFAST    folic acid (FOLVITE) 1 MG tablet TAKE ONE TABLET BY MOUTH DAILY    ixekizumab (TALTZ AUTOINJECTOR) 80 mg/mL AtIn Inject 80 mg into the skin every 28 days.    ketoconazole (NIZORAL) 2 % cream Apply topically 2 (two) times daily.    methotrexate 25 mg/mL injection INJECT 1ML INTO THE MUSCLE EVERY SEVEN DAYS    sertraline (ZOLOFT) 100 MG tablet TAKE ONE TABLET BY MOUTH ONCE DAILY    ixekizumab (TALTZ AUTOINJECTOR, 2 PACK,) 80 mg/mL AtIn Inject 160 mg on day 0 then  80 mg every 2 weeks for 3 months    traMADoL (ULTRAM) 50 mg tablet Take 1 tablet (50 mg total) by mouth 4 (four) times daily as needed for Pain.    triamcinolone acetonide 0.025% (KENALOG) 0.025 % cream AAA bid   Last reviewed on 12/3/2020 12:56 PM by Laxmi Thomas PharmD    Review of patient's allergies indicates:  No Known Allergies Last reviewed on  12/3/2020 12:56 PM by Laxmi Thomas      Tasks added this encounter   No tasks added.   Tasks due within next 3 months   11/20/2020 - Clinical - Initial Assessment     Laxmi Thomas PharmD  [facility]  [address] [place] [id number]  Phone: [phone]  Fax: [phone]

## 2020-12-10 ENCOUNTER — OFFICE VISIT (OUTPATIENT)
Dept: RHEUMATOLOGY | Facility: CLINIC | Age: 40
End: 2020-12-10
Payer: COMMERCIAL

## 2020-12-10 ENCOUNTER — LAB VISIT (OUTPATIENT)
Dept: LAB | Facility: HOSPITAL | Age: 40
End: 2020-12-10
Attending: INTERNAL MEDICINE
Payer: COMMERCIAL

## 2020-12-10 VITALS
DIASTOLIC BLOOD PRESSURE: 72 MMHG | BODY MASS INDEX: 41.02 KG/M2 | WEIGHT: 293 LBS | HEIGHT: 71 IN | SYSTOLIC BLOOD PRESSURE: 139 MMHG | HEART RATE: 83 BPM

## 2020-12-10 DIAGNOSIS — E66.01 MORBID OBESITY WITH BMI OF 40.0-44.9, ADULT: ICD-10-CM

## 2020-12-10 DIAGNOSIS — E55.9 VITAMIN D DEFICIENCY: ICD-10-CM

## 2020-12-10 DIAGNOSIS — L40.0 PLAQUE PSORIASIS: ICD-10-CM

## 2020-12-10 DIAGNOSIS — D50.8 ACQUIRED IRON DEFICIENCY ANEMIA DUE TO DECREASED ABSORPTION: ICD-10-CM

## 2020-12-10 DIAGNOSIS — Z79.899 HIGH RISK MEDICATION USE: ICD-10-CM

## 2020-12-10 DIAGNOSIS — L40.59 POLYARTICULAR PSORIATIC ARTHRITIS: ICD-10-CM

## 2020-12-10 DIAGNOSIS — D84.9 IMMUNOSUPPRESSED STATUS: ICD-10-CM

## 2020-12-10 DIAGNOSIS — L40.50 PSORIATIC ARTHRITIS: Primary | ICD-10-CM

## 2020-12-10 LAB
ALBUMIN SERPL BCP-MCNC: 4.2 G/DL (ref 3.5–5.2)
ALP SERPL-CCNC: 85 U/L (ref 55–135)
ALT SERPL W/O P-5'-P-CCNC: 33 U/L (ref 10–44)
ANION GAP SERPL CALC-SCNC: 8 MMOL/L (ref 8–16)
AST SERPL-CCNC: 24 U/L (ref 10–40)
BASOPHILS # BLD AUTO: 0.05 K/UL (ref 0–0.2)
BASOPHILS NFR BLD: 0.4 % (ref 0–1.9)
BILIRUB SERPL-MCNC: 0.4 MG/DL (ref 0.1–1)
BUN SERPL-MCNC: 14 MG/DL (ref 6–20)
CALCIUM SERPL-MCNC: 9.1 MG/DL (ref 8.7–10.5)
CHLORIDE SERPL-SCNC: 106 MMOL/L (ref 95–110)
CO2 SERPL-SCNC: 24 MMOL/L (ref 23–29)
CREAT SERPL-MCNC: 0.9 MG/DL (ref 0.5–1.4)
CRP SERPL-MCNC: 9.5 MG/L (ref 0–8.2)
DIFFERENTIAL METHOD: ABNORMAL
EOSINOPHIL # BLD AUTO: 0.5 K/UL (ref 0–0.5)
EOSINOPHIL NFR BLD: 3.9 % (ref 0–8)
ERYTHROCYTE [DISTWIDTH] IN BLOOD BY AUTOMATED COUNT: 19.5 % (ref 11.5–14.5)
ERYTHROCYTE [SEDIMENTATION RATE] IN BLOOD BY WESTERGREN METHOD: 34 MM/HR (ref 0–36)
EST. GFR  (AFRICAN AMERICAN): >60 ML/MIN/1.73 M^2
EST. GFR  (NON AFRICAN AMERICAN): >60 ML/MIN/1.73 M^2
GLUCOSE SERPL-MCNC: 82 MG/DL (ref 70–110)
HCT VFR BLD AUTO: 31.8 % (ref 37–48.5)
HGB BLD-MCNC: 9.4 G/DL (ref 12–16)
IMM GRANULOCYTES # BLD AUTO: 0.05 K/UL (ref 0–0.04)
IMM GRANULOCYTES NFR BLD AUTO: 0.4 % (ref 0–0.5)
LYMPHOCYTES # BLD AUTO: 2.9 K/UL (ref 1–4.8)
LYMPHOCYTES NFR BLD: 24.4 % (ref 18–48)
MCH RBC QN AUTO: 21.7 PG (ref 27–31)
MCHC RBC AUTO-ENTMCNC: 29.6 G/DL (ref 32–36)
MCV RBC AUTO: 73 FL (ref 82–98)
MONOCYTES # BLD AUTO: 0.6 K/UL (ref 0.3–1)
MONOCYTES NFR BLD: 5.3 % (ref 4–15)
NEUTROPHILS # BLD AUTO: 7.9 K/UL (ref 1.8–7.7)
NEUTROPHILS NFR BLD: 65.6 % (ref 38–73)
NRBC BLD-RTO: 0 /100 WBC
PLATELET # BLD AUTO: 537 K/UL (ref 150–350)
PMV BLD AUTO: 8.1 FL (ref 9.2–12.9)
POTASSIUM SERPL-SCNC: 4.2 MMOL/L (ref 3.5–5.1)
PROT SERPL-MCNC: 7.6 G/DL (ref 6–8.4)
RBC # BLD AUTO: 4.34 M/UL (ref 4–5.4)
SODIUM SERPL-SCNC: 138 MMOL/L (ref 136–145)
WBC # BLD AUTO: 11.99 K/UL (ref 3.9–12.7)

## 2020-12-10 PROCEDURE — 99215 PR OFFICE/OUTPT VISIT, EST, LEVL V, 40-54 MIN: ICD-10-PCS | Mod: S$GLB,,, | Performed by: INTERNAL MEDICINE

## 2020-12-10 PROCEDURE — 3008F BODY MASS INDEX DOCD: CPT | Mod: CPTII,S$GLB,, | Performed by: INTERNAL MEDICINE

## 2020-12-10 PROCEDURE — 36415 COLL VENOUS BLD VENIPUNCTURE: CPT

## 2020-12-10 PROCEDURE — 82306 VITAMIN D 25 HYDROXY: CPT

## 2020-12-10 PROCEDURE — 1125F PR PAIN SEVERITY QUANTIFIED, PAIN PRESENT: ICD-10-PCS | Mod: S$GLB,,, | Performed by: INTERNAL MEDICINE

## 2020-12-10 PROCEDURE — 3008F PR BODY MASS INDEX (BMI) DOCUMENTED: ICD-10-PCS | Mod: CPTII,S$GLB,, | Performed by: INTERNAL MEDICINE

## 2020-12-10 PROCEDURE — 1125F AMNT PAIN NOTED PAIN PRSNT: CPT | Mod: S$GLB,,, | Performed by: INTERNAL MEDICINE

## 2020-12-10 PROCEDURE — 86140 C-REACTIVE PROTEIN: CPT

## 2020-12-10 PROCEDURE — 80053 COMPREHEN METABOLIC PANEL: CPT

## 2020-12-10 PROCEDURE — 99999 PR PBB SHADOW E&M-EST. PATIENT-LVL IV: ICD-10-PCS | Mod: PBBFAC,,, | Performed by: INTERNAL MEDICINE

## 2020-12-10 PROCEDURE — 85652 RBC SED RATE AUTOMATED: CPT

## 2020-12-10 PROCEDURE — 85025 COMPLETE CBC W/AUTO DIFF WBC: CPT

## 2020-12-10 PROCEDURE — 99999 PR PBB SHADOW E&M-EST. PATIENT-LVL IV: CPT | Mod: PBBFAC,,, | Performed by: INTERNAL MEDICINE

## 2020-12-10 PROCEDURE — 99215 OFFICE O/P EST HI 40 MIN: CPT | Mod: S$GLB,,, | Performed by: INTERNAL MEDICINE

## 2020-12-10 NOTE — PROGRESS NOTES
RHEUMATOLOGY CLINIC FOLLOW UP VISIT  Chief complaints:-  To follow up for PsA, PsO.     HPI:-  Janice Nova a 40 y.o. pleasant female comes in for a follow up visit.  SHE HAS TREATMENT RESISTANT PSORIASIS AND PSORIATIC ARTHRITIS. She was on Stelara recently and started having flares including skin and joints.  She was recently started on Taltz.  Within a couple of doses she has noted significant improvement of her skin and is noticing improvement in her joints.  Prolonged morning stiffness present with swelling.  She has also started having severe case of inverse psoriasis and have also started noticing unusual hives in her forearm without any clear allergen exposure.  There all subsiding at this time.    Review of Systems   Constitutional: Positive for malaise/fatigue. Negative for chills and fever.   HENT: Negative for congestion and sore throat.    Eyes: Negative for blurred vision and redness.   Respiratory: Negative for cough and shortness of breath.    Cardiovascular: Negative for chest pain and leg swelling.   Gastrointestinal: Negative for abdominal pain.   Genitourinary: Negative for dysuria.   Musculoskeletal: Positive for back pain, joint pain, myalgias and neck pain. Negative for falls.   Skin: Positive for itching and rash.   Neurological: Negative for headaches.   Endo/Heme/Allergies: Does not bruise/bleed easily.   Psychiatric/Behavioral: Negative for memory loss. The patient does not have insomnia.        Past Medical History:   Diagnosis Date    Acid reflux     Anxiety     Arthritis     Depression        Past Surgical History:   Procedure Laterality Date    COSMETIC SURGERY          Social History     Tobacco Use    Smoking status: Former Smoker     Packs/day: 0.50     Years: 10.00     Pack years: 5.00     Types: Cigarettes    Smokeless tobacco: Never Used   Substance Use Topics    Alcohol use: Yes     Alcohol/week: 3.0  "standard drinks     Types: 3 Glasses of wine per week    Drug use: Yes     Frequency: 2.0 times per week     Types: Marijuana       Family History   Problem Relation Age of Onset    Cancer Mother     Breast cancer Mother     Diabetes Mellitus Maternal Grandfather     Cancer Paternal Grandmother     Psoriasis Father     Arthritis Father     Colon cancer Paternal Aunt     Ovarian cancer Neg Hx     Thrombophilia Neg Hx        Review of patient's allergies indicates:  No Known Allergies    Vitals:    12/10/20 1504   BP: 139/72   Pulse: 83   Weight: (!) 153 kg (337 lb 4.9 oz)   Height: 5' 11" (1.803 m)   PainSc:   3   PainLoc: Foot       Physical Exam   Constitutional: She is oriented to person, place, and time and well-developed, well-nourished, and in no distress. No distress.   HENT:   Head: Normocephalic.   Mouth/Throat: Oropharynx is clear and moist.   Eyes: Pupils are equal, round, and reactive to light. Conjunctivae and EOM are normal.   Neck: Normal range of motion. Neck supple.   Cardiovascular: Normal rate and intact distal pulses.   Pulmonary/Chest: Effort normal. No respiratory distress.   Abdominal: Soft. There is no abdominal tenderness.   Musculoskeletal:      Comments: Significant improvement of dactylitis. No effusion. Multiple enthesitis.    Neurological: She is alert and oriented to person, place, and time. No cranial nerve deficit.   Skin: Skin is warm. No rash noted. No erythema.   Psychiatric: Mood and affect normal.   Nursing note and vitals reviewed.      Medication List with Changes/Refills   Current Medications    BETAMETHASONE VALERATE 0.1% (VALISONE) 0.1 % CREA    Apply topically 2 (two) times daily. Apply to the elbows.    CELECOXIB (CELEBREX) 200 MG CAPSULE    TAKE ONE CAPSULE BY MOUTH TWICE DAILY    ERGOCALCIFEROL (ERGOCALCIFEROL) 50,000 UNIT CAP    Take 1 capsule (50,000 Units total) by mouth every 7 days.    ESOMEPRAZOLE (NEXIUM) 40 MG CAPSULE    TAKE ONE CAPSULE BY MOUTH EVERY " DAY BEFORE BREAKFAST    FOLIC ACID (FOLVITE) 1 MG TABLET    TAKE ONE TABLET BY MOUTH DAILY    IXEKIZUMAB (TALTZ AUTOINJECTOR) 80 MG/ML ATIN    Inject 80 mg into the skin every 28 days.    IXEKIZUMAB (TALTZ AUTOINJECTOR, 2 PACK,) 80 MG/ML ATIN    Inject 160 mg on day 0 then  80 mg every 2 weeks for 3 months    KETOCONAZOLE (NIZORAL) 2 % CREAM    Apply topically 2 (two) times daily.    METHOTREXATE 25 MG/ML INJECTION    INJECT 1ML INTO THE MUSCLE EVERY SEVEN DAYS    SERTRALINE (ZOLOFT) 100 MG TABLET    TAKE ONE TABLET BY MOUTH ONCE DAILY    TRAMADOL (ULTRAM) 50 MG TABLET    Take 1 tablet (50 mg total) by mouth 4 (four) times daily as needed for Pain.    TRIAMCINOLONE ACETONIDE 0.025% (KENALOG) 0.025 % CREAM    AAA bid       Assessment/Plans:-  1. Psoriatic arthritis    2. Plaque psoriasis    3. High risk medication use    4. Immunosuppressed status    5. Morbid obesity with BMI of 40.0-44.9, adult    6. Acquired iron deficiency anemia due to decreased absorption    7. Vitamin D deficiency      Severe case of psoriasis and psoriatic arthritis improving on Taltz.  Continue Taltz as prescription.  Hold Taltz if any infection.  Hold methotrexate if any infection.  Safety labs today.  Compromised immune system secondary to autoimmune disease and use of immunosuppressive drugs. Monitor carefully for infections. Advised to get immediate medical care if any infection. Also advised strict adherence to age appropriate vaccinations and cancer screenings with PCP.  Low carb diet  Referral sent to establish care with hematologist for longstanding microcytic anemia without any history of significant menstrual bleeding.  May require iron infusion because of her chronic inflammatory disease.  Check vitamin-D levels today.    Problem List Items Addressed This Visit     Plaque psoriasis    Vitamin D deficiency    Relevant Orders    Vitamin D    Psoriatic arthritis - Primary    Morbid obesity with BMI of 40.0-44.9, adult    High risk  medication use    Immunosuppressed status    Acquired iron deficiency anemia due to decreased absorption    Relevant Orders    Ambulatory referral/consult to Hematology / Oncology        # Follow up in about 3 months (around 3/10/2021).      Disclaimer: This note was prepared using voice recognition system and is likely to have sound alike errors and is not proof read.  Please call me with any questions.

## 2020-12-11 LAB — 25(OH)D3+25(OH)D2 SERPL-MCNC: 16 NG/ML (ref 30–96)

## 2020-12-14 ENCOUNTER — PATIENT MESSAGE (OUTPATIENT)
Dept: RHEUMATOLOGY | Facility: CLINIC | Age: 40
End: 2020-12-14

## 2020-12-14 ENCOUNTER — TELEPHONE (OUTPATIENT)
Dept: HEMATOLOGY/ONCOLOGY | Facility: CLINIC | Age: 40
End: 2020-12-14

## 2020-12-14 DIAGNOSIS — E55.9 VITAMIN D DEFICIENCY: ICD-10-CM

## 2020-12-14 RX ORDER — ERGOCALCIFEROL 1.25 MG/1
50000 CAPSULE ORAL
Qty: 24 CAPSULE | Refills: 3 | Status: SHIPPED | OUTPATIENT
Start: 2020-12-14 | End: 2021-11-02

## 2020-12-14 NOTE — TELEPHONE ENCOUNTER
Spoke with patient appt scheduled for 01/11 at 3:40 with Dr. Stuart. Patient requested a Monday appt.

## 2021-01-04 NOTE — TELEPHONE ENCOUNTER
"Called plan on  (P: [phone]) and spoke with Rep Babita because plan states RTS until , Last filled 12/3/2020.   Rep stated there is a new rule that with Taltz 2 pack there is a max qty of 4mL within a 720 day period. She tansferred call to Benefit Coverage Review Department to help with a Plan Exclusion authorization for Taltz qty (2ml/28 days).     Spoke with Rep with Benefit Coverage Review department and was informed the plan's formulary has changed. New appeal is needed for Taltz due to formulary changes for .   Appeal for Taltz was submitted over phone with Rep on 2021. Rep informed appeal was denied on  due to Qty limitation for Taltz - Case # [id number].    Rep stated [facility] may proceed with Level 1 appeal. Reviewed this was completed last year and [employer] was informed "wrong form" was used. Rep assured that [employer] may submit Level 1 appeal without specific form as long as letter notes "Level 1 Appeal, Letter of Medical Necessity, Case #, Pt's Name/, Drug.  Rep provided Fax #: [phone] and confirmed Case # [id number].    Messaged Nolvia Carver PA-C and staff to update.     Called patient to update. Patient voiced understanding. She stated she has 2 Taltz doses on hand to cover week 6 and week 8 dose.   Week 6 Dose:   Week 8 Dose:   Week 10 dose:   Week 12 dose:      Called [employer] to proceed with level 1 appeal.   "

## 2021-01-11 ENCOUNTER — LAB VISIT (OUTPATIENT)
Dept: LAB | Facility: HOSPITAL | Age: 41
End: 2021-01-11
Attending: INTERNAL MEDICINE
Payer: COMMERCIAL

## 2021-01-11 ENCOUNTER — TELEPHONE (OUTPATIENT)
Dept: RHEUMATOLOGY | Facility: CLINIC | Age: 41
End: 2021-01-11

## 2021-01-11 ENCOUNTER — OFFICE VISIT (OUTPATIENT)
Dept: HEMATOLOGY/ONCOLOGY | Facility: CLINIC | Age: 41
End: 2021-01-11
Payer: COMMERCIAL

## 2021-01-11 VITALS
HEIGHT: 72 IN | OXYGEN SATURATION: 98 % | HEART RATE: 98 BPM | TEMPERATURE: 98 F | DIASTOLIC BLOOD PRESSURE: 83 MMHG | SYSTOLIC BLOOD PRESSURE: 151 MMHG | WEIGHT: 293 LBS | BODY MASS INDEX: 39.68 KG/M2

## 2021-01-11 DIAGNOSIS — L40.0 PLAQUE PSORIASIS: ICD-10-CM

## 2021-01-11 DIAGNOSIS — E66.01 MORBID OBESITY WITH BMI OF 40.0-44.9, ADULT: Primary | ICD-10-CM

## 2021-01-11 DIAGNOSIS — D84.9 IMMUNOSUPPRESSED STATUS: ICD-10-CM

## 2021-01-11 DIAGNOSIS — D50.8 ACQUIRED IRON DEFICIENCY ANEMIA DUE TO DECREASED ABSORPTION: ICD-10-CM

## 2021-01-11 LAB
BASOPHILS # BLD AUTO: 0.06 K/UL (ref 0–0.2)
BASOPHILS NFR BLD: 0.5 % (ref 0–1.9)
DIFFERENTIAL METHOD: ABNORMAL
EOSINOPHIL # BLD AUTO: 0.7 K/UL (ref 0–0.5)
EOSINOPHIL NFR BLD: 5.5 % (ref 0–8)
ERYTHROCYTE [DISTWIDTH] IN BLOOD BY AUTOMATED COUNT: 18.9 % (ref 11.5–14.5)
HCG INTACT+B SERPL-ACNC: <1.2 MIU/ML
HCT VFR BLD AUTO: 30.8 % (ref 37–48.5)
HGB BLD-MCNC: 9.2 G/DL (ref 12–16)
IMM GRANULOCYTES # BLD AUTO: 0.07 K/UL (ref 0–0.04)
IMM GRANULOCYTES NFR BLD AUTO: 0.6 % (ref 0–0.5)
LDH SERPL L TO P-CCNC: 160 U/L (ref 110–260)
LYMPHOCYTES # BLD AUTO: 2.9 K/UL (ref 1–4.8)
LYMPHOCYTES NFR BLD: 23.6 % (ref 18–48)
MCH RBC QN AUTO: 21.4 PG (ref 27–31)
MCHC RBC AUTO-ENTMCNC: 29.9 G/DL (ref 32–36)
MCV RBC AUTO: 72 FL (ref 82–98)
MONOCYTES # BLD AUTO: 0.5 K/UL (ref 0.3–1)
MONOCYTES NFR BLD: 4.1 % (ref 4–15)
NEUTROPHILS # BLD AUTO: 8.1 K/UL (ref 1.8–7.7)
NEUTROPHILS NFR BLD: 65.7 % (ref 38–73)
NRBC BLD-RTO: 0 /100 WBC
PLATELET # BLD AUTO: 456 K/UL (ref 150–350)
PMV BLD AUTO: 8 FL (ref 9.2–12.9)
RBC # BLD AUTO: 4.29 M/UL (ref 4–5.4)
RETICS/RBC NFR AUTO: 2.1 % (ref 0.5–2.5)
WBC # BLD AUTO: 12.36 K/UL (ref 3.9–12.7)

## 2021-01-11 PROCEDURE — 84165 PATHOLOGIST INTERPRETATION SPE: ICD-10-PCS | Mod: 26,,, | Performed by: PATHOLOGY

## 2021-01-11 PROCEDURE — 1126F PR PAIN SEVERITY QUANTIFIED, NO PAIN PRESENT: ICD-10-PCS | Mod: S$GLB,,, | Performed by: INTERNAL MEDICINE

## 2021-01-11 PROCEDURE — 82728 ASSAY OF FERRITIN: CPT

## 2021-01-11 PROCEDURE — 85025 COMPLETE CBC W/AUTO DIFF WBC: CPT

## 2021-01-11 PROCEDURE — 84702 CHORIONIC GONADOTROPIN TEST: CPT

## 2021-01-11 PROCEDURE — 1126F AMNT PAIN NOTED NONE PRSNT: CPT | Mod: S$GLB,,, | Performed by: INTERNAL MEDICINE

## 2021-01-11 PROCEDURE — 83010 ASSAY OF HAPTOGLOBIN QUANT: CPT

## 2021-01-11 PROCEDURE — 84165 PROTEIN E-PHORESIS SERUM: CPT | Mod: 26,,, | Performed by: PATHOLOGY

## 2021-01-11 PROCEDURE — 3008F PR BODY MASS INDEX (BMI) DOCUMENTED: ICD-10-PCS | Mod: CPTII,S$GLB,, | Performed by: INTERNAL MEDICINE

## 2021-01-11 PROCEDURE — 80074 ACUTE HEPATITIS PANEL: CPT

## 2021-01-11 PROCEDURE — 99999 PR PBB SHADOW E&M-EST. PATIENT-LVL IV: CPT | Mod: PBBFAC,,, | Performed by: INTERNAL MEDICINE

## 2021-01-11 PROCEDURE — 83520 IMMUNOASSAY QUANT NOS NONAB: CPT | Mod: 59

## 2021-01-11 PROCEDURE — 84443 ASSAY THYROID STIM HORMONE: CPT

## 2021-01-11 PROCEDURE — 83540 ASSAY OF IRON: CPT

## 2021-01-11 PROCEDURE — 3008F BODY MASS INDEX DOCD: CPT | Mod: CPTII,S$GLB,, | Performed by: INTERNAL MEDICINE

## 2021-01-11 PROCEDURE — 86703 HIV-1/HIV-2 1 RESULT ANTBDY: CPT

## 2021-01-11 PROCEDURE — 85045 AUTOMATED RETICULOCYTE COUNT: CPT

## 2021-01-11 PROCEDURE — 82607 VITAMIN B-12: CPT

## 2021-01-11 PROCEDURE — 83615 LACTATE (LD) (LDH) ENZYME: CPT

## 2021-01-11 PROCEDURE — 36415 COLL VENOUS BLD VENIPUNCTURE: CPT

## 2021-01-11 PROCEDURE — 84165 PROTEIN E-PHORESIS SERUM: CPT

## 2021-01-11 PROCEDURE — 99999 PR PBB SHADOW E&M-EST. PATIENT-LVL IV: ICD-10-PCS | Mod: PBBFAC,,, | Performed by: INTERNAL MEDICINE

## 2021-01-11 PROCEDURE — 99244 PR OFFICE CONSULTATION,LEVEL IV: ICD-10-PCS | Mod: S$GLB,,, | Performed by: INTERNAL MEDICINE

## 2021-01-11 PROCEDURE — 99244 OFF/OP CNSLTJ NEW/EST MOD 40: CPT | Mod: S$GLB,,, | Performed by: INTERNAL MEDICINE

## 2021-01-12 ENCOUNTER — CLINICAL SUPPORT (OUTPATIENT)
Dept: URGENT CARE | Facility: CLINIC | Age: 41
End: 2021-01-12
Payer: COMMERCIAL

## 2021-01-12 DIAGNOSIS — Z11.9 ENCOUNTER FOR SCREENING EXAMINATION FOR INFECTIOUS DISEASE: Primary | ICD-10-CM

## 2021-01-12 LAB
CTP QC/QA: YES
FERRITIN SERPL-MCNC: 5 NG/ML (ref 20–300)
HAPTOGLOB SERPL-MCNC: 224 MG/DL (ref 30–250)
HAV IGM SERPL QL IA: NEGATIVE
HBV CORE IGM SERPL QL IA: NEGATIVE
HBV SURFACE AG SERPL QL IA: NEGATIVE
HCV AB SERPL QL IA: NEGATIVE
HIV 1+2 AB+HIV1 P24 AG SERPL QL IA: NEGATIVE
IRON SERPL-MCNC: 15 UG/DL (ref 30–160)
SARS-COV-2 RDRP RESP QL NAA+PROBE: NEGATIVE
SATURATED IRON: 3 % (ref 20–50)
TOTAL IRON BINDING CAPACITY: 530 UG/DL (ref 250–450)
TRANSFERRIN SERPL-MCNC: 358 MG/DL (ref 200–375)
TSH SERPL DL<=0.005 MIU/L-ACNC: 1.71 UIU/ML (ref 0.4–4)
VIT B12 SERPL-MCNC: 470 PG/ML (ref 210–950)

## 2021-01-12 PROCEDURE — U0002 COVID-19 LAB TEST NON-CDC: HCPCS | Mod: QW,S$GLB,, | Performed by: EMERGENCY MEDICINE

## 2021-01-12 PROCEDURE — U0002: ICD-10-PCS | Mod: QW,S$GLB,, | Performed by: EMERGENCY MEDICINE

## 2021-01-12 PROCEDURE — 99211 OFF/OP EST MAY X REQ PHY/QHP: CPT | Mod: S$GLB,,, | Performed by: EMERGENCY MEDICINE

## 2021-01-12 PROCEDURE — 99211 PR OFFICE/OUTPT VISIT, EST, LEVL I: ICD-10-PCS | Mod: S$GLB,,, | Performed by: EMERGENCY MEDICINE

## 2021-01-13 ENCOUNTER — PATIENT MESSAGE (OUTPATIENT)
Dept: DERMATOLOGY | Facility: CLINIC | Age: 41
End: 2021-01-13

## 2021-01-13 DIAGNOSIS — L40.9 PSORIASIS: ICD-10-CM

## 2021-01-13 LAB
KAPPA LC SER QL IA: 1.77 MG/DL (ref 0.33–1.94)
KAPPA LC/LAMBDA SER IA: 1.25 (ref 0.26–1.65)
LAMBDA LC SER QL IA: 1.42 MG/DL (ref 0.57–2.63)

## 2021-01-13 RX ORDER — BETAMETHASONE VALERATE 1.2 MG/G
CREAM TOPICAL 2 TIMES DAILY
Qty: 45 G | Refills: 1 | Status: SHIPPED | OUTPATIENT
Start: 2021-01-13 | End: 2021-04-20

## 2021-01-14 LAB
ALBUMIN SERPL ELPH-MCNC: 4.08 G/DL (ref 3.35–5.55)
ALPHA1 GLOB SERPL ELPH-MCNC: 0.33 G/DL (ref 0.17–0.41)
ALPHA2 GLOB SERPL ELPH-MCNC: 0.78 G/DL (ref 0.43–0.99)
B-GLOBULIN SERPL ELPH-MCNC: 0.91 G/DL (ref 0.5–1.1)
GAMMA GLOB SERPL ELPH-MCNC: 1.09 G/DL (ref 0.67–1.58)
PROT SERPL-MCNC: 7.2 G/DL (ref 6–8.4)

## 2021-01-15 LAB — PATHOLOGIST INTERPRETATION SPE: NORMAL

## 2021-01-21 ENCOUNTER — PATIENT MESSAGE (OUTPATIENT)
Dept: HEMATOLOGY/ONCOLOGY | Facility: CLINIC | Age: 41
End: 2021-01-21

## 2021-01-21 ENCOUNTER — OFFICE VISIT (OUTPATIENT)
Dept: HEMATOLOGY/ONCOLOGY | Facility: CLINIC | Age: 41
End: 2021-01-21
Payer: COMMERCIAL

## 2021-01-21 DIAGNOSIS — D50.8 ACQUIRED IRON DEFICIENCY ANEMIA DUE TO DECREASED ABSORPTION: Primary | ICD-10-CM

## 2021-01-21 PROCEDURE — 99214 PR OFFICE/OUTPT VISIT, EST, LEVL IV, 30-39 MIN: ICD-10-PCS | Mod: 95,,, | Performed by: INTERNAL MEDICINE

## 2021-01-21 PROCEDURE — 99214 OFFICE O/P EST MOD 30 MIN: CPT | Mod: 95,,, | Performed by: INTERNAL MEDICINE

## 2021-01-21 RX ORDER — SODIUM CHLORIDE 0.9 % (FLUSH) 0.9 %
10 SYRINGE (ML) INJECTION
Status: CANCELLED | OUTPATIENT
Start: 2021-02-03

## 2021-01-21 RX ORDER — HEPARIN 100 UNIT/ML
500 SYRINGE INTRAVENOUS
Status: CANCELLED | OUTPATIENT
Start: 2021-02-03

## 2021-01-21 RX ORDER — SODIUM CHLORIDE 0.9 % (FLUSH) 0.9 %
10 SYRINGE (ML) INJECTION
Status: CANCELLED | OUTPATIENT
Start: 2021-01-21

## 2021-01-21 RX ORDER — HEPARIN 100 UNIT/ML
500 SYRINGE INTRAVENOUS
Status: CANCELLED | OUTPATIENT
Start: 2021-01-21

## 2021-01-22 ENCOUNTER — TELEPHONE (OUTPATIENT)
Dept: ENDOSCOPY | Facility: HOSPITAL | Age: 41
End: 2021-01-22

## 2021-01-22 DIAGNOSIS — D50.0 CHRONIC BLOOD LOSS ANEMIA: Primary | ICD-10-CM

## 2021-01-22 RX ORDER — SODIUM, POTASSIUM,MAG SULFATES 17.5-3.13G
1 SOLUTION, RECONSTITUTED, ORAL ORAL DAILY
Qty: 1 KIT | Refills: 0 | Status: SHIPPED | OUTPATIENT
Start: 2021-01-22 | End: 2021-01-24

## 2021-01-26 ENCOUNTER — PATIENT MESSAGE (OUTPATIENT)
Dept: RHEUMATOLOGY | Facility: CLINIC | Age: 41
End: 2021-01-26

## 2021-01-27 ENCOUNTER — PATIENT MESSAGE (OUTPATIENT)
Dept: HEMATOLOGY/ONCOLOGY | Facility: CLINIC | Age: 41
End: 2021-01-27

## 2021-01-27 ENCOUNTER — PATIENT MESSAGE (OUTPATIENT)
Dept: RHEUMATOLOGY | Facility: CLINIC | Age: 41
End: 2021-01-27

## 2021-01-31 DIAGNOSIS — L40.59 POLYARTICULAR PSORIATIC ARTHRITIS: ICD-10-CM

## 2021-01-31 DIAGNOSIS — L40.0 PLAQUE PSORIASIS: ICD-10-CM

## 2021-02-01 RX ORDER — METHOTREXATE 25 MG/ML
INJECTION, SOLUTION INTRA-ARTERIAL; INTRAMUSCULAR; INTRAVENOUS
Qty: 10 ML | Refills: 1 | Status: SHIPPED | OUTPATIENT
Start: 2021-02-01 | End: 2021-04-27

## 2021-02-02 ENCOUNTER — INFUSION (OUTPATIENT)
Dept: INFUSION THERAPY | Facility: HOSPITAL | Age: 41
End: 2021-02-02
Payer: COMMERCIAL

## 2021-02-02 VITALS
SYSTOLIC BLOOD PRESSURE: 129 MMHG | OXYGEN SATURATION: 98 % | TEMPERATURE: 98 F | RESPIRATION RATE: 16 BRPM | DIASTOLIC BLOOD PRESSURE: 71 MMHG | HEART RATE: 72 BPM

## 2021-02-02 DIAGNOSIS — D50.8 ACQUIRED IRON DEFICIENCY ANEMIA DUE TO DECREASED ABSORPTION: Primary | ICD-10-CM

## 2021-02-02 PROCEDURE — 25000003 PHARM REV CODE 250: Performed by: INTERNAL MEDICINE

## 2021-02-02 PROCEDURE — 96365 THER/PROPH/DIAG IV INF INIT: CPT

## 2021-02-02 PROCEDURE — 63600175 PHARM REV CODE 636 W HCPCS: Mod: JG | Performed by: INTERNAL MEDICINE

## 2021-02-02 RX ADMIN — FERRIC CARBOXYMALTOSE INJECTION 750 MG: 50 INJECTION, SOLUTION INTRAVENOUS at 12:02

## 2021-02-04 ENCOUNTER — TELEPHONE (OUTPATIENT)
Dept: RHEUMATOLOGY | Facility: CLINIC | Age: 41
End: 2021-02-04

## 2021-02-05 ENCOUNTER — TELEPHONE (OUTPATIENT)
Dept: ENDOSCOPY | Facility: HOSPITAL | Age: 41
End: 2021-02-05

## 2021-02-07 ENCOUNTER — LAB VISIT (OUTPATIENT)
Dept: OTOLARYNGOLOGY | Facility: CLINIC | Age: 41
End: 2021-02-07
Payer: COMMERCIAL

## 2021-02-07 DIAGNOSIS — D50.0 CHRONIC BLOOD LOSS ANEMIA: ICD-10-CM

## 2021-02-07 PROCEDURE — U0003 INFECTIOUS AGENT DETECTION BY NUCLEIC ACID (DNA OR RNA); SEVERE ACUTE RESPIRATORY SYNDROME CORONAVIRUS 2 (SARS-COV-2) (CORONAVIRUS DISEASE [COVID-19]), AMPLIFIED PROBE TECHNIQUE, MAKING USE OF HIGH THROUGHPUT TECHNOLOGIES AS DESCRIBED BY CMS-2020-01-R: HCPCS

## 2021-02-07 PROCEDURE — U0005 INFEC AGEN DETEC AMPLI PROBE: HCPCS

## 2021-02-08 LAB — SARS-COV-2 RNA RESP QL NAA+PROBE: NOT DETECTED

## 2021-02-09 ENCOUNTER — INFUSION (OUTPATIENT)
Dept: INFUSION THERAPY | Facility: HOSPITAL | Age: 41
End: 2021-02-09
Attending: INTERNAL MEDICINE
Payer: COMMERCIAL

## 2021-02-09 ENCOUNTER — ANESTHESIA EVENT (OUTPATIENT)
Dept: ENDOSCOPY | Facility: HOSPITAL | Age: 41
End: 2021-02-09
Payer: COMMERCIAL

## 2021-02-09 ENCOUNTER — PATIENT MESSAGE (OUTPATIENT)
Dept: RHEUMATOLOGY | Facility: CLINIC | Age: 41
End: 2021-02-09

## 2021-02-09 VITALS
OXYGEN SATURATION: 98 % | RESPIRATION RATE: 16 BRPM | TEMPERATURE: 98 F | SYSTOLIC BLOOD PRESSURE: 126 MMHG | HEART RATE: 71 BPM | DIASTOLIC BLOOD PRESSURE: 68 MMHG

## 2021-02-09 DIAGNOSIS — D50.9 IRON DEFICIENCY ANEMIA, UNSPECIFIED IRON DEFICIENCY ANEMIA TYPE: Primary | ICD-10-CM

## 2021-02-09 PROCEDURE — 63600175 PHARM REV CODE 636 W HCPCS: Mod: JG | Performed by: INTERNAL MEDICINE

## 2021-02-09 PROCEDURE — 96365 THER/PROPH/DIAG IV INF INIT: CPT

## 2021-02-09 PROCEDURE — 25000003 PHARM REV CODE 250: Performed by: INTERNAL MEDICINE

## 2021-02-09 RX ADMIN — FERRIC CARBOXYMALTOSE INJECTION 750 MG: 50 INJECTION, SOLUTION INTRAVENOUS at 01:02

## 2021-02-10 ENCOUNTER — ANESTHESIA (OUTPATIENT)
Dept: ENDOSCOPY | Facility: HOSPITAL | Age: 41
End: 2021-02-10
Payer: COMMERCIAL

## 2021-02-10 ENCOUNTER — HOSPITAL ENCOUNTER (OUTPATIENT)
Facility: HOSPITAL | Age: 41
Discharge: HOME | End: 2021-02-10
Attending: INTERNAL MEDICINE | Admitting: INTERNAL MEDICINE
Payer: COMMERCIAL

## 2021-02-10 VITALS
SYSTOLIC BLOOD PRESSURE: 125 MMHG | DIASTOLIC BLOOD PRESSURE: 59 MMHG | HEIGHT: 72 IN | RESPIRATION RATE: 20 BRPM | TEMPERATURE: 98 F | BODY MASS INDEX: 39.68 KG/M2 | OXYGEN SATURATION: 100 % | WEIGHT: 293 LBS | HEART RATE: 68 BPM

## 2021-02-10 DIAGNOSIS — D50.9 IRON DEFICIENCY ANEMIA, UNSPECIFIED IRON DEFICIENCY ANEMIA TYPE: Primary | ICD-10-CM

## 2021-02-10 DIAGNOSIS — D50.9 IRON DEFICIENCY ANEMIA: ICD-10-CM

## 2021-02-10 LAB
B-HCG UR QL: NEGATIVE
CTP QC/QA: YES

## 2021-02-10 PROCEDURE — 27201012 HC FORCEPS, HOT/COLD, DISP: Performed by: INTERNAL MEDICINE

## 2021-02-10 PROCEDURE — 88305 TISSUE EXAM BY PATHOLOGIST: CPT | Mod: 59 | Performed by: PATHOLOGY

## 2021-02-10 PROCEDURE — 45378 DIAGNOSTIC COLONOSCOPY: CPT | Mod: ,,, | Performed by: INTERNAL MEDICINE

## 2021-02-10 PROCEDURE — 25000003 PHARM REV CODE 250: Performed by: NURSE ANESTHETIST, CERTIFIED REGISTERED

## 2021-02-10 PROCEDURE — 45378 PR COLONOSCOPY,DIAGNOSTIC: ICD-10-PCS | Mod: ,,, | Performed by: INTERNAL MEDICINE

## 2021-02-10 PROCEDURE — 88305 TISSUE EXAM BY PATHOLOGIST: CPT | Mod: 26,,, | Performed by: PATHOLOGY

## 2021-02-10 PROCEDURE — 37000008 HC ANESTHESIA 1ST 15 MINUTES: Performed by: INTERNAL MEDICINE

## 2021-02-10 PROCEDURE — 43239 PR EGD, FLEX, W/BIOPSY, SGL/MULTI: ICD-10-PCS | Mod: 51,,, | Performed by: INTERNAL MEDICINE

## 2021-02-10 PROCEDURE — 43239 EGD BIOPSY SINGLE/MULTIPLE: CPT | Mod: 51,,, | Performed by: INTERNAL MEDICINE

## 2021-02-10 PROCEDURE — 37000009 HC ANESTHESIA EA ADD 15 MINS: Performed by: INTERNAL MEDICINE

## 2021-02-10 PROCEDURE — 81025 URINE PREGNANCY TEST: CPT | Performed by: INTERNAL MEDICINE

## 2021-02-10 PROCEDURE — 43239 EGD BIOPSY SINGLE/MULTIPLE: CPT | Performed by: INTERNAL MEDICINE

## 2021-02-10 PROCEDURE — 45378 DIAGNOSTIC COLONOSCOPY: CPT | Performed by: INTERNAL MEDICINE

## 2021-02-10 PROCEDURE — 63600175 PHARM REV CODE 636 W HCPCS: Performed by: NURSE ANESTHETIST, CERTIFIED REGISTERED

## 2021-02-10 PROCEDURE — 88305 TISSUE EXAM BY PATHOLOGIST: ICD-10-PCS | Mod: 26,,, | Performed by: PATHOLOGY

## 2021-02-10 RX ORDER — SODIUM CHLORIDE 0.9 % (FLUSH) 0.9 %
10 SYRINGE (ML) INJECTION
Status: DISCONTINUED | OUTPATIENT
Start: 2021-02-10 | End: 2021-02-10 | Stop reason: HOSPADM

## 2021-02-10 RX ORDER — SODIUM CHLORIDE, SODIUM LACTATE, POTASSIUM CHLORIDE, CALCIUM CHLORIDE 600; 310; 30; 20 MG/100ML; MG/100ML; MG/100ML; MG/100ML
INJECTION, SOLUTION INTRAVENOUS CONTINUOUS
Status: DISCONTINUED | OUTPATIENT
Start: 2021-02-10 | End: 2021-02-10 | Stop reason: HOSPADM

## 2021-02-10 RX ORDER — SODIUM CHLORIDE, SODIUM LACTATE, POTASSIUM CHLORIDE, CALCIUM CHLORIDE 600; 310; 30; 20 MG/100ML; MG/100ML; MG/100ML; MG/100ML
INJECTION, SOLUTION INTRAVENOUS CONTINUOUS PRN
Status: DISCONTINUED | OUTPATIENT
Start: 2021-02-10 | End: 2021-02-10

## 2021-02-10 RX ORDER — LIDOCAINE HYDROCHLORIDE 10 MG/ML
INJECTION, SOLUTION EPIDURAL; INFILTRATION; INTRACAUDAL; PERINEURAL
Status: DISCONTINUED | OUTPATIENT
Start: 2021-02-10 | End: 2021-02-10

## 2021-02-10 RX ORDER — PROPOFOL 10 MG/ML
VIAL (ML) INTRAVENOUS
Status: DISCONTINUED | OUTPATIENT
Start: 2021-02-10 | End: 2021-02-10

## 2021-02-10 RX ADMIN — SODIUM CHLORIDE, SODIUM LACTATE, POTASSIUM CHLORIDE, AND CALCIUM CHLORIDE: .6; .31; .03; .02 INJECTION, SOLUTION INTRAVENOUS at 09:02

## 2021-02-10 RX ADMIN — LIDOCAINE HYDROCHLORIDE 50 MG: 10 INJECTION, SOLUTION EPIDURAL; INFILTRATION; INTRACAUDAL; PERINEURAL at 10:02

## 2021-02-10 RX ADMIN — PROPOFOL 500 MG: 10 INJECTION, EMULSION INTRAVENOUS at 10:02

## 2021-02-11 LAB
FINAL PATHOLOGIC DIAGNOSIS: NORMAL
GROSS: NORMAL
Lab: NORMAL

## 2021-02-15 ENCOUNTER — SPECIALTY PHARMACY (OUTPATIENT)
Dept: PHARMACY | Facility: CLINIC | Age: 41
End: 2021-02-15

## 2021-02-17 ENCOUNTER — PATIENT MESSAGE (OUTPATIENT)
Dept: HEMATOLOGY/ONCOLOGY | Facility: CLINIC | Age: 41
End: 2021-02-17

## 2021-02-25 ENCOUNTER — SPECIALTY PHARMACY (OUTPATIENT)
Dept: PHARMACY | Facility: CLINIC | Age: 41
End: 2021-02-25

## 2021-03-09 ENCOUNTER — TELEPHONE (OUTPATIENT)
Dept: RHEUMATOLOGY | Facility: CLINIC | Age: 41
End: 2021-03-09

## 2021-03-10 ENCOUNTER — OFFICE VISIT (OUTPATIENT)
Dept: RHEUMATOLOGY | Facility: CLINIC | Age: 41
End: 2021-03-10
Payer: COMMERCIAL

## 2021-03-10 ENCOUNTER — LAB VISIT (OUTPATIENT)
Dept: LAB | Facility: HOSPITAL | Age: 41
End: 2021-03-10
Attending: INTERNAL MEDICINE
Payer: COMMERCIAL

## 2021-03-10 VITALS
BODY MASS INDEX: 39.68 KG/M2 | HEIGHT: 72 IN | SYSTOLIC BLOOD PRESSURE: 145 MMHG | DIASTOLIC BLOOD PRESSURE: 92 MMHG | WEIGHT: 293 LBS | HEART RATE: 77 BPM

## 2021-03-10 DIAGNOSIS — L40.0 PLAQUE PSORIASIS: ICD-10-CM

## 2021-03-10 DIAGNOSIS — D50.8 ACQUIRED IRON DEFICIENCY ANEMIA DUE TO DECREASED ABSORPTION: ICD-10-CM

## 2021-03-10 DIAGNOSIS — E66.01 MORBID OBESITY WITH BMI OF 40.0-44.9, ADULT: ICD-10-CM

## 2021-03-10 DIAGNOSIS — D84.9 IMMUNOSUPPRESSED STATUS: ICD-10-CM

## 2021-03-10 DIAGNOSIS — L40.59 POLYARTICULAR PSORIATIC ARTHRITIS: ICD-10-CM

## 2021-03-10 DIAGNOSIS — L40.50 PSORIATIC ARTHRITIS: Primary | ICD-10-CM

## 2021-03-10 DIAGNOSIS — Z79.899 HIGH RISK MEDICATION USE: ICD-10-CM

## 2021-03-10 LAB
ALBUMIN SERPL BCP-MCNC: 4.2 G/DL (ref 3.5–5.2)
ALP SERPL-CCNC: 75 U/L (ref 55–135)
ALT SERPL W/O P-5'-P-CCNC: 48 U/L (ref 10–44)
ANION GAP SERPL CALC-SCNC: 9 MMOL/L (ref 8–16)
ANISOCYTOSIS BLD QL SMEAR: ABNORMAL
AST SERPL-CCNC: 33 U/L (ref 10–40)
BASOPHILS # BLD AUTO: 0.06 K/UL (ref 0–0.2)
BASOPHILS NFR BLD: 0.5 % (ref 0–1.9)
BILIRUB SERPL-MCNC: 0.4 MG/DL (ref 0.1–1)
BUN SERPL-MCNC: 14 MG/DL (ref 6–20)
CALCIUM SERPL-MCNC: 9.2 MG/DL (ref 8.7–10.5)
CHLORIDE SERPL-SCNC: 105 MMOL/L (ref 95–110)
CO2 SERPL-SCNC: 26 MMOL/L (ref 23–29)
CREAT SERPL-MCNC: 0.7 MG/DL (ref 0.5–1.4)
CRP SERPL-MCNC: 10 MG/L (ref 0–8.2)
DIFFERENTIAL METHOD: ABNORMAL
EOSINOPHIL # BLD AUTO: 0.5 K/UL (ref 0–0.5)
EOSINOPHIL NFR BLD: 4.9 % (ref 0–8)
ERYTHROCYTE [DISTWIDTH] IN BLOOD BY AUTOMATED COUNT: ABNORMAL % (ref 11.5–14.5)
ERYTHROCYTE [SEDIMENTATION RATE] IN BLOOD BY WESTERGREN METHOD: 7 MM/HR (ref 0–20)
EST. GFR  (AFRICAN AMERICAN): >60 ML/MIN/1.73 M^2
EST. GFR  (NON AFRICAN AMERICAN): >60 ML/MIN/1.73 M^2
GLUCOSE SERPL-MCNC: 90 MG/DL (ref 70–110)
HCT VFR BLD AUTO: 37.5 % (ref 37–48.5)
HGB BLD-MCNC: 12.1 G/DL (ref 12–16)
IMM GRANULOCYTES # BLD AUTO: 0.06 K/UL (ref 0–0.04)
IMM GRANULOCYTES NFR BLD AUTO: 0.5 % (ref 0–0.5)
LYMPHOCYTES # BLD AUTO: 2.9 K/UL (ref 1–4.8)
LYMPHOCYTES NFR BLD: 26.1 % (ref 18–48)
MCH RBC QN AUTO: 25.5 PG (ref 27–31)
MCHC RBC AUTO-ENTMCNC: 32.3 G/DL (ref 32–36)
MCV RBC AUTO: 79 FL (ref 82–98)
MONOCYTES # BLD AUTO: 0.6 K/UL (ref 0.3–1)
MONOCYTES NFR BLD: 5.3 % (ref 4–15)
NEUTROPHILS # BLD AUTO: 6.9 K/UL (ref 1.8–7.7)
NEUTROPHILS NFR BLD: 62.7 % (ref 38–73)
NRBC BLD-RTO: 0 /100 WBC
PLATELET # BLD AUTO: 443 K/UL (ref 150–350)
PLATELET BLD QL SMEAR: ABNORMAL
PMV BLD AUTO: 8.1 FL (ref 9.2–12.9)
POTASSIUM SERPL-SCNC: 3.7 MMOL/L (ref 3.5–5.1)
PROT SERPL-MCNC: 7.5 G/DL (ref 6–8.4)
RBC # BLD AUTO: 4.75 M/UL (ref 4–5.4)
SODIUM SERPL-SCNC: 140 MMOL/L (ref 136–145)
WBC # BLD AUTO: 11.04 K/UL (ref 3.9–12.7)

## 2021-03-10 PROCEDURE — 83540 ASSAY OF IRON: CPT | Performed by: INTERNAL MEDICINE

## 2021-03-10 PROCEDURE — 85651 RBC SED RATE NONAUTOMATED: CPT | Performed by: INTERNAL MEDICINE

## 2021-03-10 PROCEDURE — 80053 COMPREHEN METABOLIC PANEL: CPT | Performed by: INTERNAL MEDICINE

## 2021-03-10 PROCEDURE — 1126F PR PAIN SEVERITY QUANTIFIED, NO PAIN PRESENT: ICD-10-PCS | Mod: S$GLB,,, | Performed by: INTERNAL MEDICINE

## 2021-03-10 PROCEDURE — 3008F PR BODY MASS INDEX (BMI) DOCUMENTED: ICD-10-PCS | Mod: CPTII,S$GLB,, | Performed by: INTERNAL MEDICINE

## 2021-03-10 PROCEDURE — 99999 PR PBB SHADOW E&M-EST. PATIENT-LVL III: CPT | Mod: PBBFAC,,, | Performed by: INTERNAL MEDICINE

## 2021-03-10 PROCEDURE — 86140 C-REACTIVE PROTEIN: CPT | Performed by: INTERNAL MEDICINE

## 2021-03-10 PROCEDURE — 99215 OFFICE O/P EST HI 40 MIN: CPT | Mod: S$GLB,,, | Performed by: INTERNAL MEDICINE

## 2021-03-10 PROCEDURE — 99999 PR PBB SHADOW E&M-EST. PATIENT-LVL III: ICD-10-PCS | Mod: PBBFAC,,, | Performed by: INTERNAL MEDICINE

## 2021-03-10 PROCEDURE — 85025 COMPLETE CBC W/AUTO DIFF WBC: CPT | Performed by: INTERNAL MEDICINE

## 2021-03-10 PROCEDURE — 1126F AMNT PAIN NOTED NONE PRSNT: CPT | Mod: S$GLB,,, | Performed by: INTERNAL MEDICINE

## 2021-03-10 PROCEDURE — 3008F BODY MASS INDEX DOCD: CPT | Mod: CPTII,S$GLB,, | Performed by: INTERNAL MEDICINE

## 2021-03-10 PROCEDURE — 99215 PR OFFICE/OUTPT VISIT, EST, LEVL V, 40-54 MIN: ICD-10-PCS | Mod: S$GLB,,, | Performed by: INTERNAL MEDICINE

## 2021-03-10 PROCEDURE — 82728 ASSAY OF FERRITIN: CPT | Performed by: INTERNAL MEDICINE

## 2021-03-11 LAB
FERRITIN SERPL-MCNC: 156 NG/ML (ref 20–300)
IRON SERPL-MCNC: 63 UG/DL (ref 30–160)
IRON SERPL-MCNC: 63 UG/DL (ref 30–160)
SATURATED IRON: 19 % (ref 20–50)
SATURATED IRON: 19 % (ref 20–50)
TOTAL IRON BINDING CAPACITY: 326 UG/DL (ref 250–450)
TOTAL IRON BINDING CAPACITY: 326 UG/DL (ref 250–450)
TRANSFERRIN SERPL-MCNC: 220 MG/DL (ref 200–375)
TRANSFERRIN SERPL-MCNC: 220 MG/DL (ref 200–375)

## 2021-03-19 ENCOUNTER — OFFICE VISIT (OUTPATIENT)
Dept: HEMATOLOGY/ONCOLOGY | Facility: CLINIC | Age: 41
End: 2021-03-19
Payer: COMMERCIAL

## 2021-03-19 VITALS
HEART RATE: 103 BPM | BODY MASS INDEX: 39.68 KG/M2 | WEIGHT: 293 LBS | HEIGHT: 72 IN | DIASTOLIC BLOOD PRESSURE: 77 MMHG | TEMPERATURE: 98 F | RESPIRATION RATE: 17 BRPM | SYSTOLIC BLOOD PRESSURE: 140 MMHG | OXYGEN SATURATION: 99 %

## 2021-03-19 DIAGNOSIS — E66.01 MORBID OBESITY WITH BMI OF 40.0-44.9, ADULT: ICD-10-CM

## 2021-03-19 DIAGNOSIS — D50.8 ACQUIRED IRON DEFICIENCY ANEMIA DUE TO DECREASED ABSORPTION: Primary | ICD-10-CM

## 2021-03-19 DIAGNOSIS — D50.9 IRON DEFICIENCY ANEMIA, UNSPECIFIED IRON DEFICIENCY ANEMIA TYPE: ICD-10-CM

## 2021-03-19 DIAGNOSIS — L40.0 PLAQUE PSORIASIS: ICD-10-CM

## 2021-03-19 DIAGNOSIS — D84.9 IMMUNOSUPPRESSED STATUS: ICD-10-CM

## 2021-03-19 PROCEDURE — 1126F AMNT PAIN NOTED NONE PRSNT: CPT | Mod: S$GLB,,, | Performed by: INTERNAL MEDICINE

## 2021-03-19 PROCEDURE — 3008F PR BODY MASS INDEX (BMI) DOCUMENTED: ICD-10-PCS | Mod: CPTII,S$GLB,, | Performed by: INTERNAL MEDICINE

## 2021-03-19 PROCEDURE — 1126F PR PAIN SEVERITY QUANTIFIED, NO PAIN PRESENT: ICD-10-PCS | Mod: S$GLB,,, | Performed by: INTERNAL MEDICINE

## 2021-03-19 PROCEDURE — 99999 PR PBB SHADOW E&M-EST. PATIENT-LVL III: CPT | Mod: PBBFAC,,, | Performed by: INTERNAL MEDICINE

## 2021-03-19 PROCEDURE — 99214 PR OFFICE/OUTPT VISIT, EST, LEVL IV, 30-39 MIN: ICD-10-PCS | Mod: S$GLB,,, | Performed by: INTERNAL MEDICINE

## 2021-03-19 PROCEDURE — 3008F BODY MASS INDEX DOCD: CPT | Mod: CPTII,S$GLB,, | Performed by: INTERNAL MEDICINE

## 2021-03-19 PROCEDURE — 99214 OFFICE O/P EST MOD 30 MIN: CPT | Mod: S$GLB,,, | Performed by: INTERNAL MEDICINE

## 2021-03-19 PROCEDURE — 99999 PR PBB SHADOW E&M-EST. PATIENT-LVL III: ICD-10-PCS | Mod: PBBFAC,,, | Performed by: INTERNAL MEDICINE

## 2021-03-22 ENCOUNTER — OFFICE VISIT (OUTPATIENT)
Dept: DERMATOLOGY | Facility: CLINIC | Age: 41
End: 2021-03-22
Payer: COMMERCIAL

## 2021-03-22 DIAGNOSIS — L71.9 ROSACEA: ICD-10-CM

## 2021-03-22 DIAGNOSIS — L40.9 PSORIASIS: Primary | ICD-10-CM

## 2021-03-22 DIAGNOSIS — L71.9 ACNE ROSACEA: ICD-10-CM

## 2021-03-22 PROCEDURE — 99213 OFFICE O/P EST LOW 20 MIN: CPT | Mod: S$GLB,,, | Performed by: DERMATOLOGY

## 2021-03-22 PROCEDURE — 1125F PR PAIN SEVERITY QUANTIFIED, PAIN PRESENT: ICD-10-PCS | Mod: S$GLB,,, | Performed by: DERMATOLOGY

## 2021-03-22 PROCEDURE — 99213 PR OFFICE/OUTPT VISIT, EST, LEVL III, 20-29 MIN: ICD-10-PCS | Mod: S$GLB,,, | Performed by: DERMATOLOGY

## 2021-03-22 PROCEDURE — 99999 PR PBB SHADOW E&M-EST. PATIENT-LVL III: CPT | Mod: PBBFAC,,, | Performed by: DERMATOLOGY

## 2021-03-22 PROCEDURE — 1125F AMNT PAIN NOTED PAIN PRSNT: CPT | Mod: S$GLB,,, | Performed by: DERMATOLOGY

## 2021-03-22 PROCEDURE — 99999 PR PBB SHADOW E&M-EST. PATIENT-LVL III: ICD-10-PCS | Mod: PBBFAC,,, | Performed by: DERMATOLOGY

## 2021-03-22 RX ORDER — CALCIPOTRIENE AND BETAMETHASONE DIPROPIONATE 50; .5 UG/G; MG/G
AEROSOL, FOAM TOPICAL
Qty: 60 G | Refills: 3 | Status: SHIPPED | OUTPATIENT
Start: 2021-03-22 | End: 2021-05-21 | Stop reason: SDUPTHER

## 2021-03-22 RX ORDER — AZELAIC ACID 0.15 G/G
AEROSOL, FOAM TOPICAL
Qty: 50 G | Refills: 5 | Status: SHIPPED | OUTPATIENT
Start: 2021-03-22 | End: 2022-05-04

## 2021-03-26 ENCOUNTER — PATIENT MESSAGE (OUTPATIENT)
Dept: PHARMACY | Facility: CLINIC | Age: 41
End: 2021-03-26

## 2021-04-06 ENCOUNTER — PATIENT MESSAGE (OUTPATIENT)
Dept: RHEUMATOLOGY | Facility: CLINIC | Age: 41
End: 2021-04-06

## 2021-04-06 DIAGNOSIS — L40.50 PSORIATIC ARTHRITIS: ICD-10-CM

## 2021-04-06 DIAGNOSIS — L40.0 PLAQUE PSORIASIS: Primary | ICD-10-CM

## 2021-04-08 ENCOUNTER — PATIENT MESSAGE (OUTPATIENT)
Dept: PHARMACY | Facility: CLINIC | Age: 41
End: 2021-04-08

## 2021-04-12 RX ORDER — GUSELKUMAB 100 MG/ML
INJECTION SUBCUTANEOUS
Qty: 1 ML | Refills: 1 | Status: SHIPPED | OUTPATIENT
Start: 2021-04-12 | End: 2021-04-20

## 2021-04-14 ENCOUNTER — SPECIALTY PHARMACY (OUTPATIENT)
Dept: PHARMACY | Facility: CLINIC | Age: 41
End: 2021-04-14

## 2021-04-16 ENCOUNTER — SPECIALTY PHARMACY (OUTPATIENT)
Dept: PHARMACY | Facility: CLINIC | Age: 41
End: 2021-04-16

## 2021-04-19 ENCOUNTER — TELEPHONE (OUTPATIENT)
Dept: RHEUMATOLOGY | Facility: CLINIC | Age: 41
End: 2021-04-19

## 2021-04-19 ENCOUNTER — SPECIALTY PHARMACY (OUTPATIENT)
Dept: PHARMACY | Facility: CLINIC | Age: 41
End: 2021-04-19

## 2021-04-19 DIAGNOSIS — L40.50 PSORIATIC ARTHRITIS: ICD-10-CM

## 2021-04-19 DIAGNOSIS — L40.0 PLAQUE PSORIASIS: Primary | ICD-10-CM

## 2021-04-19 RX ORDER — GUSELKUMAB 100 MG/ML
INJECTION SUBCUTANEOUS
Qty: 2 ML | Refills: 3 | Status: SHIPPED | OUTPATIENT
Start: 2021-04-19 | End: 2022-04-16 | Stop reason: SDUPTHER

## 2021-04-20 ENCOUNTER — PATIENT MESSAGE (OUTPATIENT)
Dept: DERMATOLOGY | Facility: CLINIC | Age: 41
End: 2021-04-20

## 2021-04-20 ENCOUNTER — SPECIALTY PHARMACY (OUTPATIENT)
Dept: PHARMACY | Facility: CLINIC | Age: 41
End: 2021-04-20

## 2021-04-20 DIAGNOSIS — L40.8 OTHER PSORIASIS: Primary | ICD-10-CM

## 2021-04-20 DIAGNOSIS — L30.4 INTERTRIGO: ICD-10-CM

## 2021-04-20 RX ORDER — TRIAMCINOLONE ACETONIDE 0.25 MG/G
CREAM TOPICAL
Qty: 30 G | Refills: 1 | Status: SHIPPED | OUTPATIENT
Start: 2021-04-20 | End: 2021-05-10

## 2021-04-27 ENCOUNTER — PATIENT MESSAGE (OUTPATIENT)
Dept: RHEUMATOLOGY | Facility: CLINIC | Age: 41
End: 2021-04-27

## 2021-04-29 ENCOUNTER — PATIENT MESSAGE (OUTPATIENT)
Dept: RESEARCH | Facility: HOSPITAL | Age: 41
End: 2021-04-29

## 2021-05-12 ENCOUNTER — SPECIALTY PHARMACY (OUTPATIENT)
Dept: PHARMACY | Facility: CLINIC | Age: 41
End: 2021-05-12

## 2021-05-18 ENCOUNTER — TELEPHONE (OUTPATIENT)
Dept: DERMATOLOGY | Facility: CLINIC | Age: 41
End: 2021-05-18

## 2021-06-09 ENCOUNTER — PATIENT MESSAGE (OUTPATIENT)
Dept: DERMATOLOGY | Facility: CLINIC | Age: 41
End: 2021-06-09

## 2021-06-09 ENCOUNTER — TELEPHONE (OUTPATIENT)
Dept: RHEUMATOLOGY | Facility: CLINIC | Age: 41
End: 2021-06-09

## 2021-06-10 ENCOUNTER — HOSPITAL ENCOUNTER (OUTPATIENT)
Dept: RADIOLOGY | Facility: HOSPITAL | Age: 41
Discharge: HOME | End: 2021-06-10
Attending: INTERNAL MEDICINE
Payer: COMMERCIAL

## 2021-06-10 ENCOUNTER — OFFICE VISIT (OUTPATIENT)
Dept: RHEUMATOLOGY | Facility: CLINIC | Age: 41
End: 2021-06-10
Payer: COMMERCIAL

## 2021-06-10 VITALS
DIASTOLIC BLOOD PRESSURE: 88 MMHG | HEIGHT: 72 IN | BODY MASS INDEX: 39.68 KG/M2 | HEART RATE: 76 BPM | SYSTOLIC BLOOD PRESSURE: 136 MMHG | WEIGHT: 293 LBS

## 2021-06-10 DIAGNOSIS — L40.50 PSORIATIC ARTHRITIS: ICD-10-CM

## 2021-06-10 DIAGNOSIS — D84.9 IMMUNOSUPPRESSED STATUS: ICD-10-CM

## 2021-06-10 DIAGNOSIS — Z79.899 HIGH RISK MEDICATION USE: ICD-10-CM

## 2021-06-10 DIAGNOSIS — L40.0 PLAQUE PSORIASIS: ICD-10-CM

## 2021-06-10 DIAGNOSIS — E66.01 CLASS 3 SEVERE OBESITY WITH SERIOUS COMORBIDITY AND BODY MASS INDEX (BMI) OF 45.0 TO 49.9 IN ADULT, UNSPECIFIED OBESITY TYPE: ICD-10-CM

## 2021-06-10 DIAGNOSIS — L40.50 PSORIATIC ARTHRITIS: Primary | ICD-10-CM

## 2021-06-10 PROCEDURE — 73630 X-RAY EXAM OF FOOT: CPT | Mod: 26,,, | Performed by: RADIOLOGY

## 2021-06-10 PROCEDURE — 99215 OFFICE O/P EST HI 40 MIN: CPT | Mod: S$GLB,,, | Performed by: INTERNAL MEDICINE

## 2021-06-10 PROCEDURE — 1125F AMNT PAIN NOTED PAIN PRSNT: CPT | Mod: S$GLB,,, | Performed by: INTERNAL MEDICINE

## 2021-06-10 PROCEDURE — 73630 X-RAY EXAM OF FOOT: CPT | Mod: TC,50

## 2021-06-10 PROCEDURE — 99215 PR OFFICE/OUTPT VISIT, EST, LEVL V, 40-54 MIN: ICD-10-PCS | Mod: S$GLB,,, | Performed by: INTERNAL MEDICINE

## 2021-06-10 PROCEDURE — 73130 X-RAY EXAM OF HAND: CPT | Mod: 26,,, | Performed by: RADIOLOGY

## 2021-06-10 PROCEDURE — 99999 PR PBB SHADOW E&M-EST. PATIENT-LVL IV: ICD-10-PCS | Mod: PBBFAC,,, | Performed by: INTERNAL MEDICINE

## 2021-06-10 PROCEDURE — 1125F PR PAIN SEVERITY QUANTIFIED, PAIN PRESENT: ICD-10-PCS | Mod: S$GLB,,, | Performed by: INTERNAL MEDICINE

## 2021-06-10 PROCEDURE — 3008F BODY MASS INDEX DOCD: CPT | Mod: CPTII,S$GLB,, | Performed by: INTERNAL MEDICINE

## 2021-06-10 PROCEDURE — 73130 XR HAND COMPLETE 3 VIEWS BILATERAL: ICD-10-PCS | Mod: 26,,, | Performed by: RADIOLOGY

## 2021-06-10 PROCEDURE — 73130 X-RAY EXAM OF HAND: CPT | Mod: TC,50

## 2021-06-10 PROCEDURE — 3008F PR BODY MASS INDEX (BMI) DOCUMENTED: ICD-10-PCS | Mod: CPTII,S$GLB,, | Performed by: INTERNAL MEDICINE

## 2021-06-10 PROCEDURE — 99999 PR PBB SHADOW E&M-EST. PATIENT-LVL IV: CPT | Mod: PBBFAC,,, | Performed by: INTERNAL MEDICINE

## 2021-06-10 PROCEDURE — 73630 XR FOOT COMPLETE 3 VIEW BILATERAL: ICD-10-PCS | Mod: 26,,, | Performed by: RADIOLOGY

## 2021-06-17 ENCOUNTER — TELEPHONE (OUTPATIENT)
Dept: RHEUMATOLOGY | Facility: CLINIC | Age: 41
End: 2021-06-17

## 2021-06-17 DIAGNOSIS — E66.01 MORBID OBESITY WITH BMI OF 40.0-44.9, ADULT: ICD-10-CM

## 2021-06-17 DIAGNOSIS — E66.01 CLASS 3 SEVERE OBESITY WITH SERIOUS COMORBIDITY AND BODY MASS INDEX (BMI) OF 45.0 TO 49.9 IN ADULT, UNSPECIFIED OBESITY TYPE: Primary | ICD-10-CM

## 2021-06-21 ENCOUNTER — OFFICE VISIT (OUTPATIENT)
Dept: DERMATOLOGY | Facility: CLINIC | Age: 41
End: 2021-06-21
Payer: COMMERCIAL

## 2021-06-21 DIAGNOSIS — L81.0 POST-INFLAMMATORY HYPERPIGMENTATION: ICD-10-CM

## 2021-06-21 DIAGNOSIS — L40.8 OTHER PSORIASIS: Primary | ICD-10-CM

## 2021-06-21 DIAGNOSIS — L71.9 ROSACEA: ICD-10-CM

## 2021-06-21 DIAGNOSIS — L71.9 ACNE ROSACEA: ICD-10-CM

## 2021-06-21 DIAGNOSIS — L40.9 PSORIASIS: ICD-10-CM

## 2021-06-21 PROCEDURE — 99213 PR OFFICE/OUTPT VISIT, EST, LEVL III, 20-29 MIN: ICD-10-PCS | Mod: 95,,, | Performed by: DERMATOLOGY

## 2021-06-21 PROCEDURE — 99213 OFFICE O/P EST LOW 20 MIN: CPT | Mod: 95,,, | Performed by: DERMATOLOGY

## 2021-06-21 RX ORDER — HYDROQUINONE 40 MG/G
CREAM TOPICAL
Qty: 28 G | Refills: 1 | Status: SHIPPED | OUTPATIENT
Start: 2021-06-21 | End: 2022-11-08

## 2021-06-21 RX ORDER — CALCIPOTRIENE AND BETAMETHASONE DIPROPIONATE 50; .5 UG/G; MG/G
AEROSOL, FOAM TOPICAL
Qty: 60 G | Refills: 3 | Status: SHIPPED | OUTPATIENT
Start: 2021-06-21 | End: 2021-09-08 | Stop reason: SDUPTHER

## 2021-06-28 ENCOUNTER — OFFICE VISIT (OUTPATIENT)
Dept: HEMATOLOGY/ONCOLOGY | Facility: CLINIC | Age: 41
End: 2021-06-28
Payer: COMMERCIAL

## 2021-06-28 DIAGNOSIS — M13.0 POLYARTHRITIS: ICD-10-CM

## 2021-06-28 DIAGNOSIS — R70.0 ELEVATED SED RATE: ICD-10-CM

## 2021-06-28 DIAGNOSIS — L40.0 PLAQUE PSORIASIS: ICD-10-CM

## 2021-06-28 DIAGNOSIS — R74.01 ELEVATED ALT MEASUREMENT: ICD-10-CM

## 2021-06-28 DIAGNOSIS — E61.1 IRON DEFICIENCY: Primary | ICD-10-CM

## 2021-06-28 DIAGNOSIS — L40.59 POLYARTICULAR PSORIATIC ARTHRITIS: ICD-10-CM

## 2021-06-28 PROCEDURE — 99214 PR OFFICE/OUTPT VISIT, EST, LEVL IV, 30-39 MIN: ICD-10-PCS | Mod: 95,,, | Performed by: NURSE PRACTITIONER

## 2021-06-28 PROCEDURE — 99214 OFFICE O/P EST MOD 30 MIN: CPT | Mod: 95,,, | Performed by: NURSE PRACTITIONER

## 2021-06-29 ENCOUNTER — PATIENT MESSAGE (OUTPATIENT)
Dept: HEMATOLOGY/ONCOLOGY | Facility: CLINIC | Age: 41
End: 2021-06-29

## 2021-06-29 DIAGNOSIS — R74.01 ELEVATED ALT MEASUREMENT: Primary | ICD-10-CM

## 2021-07-07 ENCOUNTER — PATIENT MESSAGE (OUTPATIENT)
Dept: PHARMACY | Facility: CLINIC | Age: 41
End: 2021-07-07

## 2021-07-08 ENCOUNTER — SPECIALTY PHARMACY (OUTPATIENT)
Dept: PHARMACY | Facility: CLINIC | Age: 41
End: 2021-07-08

## 2021-07-13 ENCOUNTER — HOSPITAL ENCOUNTER (OUTPATIENT)
Dept: RADIOLOGY | Facility: HOSPITAL | Age: 41
Discharge: HOME | End: 2021-07-13
Attending: NURSE PRACTITIONER
Payer: COMMERCIAL

## 2021-07-13 DIAGNOSIS — R74.01 ELEVATED ALT MEASUREMENT: ICD-10-CM

## 2021-07-13 PROCEDURE — 76700 US EXAM ABDOM COMPLETE: CPT | Mod: TC

## 2021-07-14 DIAGNOSIS — K76.0 FATTY LIVER: ICD-10-CM

## 2021-07-14 DIAGNOSIS — R16.0 HEPATOMEGALY: Primary | ICD-10-CM

## 2021-07-27 ENCOUNTER — LAB VISIT (OUTPATIENT)
Dept: LAB | Facility: HOSPITAL | Age: 41
End: 2021-07-27
Payer: COMMERCIAL

## 2021-07-27 ENCOUNTER — OFFICE VISIT (OUTPATIENT)
Dept: HEPATOLOGY | Facility: CLINIC | Age: 41
End: 2021-07-27
Payer: COMMERCIAL

## 2021-07-27 VITALS
WEIGHT: 293 LBS | HEART RATE: 78 BPM | HEIGHT: 72 IN | SYSTOLIC BLOOD PRESSURE: 122 MMHG | BODY MASS INDEX: 39.68 KG/M2 | DIASTOLIC BLOOD PRESSURE: 82 MMHG

## 2021-07-27 DIAGNOSIS — K76.0 FATTY LIVER: ICD-10-CM

## 2021-07-27 DIAGNOSIS — R16.0 HEPATOMEGALY: ICD-10-CM

## 2021-07-27 DIAGNOSIS — E66.01 CLASS 3 SEVERE OBESITY WITH SERIOUS COMORBIDITY AND BODY MASS INDEX (BMI) OF 45.0 TO 49.9 IN ADULT, UNSPECIFIED OBESITY TYPE: Primary | ICD-10-CM

## 2021-07-27 LAB
A1AT SERPL-MCNC: 99 MG/DL (ref 100–190)
ALBUMIN SERPL BCP-MCNC: 4 G/DL (ref 3.5–5.2)
ALP SERPL-CCNC: 68 U/L (ref 55–135)
ALT SERPL W/O P-5'-P-CCNC: 42 U/L (ref 10–44)
ANION GAP SERPL CALC-SCNC: 10 MMOL/L (ref 8–16)
AST SERPL-CCNC: 30 U/L (ref 10–40)
BILIRUB SERPL-MCNC: 0.5 MG/DL (ref 0.1–1)
BUN SERPL-MCNC: 12 MG/DL (ref 6–20)
CALCIUM SERPL-MCNC: 9.8 MG/DL (ref 8.7–10.5)
CHLORIDE SERPL-SCNC: 104 MMOL/L (ref 95–110)
CO2 SERPL-SCNC: 24 MMOL/L (ref 23–29)
CREAT SERPL-MCNC: 0.7 MG/DL (ref 0.5–1.4)
EST. GFR  (AFRICAN AMERICAN): >60 ML/MIN/1.73 M^2
EST. GFR  (NON AFRICAN AMERICAN): >60 ML/MIN/1.73 M^2
GLUCOSE SERPL-MCNC: 89 MG/DL (ref 70–110)
INR PPP: 1 (ref 0.8–1.2)
POTASSIUM SERPL-SCNC: 4.3 MMOL/L (ref 3.5–5.1)
PROT SERPL-MCNC: 7.4 G/DL (ref 6–8.4)
PROTHROMBIN TIME: 10.6 SEC (ref 9–12.5)
SODIUM SERPL-SCNC: 138 MMOL/L (ref 136–145)

## 2021-07-27 PROCEDURE — 1126F AMNT PAIN NOTED NONE PRSNT: CPT | Mod: CPTII,S$GLB,, | Performed by: NURSE PRACTITIONER

## 2021-07-27 PROCEDURE — 82103 ALPHA-1-ANTITRYPSIN TOTAL: CPT | Performed by: NURSE PRACTITIONER

## 2021-07-27 PROCEDURE — 99214 OFFICE O/P EST MOD 30 MIN: CPT | Mod: S$GLB,,, | Performed by: NURSE PRACTITIONER

## 2021-07-27 PROCEDURE — 80053 COMPREHEN METABOLIC PANEL: CPT | Performed by: NURSE PRACTITIONER

## 2021-07-27 PROCEDURE — 99999 PR PBB SHADOW E&M-EST. PATIENT-LVL IV: CPT | Mod: PBBFAC,,, | Performed by: NURSE PRACTITIONER

## 2021-07-27 PROCEDURE — 85610 PROTHROMBIN TIME: CPT | Performed by: NURSE PRACTITIONER

## 2021-07-27 PROCEDURE — 1126F PR PAIN SEVERITY QUANTIFIED, NO PAIN PRESENT: ICD-10-PCS | Mod: CPTII,S$GLB,, | Performed by: NURSE PRACTITIONER

## 2021-07-27 PROCEDURE — 86256 FLUORESCENT ANTIBODY TITER: CPT | Mod: 91 | Performed by: NURSE PRACTITIONER

## 2021-07-27 PROCEDURE — 3008F BODY MASS INDEX DOCD: CPT | Mod: CPTII,S$GLB,, | Performed by: NURSE PRACTITIONER

## 2021-07-27 PROCEDURE — 99999 PR PBB SHADOW E&M-EST. PATIENT-LVL IV: ICD-10-PCS | Mod: PBBFAC,,, | Performed by: NURSE PRACTITIONER

## 2021-07-27 PROCEDURE — 3008F PR BODY MASS INDEX (BMI) DOCUMENTED: ICD-10-PCS | Mod: CPTII,S$GLB,, | Performed by: NURSE PRACTITIONER

## 2021-07-27 PROCEDURE — 86235 NUCLEAR ANTIGEN ANTIBODY: CPT | Performed by: NURSE PRACTITIONER

## 2021-07-27 PROCEDURE — 36415 COLL VENOUS BLD VENIPUNCTURE: CPT | Performed by: NURSE PRACTITIONER

## 2021-07-27 PROCEDURE — 1159F MED LIST DOCD IN RCRD: CPT | Mod: CPTII,S$GLB,, | Performed by: NURSE PRACTITIONER

## 2021-07-27 PROCEDURE — 1159F PR MEDICATION LIST DOCUMENTED IN MEDICAL RECORD: ICD-10-PCS | Mod: CPTII,S$GLB,, | Performed by: NURSE PRACTITIONER

## 2021-07-27 PROCEDURE — 99214 PR OFFICE/OUTPT VISIT, EST, LEVL IV, 30-39 MIN: ICD-10-PCS | Mod: S$GLB,,, | Performed by: NURSE PRACTITIONER

## 2021-07-28 LAB
MITOCHONDRIA AB TITR SER IF: NORMAL {TITER}
SMOOTH MUSCLE AB TITR SER IF: NORMAL {TITER}

## 2021-07-30 ENCOUNTER — PATIENT MESSAGE (OUTPATIENT)
Dept: HEPATOLOGY | Facility: CLINIC | Age: 41
End: 2021-07-30

## 2021-08-02 ENCOUNTER — CLINICAL SUPPORT (OUTPATIENT)
Dept: REHABILITATION | Facility: HOSPITAL | Age: 41
End: 2021-08-02
Payer: COMMERCIAL

## 2021-08-02 DIAGNOSIS — L40.50 PSORIATIC ARTHRITIS: ICD-10-CM

## 2021-08-02 DIAGNOSIS — R53.1 WEAKNESS: ICD-10-CM

## 2021-08-02 DIAGNOSIS — R52 PAIN: ICD-10-CM

## 2021-08-02 DIAGNOSIS — R60.9 SWELLING: ICD-10-CM

## 2021-08-02 DIAGNOSIS — M25.60 DECREASED RANGE OF MOTION: ICD-10-CM

## 2021-08-02 PROCEDURE — 97166 OT EVAL MOD COMPLEX 45 MIN: CPT | Performed by: OCCUPATIONAL THERAPIST

## 2021-08-03 ENCOUNTER — TELEPHONE (OUTPATIENT)
Dept: HEPATOLOGY | Facility: CLINIC | Age: 41
End: 2021-08-03

## 2021-08-03 ENCOUNTER — PATIENT MESSAGE (OUTPATIENT)
Dept: HEPATOLOGY | Facility: CLINIC | Age: 41
End: 2021-08-03

## 2021-08-09 ENCOUNTER — CLINICAL SUPPORT (OUTPATIENT)
Dept: REHABILITATION | Facility: HOSPITAL | Age: 41
End: 2021-08-09
Payer: COMMERCIAL

## 2021-08-09 DIAGNOSIS — R60.9 SWELLING: ICD-10-CM

## 2021-08-09 DIAGNOSIS — R53.1 WEAKNESS: ICD-10-CM

## 2021-08-09 DIAGNOSIS — M25.60 DECREASED RANGE OF MOTION: ICD-10-CM

## 2021-08-09 DIAGNOSIS — R52 PAIN: ICD-10-CM

## 2021-08-09 PROCEDURE — 97530 THERAPEUTIC ACTIVITIES: CPT | Performed by: OCCUPATIONAL THERAPIST

## 2021-08-09 PROCEDURE — 97110 THERAPEUTIC EXERCISES: CPT | Performed by: OCCUPATIONAL THERAPIST

## 2021-08-23 ENCOUNTER — CLINICAL SUPPORT (OUTPATIENT)
Dept: REHABILITATION | Facility: HOSPITAL | Age: 41
End: 2021-08-23
Payer: COMMERCIAL

## 2021-08-23 DIAGNOSIS — R52 PAIN: ICD-10-CM

## 2021-08-23 DIAGNOSIS — R53.1 WEAKNESS: ICD-10-CM

## 2021-08-23 DIAGNOSIS — R60.9 SWELLING: ICD-10-CM

## 2021-08-23 DIAGNOSIS — M25.60 DECREASED RANGE OF MOTION: ICD-10-CM

## 2021-08-23 PROCEDURE — 97110 THERAPEUTIC EXERCISES: CPT | Performed by: OCCUPATIONAL THERAPIST

## 2021-08-23 PROCEDURE — 97530 THERAPEUTIC ACTIVITIES: CPT | Performed by: OCCUPATIONAL THERAPIST

## 2021-08-24 ENCOUNTER — PATIENT MESSAGE (OUTPATIENT)
Dept: PHARMACY | Facility: CLINIC | Age: 41
End: 2021-08-24

## 2021-09-05 ENCOUNTER — SPECIALTY PHARMACY (OUTPATIENT)
Dept: PHARMACY | Facility: CLINIC | Age: 41
End: 2021-09-05

## 2021-09-08 ENCOUNTER — CLINICAL SUPPORT (OUTPATIENT)
Dept: REHABILITATION | Facility: HOSPITAL | Age: 41
End: 2021-09-08
Payer: COMMERCIAL

## 2021-09-08 DIAGNOSIS — R53.1 WEAKNESS: ICD-10-CM

## 2021-09-08 DIAGNOSIS — R60.9 SWELLING: ICD-10-CM

## 2021-09-08 DIAGNOSIS — M25.60 DECREASED RANGE OF MOTION: ICD-10-CM

## 2021-09-08 DIAGNOSIS — R52 PAIN: ICD-10-CM

## 2021-09-08 PROCEDURE — 97110 THERAPEUTIC EXERCISES: CPT | Performed by: OCCUPATIONAL THERAPIST

## 2021-09-08 PROCEDURE — 97530 THERAPEUTIC ACTIVITIES: CPT | Performed by: OCCUPATIONAL THERAPIST

## 2021-09-09 ENCOUNTER — PATIENT MESSAGE (OUTPATIENT)
Dept: HEPATOLOGY | Facility: CLINIC | Age: 41
End: 2021-09-09

## 2021-09-13 ENCOUNTER — CLINICAL SUPPORT (OUTPATIENT)
Dept: REHABILITATION | Facility: HOSPITAL | Age: 41
End: 2021-09-13
Payer: COMMERCIAL

## 2021-09-13 DIAGNOSIS — R52 PAIN: ICD-10-CM

## 2021-09-13 DIAGNOSIS — R53.1 WEAKNESS: ICD-10-CM

## 2021-09-13 DIAGNOSIS — R60.9 SWELLING: ICD-10-CM

## 2021-09-13 DIAGNOSIS — M25.60 DECREASED RANGE OF MOTION: ICD-10-CM

## 2021-09-13 PROCEDURE — 97140 MANUAL THERAPY 1/> REGIONS: CPT | Performed by: OCCUPATIONAL THERAPIST

## 2021-09-13 PROCEDURE — 97110 THERAPEUTIC EXERCISES: CPT | Performed by: OCCUPATIONAL THERAPIST

## 2021-09-13 PROCEDURE — 97530 THERAPEUTIC ACTIVITIES: CPT | Performed by: OCCUPATIONAL THERAPIST

## 2021-09-24 ENCOUNTER — PATIENT MESSAGE (OUTPATIENT)
Dept: HEPATOLOGY | Facility: CLINIC | Age: 41
End: 2021-09-24

## 2021-09-24 ENCOUNTER — PATIENT MESSAGE (OUTPATIENT)
Dept: DERMATOLOGY | Facility: CLINIC | Age: 41
End: 2021-09-24

## 2021-09-27 ENCOUNTER — LAB VISIT (OUTPATIENT)
Dept: LAB | Facility: HOSPITAL | Age: 41
End: 2021-09-27
Attending: INTERNAL MEDICINE
Payer: COMMERCIAL

## 2021-09-27 ENCOUNTER — CLINICAL SUPPORT (OUTPATIENT)
Dept: REHABILITATION | Facility: HOSPITAL | Age: 41
End: 2021-09-27
Payer: COMMERCIAL

## 2021-09-27 ENCOUNTER — OFFICE VISIT (OUTPATIENT)
Dept: RHEUMATOLOGY | Facility: CLINIC | Age: 41
End: 2021-09-27
Payer: COMMERCIAL

## 2021-09-27 VITALS
WEIGHT: 293 LBS | HEIGHT: 72 IN | BODY MASS INDEX: 39.68 KG/M2 | SYSTOLIC BLOOD PRESSURE: 136 MMHG | DIASTOLIC BLOOD PRESSURE: 83 MMHG | HEART RATE: 73 BPM

## 2021-09-27 DIAGNOSIS — M13.0 POLYARTHRITIS: ICD-10-CM

## 2021-09-27 DIAGNOSIS — E61.1 IRON DEFICIENCY: ICD-10-CM

## 2021-09-27 DIAGNOSIS — L40.50 PSORIATIC ARTHRITIS: Primary | ICD-10-CM

## 2021-09-27 DIAGNOSIS — R70.0 ELEVATED SED RATE: ICD-10-CM

## 2021-09-27 DIAGNOSIS — M25.60 DECREASED RANGE OF MOTION: ICD-10-CM

## 2021-09-27 DIAGNOSIS — U07.1 COVID: ICD-10-CM

## 2021-09-27 DIAGNOSIS — Z79.899 HIGH RISK MEDICATION USE: ICD-10-CM

## 2021-09-27 DIAGNOSIS — R60.9 SWELLING: ICD-10-CM

## 2021-09-27 DIAGNOSIS — L40.59 POLYARTICULAR PSORIATIC ARTHRITIS: ICD-10-CM

## 2021-09-27 DIAGNOSIS — L40.0 PLAQUE PSORIASIS: ICD-10-CM

## 2021-09-27 DIAGNOSIS — R52 PAIN: ICD-10-CM

## 2021-09-27 DIAGNOSIS — R53.1 WEAKNESS: ICD-10-CM

## 2021-09-27 DIAGNOSIS — D84.9 IMMUNOSUPPRESSED STATUS: ICD-10-CM

## 2021-09-27 LAB
ALBUMIN SERPL BCP-MCNC: 4.1 G/DL (ref 3.5–5.2)
ALP SERPL-CCNC: 71 U/L (ref 55–135)
ALT SERPL W/O P-5'-P-CCNC: 47 U/L (ref 10–44)
ANION GAP SERPL CALC-SCNC: 11 MMOL/L (ref 8–16)
AST SERPL-CCNC: 30 U/L (ref 10–40)
BASOPHILS # BLD AUTO: 0.06 K/UL (ref 0–0.2)
BASOPHILS NFR BLD: 0.5 % (ref 0–1.9)
BILIRUB SERPL-MCNC: 0.4 MG/DL (ref 0.1–1)
BUN SERPL-MCNC: 12 MG/DL (ref 6–20)
CALCIUM SERPL-MCNC: 9.3 MG/DL (ref 8.7–10.5)
CHLORIDE SERPL-SCNC: 102 MMOL/L (ref 95–110)
CO2 SERPL-SCNC: 23 MMOL/L (ref 23–29)
CREAT SERPL-MCNC: 0.8 MG/DL (ref 0.5–1.4)
CRP SERPL-MCNC: 11.4 MG/L (ref 0–8.2)
DIFFERENTIAL METHOD: ABNORMAL
EOSINOPHIL # BLD AUTO: 0.6 K/UL (ref 0–0.5)
EOSINOPHIL NFR BLD: 5.1 % (ref 0–8)
ERYTHROCYTE [DISTWIDTH] IN BLOOD BY AUTOMATED COUNT: 14.4 % (ref 11.5–14.5)
EST. GFR  (AFRICAN AMERICAN): >60 ML/MIN/1.73 M^2
EST. GFR  (NON AFRICAN AMERICAN): >60 ML/MIN/1.73 M^2
FERRITIN SERPL-MCNC: 50 NG/ML (ref 20–300)
GLUCOSE SERPL-MCNC: 90 MG/DL (ref 70–110)
HCT VFR BLD AUTO: 40.2 % (ref 37–48.5)
HGB BLD-MCNC: 13.1 G/DL (ref 12–16)
IMM GRANULOCYTES # BLD AUTO: 0.05 K/UL (ref 0–0.04)
IMM GRANULOCYTES NFR BLD AUTO: 0.4 % (ref 0–0.5)
IRON SERPL-MCNC: 31 UG/DL (ref 30–160)
LYMPHOCYTES # BLD AUTO: 2.7 K/UL (ref 1–4.8)
LYMPHOCYTES NFR BLD: 24.3 % (ref 18–48)
MCH RBC QN AUTO: 28.1 PG (ref 27–31)
MCHC RBC AUTO-ENTMCNC: 32.6 G/DL (ref 32–36)
MCV RBC AUTO: 86 FL (ref 82–98)
MONOCYTES # BLD AUTO: 0.6 K/UL (ref 0.3–1)
MONOCYTES NFR BLD: 5 % (ref 4–15)
NEUTROPHILS # BLD AUTO: 7.3 K/UL (ref 1.8–7.7)
NEUTROPHILS NFR BLD: 64.7 % (ref 38–73)
NRBC BLD-RTO: 0 /100 WBC
PLATELET # BLD AUTO: 373 K/UL (ref 150–450)
PMV BLD AUTO: 8.8 FL (ref 9.2–12.9)
POTASSIUM SERPL-SCNC: 4.2 MMOL/L (ref 3.5–5.1)
PROT SERPL-MCNC: 7.7 G/DL (ref 6–8.4)
RBC # BLD AUTO: 4.67 M/UL (ref 4–5.4)
SATURATED IRON: 7 % (ref 20–50)
SODIUM SERPL-SCNC: 136 MMOL/L (ref 136–145)
TOTAL IRON BINDING CAPACITY: 417 UG/DL (ref 250–450)
TRANSFERRIN SERPL-MCNC: 282 MG/DL (ref 200–375)
WBC # BLD AUTO: 11.24 K/UL (ref 3.9–12.7)

## 2021-09-27 PROCEDURE — 1159F MED LIST DOCD IN RCRD: CPT | Mod: CPTII,S$GLB,, | Performed by: INTERNAL MEDICINE

## 2021-09-27 PROCEDURE — 1160F RVW MEDS BY RX/DR IN RCRD: CPT | Mod: CPTII,S$GLB,, | Performed by: INTERNAL MEDICINE

## 2021-09-27 PROCEDURE — 3079F PR MOST RECENT DIASTOLIC BLOOD PRESSURE 80-89 MM HG: ICD-10-PCS | Mod: CPTII,S$GLB,, | Performed by: INTERNAL MEDICINE

## 2021-09-27 PROCEDURE — 80053 COMPREHEN METABOLIC PANEL: CPT | Performed by: INTERNAL MEDICINE

## 2021-09-27 PROCEDURE — 85025 COMPLETE CBC W/AUTO DIFF WBC: CPT | Performed by: INTERNAL MEDICINE

## 2021-09-27 PROCEDURE — 97110 THERAPEUTIC EXERCISES: CPT | Performed by: OCCUPATIONAL THERAPIST

## 2021-09-27 PROCEDURE — 86140 C-REACTIVE PROTEIN: CPT | Performed by: INTERNAL MEDICINE

## 2021-09-27 PROCEDURE — 85652 RBC SED RATE AUTOMATED: CPT | Performed by: INTERNAL MEDICINE

## 2021-09-27 PROCEDURE — 3008F PR BODY MASS INDEX (BMI) DOCUMENTED: ICD-10-PCS | Mod: CPTII,S$GLB,, | Performed by: INTERNAL MEDICINE

## 2021-09-27 PROCEDURE — 99999 PR PBB SHADOW E&M-EST. PATIENT-LVL III: ICD-10-PCS | Mod: PBBFAC,,, | Performed by: INTERNAL MEDICINE

## 2021-09-27 PROCEDURE — 1160F PR REVIEW ALL MEDS BY PRESCRIBER/CLIN PHARMACIST DOCUMENTED: ICD-10-PCS | Mod: CPTII,S$GLB,, | Performed by: INTERNAL MEDICINE

## 2021-09-27 PROCEDURE — 3079F DIAST BP 80-89 MM HG: CPT | Mod: CPTII,S$GLB,, | Performed by: INTERNAL MEDICINE

## 2021-09-27 PROCEDURE — 99215 OFFICE O/P EST HI 40 MIN: CPT | Mod: S$GLB,,, | Performed by: INTERNAL MEDICINE

## 2021-09-27 PROCEDURE — 3075F SYST BP GE 130 - 139MM HG: CPT | Mod: CPTII,S$GLB,, | Performed by: INTERNAL MEDICINE

## 2021-09-27 PROCEDURE — 97140 MANUAL THERAPY 1/> REGIONS: CPT | Performed by: OCCUPATIONAL THERAPIST

## 2021-09-27 PROCEDURE — 99999 PR PBB SHADOW E&M-EST. PATIENT-LVL III: CPT | Mod: PBBFAC,,, | Performed by: INTERNAL MEDICINE

## 2021-09-27 PROCEDURE — 36415 COLL VENOUS BLD VENIPUNCTURE: CPT | Performed by: NURSE PRACTITIONER

## 2021-09-27 PROCEDURE — 3008F BODY MASS INDEX DOCD: CPT | Mod: CPTII,S$GLB,, | Performed by: INTERNAL MEDICINE

## 2021-09-27 PROCEDURE — 82728 ASSAY OF FERRITIN: CPT | Performed by: NURSE PRACTITIONER

## 2021-09-27 PROCEDURE — 99215 PR OFFICE/OUTPT VISIT, EST, LEVL V, 40-54 MIN: ICD-10-PCS | Mod: S$GLB,,, | Performed by: INTERNAL MEDICINE

## 2021-09-27 PROCEDURE — 1159F PR MEDICATION LIST DOCUMENTED IN MEDICAL RECORD: ICD-10-PCS | Mod: CPTII,S$GLB,, | Performed by: INTERNAL MEDICINE

## 2021-09-27 PROCEDURE — 3075F PR MOST RECENT SYSTOLIC BLOOD PRESS GE 130-139MM HG: ICD-10-PCS | Mod: CPTII,S$GLB,, | Performed by: INTERNAL MEDICINE

## 2021-09-27 PROCEDURE — 83540 ASSAY OF IRON: CPT | Performed by: NURSE PRACTITIONER

## 2021-09-27 RX ORDER — LEFLUNOMIDE 20 MG/1
20 TABLET ORAL DAILY
Qty: 30 TABLET | Refills: 0 | Status: SHIPPED | OUTPATIENT
Start: 2021-09-27 | End: 2021-10-27 | Stop reason: SDUPTHER

## 2021-09-28 LAB — ERYTHROCYTE [SEDIMENTATION RATE] IN BLOOD BY WESTERGREN METHOD: 27 MM/HR (ref 0–36)

## 2021-10-07 ENCOUNTER — OFFICE VISIT (OUTPATIENT)
Dept: HEMATOLOGY/ONCOLOGY | Facility: CLINIC | Age: 41
End: 2021-10-07
Payer: COMMERCIAL

## 2021-10-07 DIAGNOSIS — D50.9 IRON DEFICIENCY ANEMIA, UNSPECIFIED IRON DEFICIENCY ANEMIA TYPE: ICD-10-CM

## 2021-10-07 DIAGNOSIS — E61.1 IRON DEFICIENCY: Primary | ICD-10-CM

## 2021-10-07 PROCEDURE — 1159F PR MEDICATION LIST DOCUMENTED IN MEDICAL RECORD: ICD-10-PCS | Mod: CPTII,95,, | Performed by: NURSE PRACTITIONER

## 2021-10-07 PROCEDURE — 1160F RVW MEDS BY RX/DR IN RCRD: CPT | Mod: CPTII,95,, | Performed by: NURSE PRACTITIONER

## 2021-10-07 PROCEDURE — 1159F MED LIST DOCD IN RCRD: CPT | Mod: CPTII,95,, | Performed by: NURSE PRACTITIONER

## 2021-10-07 PROCEDURE — 1160F PR REVIEW ALL MEDS BY PRESCRIBER/CLIN PHARMACIST DOCUMENTED: ICD-10-PCS | Mod: CPTII,95,, | Performed by: NURSE PRACTITIONER

## 2021-10-07 PROCEDURE — 99214 PR OFFICE/OUTPT VISIT, EST, LEVL IV, 30-39 MIN: ICD-10-PCS | Mod: 95,,, | Performed by: NURSE PRACTITIONER

## 2021-10-07 PROCEDURE — 99214 OFFICE O/P EST MOD 30 MIN: CPT | Mod: 95,,, | Performed by: NURSE PRACTITIONER

## 2021-10-07 RX ORDER — FERROUS SULFATE 325(65) MG
325 TABLET ORAL DAILY
Qty: 90 TABLET | Refills: 3 | Status: SHIPPED | OUTPATIENT
Start: 2021-10-07 | End: 2023-05-09

## 2021-10-13 DIAGNOSIS — L20.89 OTHER ATOPIC DERMATITIS: Primary | ICD-10-CM

## 2021-10-13 RX ORDER — TACROLIMUS 1 MG/G
OINTMENT TOPICAL
Qty: 60 G | Refills: 3 | Status: SHIPPED | OUTPATIENT
Start: 2021-10-13 | End: 2022-01-10

## 2021-10-25 ENCOUNTER — OFFICE VISIT (OUTPATIENT)
Dept: OBSTETRICS AND GYNECOLOGY | Facility: CLINIC | Age: 41
End: 2021-10-25
Payer: COMMERCIAL

## 2021-10-25 ENCOUNTER — LAB VISIT (OUTPATIENT)
Dept: LAB | Facility: HOSPITAL | Age: 41
End: 2021-10-25
Attending: INTERNAL MEDICINE
Payer: COMMERCIAL

## 2021-10-25 VITALS
WEIGHT: 293 LBS | HEIGHT: 72 IN | SYSTOLIC BLOOD PRESSURE: 140 MMHG | BODY MASS INDEX: 39.68 KG/M2 | DIASTOLIC BLOOD PRESSURE: 84 MMHG

## 2021-10-25 DIAGNOSIS — L40.59 POLYARTICULAR PSORIATIC ARTHRITIS: ICD-10-CM

## 2021-10-25 DIAGNOSIS — L40.0 PLAQUE PSORIASIS: ICD-10-CM

## 2021-10-25 DIAGNOSIS — Z01.419 WELL WOMAN EXAM WITH ROUTINE GYNECOLOGICAL EXAM: Primary | ICD-10-CM

## 2021-10-25 DIAGNOSIS — Z91.89 AT INCREASED RISK OF BREAST CANCER: ICD-10-CM

## 2021-10-25 DIAGNOSIS — Z11.3 SCREEN FOR STD (SEXUALLY TRANSMITTED DISEASE): ICD-10-CM

## 2021-10-25 LAB
ALBUMIN SERPL BCP-MCNC: 4.1 G/DL (ref 3.5–5.2)
ALP SERPL-CCNC: 86 U/L (ref 55–135)
ALT SERPL W/O P-5'-P-CCNC: 55 U/L (ref 10–44)
ANION GAP SERPL CALC-SCNC: 11 MMOL/L (ref 8–16)
AST SERPL-CCNC: 28 U/L (ref 10–40)
BASOPHILS # BLD AUTO: 0.07 K/UL (ref 0–0.2)
BASOPHILS NFR BLD: 0.6 % (ref 0–1.9)
BILIRUB SERPL-MCNC: 0.5 MG/DL (ref 0.1–1)
BUN SERPL-MCNC: 10 MG/DL (ref 6–20)
CALCIUM SERPL-MCNC: 9.4 MG/DL (ref 8.7–10.5)
CHLORIDE SERPL-SCNC: 104 MMOL/L (ref 95–110)
CO2 SERPL-SCNC: 25 MMOL/L (ref 23–29)
CREAT SERPL-MCNC: 0.8 MG/DL (ref 0.5–1.4)
CRP SERPL-MCNC: 8.4 MG/L (ref 0–8.2)
DIFFERENTIAL METHOD: ABNORMAL
EOSINOPHIL # BLD AUTO: 0.7 K/UL (ref 0–0.5)
EOSINOPHIL NFR BLD: 5.7 % (ref 0–8)
ERYTHROCYTE [DISTWIDTH] IN BLOOD BY AUTOMATED COUNT: 14.4 % (ref 11.5–14.5)
EST. GFR  (AFRICAN AMERICAN): >60 ML/MIN/1.73 M^2
EST. GFR  (NON AFRICAN AMERICAN): >60 ML/MIN/1.73 M^2
GLUCOSE SERPL-MCNC: 127 MG/DL (ref 70–110)
HCT VFR BLD AUTO: 42.6 % (ref 37–48.5)
HGB BLD-MCNC: 13.8 G/DL (ref 12–16)
IMM GRANULOCYTES # BLD AUTO: 0.05 K/UL (ref 0–0.04)
IMM GRANULOCYTES NFR BLD AUTO: 0.4 % (ref 0–0.5)
LYMPHOCYTES # BLD AUTO: 2.8 K/UL (ref 1–4.8)
LYMPHOCYTES NFR BLD: 24.9 % (ref 18–48)
MCH RBC QN AUTO: 27.6 PG (ref 27–31)
MCHC RBC AUTO-ENTMCNC: 32.4 G/DL (ref 32–36)
MCV RBC AUTO: 85 FL (ref 82–98)
MONOCYTES # BLD AUTO: 0.5 K/UL (ref 0.3–1)
MONOCYTES NFR BLD: 4.4 % (ref 4–15)
NEUTROPHILS # BLD AUTO: 7.3 K/UL (ref 1.8–7.7)
NEUTROPHILS NFR BLD: 64 % (ref 38–73)
NRBC BLD-RTO: 0 /100 WBC
PLATELET # BLD AUTO: 446 K/UL (ref 150–450)
PMV BLD AUTO: 8.8 FL (ref 9.2–12.9)
POTASSIUM SERPL-SCNC: 4.4 MMOL/L (ref 3.5–5.1)
PROT SERPL-MCNC: 7.6 G/DL (ref 6–8.4)
RBC # BLD AUTO: 5 M/UL (ref 4–5.4)
SODIUM SERPL-SCNC: 140 MMOL/L (ref 136–145)
WBC # BLD AUTO: 11.39 K/UL (ref 3.9–12.7)

## 2021-10-25 PROCEDURE — 99999 PR PBB SHADOW E&M-EST. PATIENT-LVL III: CPT | Mod: PBBFAC,,, | Performed by: OBSTETRICS & GYNECOLOGY

## 2021-10-25 PROCEDURE — 86140 C-REACTIVE PROTEIN: CPT | Performed by: INTERNAL MEDICINE

## 2021-10-25 PROCEDURE — 1159F MED LIST DOCD IN RCRD: CPT | Mod: CPTII,S$GLB,, | Performed by: OBSTETRICS & GYNECOLOGY

## 2021-10-25 PROCEDURE — 1160F RVW MEDS BY RX/DR IN RCRD: CPT | Mod: CPTII,S$GLB,, | Performed by: OBSTETRICS & GYNECOLOGY

## 2021-10-25 PROCEDURE — 80053 COMPREHEN METABOLIC PANEL: CPT | Performed by: INTERNAL MEDICINE

## 2021-10-25 PROCEDURE — 87591 N.GONORRHOEAE DNA AMP PROB: CPT | Performed by: OBSTETRICS & GYNECOLOGY

## 2021-10-25 PROCEDURE — 3079F DIAST BP 80-89 MM HG: CPT | Mod: CPTII,S$GLB,, | Performed by: OBSTETRICS & GYNECOLOGY

## 2021-10-25 PROCEDURE — 99395 PREV VISIT EST AGE 18-39: CPT | Mod: S$GLB,,, | Performed by: OBSTETRICS & GYNECOLOGY

## 2021-10-25 PROCEDURE — 1160F PR REVIEW ALL MEDS BY PRESCRIBER/CLIN PHARMACIST DOCUMENTED: ICD-10-PCS | Mod: CPTII,S$GLB,, | Performed by: OBSTETRICS & GYNECOLOGY

## 2021-10-25 PROCEDURE — 99395 PR PREVENTIVE VISIT,EST,18-39: ICD-10-PCS | Mod: S$GLB,,, | Performed by: OBSTETRICS & GYNECOLOGY

## 2021-10-25 PROCEDURE — 36415 COLL VENOUS BLD VENIPUNCTURE: CPT | Performed by: INTERNAL MEDICINE

## 2021-10-25 PROCEDURE — 3077F SYST BP >= 140 MM HG: CPT | Mod: CPTII,S$GLB,, | Performed by: OBSTETRICS & GYNECOLOGY

## 2021-10-25 PROCEDURE — 87491 CHLMYD TRACH DNA AMP PROBE: CPT | Performed by: OBSTETRICS & GYNECOLOGY

## 2021-10-25 PROCEDURE — 85025 COMPLETE CBC W/AUTO DIFF WBC: CPT | Performed by: INTERNAL MEDICINE

## 2021-10-25 PROCEDURE — 3008F BODY MASS INDEX DOCD: CPT | Mod: CPTII,S$GLB,, | Performed by: OBSTETRICS & GYNECOLOGY

## 2021-10-25 PROCEDURE — 3079F PR MOST RECENT DIASTOLIC BLOOD PRESSURE 80-89 MM HG: ICD-10-PCS | Mod: CPTII,S$GLB,, | Performed by: OBSTETRICS & GYNECOLOGY

## 2021-10-25 PROCEDURE — 1159F PR MEDICATION LIST DOCUMENTED IN MEDICAL RECORD: ICD-10-PCS | Mod: CPTII,S$GLB,, | Performed by: OBSTETRICS & GYNECOLOGY

## 2021-10-25 PROCEDURE — 3008F PR BODY MASS INDEX (BMI) DOCUMENTED: ICD-10-PCS | Mod: CPTII,S$GLB,, | Performed by: OBSTETRICS & GYNECOLOGY

## 2021-10-25 PROCEDURE — 85652 RBC SED RATE AUTOMATED: CPT | Performed by: INTERNAL MEDICINE

## 2021-10-25 PROCEDURE — 99999 PR PBB SHADOW E&M-EST. PATIENT-LVL III: ICD-10-PCS | Mod: PBBFAC,,, | Performed by: OBSTETRICS & GYNECOLOGY

## 2021-10-25 PROCEDURE — 3077F PR MOST RECENT SYSTOLIC BLOOD PRESSURE >= 140 MM HG: ICD-10-PCS | Mod: CPTII,S$GLB,, | Performed by: OBSTETRICS & GYNECOLOGY

## 2021-10-26 LAB
C TRACH DNA SPEC QL NAA+PROBE: NOT DETECTED
ERYTHROCYTE [SEDIMENTATION RATE] IN BLOOD BY WESTERGREN METHOD: 20 MM/HR (ref 0–36)
N GONORRHOEA DNA SPEC QL NAA+PROBE: NOT DETECTED

## 2021-10-27 ENCOUNTER — PATIENT MESSAGE (OUTPATIENT)
Dept: RHEUMATOLOGY | Facility: CLINIC | Age: 41
End: 2021-10-27
Payer: COMMERCIAL

## 2021-10-27 DIAGNOSIS — L40.59 POLYARTICULAR PSORIATIC ARTHRITIS: Primary | ICD-10-CM

## 2021-10-27 DIAGNOSIS — L40.50 PSORIATIC ARTHRITIS: ICD-10-CM

## 2021-10-27 DIAGNOSIS — L40.0 PLAQUE PSORIASIS: ICD-10-CM

## 2021-10-27 RX ORDER — LEFLUNOMIDE 20 MG/1
20 TABLET ORAL DAILY
Qty: 30 TABLET | Refills: 2 | Status: SHIPPED | OUTPATIENT
Start: 2021-10-27 | End: 2022-01-13

## 2021-10-27 RX ORDER — PREDNISONE 5 MG/1
5 TABLET ORAL DAILY
Qty: 90 TABLET | Refills: 0 | Status: SHIPPED | OUTPATIENT
Start: 2021-10-27 | End: 2022-01-26

## 2021-11-03 ENCOUNTER — SPECIALTY PHARMACY (OUTPATIENT)
Dept: PHARMACY | Facility: CLINIC | Age: 41
End: 2021-11-03
Payer: COMMERCIAL

## 2021-12-20 ENCOUNTER — SPECIALTY PHARMACY (OUTPATIENT)
Dept: PHARMACY | Facility: CLINIC | Age: 41
End: 2021-12-20
Payer: COMMERCIAL

## 2021-12-27 ENCOUNTER — PATIENT MESSAGE (OUTPATIENT)
Dept: ADMINISTRATIVE | Facility: OTHER | Age: 41
End: 2021-12-27
Payer: COMMERCIAL

## 2021-12-29 ENCOUNTER — PATIENT MESSAGE (OUTPATIENT)
Dept: RHEUMATOLOGY | Facility: CLINIC | Age: 41
End: 2021-12-29
Payer: COMMERCIAL

## 2021-12-30 RX ORDER — SERTRALINE HYDROCHLORIDE 100 MG/1
100 TABLET, FILM COATED ORAL DAILY
Qty: 30 TABLET | Refills: 5 | Status: SHIPPED | OUTPATIENT
Start: 2021-12-30 | End: 2022-06-24 | Stop reason: SDUPTHER

## 2022-01-03 ENCOUNTER — PATIENT MESSAGE (OUTPATIENT)
Dept: INFECTIOUS DISEASES | Facility: HOSPITAL | Age: 42
End: 2022-01-03
Payer: COMMERCIAL

## 2022-01-04 ENCOUNTER — TELEPHONE (OUTPATIENT)
Dept: RHEUMATOLOGY | Facility: CLINIC | Age: 42
End: 2022-01-04
Payer: COMMERCIAL

## 2022-01-04 ENCOUNTER — SPECIALTY PHARMACY (OUTPATIENT)
Dept: PHARMACY | Facility: CLINIC | Age: 42
End: 2022-01-04
Payer: COMMERCIAL

## 2022-01-04 NOTE — TELEPHONE ENCOUNTER
Called patient to review COVID-19 symptoms and Tremfya refill appropriateness. Patient reports she was diagnosed with COVID-19 on 12/28. She stated she is feeling much better now. She reports cold symptoms still though, including sinus pressure. Patient denies fever. She stated she last injected Tremfya on 12/22 Wed. Next dose due Wed 1/16. Patient stated she has been taking Arava since she is feeling better and did not hold Arava. Rheum provider had advised to hold both Arava and Tremfya until COVDI-19 sx resolve. Informed [facility] will reach out to Rheum to review if Arava is okay to continue at this point, for additional advisement. Patient had no further questions or concerns. Pended Tremfya refill task out as appropriate.     Messaged Dr. Cui and staff to update and asked to review if Rheum advises to keep taking Arava at this point, despite sinus pressure, since patient feels better.

## 2022-01-04 NOTE — TELEPHONE ENCOUNTER
----- Message from Leo MaxwellD sent at 1/4/2022  9:57 AM CST -----  Regarding: Tremfya and Arava  Good morning Dr. Cui and staff,    Patient reported she was diagnosed with COVID-19 on 12/28. She stated she is feeling much better now. She reports cold symptoms still though, including sinus pressure. Patient denies fever.     She stated she last injected Tremfya on 12/22 Wed. Next dose due Wed 1/16. Patient stated she has been taking Arava since she is feeling better and did not hold Arava as advised. Rheum provider had initially advised to hold both Arava and Tremfya until COVID-19 sx resolve. Since patient has continued Arava, do you advise she keep taking as prescribed at this point? She said COVID infusion was declined - appears ID considered her low risk.     Thank you,    Laxmi Medina, PharmD  Clinical Pharmacist  [facility]  [phone]

## 2022-01-05 NOTE — TELEPHONE ENCOUNTER
LVM with patient to inform Dr. Cui advised to continue Arava as patient has been and advised okay to inject Tremfya on 1/16.

## 2022-01-24 ENCOUNTER — SPECIALTY PHARMACY (OUTPATIENT)
Dept: PHARMACY | Facility: CLINIC | Age: 42
End: 2022-01-24
Payer: COMMERCIAL

## 2022-01-24 NOTE — TELEPHONE ENCOUNTER
Specialty Pharmacy - Refill Coordination     Patient insistent that she is due this week, and note regarding previous injection is incorrect.     Specialty Medication Orders Linked to Encounter    Flowsheet Row Most Recent Value   Medication #1 guselkumab (TREMFYA) 100 mg/mL Syrg (Order#[id number], Rx#[id number])          Refill Questions - Documented Responses    Flowsheet Row Most Recent Value   Patient Availability and HIPAA Verification    Does patient want to proceed with activity? Yes   HIPAA/medical authority confirmed? Yes   Relationship to patient of person spoken to? Self   Refill Screening Questions    Changes to allergies? No   Changes to medications? No   New conditions since last clinic visit? No   Unplanned office visit, urgent care, ED, or hospital admission in the last 4 weeks? No   How does patient/caregiver feel medication is working? Very good   Financial problems or insurance changes? No   How many doses of your specialty medications were missed in the last 4 weeks? 0   Would patient like to speak to a pharmacist? No   When does the patient need to receive the medication? 01/27/22   Refill Delivery Questions    How will the patient receive the medication? Delivery Solange   When does the patient need to receive the medication? 01/27/22   Shipping Address Home   Address in [facility] confirmed and updated if neccessary? Yes   Expected Copay ($) 5   Is the patient able to afford the medication copay? Yes   Payment Method CC on file   Days supply of Refill 56   Supplies needed? No supplies needed   Refill activity completed? Yes   Refill activity plan Refill scheduled   Shipment/Pickup Date: 01/25/22          Current Outpatient Medications   Medication Sig    azelaic acid (FINACEA) 15 % Foam AAA bid for acne rosacea on face    celecoxib (CELEBREX) 200 MG capsule TAKE ONE CAPSULE BY MOUTH TWICE DAILY    ENSTILAR 0.005-0.064 % Foam AAA qD prn. Do not use on face or folds.    ergocalciferol  (ERGOCALCIFEROL) 50,000 unit Cap TAKE ONE CAPSULE BY MOUTH TWICE A WEEK    esomeprazole (NEXIUM) 40 MG capsule TAKE ONE CAPSULE BY MOUTH EVERY DAY BEFORE BREAKFAST    ferrous sulfate (FEOSOL) 325 mg (65 mg iron) Tab tablet Take 1 tablet (325 mg total) by mouth once daily.    folic acid (FOLVITE) 1 MG tablet TAKE ONE TABLET BY MOUTH DAILY    guselkumab (TREMFYA) 100 mg/mL Syrg Inject 100 mg at 0 and 4 weeks and then every 8 weeks thereafter.    hydroquinone 4 % Crea Apply to dark spots once daily. Use with sunscreen if outdoors    ketoconazole (NIZORAL) 2 % cream Apply topically 2 (two) times daily.    leflunomide (ARAVA) 20 MG Tab TAKE ONE TABLET BY MOUTH ONCE DAILY    predniSONE (DELTASONE) 5 MG tablet Take 1 tablet (5 mg total) by mouth once daily.    sertraline (ZOLOFT) 100 MG tablet Take 1 tablet (100 mg total) by mouth once daily.    tacrolimus (PROTOPIC) 0.1 % ointment APPLY TO THE AFFECTED AREA(S) TWICE DAILY AS NEEDED FOR ECZEMA    triamcinolone acetonide 0.025% (KENALOG) 0.025 % cream APPLY TO THE AFFECTED AREA(S) TWICE DAILY AS NEEDED FOR psoriasis ON THE face   Last reviewed on 10/25/2021  2:02 PM by Vaibhav Stinson MD    Review of patient's allergies indicates:  No Known Allergies Last reviewed on  12/30/2021 8:42 AM by Mehul Magnaa      Tasks added this encounter   No tasks added.   Tasks due within next 3 months   2/9/2022 - Refill Call (Auto Added)     Flor Puentes, PharmD  [facility]  [address] [place] [id number]  Phone: [phone]  Fax: [phone]

## 2022-01-26 ENCOUNTER — LAB VISIT (OUTPATIENT)
Dept: LAB | Facility: HOSPITAL | Age: 42
End: 2022-01-26
Attending: INTERNAL MEDICINE
Payer: COMMERCIAL

## 2022-01-26 ENCOUNTER — OFFICE VISIT (OUTPATIENT)
Dept: RHEUMATOLOGY | Facility: CLINIC | Age: 42
End: 2022-01-26
Payer: COMMERCIAL

## 2022-01-26 VITALS
BODY MASS INDEX: 39.68 KG/M2 | HEIGHT: 72 IN | DIASTOLIC BLOOD PRESSURE: 90 MMHG | HEART RATE: 78 BPM | SYSTOLIC BLOOD PRESSURE: 152 MMHG | WEIGHT: 293 LBS

## 2022-01-26 DIAGNOSIS — E66.01 OBESITY, MORBID, BMI 40.0-49.9: ICD-10-CM

## 2022-01-26 DIAGNOSIS — L40.0 PLAQUE PSORIASIS: ICD-10-CM

## 2022-01-26 DIAGNOSIS — L40.59 POLYARTICULAR PSORIATIC ARTHRITIS: ICD-10-CM

## 2022-01-26 DIAGNOSIS — L40.59 POLYARTICULAR PSORIATIC ARTHRITIS: Primary | ICD-10-CM

## 2022-01-26 DIAGNOSIS — Z79.899 HIGH RISK MEDICATION USE: ICD-10-CM

## 2022-01-26 DIAGNOSIS — D84.9 IMMUNOSUPPRESSED STATUS: ICD-10-CM

## 2022-01-26 LAB
ALBUMIN SERPL BCP-MCNC: 4.1 G/DL (ref 3.5–5.2)
ALP SERPL-CCNC: 79 U/L (ref 55–135)
ALT SERPL W/O P-5'-P-CCNC: 36 U/L (ref 10–44)
ANION GAP SERPL CALC-SCNC: 10 MMOL/L (ref 8–16)
AST SERPL-CCNC: 28 U/L (ref 10–40)
BASOPHILS # BLD AUTO: 0.09 K/UL (ref 0–0.2)
BASOPHILS NFR BLD: 0.7 % (ref 0–1.9)
BILIRUB SERPL-MCNC: 0.5 MG/DL (ref 0.1–1)
BUN SERPL-MCNC: 14 MG/DL (ref 6–20)
CALCIUM SERPL-MCNC: 9.5 MG/DL (ref 8.7–10.5)
CHLORIDE SERPL-SCNC: 101 MMOL/L (ref 95–110)
CO2 SERPL-SCNC: 26 MMOL/L (ref 23–29)
CREAT SERPL-MCNC: 0.8 MG/DL (ref 0.5–1.4)
CRP SERPL-MCNC: 14.2 MG/L (ref 0–8.2)
DIFFERENTIAL METHOD: ABNORMAL
EOSINOPHIL # BLD AUTO: 0.3 K/UL (ref 0–0.5)
EOSINOPHIL NFR BLD: 2.7 % (ref 0–8)
ERYTHROCYTE [DISTWIDTH] IN BLOOD BY AUTOMATED COUNT: 15.1 % (ref 11.5–14.5)
ERYTHROCYTE [SEDIMENTATION RATE] IN BLOOD BY WESTERGREN METHOD: 48 MM/HR (ref 0–36)
EST. GFR  (AFRICAN AMERICAN): >60 ML/MIN/1.73 M^2
EST. GFR  (NON AFRICAN AMERICAN): >60 ML/MIN/1.73 M^2
GLUCOSE SERPL-MCNC: 95 MG/DL (ref 70–110)
HCT VFR BLD AUTO: 40.8 % (ref 37–48.5)
HGB BLD-MCNC: 13.1 G/DL (ref 12–16)
IMM GRANULOCYTES # BLD AUTO: 0.07 K/UL (ref 0–0.04)
IMM GRANULOCYTES NFR BLD AUTO: 0.6 % (ref 0–0.5)
LYMPHOCYTES # BLD AUTO: 2.3 K/UL (ref 1–4.8)
LYMPHOCYTES NFR BLD: 18.2 % (ref 18–48)
MCH RBC QN AUTO: 26.4 PG (ref 27–31)
MCHC RBC AUTO-ENTMCNC: 32.1 G/DL (ref 32–36)
MCV RBC AUTO: 82 FL (ref 82–98)
MONOCYTES # BLD AUTO: 0.6 K/UL (ref 0.3–1)
MONOCYTES NFR BLD: 5.1 % (ref 4–15)
NEUTROPHILS # BLD AUTO: 9.2 K/UL (ref 1.8–7.7)
NEUTROPHILS NFR BLD: 72.7 % (ref 38–73)
NRBC BLD-RTO: 0 /100 WBC
PLATELET # BLD AUTO: 415 K/UL (ref 150–450)
PMV BLD AUTO: 8.3 FL (ref 9.2–12.9)
POTASSIUM SERPL-SCNC: 4.4 MMOL/L (ref 3.5–5.1)
PROT SERPL-MCNC: 8 G/DL (ref 6–8.4)
RBC # BLD AUTO: 4.96 M/UL (ref 4–5.4)
SODIUM SERPL-SCNC: 137 MMOL/L (ref 136–145)
WBC # BLD AUTO: 12.62 K/UL (ref 3.9–12.7)

## 2022-01-26 PROCEDURE — 80053 COMPREHEN METABOLIC PANEL: CPT | Performed by: INTERNAL MEDICINE

## 2022-01-26 PROCEDURE — 1159F PR MEDICATION LIST DOCUMENTED IN MEDICAL RECORD: ICD-10-PCS | Mod: CPTII,S$GLB,, | Performed by: INTERNAL MEDICINE

## 2022-01-26 PROCEDURE — 1159F MED LIST DOCD IN RCRD: CPT | Mod: CPTII,S$GLB,, | Performed by: INTERNAL MEDICINE

## 2022-01-26 PROCEDURE — 86140 C-REACTIVE PROTEIN: CPT | Performed by: INTERNAL MEDICINE

## 2022-01-26 PROCEDURE — 1160F PR REVIEW ALL MEDS BY PRESCRIBER/CLIN PHARMACIST DOCUMENTED: ICD-10-PCS | Mod: CPTII,S$GLB,, | Performed by: INTERNAL MEDICINE

## 2022-01-26 PROCEDURE — 36415 COLL VENOUS BLD VENIPUNCTURE: CPT | Performed by: INTERNAL MEDICINE

## 2022-01-26 PROCEDURE — 3080F DIAST BP >= 90 MM HG: CPT | Mod: CPTII,S$GLB,, | Performed by: INTERNAL MEDICINE

## 2022-01-26 PROCEDURE — 85025 COMPLETE CBC W/AUTO DIFF WBC: CPT | Performed by: INTERNAL MEDICINE

## 2022-01-26 PROCEDURE — 99215 PR OFFICE/OUTPT VISIT, EST, LEVL V, 40-54 MIN: ICD-10-PCS | Mod: S$GLB,,, | Performed by: INTERNAL MEDICINE

## 2022-01-26 PROCEDURE — 99215 OFFICE O/P EST HI 40 MIN: CPT | Mod: S$GLB,,, | Performed by: INTERNAL MEDICINE

## 2022-01-26 PROCEDURE — 3077F SYST BP >= 140 MM HG: CPT | Mod: CPTII,S$GLB,, | Performed by: INTERNAL MEDICINE

## 2022-01-26 PROCEDURE — 3080F PR MOST RECENT DIASTOLIC BLOOD PRESSURE >= 90 MM HG: ICD-10-PCS | Mod: CPTII,S$GLB,, | Performed by: INTERNAL MEDICINE

## 2022-01-26 PROCEDURE — 99999 PR PBB SHADOW E&M-EST. PATIENT-LVL IV: ICD-10-PCS | Mod: PBBFAC,,, | Performed by: INTERNAL MEDICINE

## 2022-01-26 PROCEDURE — 3008F PR BODY MASS INDEX (BMI) DOCUMENTED: ICD-10-PCS | Mod: CPTII,S$GLB,, | Performed by: INTERNAL MEDICINE

## 2022-01-26 PROCEDURE — 3077F PR MOST RECENT SYSTOLIC BLOOD PRESSURE >= 140 MM HG: ICD-10-PCS | Mod: CPTII,S$GLB,, | Performed by: INTERNAL MEDICINE

## 2022-01-26 PROCEDURE — 1160F RVW MEDS BY RX/DR IN RCRD: CPT | Mod: CPTII,S$GLB,, | Performed by: INTERNAL MEDICINE

## 2022-01-26 PROCEDURE — 99999 PR PBB SHADOW E&M-EST. PATIENT-LVL IV: CPT | Mod: PBBFAC,,, | Performed by: INTERNAL MEDICINE

## 2022-01-26 PROCEDURE — 3008F BODY MASS INDEX DOCD: CPT | Mod: CPTII,S$GLB,, | Performed by: INTERNAL MEDICINE

## 2022-01-26 PROCEDURE — 85652 RBC SED RATE AUTOMATED: CPT | Performed by: INTERNAL MEDICINE

## 2022-01-26 RX ORDER — METHYLPREDNISOLONE 4 MG/1
TABLET ORAL
Qty: 21 TABLET | Refills: 0 | Status: SHIPPED | OUTPATIENT
Start: 2022-01-26 | End: 2022-11-08

## 2022-01-26 NOTE — PROGRESS NOTES
RHEUMATOLOGY CLINIC FOLLOW UP VISIT  Chief complaints, HPI, ROS, EXAM, Assessment & Plans:-  Janice Nova a 41 y.o. pleasant female comes in for follow-up visit.  She follows up in the Rheumatology Clinic for plaque psoriasis and severe psoriatic arthritis with dactylitis.  After failing multiple medication she was recently treated with Tremfya.  Due to active disease her methotrexate was discontinued and started on Arava along with low-dose 5 mg daily prednisone.  Since adding this combination she reports 90% improvement in her arthritic symptoms with complete resolution of plaque psoriasis.  No joint pain today.  No swelling.  No active dactylitis.  No adverse effects from Arava.  No infections.  Rheumatological review of systems negative physical examination chronic scars but no active plaque psoriasis.  No dactylitis, enthesitis or effusion..    1. Polyarticular psoriatic arthritis    2. Plaque psoriasis    3. Immunosuppressed status    4. High risk medication use    5. Obesity, morbid, BMI 40.0-49.9      Problem List Items Addressed This Visit     Plaque psoriasis    Polyarticular psoriatic arthritis - Primary    Relevant Medications    methylPREDNISolone (MEDROL DOSEPACK) 4 mg tablet    High risk medication use    Immunosuppressed status      Other Visit Diagnoses     Obesity, morbid, BMI 40.0-49.9        Relevant Medications    semaglutide, weight loss, 0.25 mg/0.5 mL PnIj           Well controlled plaque psoriasis and psoriatic arthritis on Tremfya and Arava.    Continue the same.  Okay to take Medrol Dosepak p.r.n. for flares.   Try getting approval for semaglutide due to worsening obesity.  Prescription sent to specialty pharmacy.   Advised all precautions.   Compromised immune system secondary to autoimmune disease and use of immunosuppressive drugs. Monitor carefully for infections. Advised to get immediate medical care if any  infection. Also advised strict adherence to age appropriate vaccinations and cancer screenings with PCP.   Hold Tremfya and arava if any infection.   Safety labs every 12 weeks.    # No follow-ups on file.      Disclaimer: This note was prepared using voice recognition system and is likely to have sound alike errors and is not proof read.  Please call me with any questions.

## 2022-01-27 NOTE — PROGRESS NOTES
Stable values except abnormality of inflammation markers.  Please continue plan as discussed during the visit.

## 2022-01-31 ENCOUNTER — DOCUMENTATION ONLY (OUTPATIENT)
Dept: REHABILITATION | Facility: HOSPITAL | Age: 42
End: 2022-01-31
Payer: COMMERCIAL

## 2022-01-31 NOTE — PROGRESS NOTES
[facility]  Occupational therapy  Discharge Note    Name: Janice Lawrence  Clinic Number: [id number]    Therapy Diagnosis:        Encounter Diagnoses   Name Primary?    Decreased range of motion      Pain      Swelling      Weakness        Physician: Saul Cui,*     Visit Date: 9/27/2021  Physician Orders: Evaluation and treatment   Medical Diagnosis: L40.50 (ICD-10-CM) - Psoriatic arthritis  Surgical Procedure and Date: NA  Evaluation Date: 8/2/2021      Date of Last visit: 9/27/2021  Total Visits Received: 6    ASSESSMENT      Unable to reassess as patien did not return for treatment.    Discharge reason: Patient has not attended therapy since 9/27/2021    Discharge FOTO Score: NA    Goals: GOALS:     Short Term Goals:  6 weeks     1. Pain: Pt will demonstrate improved pain by reports of less than or equal to 6/10 worst pain on the verbal rating scale in order to progress toward maximal functional ability and improve QOL.  Met 8/23/2021   2. Mobility: Patient will improve AROM to 50% of stated goals, listed in objective measures above, in order to progress towards independence with functional activities.   progressing   3. Strength: Patient will improve strength to 50% of stated goals, listed in objective measures above, in order to progress towards independence with functional activities.      4. Self Care: Patient will demonstrate improved self care skills listed in objective measure above,in order to progress towards independence with functional activities.   Met 9/13/2021   5. HEP: Patient will demonstrate independence with HEP in order to progress toward functional independence.        Long Term Goals:  12 weeks     1. Pain: Pt will demonstrate improved pain by reports of less than or equal to 2/10 worst pain on the verbal rating scale in order to progress toward maximal functional ability and improve QOL.    Met 9/13/2021   2. Function: Patient will  demonstrate improved function as indicated by a functional limitation score of less than or equal to 33 out of 100 on FOTO.  Progressing   3. Mobility: Patient will improve AROM to stated goals, listed in objective measures above, in order to return to maximal functional potential and improve quality of life.     4. Strength: Patient will improve strength to stated goals, listed in objective measures above, in order to improve functional independence and quality of life.     5. Self Care: Patient will demonstrate increased self care skills to an independent level or modified independent level with adaptive equipment as needed.     6. Patient will return to normal ADL's, IADL's, community involvement, recreational activities, and work-related activities with less than or equal to 0/10 pain and maximal function.                        PLAN   This patient is discharged from Occupational Therapy      Paradise Jerome OTR

## 2022-02-07 ENCOUNTER — TELEPHONE (OUTPATIENT)
Dept: PHARMACY | Facility: CLINIC | Age: 42
End: 2022-02-07
Payer: COMMERCIAL

## 2022-02-07 NOTE — TELEPHONE ENCOUNTER
China Hughes Staff 10 minutes ago (10:37 AM)     [facility],     This message is in regards to Janice Lawrence's medication Wegovy . This medication is a complete plan/benefit exclusion. This medication is not enrolled with CMS. If there are any alternatives, you would like the patient to receive, please send a new prescription to the pharmacy. The prior authorization and the prescription will be placed on hold for the patient. Her copay is $877.64 after the manufacture coupon was applied. I have contacted the patient to let her know. If you have any questions or concerns, please do not hesitate to reach out to me. Thank you.       Thanks,   China Bellamy   Pharmacy Technician   [facility]   Phone: [phone]   Fax: [phone]    Routing comment

## 2022-02-08 NOTE — TELEPHONE ENCOUNTER
MD Mehul Waldron, NILTONN  Caller: Unspecified (Yesterday, 10:33 AM)  Thanks. No alternative medication available. Her PCP could help. Thanks.

## 2022-03-14 ENCOUNTER — SPECIALTY PHARMACY (OUTPATIENT)
Dept: PHARMACY | Facility: CLINIC | Age: 42
End: 2022-03-14
Payer: COMMERCIAL

## 2022-03-14 NOTE — TELEPHONE ENCOUNTER
Specialty Pharmacy - Refill Coordination    Specialty Medication Orders Linked to Encounter    Flowsheet Row Most Recent Value   Medication #1 guselkumab (TREMFYA) 100 mg/mL Syrg (Order#[id number], Rx#[id number])          Refill Questions - Documented Responses    Flowsheet Row Most Recent Value   Patient Availability and HIPAA Verification    Does patient want to proceed with activity? Yes   HIPAA/medical authority confirmed? Yes   Relationship to patient of person spoken to? Self   Refill Screening Questions    Changes to allergies? No   Changes to medications? No   New conditions since last clinic visit? No   Unplanned office visit, urgent care, ED, or hospital admission in the last 4 weeks? No   How does patient/caregiver feel medication is working? Excellent   Financial problems or insurance changes? No   How many doses of your specialty medications were missed in the last 4 weeks? 0   Would patient like to speak to a pharmacist? No   When does the patient need to receive the medication? 03/23/22   Refill Delivery Questions    How will the patient receive the medication? Delivery Solange   When does the patient need to receive the medication? 03/23/22   Shipping Address Home   Address in [facility] confirmed and updated if neccessary? Yes   Expected Copay ($) 5   Is the patient able to afford the medication copay? Yes   Payment Method CC on file   Days supply of Refill 30   Supplies needed? No supplies needed   Refill activity completed? Yes   Refill activity plan Refill scheduled   Shipment/Pickup Date: 03/17/22          Current Outpatient Medications   Medication Sig    azelaic acid (FINACEA) 15 % Foam AAA bid for acne rosacea on face    celecoxib (CELEBREX) 200 MG capsule TAKE ONE CAPSULE BY MOUTH TWICE DAILY    ENSTILAR 0.005-0.064 % Foam AAA qD prn. Do not use on face or folds.    ergocalciferol (ERGOCALCIFEROL) 50,000 unit Cap TAKE ONE CAPSULE BY MOUTH TWICE A WEEK    esomeprazole (NEXIUM) 40 MG  capsule TAKE ONE CAPSULE BY MOUTH EVERY DAY BEFORE breakfast    ferrous sulfate (FEOSOL) 325 mg (65 mg iron) Tab tablet Take 1 tablet (325 mg total) by mouth once daily.    folic acid (FOLVITE) 1 MG tablet TAKE ONE TABLET BY MOUTH DAILY    guselkumab (TREMFYA) 100 mg/mL Syrg Inject 100 mg at 0 and 4 weeks and then every 8 weeks thereafter.    hydroquinone 4 % Crea Apply to dark spots once daily. Use with sunscreen if outdoors    ketoconazole (NIZORAL) 2 % cream Apply topically 2 (two) times daily.    leflunomide (ARAVA) 20 MG Tab TAKE ONE TABLET BY MOUTH ONCE DAILY    methylPREDNISolone (MEDROL DOSEPACK) 4 mg tablet use as directed    sertraline (ZOLOFT) 100 MG tablet Take 1 tablet (100 mg total) by mouth once daily.    tacrolimus (PROTOPIC) 0.1 % ointment APPLY TO THE AFFECTED AREA(S) TWICE DAILY AS NEEDED FOR ECZEMA    triamcinolone acetonide 0.025% (KENALOG) 0.025 % cream APPLY TO THE AFFECTED AREA(S) TWICE DAILY AS NEEDED FOR psoriasis ON THE face   Last reviewed on 1/26/2022  5:13 PM by Saul Cui MD    Review of patient's allergies indicates:  No Known Allergies Last reviewed on  1/26/2022 5:13 PM by Saul Cui      Tasks added this encounter   No tasks added.   Tasks due within next 3 months   3/13/2022 - Refill Call (Auto Added)     Chery Moreno  [facility]  [address] [place] [address]  Phone: [phone]  Fax: [phone]

## 2022-03-15 ENCOUNTER — PATIENT MESSAGE (OUTPATIENT)
Dept: RHEUMATOLOGY | Facility: CLINIC | Age: 42
End: 2022-03-15
Payer: COMMERCIAL

## 2022-03-23 ENCOUNTER — TELEPHONE (OUTPATIENT)
Dept: RHEUMATOLOGY | Facility: CLINIC | Age: 42
End: 2022-03-23
Payer: COMMERCIAL

## 2022-03-23 NOTE — TELEPHONE ENCOUNTER
----- Message from Caridad Sebastian sent at 3/23/2022  9:33 AM CDT -----  Contact: Cover My Meds  States a renewal is needed on the pt Tremfya, ref# [id number], no additional info given and can be reached at [phone]///[id number]

## 2022-03-24 NOTE — TELEPHONE ENCOUNTER
CaseId:[id number];Status:Approved;Review Type:Prior Auth;Coverage Start Date:03/24/2022;Coverage End Date:03/24/2023;

## 2022-04-08 ENCOUNTER — TELEPHONE (OUTPATIENT)
Dept: RHEUMATOLOGY | Facility: CLINIC | Age: 42
End: 2022-04-08
Payer: COMMERCIAL

## 2022-04-11 ENCOUNTER — HOSPITAL ENCOUNTER (OUTPATIENT)
Dept: RADIOLOGY | Facility: HOSPITAL | Age: 42
Discharge: HOME | End: 2022-04-11
Attending: OBSTETRICS & GYNECOLOGY
Payer: COMMERCIAL

## 2022-04-11 DIAGNOSIS — Z91.89 AT INCREASED RISK OF BREAST CANCER: ICD-10-CM

## 2022-04-11 PROCEDURE — 77067 SCR MAMMO BI INCL CAD: CPT | Mod: TC

## 2022-04-11 PROCEDURE — 77063 MAMMO DIGITAL SCREENING BILAT WITH TOMO: ICD-10-PCS | Mod: 26,,, | Performed by: RADIOLOGY

## 2022-04-11 PROCEDURE — 77067 SCR MAMMO BI INCL CAD: CPT | Mod: 26,,, | Performed by: RADIOLOGY

## 2022-04-11 PROCEDURE — 77063 BREAST TOMOSYNTHESIS BI: CPT | Mod: TC

## 2022-04-11 PROCEDURE — 77063 BREAST TOMOSYNTHESIS BI: CPT | Mod: 26,,, | Performed by: RADIOLOGY

## 2022-04-11 PROCEDURE — 77067 MAMMO DIGITAL SCREENING BILAT WITH TOMO: ICD-10-PCS | Mod: 26,,, | Performed by: RADIOLOGY

## 2022-04-16 DIAGNOSIS — L40.50 PSORIATIC ARTHRITIS: ICD-10-CM

## 2022-04-16 DIAGNOSIS — L40.0 PLAQUE PSORIASIS: ICD-10-CM

## 2022-04-18 ENCOUNTER — PATIENT MESSAGE (OUTPATIENT)
Dept: ADMINISTRATIVE | Facility: OTHER | Age: 42
End: 2022-04-18
Payer: COMMERCIAL

## 2022-04-18 RX ORDER — GUSELKUMAB 100 MG/ML
INJECTION SUBCUTANEOUS
Qty: 2 ML | Refills: 3 | Status: SHIPPED | OUTPATIENT
Start: 2022-04-18 | End: 2023-04-25 | Stop reason: SDUPTHER

## 2022-04-20 ENCOUNTER — PATIENT MESSAGE (OUTPATIENT)
Dept: PHARMACY | Facility: CLINIC | Age: 42
End: 2022-04-20
Payer: COMMERCIAL

## 2022-04-28 ENCOUNTER — PATIENT MESSAGE (OUTPATIENT)
Dept: PSYCHIATRY | Facility: CLINIC | Age: 42
End: 2022-04-28
Payer: COMMERCIAL

## 2022-05-03 ENCOUNTER — TELEPHONE (OUTPATIENT)
Dept: HEPATOLOGY | Facility: CLINIC | Age: 42
End: 2022-05-03
Payer: COMMERCIAL

## 2022-05-03 ENCOUNTER — PATIENT MESSAGE (OUTPATIENT)
Dept: HEPATOLOGY | Facility: CLINIC | Age: 42
End: 2022-05-03
Payer: COMMERCIAL

## 2022-05-03 NOTE — TELEPHONE ENCOUNTER
Attempted to contact patient at [phone] with no answer. Left voicemail message for patient to return call.    Re: scheduling fibroscan test

## 2022-05-04 ENCOUNTER — OFFICE VISIT (OUTPATIENT)
Dept: RHEUMATOLOGY | Facility: CLINIC | Age: 42
End: 2022-05-04
Payer: COMMERCIAL

## 2022-05-04 ENCOUNTER — LAB VISIT (OUTPATIENT)
Dept: LAB | Facility: HOSPITAL | Age: 42
End: 2022-05-04
Attending: INTERNAL MEDICINE
Payer: COMMERCIAL

## 2022-05-04 VITALS
SYSTOLIC BLOOD PRESSURE: 168 MMHG | HEIGHT: 72 IN | WEIGHT: 293 LBS | HEART RATE: 90 BPM | DIASTOLIC BLOOD PRESSURE: 87 MMHG | BODY MASS INDEX: 39.68 KG/M2

## 2022-05-04 DIAGNOSIS — Z79.899 HIGH RISK MEDICATION USE: ICD-10-CM

## 2022-05-04 DIAGNOSIS — E66.01 OBESITY, MORBID, BMI 40.0-49.9: ICD-10-CM

## 2022-05-04 DIAGNOSIS — L40.59 POLYARTICULAR PSORIATIC ARTHRITIS: Primary | ICD-10-CM

## 2022-05-04 DIAGNOSIS — F32.A ANXIETY AND DEPRESSION: ICD-10-CM

## 2022-05-04 DIAGNOSIS — F41.9 ANXIETY AND DEPRESSION: ICD-10-CM

## 2022-05-04 DIAGNOSIS — L40.59 POLYARTICULAR PSORIATIC ARTHRITIS: ICD-10-CM

## 2022-05-04 DIAGNOSIS — L40.0 PLAQUE PSORIASIS: ICD-10-CM

## 2022-05-04 DIAGNOSIS — D84.9 IMMUNOSUPPRESSED STATUS: ICD-10-CM

## 2022-05-04 LAB
ALBUMIN SERPL BCP-MCNC: 4.1 G/DL (ref 3.5–5.2)
ALP SERPL-CCNC: 77 U/L (ref 55–135)
ALT SERPL W/O P-5'-P-CCNC: 45 U/L (ref 10–44)
ANION GAP SERPL CALC-SCNC: 11 MMOL/L (ref 8–16)
AST SERPL-CCNC: 30 U/L (ref 10–40)
BASOPHILS # BLD AUTO: 0.09 K/UL (ref 0–0.2)
BASOPHILS NFR BLD: 0.9 % (ref 0–1.9)
BILIRUB SERPL-MCNC: 0.5 MG/DL (ref 0.1–1)
BUN SERPL-MCNC: 19 MG/DL (ref 6–20)
CALCIUM SERPL-MCNC: 10 MG/DL (ref 8.7–10.5)
CHLORIDE SERPL-SCNC: 105 MMOL/L (ref 95–110)
CO2 SERPL-SCNC: 25 MMOL/L (ref 23–29)
CREAT SERPL-MCNC: 0.8 MG/DL (ref 0.5–1.4)
CRP SERPL-MCNC: 10.4 MG/L (ref 0–8.2)
DIFFERENTIAL METHOD: ABNORMAL
EOSINOPHIL # BLD AUTO: 1 K/UL (ref 0–0.5)
EOSINOPHIL NFR BLD: 9.5 % (ref 0–8)
ERYTHROCYTE [DISTWIDTH] IN BLOOD BY AUTOMATED COUNT: 14.8 % (ref 11.5–14.5)
ERYTHROCYTE [SEDIMENTATION RATE] IN BLOOD BY WESTERGREN METHOD: 18 MM/HR (ref 0–36)
EST. GFR  (AFRICAN AMERICAN): >60 ML/MIN/1.73 M^2
EST. GFR  (NON AFRICAN AMERICAN): >60 ML/MIN/1.73 M^2
GLUCOSE SERPL-MCNC: 91 MG/DL (ref 70–110)
HCT VFR BLD AUTO: 39.4 % (ref 37–48.5)
HGB BLD-MCNC: 12.7 G/DL (ref 12–16)
IMM GRANULOCYTES # BLD AUTO: 0.04 K/UL (ref 0–0.04)
IMM GRANULOCYTES NFR BLD AUTO: 0.4 % (ref 0–0.5)
LYMPHOCYTES # BLD AUTO: 2.4 K/UL (ref 1–4.8)
LYMPHOCYTES NFR BLD: 23.5 % (ref 18–48)
MCH RBC QN AUTO: 26.1 PG (ref 27–31)
MCHC RBC AUTO-ENTMCNC: 32.2 G/DL (ref 32–36)
MCV RBC AUTO: 81 FL (ref 82–98)
MONOCYTES # BLD AUTO: 0.6 K/UL (ref 0.3–1)
MONOCYTES NFR BLD: 5.9 % (ref 4–15)
NEUTROPHILS # BLD AUTO: 6.1 K/UL (ref 1.8–7.7)
NEUTROPHILS NFR BLD: 59.8 % (ref 38–73)
NRBC BLD-RTO: 0 /100 WBC
PLATELET # BLD AUTO: 461 K/UL (ref 150–450)
PMV BLD AUTO: 8.5 FL (ref 9.2–12.9)
POTASSIUM SERPL-SCNC: 4.4 MMOL/L (ref 3.5–5.1)
PROT SERPL-MCNC: 7.7 G/DL (ref 6–8.4)
RBC # BLD AUTO: 4.86 M/UL (ref 4–5.4)
SODIUM SERPL-SCNC: 141 MMOL/L (ref 136–145)
WBC # BLD AUTO: 10.13 K/UL (ref 3.9–12.7)

## 2022-05-04 PROCEDURE — 1160F RVW MEDS BY RX/DR IN RCRD: CPT | Mod: CPTII,S$GLB,, | Performed by: INTERNAL MEDICINE

## 2022-05-04 PROCEDURE — 99215 OFFICE O/P EST HI 40 MIN: CPT | Mod: S$GLB,,, | Performed by: INTERNAL MEDICINE

## 2022-05-04 PROCEDURE — 3079F DIAST BP 80-89 MM HG: CPT | Mod: CPTII,S$GLB,, | Performed by: INTERNAL MEDICINE

## 2022-05-04 PROCEDURE — 3077F SYST BP >= 140 MM HG: CPT | Mod: CPTII,S$GLB,, | Performed by: INTERNAL MEDICINE

## 2022-05-04 PROCEDURE — 1159F MED LIST DOCD IN RCRD: CPT | Mod: CPTII,S$GLB,, | Performed by: INTERNAL MEDICINE

## 2022-05-04 PROCEDURE — 85025 COMPLETE CBC W/AUTO DIFF WBC: CPT | Performed by: INTERNAL MEDICINE

## 2022-05-04 PROCEDURE — 99999 PR PBB SHADOW E&M-EST. PATIENT-LVL IV: ICD-10-PCS | Mod: PBBFAC,,, | Performed by: INTERNAL MEDICINE

## 2022-05-04 PROCEDURE — 99215 PR OFFICE/OUTPT VISIT, EST, LEVL V, 40-54 MIN: ICD-10-PCS | Mod: S$GLB,,, | Performed by: INTERNAL MEDICINE

## 2022-05-04 PROCEDURE — 36415 COLL VENOUS BLD VENIPUNCTURE: CPT | Performed by: INTERNAL MEDICINE

## 2022-05-04 PROCEDURE — 3008F PR BODY MASS INDEX (BMI) DOCUMENTED: ICD-10-PCS | Mod: CPTII,S$GLB,, | Performed by: INTERNAL MEDICINE

## 2022-05-04 PROCEDURE — 80053 COMPREHEN METABOLIC PANEL: CPT | Performed by: INTERNAL MEDICINE

## 2022-05-04 PROCEDURE — 3079F PR MOST RECENT DIASTOLIC BLOOD PRESSURE 80-89 MM HG: ICD-10-PCS | Mod: CPTII,S$GLB,, | Performed by: INTERNAL MEDICINE

## 2022-05-04 PROCEDURE — 86140 C-REACTIVE PROTEIN: CPT | Performed by: INTERNAL MEDICINE

## 2022-05-04 PROCEDURE — 3077F PR MOST RECENT SYSTOLIC BLOOD PRESSURE >= 140 MM HG: ICD-10-PCS | Mod: CPTII,S$GLB,, | Performed by: INTERNAL MEDICINE

## 2022-05-04 PROCEDURE — 1160F PR REVIEW ALL MEDS BY PRESCRIBER/CLIN PHARMACIST DOCUMENTED: ICD-10-PCS | Mod: CPTII,S$GLB,, | Performed by: INTERNAL MEDICINE

## 2022-05-04 PROCEDURE — 3008F BODY MASS INDEX DOCD: CPT | Mod: CPTII,S$GLB,, | Performed by: INTERNAL MEDICINE

## 2022-05-04 PROCEDURE — 1159F PR MEDICATION LIST DOCUMENTED IN MEDICAL RECORD: ICD-10-PCS | Mod: CPTII,S$GLB,, | Performed by: INTERNAL MEDICINE

## 2022-05-04 PROCEDURE — 99999 PR PBB SHADOW E&M-EST. PATIENT-LVL IV: CPT | Mod: PBBFAC,,, | Performed by: INTERNAL MEDICINE

## 2022-05-04 PROCEDURE — 85652 RBC SED RATE AUTOMATED: CPT | Performed by: INTERNAL MEDICINE

## 2022-05-04 RX ORDER — SEMAGLUTIDE 0.25 MG/.5ML
0.25 INJECTION, SOLUTION SUBCUTANEOUS
Qty: 2 ML | Refills: 0 | Status: SHIPPED | OUTPATIENT
Start: 2022-05-04 | End: 2022-09-20 | Stop reason: SDUPTHER

## 2022-05-04 RX ORDER — SEMAGLUTIDE 1.7 MG/.75ML
1.7 INJECTION, SOLUTION SUBCUTANEOUS
Qty: 3 ML | Refills: 0 | Status: SHIPPED | OUTPATIENT
Start: 2022-08-03 | End: 2022-09-20

## 2022-05-04 RX ORDER — SEMAGLUTIDE 1 MG/.5ML
1 INJECTION, SOLUTION SUBCUTANEOUS
Qty: 2 ML | Refills: 0 | Status: SHIPPED | OUTPATIENT
Start: 2022-08-03 | End: 2022-05-04

## 2022-05-04 RX ORDER — SEMAGLUTIDE 1.7 MG/.75ML
1.7 INJECTION, SOLUTION SUBCUTANEOUS
Qty: 3 ML | Refills: 0 | Status: SHIPPED | OUTPATIENT
Start: 2022-07-06 | End: 2022-05-04

## 2022-05-04 RX ORDER — SEMAGLUTIDE 2.4 MG/.75ML
2.4 INJECTION, SOLUTION SUBCUTANEOUS
Qty: 3 ML | Refills: 0 | Status: SHIPPED | OUTPATIENT
Start: 2022-09-07 | End: 2022-09-20

## 2022-05-04 RX ORDER — SEMAGLUTIDE 0.5 MG/.5ML
0.5 INJECTION, SOLUTION SUBCUTANEOUS
Qty: 2 ML | Refills: 0 | Status: SHIPPED | OUTPATIENT
Start: 2022-06-01 | End: 2022-09-20 | Stop reason: SDUPTHER

## 2022-05-04 RX ORDER — SEMAGLUTIDE 1 MG/.5ML
1 INJECTION, SOLUTION SUBCUTANEOUS
Qty: 2 ML | Refills: 0 | Status: SHIPPED | OUTPATIENT
Start: 2022-07-06 | End: 2022-09-20

## 2022-05-04 NOTE — PROGRESS NOTES
RHEUMATOLOGY CLINIC FOLLOW UP VISIT  Chief complaints, HPI, ROS, EXAM, Assessment & Plans:-  Janice Nova a 42 y.o. pleasant female comes in for follow-up visit.  She follows up in the Rheumatology Clinic for plaque psoriasis and severe psoriatic arthritis with dactylitis.  After failing multiple medication she was recently treated with Tremfya.  Due to active disease her methotrexate was discontinued and started on Arava along with low-dose 5 mg daily prednisone.  Since adding this combination she reports 90% improvement in her arthritic symptoms with complete resolution of plaque psoriasis.  No joint pain today.  No swelling.  No active dactylitis.  No adverse effects from Arava.  No infections.  Evaluated in emergency department for pulmonary embolism/myocardial infarction when she went in for elevated heart rate, left-sided numbness tingling and chest pain.  Diagnosed with anxiety disorder and referred to see psychiatrist next week.  Rheumatological review of systems negative physical examination chronic scars but no active plaque psoriasis.  No dactylitis, enthesitis or effusion..  Positive Tinel sign left hand suggestive of carpal tunnel syndrome.  1. Polyarticular psoriatic arthritis    2. Plaque psoriasis    3. High risk medication use    4. Immunosuppressed status    5. Obesity, morbid, BMI 40.0-49.9    6. Anxiety and depression      Problem List Items Addressed This Visit     Plaque psoriasis    Polyarticular psoriatic arthritis - Primary    Obesity, morbid, BMI 40.0-49.9    Relevant Medications    semaglutide, weight loss, (WEGOVY) 0.25 mg/0.5 mL PnIj    semaglutide, weight loss, (WEGOVY) 0.5 mg/0.5 mL PnIj (Start on 6/1/2022)    semaglutide, weight loss, (WEGOVY) 1 mg/0.5 mL PnIj (Start on 8/3/2022)    semaglutide, weight loss, (WEGOVY) 1.7 mg/0.75 mL PnIj (Start on 7/6/2022)    semaglutide, weight loss, (WEGOVY) 2.4 mg/0.75 mL PnIj  (Start on 9/7/2022)    High risk medication use    Immunosuppressed status    Anxiety and depression           Well controlled plaque psoriasis and psoriatic arthritis on Tremfya and Arava.    Continue the same.  Okay to take Medrol Dosepak p.r.n. for flares.   Advised all precautions.   Compromised immune system secondary to autoimmune disease and use of immunosuppressive drugs. Monitor carefully for infections. Advised to get immediate medical care if any infection. Also advised strict adherence to age appropriate vaccinations and cancer screenings with PCP.   Try conservative measures for left hand carpal tunnel syndrome as advised.  Consult orthopedic surgery if persistent.   Try Wegovy for weight loss.  Advised all precautions.   Hold Tremfya and arava if any infection.   Safety labs every 12 weeks.    # Follow up in about 6 months (around 11/4/2022).      Disclaimer: This note was prepared using voice recognition system and is likely to have sound alike errors and is not proof read.  Please call me with any questions.

## 2022-05-10 ENCOUNTER — PATIENT MESSAGE (OUTPATIENT)
Dept: PSYCHIATRY | Facility: CLINIC | Age: 42
End: 2022-05-10
Payer: COMMERCIAL

## 2022-05-13 ENCOUNTER — SPECIALTY PHARMACY (OUTPATIENT)
Dept: PHARMACY | Facility: CLINIC | Age: 42
End: 2022-05-13
Payer: COMMERCIAL

## 2022-05-13 NOTE — TELEPHONE ENCOUNTER
"[facility] states "CaseId:[id number];Status:Cancelled;Explanation:Prior Authorization currently on file."    Called [employer] PA dept.  Rep confirmed PA is needed.  PA was completed over the phone.      Approved  Case ID [id number]  5/13/22-5/13/23    Rx rejects for cost exceeds max stress- approved  5/13/22-5/13/23     "

## 2022-05-13 NOTE — TELEPHONE ENCOUNTER
Specialty Pharmacy - Medication/Referral Authorization  Specialty Pharmacy - Refill Coordination    Specialty Medication Orders Linked to Encounter    Flowsheet Row Most Recent Value   Medication #1 guselkumab (TREMFYA) 100 mg/mL Syrg (Order#[id number], Rx#[id number])          Refill Questions - Documented Responses    Flowsheet Row Most Recent Value   Refill Screening Questions    Changes to allergies? No   Changes to medications? No   New conditions since last clinic visit? No   Unplanned office visit, urgent care, ED, or hospital admission in the last 4 weeks? No   How does patient/caregiver feel medication is working? Very good   Financial problems or insurance changes? No   How many doses of your specialty medications were missed in the last 4 weeks? 0   Would patient like to speak to a pharmacist? No   When does the patient need to receive the medication? 05/18/22   Refill Delivery Questions    How will the patient receive the medication? Delivery Solange   When does the patient need to receive the medication? 05/18/22   Shipping Address Home   Address in [facility] confirmed and updated if neccessary? Yes   Expected Copay ($) 5   Is the patient able to afford the medication copay? Yes   Payment Method CC on file   Days supply of Refill 56   Supplies needed? No supplies needed   Refill activity completed? Yes   Refill activity plan Refill scheduled   Shipment/Pickup Date: 05/17/22          Current Outpatient Medications   Medication Sig    celecoxib (CELEBREX) 200 MG capsule TAKE ONE CAPSULE BY MOUTH TWICE DAILY    ENSTILAR 0.005-0.064 % Foam AAA qD prn. Do not use on face or folds.    ergocalciferol (ERGOCALCIFEROL) 50,000 unit Cap TAKE ONE CAPSULE BY MOUTH TWICE A WEEK    esomeprazole (NEXIUM) 40 MG capsule TAKE ONE CAPSULE BY MOUTH EVERY DAY BEFORE breakfast    ferrous sulfate (FEOSOL) 325 mg (65 mg iron) Tab tablet Take 1 tablet (325 mg total) by mouth once daily.    folic acid (FOLVITE) 1 MG  tablet TAKE ONE TABLET BY MOUTH DAILY    guselkumab (TREMFYA) 100 mg/mL Syrg Inject 100 mg into the skin every 8 weeks.    hydroquinone 4 % Crea Apply to dark spots once daily. Use with sunscreen if outdoors    leflunomide (ARAVA) 20 MG Tab TAKE ONE TABLET BY MOUTH ONCE DAILY    methylPREDNISolone (MEDROL DOSEPACK) 4 mg tablet use as directed    semaglutide, weight loss, (WEGOVY) 0.25 mg/0.5 mL PnIj Inject 0.25 mg into the skin every 7 days.    [START ON 6/1/2022] semaglutide, weight loss, (WEGOVY) 0.5 mg/0.5 mL PnIj Inject 0.5 mg into the skin every 7 days.    [START ON 7/6/2022] semaglutide, weight loss, (WEGOVY) 1 mg/0.5 mL PnIj Inject 1 mg into the skin every 7 days.    [START ON 8/3/2022] semaglutide, weight loss, (WEGOVY) 1.7 mg/0.75 mL PnIj Inject 1.7 mg into the skin every 7 days.    [START ON 9/7/2022] semaglutide, weight loss, (WEGOVY) 2.4 mg/0.75 mL PnIj Inject 2.4 mg into the skin every 7 days.    sertraline (ZOLOFT) 100 MG tablet Take 1 tablet (100 mg total) by mouth once daily.    tacrolimus (PROTOPIC) 0.1 % ointment APPLY TO THE AFFECTED AREA(S) TWICE DAILY AS NEEDED FOR ECZEMA   Last reviewed on 5/4/2022  5:24 PM by Saul Cui MD    Review of patient's allergies indicates:  No Known Allergies Last reviewed on  5/4/2022 5:24 PM by Saul Cui      Tasks added this encounter   7/5/2022 - Refill Call (Auto Added)   Tasks due within next 3 months   No tasks due.     Tash Sweet, PharmD  [facility]  [address] [facility] [address]  Phone: [phone]  Fax: [phone]

## 2022-05-13 NOTE — TELEPHONE ENCOUNTER
Pt called [facility] for Tremfya refill.  Rx requires PA.  Pt due to inject 5/18.     Demographic info for PA submitted via CMM, waiting on clinical questions  (Key: [id number])

## 2022-06-24 RX ORDER — SERTRALINE HYDROCHLORIDE 100 MG/1
100 TABLET, FILM COATED ORAL DAILY
Qty: 30 TABLET | Refills: 5 | Status: SHIPPED | OUTPATIENT
Start: 2022-06-24 | End: 2022-06-29

## 2022-06-29 ENCOUNTER — OFFICE VISIT (OUTPATIENT)
Dept: PSYCHIATRY | Facility: CLINIC | Age: 42
End: 2022-06-29
Payer: COMMERCIAL

## 2022-06-29 VITALS
BODY MASS INDEX: 39.68 KG/M2 | DIASTOLIC BLOOD PRESSURE: 87 MMHG | HEART RATE: 87 BPM | HEIGHT: 72 IN | WEIGHT: 293 LBS | SYSTOLIC BLOOD PRESSURE: 143 MMHG

## 2022-06-29 DIAGNOSIS — F41.1 GAD (GENERALIZED ANXIETY DISORDER): Primary | ICD-10-CM

## 2022-06-29 PROCEDURE — 3008F BODY MASS INDEX DOCD: CPT | Mod: CPTII,S$GLB,, | Performed by: PSYCHIATRY & NEUROLOGY

## 2022-06-29 PROCEDURE — 3077F PR MOST RECENT SYSTOLIC BLOOD PRESSURE >= 140 MM HG: ICD-10-PCS | Mod: CPTII,S$GLB,, | Performed by: PSYCHIATRY & NEUROLOGY

## 2022-06-29 PROCEDURE — 3079F PR MOST RECENT DIASTOLIC BLOOD PRESSURE 80-89 MM HG: ICD-10-PCS | Mod: CPTII,S$GLB,, | Performed by: PSYCHIATRY & NEUROLOGY

## 2022-06-29 PROCEDURE — 90792 PSYCH DIAG EVAL W/MED SRVCS: CPT | Mod: S$GLB,,, | Performed by: PSYCHIATRY & NEUROLOGY

## 2022-06-29 PROCEDURE — 90792 PR PSYCHIATRIC DIAGNOSTIC EVALUATION W/MEDICAL SERVICES: ICD-10-PCS | Mod: S$GLB,,, | Performed by: PSYCHIATRY & NEUROLOGY

## 2022-06-29 PROCEDURE — 99999 PR PBB SHADOW E&M-EST. PATIENT-LVL III: ICD-10-PCS | Mod: PBBFAC,,, | Performed by: PSYCHIATRY & NEUROLOGY

## 2022-06-29 PROCEDURE — 3077F SYST BP >= 140 MM HG: CPT | Mod: CPTII,S$GLB,, | Performed by: PSYCHIATRY & NEUROLOGY

## 2022-06-29 PROCEDURE — 3008F PR BODY MASS INDEX (BMI) DOCUMENTED: ICD-10-PCS | Mod: CPTII,S$GLB,, | Performed by: PSYCHIATRY & NEUROLOGY

## 2022-06-29 PROCEDURE — 99999 PR PBB SHADOW E&M-EST. PATIENT-LVL III: CPT | Mod: PBBFAC,,, | Performed by: PSYCHIATRY & NEUROLOGY

## 2022-06-29 PROCEDURE — 3079F DIAST BP 80-89 MM HG: CPT | Mod: CPTII,S$GLB,, | Performed by: PSYCHIATRY & NEUROLOGY

## 2022-06-29 RX ORDER — FLUOXETINE HYDROCHLORIDE 40 MG/1
40 CAPSULE ORAL DAILY
Qty: 30 CAPSULE | Refills: 2 | Status: SHIPPED | OUTPATIENT
Start: 2022-06-29 | End: 2022-09-15

## 2022-06-29 NOTE — PROGRESS NOTES
Outpatient Psychiatry Visit (MD/NP)    6/29/2022    Janice Lawrence, a 42 y.o. female, presenting for visit. Met with patient.    Reason for Encounter: Depression    IntervalHx: Patient seen and interviewed, last seen over 3 years ago, initially assessed in 2017. Returns due to presence of ongoing problems with anxiety including and emergency department visit for acute onset dyspnea, numbness/tingling of extremities that she thought was a pulmonary embolus. Had a negative general medical evaluation in ED. Anxiety treated with relief acutely. Denies any acute provoking stressor, though reports generally increased workplace stress in recent months. Has had less intense spells that resembled this one on quality, but lesser intensity.     Also describes problems with starting/completing tasks. Ongoing weight gain  Depressed moods, decreased motivation. Decreased initiative.   Continuing to work. Has remained sertraline adherent over past several years. Denies acute provoking stressors. Tried psychotherapy during previous treatment experience, didn't notice much benefit.       Background: 36 y/o F presents for chronic anxiety, depression, for years. Endorses following symptoms daily/near daily - Nervous/anxious/edgy, not able to stop/control worrying, worrying too much about different things, trouble relaxing, restlessness, easily annoyed/irritable, feeling afraid as if something awful might happen. ALBERTO - 7 = 21. Also endorses problems falling & staying asleep, sad <1/2 time, driven to overeat, slight weight gain, concentration problems, decreased interest, anergia, restlessness. Has problems with chronic pain related to psoriatic arthritis. Has had pattern of pattern of brief relief from dmards then waning of effect. Describes anxiety triggered by pain and disability - work has gone down from 50 hours/week to 20 hours/week due to pain. Heart beats fast, sweats, might start talking too fast, concentration. To  "start otezla as next treatment + methotrexate IM. meds have been partially helpful. PsychHx: Depression since teen then generally under control during 20's, worsened in 30's. Saw Dr. Wesley x 15 years then care through primary care. "prozac has always worked great" (though experienced some some emotional blunting). Other meds tried include wellbutrin, effexor. Remote SI, last months ago when in intense pain; Past suicide attempts in late teens (OD x 2 - never bad enough to end up in hospital) none since. Engaged in non-suicidal self-mutilation briefly as teen - cutting on legs. No psych admissions. No AVH. No delusions. Late teens - "super-great, super happy", promiscuous, drinking; all since early 20's. Never treated with mood stabilizers. "on/off treatment from [age]". 1x/year. Adherent with prozac - denies side effects.  Buspirone - prescribed/took x 7 months but didn't help. Meds: psoriatic arthritis, obesity, Social Hx: No developmental problems. Normal milestones. Single, no kids. 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 Difficulty with adjustment of lifestyle/living standard to finances with reduced work. Mom, good friends, 2 sisters, employees are supportive. Not  (never has been).     Review Of Systems:     GENERAL:  No weight gain or loss  SKIN:  No rashes or lacerations  HEAD:  No headaches  CHEST:  No shortness of breath, hyperventilation or cough  CARDIOVASCULAR:  No tachycardia or chest pain  ABDOMEN:  No nausea, vomiting, pain, constipation or diarrhea  URINARY:  No frequency, dysuria or sexual dysfunction  ENDOCRINE:  No polydipsia, polyuria  MUSCULOSKELETAL:  No pain or stiffness of the joints  NEUROLOGIC:  No weakness, sensory changes, seizures, confusion, memory loss, tremor or other abnormal movements    Current Evaluation:     Nutritional Screening: Considering the patient's height and weight, medications, medical history and preferences, should a referral be " made to the dietitian? no    Constitutional  Vitals:  Most recent vital signs, dated less than 90 days prior to this appointment, were not reviewed.    There were no vitals filed for this visit.     General:  unremarkable, age appropriate     Musculoskeletal  Muscle Strength/Tone:  no tremor, no tic   Gait & Station:  non-ataxic     Psychiatric  Appearance: casually dressed & groomed;   Behavior: calm,   Cooperation: cooperative with assessment  Speech: normal rate, volume, tone  Thought Process: linear, goal-directed  Thought Content: No suicidal or homicidal ideation; no delusions  Affect: anxious  Mood: anxious  Perceptions: No auditory or visual hallucinations  Level of Consciousness: alert throughout interview  Insight: fair  Cognition: Oriented to person, place, time, & situation  Memory: no apparent deficits to general clinical interview; not formally assessed  Attention/Concentration: no apparent deficits to general clinical interview; not formally assessed  Fund of Knowledge: average by vocabulary/education    Laboratory Data  No visits with results within 1 Month(s) from this visit.   Latest known visit with results is:   Lab Visit on 05/04/2022   Component Date Value Ref Range Status    Sodium 05/04/2022 141  136 - 145 mmol/L Final    Potassium 05/04/2022 4.4  3.5 - 5.1 mmol/L Final    Chloride 05/04/2022 105  95 - 110 mmol/L Final    CO2 05/04/2022 25  23 - 29 mmol/L Final    Glucose 05/04/2022 91  70 - 110 mg/dL Final    BUN 05/04/2022 19  6 - 20 mg/dL Final    Creatinine 05/04/2022 0.8  0.5 - 1.4 mg/dL Final    Calcium 05/04/2022 10.0  8.7 - 10.5 mg/dL Final    Total Protein 05/04/2022 7.7  6.0 - 8.4 g/dL Final    Albumin 05/04/2022 4.1  3.5 - 5.2 g/dL Final    Total Bilirubin 05/04/2022 0.5  0.1 - 1.0 mg/dL Final    Alkaline Phosphatase 05/04/2022 77  55 - 135 U/L Final    AST 05/04/2022 30  10 - 40 U/L Final    ALT 05/04/2022 45 (A) 10 - 44 U/L Final    Anion Gap 05/04/2022 11  8 - 16  mmol/L Final    eGFR if African American 05/04/2022 >60  >60 mL/min/1.73 m^2 Final    eGFR if non African American 05/04/2022 >60  >60 mL/min/1.73 m^2 Final    WBC 05/04/2022 10.13  3.90 - 12.70 K/uL Final    RBC 05/04/2022 4.86  4.00 - 5.40 M/uL Final    Hemoglobin 05/04/2022 12.7  12.0 - 16.0 g/dL Final    Hematocrit 05/04/2022 39.4  37.0 - 48.5 % Final    MCV 05/04/2022 81 (A) 82 - 98 fL Final    MCH 05/04/2022 26.1 (A) 27.0 - 31.0 pg Final    MCHC 05/04/2022 32.2  32.0 - 36.0 g/dL Final    RDW 05/04/2022 14.8 (A) 11.5 - 14.5 % Final    Platelets 05/04/2022 461 (A) 150 - 450 K/uL Final    MPV 05/04/2022 8.5 (A) 9.2 - 12.9 fL Final    Immature Granulocytes 05/04/2022 0.4  0.0 - 0.5 % Final    Gran # (ANC) 05/04/2022 6.1  1.8 - 7.7 K/uL Final    Immature Grans (Abs) 05/04/2022 0.04  0.00 - 0.04 K/uL Final    Lymph # 05/04/2022 2.4  1.0 - 4.8 K/uL Final    Mono # 05/04/2022 0.6  0.3 - 1.0 K/uL Final    Eos # 05/04/2022 1.0 (A) 0.0 - 0.5 K/uL Final    Baso # 05/04/2022 0.09  0.00 - 0.20 K/uL Final    nRBC 05/04/2022 0  0 /100 WBC Final    Gran % 05/04/2022 59.8  38.0 - 73.0 % Final    Lymph % 05/04/2022 23.5  18.0 - 48.0 % Final    Mono % 05/04/2022 5.9  4.0 - 15.0 % Final    Eosinophil % 05/04/2022 9.5 (A) 0.0 - 8.0 % Final    Basophil % 05/04/2022 0.9  0.0 - 1.9 % Final    Differential Method 05/04/2022 Automated   Final    CRP 05/04/2022 10.4 (A) 0.0 - 8.2 mg/L Final    Sed Rate 05/04/2022 18  0 - 36 mm/Hr Final     Medications  Outpatient Encounter Medications as of 6/29/2022   Medication Sig Dispense Refill    celecoxib (CELEBREX) 200 MG capsule Take 1 capsule (200 mg total) by mouth 2 (two) times daily. 60 capsule 3    ENSTILAR 0.005-0.064 % Foam AAA qD prn. Do not use on face or folds. 60 g 3    ergocalciferol (ERGOCALCIFEROL) 50,000 unit Cap TAKE ONE CAPSULE BY MOUTH TWICE A WEEK 24 capsule 3    esomeprazole (NEXIUM) 40 MG capsule TAKE ONE CAPSULE BY MOUTH EVERY DAY BEFORE  BREAKFAST 30 capsule 2    ferrous sulfate (FEOSOL) 325 mg (65 mg iron) Tab tablet Take 1 tablet (325 mg total) by mouth once daily. 90 tablet 3    folic acid (FOLVITE) 1 MG tablet TAKE ONE TABLET BY MOUTH DAILY 90 tablet 3    guselkumab (TREMFYA) 100 mg/mL Syrg Inject 100 mg into the skin every 8 weeks. 2 mL 3    hydroquinone 4 % Crea Apply to dark spots once daily. Use with sunscreen if outdoors 28 g 1    leflunomide (ARAVA) 20 MG Tab TAKE ONE TABLET BY MOUTH ONCE DAILY 30 tablet 2    methylPREDNISolone (MEDROL DOSEPACK) 4 mg tablet use as directed 21 tablet 0    semaglutide, weight loss, (WEGOVY) 0.25 mg/0.5 mL PnIj Inject 0.25 mg into the skin every 7 days. 2 mL 0    semaglutide, weight loss, (WEGOVY) 0.5 mg/0.5 mL PnIj Inject 0.5 mg into the skin every 7 days. 2 mL 0    [START ON 7/6/2022] semaglutide, weight loss, (WEGOVY) 1 mg/0.5 mL PnIj Inject 1 mg into the skin every 7 days. 2 mL 0    [START ON 8/3/2022] semaglutide, weight loss, (WEGOVY) 1.7 mg/0.75 mL PnIj Inject 1.7 mg into the skin every 7 days. 3 mL 0    [START ON 9/7/2022] semaglutide, weight loss, (WEGOVY) 2.4 mg/0.75 mL PnIj Inject 2.4 mg into the skin every 7 days. 3 mL 0    sertraline (ZOLOFT) 100 MG tablet Take 1 tablet (100 mg total) by mouth once daily. 30 tablet 5    tacrolimus (PROTOPIC) 0.1 % ointment APPLY TO THE AFFECTED AREA(S) TWICE DAILY AS NEEDED FOR ECZEMA 60 g 3     No facility-administered encounter medications on file as of 6/29/2022.     Assessment - Diagnosis - Goals:     Impression: 38 y/o F with history of chronic depression, recently mild, worsening anxiety in recently years with symptoms tracking with physical symptoms of psoriatic arthritis/disability and now problem with apparently unprovoked panic attacks despite ongoing treatment with maintenance antidepressant. Psychotherapy not much helpful previously.     Dx: Generalized anxiety disorder; hx of MDD    Treatment Goals:  Specify outcomes written in  observable, behavioral terms: Improve coping skills, decrease anxiety by self-report    Treatment Plan/Recommendations:   · Fluoxetine 40 mg in place of sertraline.   · Discussed risks, benefits, and alternatives to treatment plan documented above with patient. I answered all patient questions related to this plan and patient expressed understanding and agreement.   · Continue psychotherapy prn.     Return to Clinic: 2 months    Counseling time: 5 minutes  Total time: 20 minutes    HENRRY Wolf MD

## 2022-07-01 ENCOUNTER — SPECIALTY PHARMACY (OUTPATIENT)
Dept: PHARMACY | Facility: CLINIC | Age: 42
End: 2022-07-01
Payer: COMMERCIAL

## 2022-07-01 NOTE — TELEPHONE ENCOUNTER
Specialty Pharmacy - Clinical Reassessment    Specialty Medication Orders Linked to Encounter    Flowsheet Row Most Recent Value   Medication #1 guselkumab (TREMFYA) 100 mg/mL Syrg (Order#[id number], Rx#[id number])        Patient Diagnosis   L40.59 - Polyarticular psoriatic arthritis    Specialty clinical pharmacist review completed for an annual review of reassessment. Reviewed the following areas: current med list, reports of adverse effects, adherence and progress towards therapeutic goals.    Recommendations: none at this time.    Tasks added this encounter   4/1/2023 - Clinical - Follow Up Assesement (Annual)   Tasks due within next 3 months   7/5/2022 - Refill Call (Auto Added)     Elizabeth Lynch, PharmD  [facility]  [address]  [facility] [address]  Phone: [phone]  Fax: [phone]

## 2022-07-06 ENCOUNTER — SPECIALTY PHARMACY (OUTPATIENT)
Dept: PHARMACY | Facility: CLINIC | Age: 42
End: 2022-07-06
Payer: COMMERCIAL

## 2022-07-06 ENCOUNTER — PATIENT MESSAGE (OUTPATIENT)
Dept: RHEUMATOLOGY | Facility: CLINIC | Age: 42
End: 2022-07-06
Payer: COMMERCIAL

## 2022-07-06 NOTE — TELEPHONE ENCOUNTER
Specialty Pharmacy - Refill Coordination    Specialty Medication Orders Linked to Encounter    Flowsheet Row Most Recent Value   Medication #1 guselkumab (TREMFYA) 100 mg/mL Syrg (Order#[id number], Rx#[id number])          Refill Questions - Documented Responses    Flowsheet Row Most Recent Value   Patient Availability and HIPAA Verification    Does patient want to proceed with activity? Yes   HIPAA/medical authority confirmed? Yes   Relationship to patient of person spoken to? Self   Refill Screening Questions    Changes to allergies? No   Changes to medications? No   New conditions since last clinic visit? No   Unplanned office visit, urgent care, ED, or hospital admission in the last 4 weeks? No   How does patient/caregiver feel medication is working? Very good   Financial problems or insurance changes? No   How many doses of your specialty medications were missed in the last 4 weeks? 0   Would patient like to speak to a pharmacist? No   When does the patient need to receive the medication? 07/13/22   Refill Delivery Questions    How will the patient receive the medication? Delivery Solange   When does the patient need to receive the medication? 07/13/22   Shipping Address Home   Address in [facility] confirmed and updated if neccessary? Yes   Expected Copay ($) 0   Is the patient able to afford the medication copay? Yes   Payment Method zero copay   Days supply of Refill 56   Supplies needed? No supplies needed   Refill activity completed? Yes   Refill activity plan Refill scheduled   Shipment/Pickup Date: 07/07/22          Current Outpatient Medications   Medication Sig    celecoxib (CELEBREX) 200 MG capsule Take 1 capsule (200 mg total) by mouth 2 (two) times daily.    ENSTILAR 0.005-0.064 % Foam AAA qD prn. Do not use on face or folds.    ergocalciferol (ERGOCALCIFEROL) 50,000 unit Cap TAKE ONE CAPSULE BY MOUTH TWICE A WEEK    esomeprazole (NEXIUM) 40 MG capsule TAKE ONE CAPSULE BY MOUTH EVERY DAY  BEFORE BREAKFAST    ferrous sulfate (FEOSOL) 325 mg (65 mg iron) Tab tablet Take 1 tablet (325 mg total) by mouth once daily.    FLUoxetine 40 MG capsule Take 1 capsule (40 mg total) by mouth once daily.    folic acid (FOLVITE) 1 MG tablet TAKE ONE TABLET BY MOUTH DAILY    guselkumab (TREMFYA) 100 mg/mL Syrg Inject 100 mg into the skin every 8 weeks.    hydroquinone 4 % Crea Apply to dark spots once daily. Use with sunscreen if outdoors    leflunomide (ARAVA) 20 MG Tab TAKE ONE TABLET BY MOUTH ONCE DAILY    methylPREDNISolone (MEDROL DOSEPACK) 4 mg tablet use as directed    semaglutide, weight loss, (WEGOVY) 0.25 mg/0.5 mL PnIj Inject 0.25 mg into the skin every 7 days.    semaglutide, weight loss, (WEGOVY) 0.5 mg/0.5 mL PnIj Inject 0.5 mg into the skin every 7 days.    semaglutide, weight loss, (WEGOVY) 1 mg/0.5 mL PnIj Inject 1 mg into the skin every 7 days.    [START ON 8/3/2022] semaglutide, weight loss, (WEGOVY) 1.7 mg/0.75 mL PnIj Inject 1.7 mg into the skin every 7 days.    [START ON 9/7/2022] semaglutide, weight loss, (WEGOVY) 2.4 mg/0.75 mL PnIj Inject 2.4 mg into the skin every 7 days.    tacrolimus (PROTOPIC) 0.1 % ointment APPLY TO THE AFFECTED AREA(S) TWICE DAILY AS NEEDED FOR ECZEMA   Last reviewed on 6/27/2022  4:28 PM by Maryellen Milligan MA    Review of patient's allergies indicates:  No Known Allergies Last reviewed on  6/29/2022 11:45 AM by Dirk Bojorquez      Tasks added this encounter   8/28/2022 - Refill Call (Auto Added)   Tasks due within next 3 months   No tasks due.     Joy Posadas, PharmD  [facility]  [address] [place] [id number]  Phone: [phone]  Fax: [phone]

## 2022-07-11 ENCOUNTER — OFFICE VISIT (OUTPATIENT)
Dept: INTERNAL MEDICINE | Facility: CLINIC | Age: 42
End: 2022-07-11
Payer: COMMERCIAL

## 2022-07-11 ENCOUNTER — LAB VISIT (OUTPATIENT)
Dept: LAB | Facility: HOSPITAL | Age: 42
End: 2022-07-11
Payer: COMMERCIAL

## 2022-07-11 ENCOUNTER — HOSPITAL ENCOUNTER (OUTPATIENT)
Dept: RADIOLOGY | Facility: HOSPITAL | Age: 42
Discharge: HOME | End: 2022-07-11
Attending: NURSE PRACTITIONER
Payer: COMMERCIAL

## 2022-07-11 VITALS
TEMPERATURE: 99 F | SYSTOLIC BLOOD PRESSURE: 138 MMHG | HEIGHT: 72 IN | HEART RATE: 58 BPM | OXYGEN SATURATION: 95 % | DIASTOLIC BLOOD PRESSURE: 82 MMHG | BODY MASS INDEX: 39.68 KG/M2 | WEIGHT: 293 LBS

## 2022-07-11 DIAGNOSIS — R19.7 DIARRHEA, UNSPECIFIED TYPE: ICD-10-CM

## 2022-07-11 DIAGNOSIS — R11.2 NAUSEA AND VOMITING, INTRACTABILITY OF VOMITING NOT SPECIFIED, UNSPECIFIED VOMITING TYPE: ICD-10-CM

## 2022-07-11 DIAGNOSIS — R74.8 ELEVATED LIVER ENZYMES: Primary | ICD-10-CM

## 2022-07-11 DIAGNOSIS — D84.9 IMMUNOSUPPRESSED STATUS: ICD-10-CM

## 2022-07-11 DIAGNOSIS — R11.2 NAUSEA AND VOMITING, INTRACTABILITY OF VOMITING NOT SPECIFIED, UNSPECIFIED VOMITING TYPE: Primary | ICD-10-CM

## 2022-07-11 PROCEDURE — 74019 RADEX ABDOMEN 2 VIEWS: CPT | Mod: TC

## 2022-07-11 PROCEDURE — 3079F PR MOST RECENT DIASTOLIC BLOOD PRESSURE 80-89 MM HG: ICD-10-PCS | Mod: CPTII,S$GLB,, | Performed by: NURSE PRACTITIONER

## 2022-07-11 PROCEDURE — 3008F BODY MASS INDEX DOCD: CPT | Mod: CPTII,S$GLB,, | Performed by: NURSE PRACTITIONER

## 2022-07-11 PROCEDURE — 87449 NOS EACH ORGANISM AG IA: CPT | Performed by: NURSE PRACTITIONER

## 2022-07-11 PROCEDURE — 3075F SYST BP GE 130 - 139MM HG: CPT | Mod: CPTII,S$GLB,, | Performed by: NURSE PRACTITIONER

## 2022-07-11 PROCEDURE — 99999 PR PBB SHADOW E&M-EST. PATIENT-LVL V: ICD-10-PCS | Mod: PBBFAC,,, | Performed by: NURSE PRACTITIONER

## 2022-07-11 PROCEDURE — 3075F PR MOST RECENT SYSTOLIC BLOOD PRESS GE 130-139MM HG: ICD-10-PCS | Mod: CPTII,S$GLB,, | Performed by: NURSE PRACTITIONER

## 2022-07-11 PROCEDURE — 87046 STOOL CULTR AEROBIC BACT EA: CPT | Performed by: NURSE PRACTITIONER

## 2022-07-11 PROCEDURE — 87045 FECES CULTURE AEROBIC BACT: CPT | Performed by: NURSE PRACTITIONER

## 2022-07-11 PROCEDURE — 99214 OFFICE O/P EST MOD 30 MIN: CPT | Mod: S$GLB,,, | Performed by: NURSE PRACTITIONER

## 2022-07-11 PROCEDURE — 87427 SHIGA-LIKE TOXIN AG IA: CPT | Mod: 59 | Performed by: NURSE PRACTITIONER

## 2022-07-11 PROCEDURE — 1159F MED LIST DOCD IN RCRD: CPT | Mod: CPTII,S$GLB,, | Performed by: NURSE PRACTITIONER

## 2022-07-11 PROCEDURE — 1159F PR MEDICATION LIST DOCUMENTED IN MEDICAL RECORD: ICD-10-PCS | Mod: CPTII,S$GLB,, | Performed by: NURSE PRACTITIONER

## 2022-07-11 PROCEDURE — 74019 RADEX ABDOMEN 2 VIEWS: CPT | Mod: 26,,, | Performed by: RADIOLOGY

## 2022-07-11 PROCEDURE — 3008F PR BODY MASS INDEX (BMI) DOCUMENTED: ICD-10-PCS | Mod: CPTII,S$GLB,, | Performed by: NURSE PRACTITIONER

## 2022-07-11 PROCEDURE — 3079F DIAST BP 80-89 MM HG: CPT | Mod: CPTII,S$GLB,, | Performed by: NURSE PRACTITIONER

## 2022-07-11 PROCEDURE — 99214 PR OFFICE/OUTPT VISIT, EST, LEVL IV, 30-39 MIN: ICD-10-PCS | Mod: S$GLB,,, | Performed by: NURSE PRACTITIONER

## 2022-07-11 PROCEDURE — 99999 PR PBB SHADOW E&M-EST. PATIENT-LVL V: CPT | Mod: PBBFAC,,, | Performed by: NURSE PRACTITIONER

## 2022-07-11 PROCEDURE — 74019 XR ABDOMEN FLAT AND ERECT: ICD-10-PCS | Mod: 26,,, | Performed by: RADIOLOGY

## 2022-07-11 RX ORDER — CHOLESTYRAMINE 4 G/9G
4 POWDER, FOR SUSPENSION ORAL 2 TIMES DAILY
Qty: 6 PACKET | Refills: 0 | Status: SHIPPED | OUTPATIENT
Start: 2022-07-11 | End: 2022-11-08

## 2022-07-11 RX ORDER — ONDANSETRON 8 MG/1
8 TABLET, ORALLY DISINTEGRATING ORAL EVERY 12 HOURS PRN
Qty: 20 TABLET | Refills: 0 | Status: SHIPPED | OUTPATIENT
Start: 2022-07-11 | End: 2022-11-08

## 2022-07-11 NOTE — PROGRESS NOTES
Subjective:       Patient ID: Janice Lawrence is a 42 y.o. female.    Chief Complaint: NAUSEA (Vomiting and diarrhea )    HPI    N/v/d x 9 days  Low grade fever last week  Took immodium, did not help  Took zofran, phenergan, and levsin which is helping  Watery stools. 5-7 x  a day  No blood in the stool  No abd pain today    Past Medical History:   Diagnosis Date    Acid reflux     Acquired iron deficiency anemia due to decreased absorption 12/10/2020    Anxiety     Arthritis     Depression      Past Surgical History:   Procedure Laterality Date    COLONOSCOPY N/A 02/10/2021    Procedure: COLONOSCOPY;  Surgeon: Angelika Eldridge MD;  Location: [facility];  Service: Endoscopy;  Laterality: N/A;    COSMETIC SURGERY      ESOPHAGOGASTRODUODENOSCOPY N/A 02/10/2021    Procedure: ESOPHAGOGASTRODUODENOSCOPY (EGD);  Surgeon: Angelika Eldridge MD;  Location: [facility];  Service: Endoscopy;  Laterality: N/A;     Social History     Socioeconomic History    Marital status: Single   Occupational History    Occupation: [employer]   Tobacco Use    Smoking status: Former Smoker     Packs/day: 0.50     Years: 10.00     Pack years: 5.00     Types: Cigarettes    Smokeless tobacco: Never Used   Substance and Sexual Activity    Alcohol use: Yes     Alcohol/week: 3.0 standard drinks     Types: 3 Glasses of wine per week    Drug use: Yes     Frequency: 2.0 times per week     Types: Marijuana    Sexual activity: Yes     Birth control/protection: None     Comment: mut mog   Other Topics Concern    Are you pregnant or think you may be? No    Breast-feeding No     Review of patient's allergies indicates:  No Known Allergies  Current Outpatient Medications   Medication Sig    celecoxib (CELEBREX) 200 MG capsule Take 1 capsule (200 mg total) by mouth 2 (two) times daily.    ENSTILAR 0.005-0.064 % Foam AAA qD prn. Do not use on face or folds.    ergocalciferol (ERGOCALCIFEROL) 50,000 unit Cap TAKE ONE CAPSULE  BY MOUTH TWICE A WEEK    esomeprazole (NEXIUM) 40 MG capsule TAKE ONE CAPSULE BY MOUTH EVERY DAY BEFORE BREAKFAST    ferrous sulfate (FEOSOL) 325 mg (65 mg iron) Tab tablet Take 1 tablet (325 mg total) by mouth once daily.    FLUoxetine 40 MG capsule Take 1 capsule (40 mg total) by mouth once daily.    folic acid (FOLVITE) 1 MG tablet TAKE ONE TABLET BY MOUTH DAILY    guselkumab (TREMFYA) 100 mg/mL Syrg Inject 100 mg into the skin every 8 weeks.    hydroquinone 4 % Crea Apply to dark spots once daily. Use with sunscreen if outdoors    leflunomide (ARAVA) 20 MG Tab TAKE ONE TABLET BY MOUTH ONCE DAILY    methylPREDNISolone (MEDROL DOSEPACK) 4 mg tablet use as directed    semaglutide, weight loss, (WEGOVY) 1 mg/0.5 mL PnIj Inject 1 mg into the skin every 7 days.    [START ON 8/3/2022] semaglutide, weight loss, (WEGOVY) 1.7 mg/0.75 mL PnIj Inject 1.7 mg into the skin every 7 days.    cholestyramine (QUESTRAN) 4 gram packet Take 1 packet (4 g total) by mouth 2 (two) times daily. for 3 days    ondansetron (ZOFRAN-ODT) 8 MG TbDL Take 1 tablet (8 mg total) by mouth every 12 (twelve) hours as needed (nausea).    semaglutide, weight loss, (WEGOVY) 0.25 mg/0.5 mL PnIj Inject 0.25 mg into the skin every 7 days.    semaglutide, weight loss, (WEGOVY) 0.5 mg/0.5 mL PnIj Inject 0.5 mg into the skin every 7 days.    [START ON 9/7/2022] semaglutide, weight loss, (WEGOVY) 2.4 mg/0.75 mL PnIj Inject 2.4 mg into the skin every 7 days.    tacrolimus (PROTOPIC) 0.1 % ointment APPLY TO THE AFFECTED AREA(S) TWICE DAILY AS NEEDED FOR ECZEMA     No current facility-administered medications for this visit.           Review of Systems   Constitutional: Negative for activity change, appetite change, chills, diaphoresis, fatigue, fever and unexpected weight change.   HENT: Negative for congestion, ear pain, postnasal drip, rhinorrhea, sinus pressure, sinus pain, sneezing, sore throat, tinnitus, trouble swallowing and voice change.     Eyes: Negative for photophobia, pain and visual disturbance.   Respiratory: Negative for cough, chest tightness, shortness of breath and wheezing.    Cardiovascular: Negative for chest pain, palpitations and leg swelling.   Gastrointestinal: Positive for diarrhea and nausea. Negative for abdominal distention, abdominal pain, constipation and vomiting.   Genitourinary: Negative for decreased urine volume, difficulty urinating, dysuria, flank pain, frequency, hematuria and urgency.   Musculoskeletal: Negative for arthralgias, back pain, joint swelling, neck pain and neck stiffness.   Allergic/Immunologic: Negative for immunocompromised state.   Neurological: Negative for dizziness, tremors, seizures, syncope, facial asymmetry, speech difficulty, weakness, light-headedness, numbness and headaches.   Hematological: Negative for adenopathy. Does not bruise/bleed easily.   Psychiatric/Behavioral: Negative for confusion and sleep disturbance.       Objective:      Physical Exam  Vitals reviewed.   HENT:      Head: Normocephalic and atraumatic.      Right Ear: Tympanic membrane normal.      Left Ear: Tympanic membrane normal.   Eyes:      Conjunctiva/sclera: Conjunctivae normal.   Cardiovascular:      Rate and Rhythm: Normal rate and regular rhythm.      Heart sounds: Normal heart sounds.   Pulmonary:      Effort: Pulmonary effort is normal.      Breath sounds: Normal breath sounds.   Abdominal:      General: Bowel sounds are normal.      Palpations: Abdomen is soft.      Tenderness: There is generalized abdominal tenderness.   Musculoskeletal:         General: Normal range of motion.      Cervical back: Normal range of motion and neck supple.   Skin:     General: Skin is warm and dry.   Neurological:      Mental Status: She is alert and oriented to person, place, and time.         Assessment:     Vitals:    07/11/22 1130   BP: 138/82   Pulse: (!) 58   Temp: 98.8 °F (37.1 °C)         1. Nausea and vomiting,  intractability of vomiting not specified, unspecified vomiting type    2. Diarrhea, unspecified type    3. Immunosuppressed status        Plan:   Nausea and vomiting, intractability of vomiting not specified, unspecified vomiting type  -     X-Ray Abdomen Flat And Erect; Future; Expected date: 07/11/2022  -     CBC Auto Differential; Future; Expected date: 07/11/2022  -     Comprehensive Metabolic Panel; Future; Expected date: 07/11/2022  -     Cancel: Giardia / Cryptosporidum, EIA; Future; Expected date: 07/11/2022  -     Cancel: Rotavirus antigen, stool; Future; Expected date: 07/11/2022  -     Cancel: Stool Exam-Ova,Cysts,Parasites; Future; Expected date: 07/11/2022  -     Stool culture; Future; Expected date: 07/11/2022  -     Clostridium difficile EIA; Future; Expected date: 07/11/2022    Diarrhea, unspecified type  -     X-Ray Abdomen Flat And Erect; Future; Expected date: 07/11/2022  -     CBC Auto Differential; Future; Expected date: 07/11/2022  -     Comprehensive Metabolic Panel; Future; Expected date: 07/11/2022  -     Cancel: Giardia / Cryptosporidum, EIA; Future; Expected date: 07/11/2022  -     Cancel: Rotavirus antigen, stool; Future; Expected date: 07/11/2022  -     Cancel: Stool Exam-Ova,Cysts,Parasites; Future; Expected date: 07/11/2022  -     Stool culture; Future; Expected date: 07/11/2022  -     Clostridium difficile EIA; Future; Expected date: 07/11/2022  -     Stool Exam-Ova,Cysts,Parasites; Future; Expected date: 07/12/2022  -     Rotavirus antigen, stool; Future; Expected date: 07/12/2022  -     Giardia / Cryptosporidum, EIA; Future; Expected date: 07/12/2022    Immunosuppressed status    Other orders  -     ondansetron (ZOFRAN-ODT) 8 MG TbDL; Take 1 tablet (8 mg total) by mouth every 12 (twelve) hours as needed (nausea).  Dispense: 20 tablet; Refill: 0  -     cholestyramine (QUESTRAN) 4 gram packet; Take 1 packet (4 g total) by mouth 2 (two) times daily. for 3 days  Dispense: 6 packet;  Refill: 0

## 2022-07-12 ENCOUNTER — TELEPHONE (OUTPATIENT)
Dept: INTERNAL MEDICINE | Facility: CLINIC | Age: 42
End: 2022-07-12
Payer: COMMERCIAL

## 2022-07-12 LAB
C DIFF GDH STL QL: NEGATIVE
C DIFF TOX A+B STL QL IA: NEGATIVE

## 2022-07-12 NOTE — TELEPHONE ENCOUNTER
----- Message from Corinne Mcdonough sent at 7/12/2022  4:35 AM CDT -----  Regarding: Lab Client Services  Contact: [phone]  Good Morning,     My name is Corinne Mcdonough I work in the Lab Client Services. We had a problem with some lab work on this patient. If someone from your office could call us at [phone] or [phone] that would be great. Anyone in my department can help.      Thank you

## 2022-07-13 LAB
E COLI SXT1 STL QL IA: NEGATIVE
E COLI SXT2 STL QL IA: NEGATIVE

## 2022-07-14 ENCOUNTER — PATIENT MESSAGE (OUTPATIENT)
Dept: INTERNAL MEDICINE | Facility: CLINIC | Age: 42
End: 2022-07-14
Payer: COMMERCIAL

## 2022-07-14 ENCOUNTER — LAB VISIT (OUTPATIENT)
Dept: LAB | Facility: HOSPITAL | Age: 42
End: 2022-07-14
Payer: COMMERCIAL

## 2022-07-14 DIAGNOSIS — R19.7 DIARRHEA, UNSPECIFIED TYPE: ICD-10-CM

## 2022-07-14 PROCEDURE — 87177 OVA AND PARASITES SMEARS: CPT | Performed by: NURSE PRACTITIONER

## 2022-07-14 PROCEDURE — 87425 ROTAVIRUS AG IA: CPT | Performed by: NURSE PRACTITIONER

## 2022-07-14 PROCEDURE — 87209 SMEAR COMPLEX STAIN: CPT | Performed by: NURSE PRACTITIONER

## 2022-07-14 PROCEDURE — 87329 GIARDIA AG IA: CPT | Performed by: NURSE PRACTITIONER

## 2022-07-15 LAB
BACTERIA STL CULT: NORMAL
CRYPTOSP AG STL QL IA: NEGATIVE
G LAMBLIA AG STL QL IA: NEGATIVE
RV AG STL QL IA.RAPID: NEGATIVE

## 2022-07-18 ENCOUNTER — PATIENT MESSAGE (OUTPATIENT)
Dept: INTERNAL MEDICINE | Facility: CLINIC | Age: 42
End: 2022-07-18
Payer: COMMERCIAL

## 2022-07-19 ENCOUNTER — HOSPITAL ENCOUNTER (EMERGENCY)
Facility: HOSPITAL | Age: 42
Discharge: HOME | End: 2022-07-19
Attending: EMERGENCY MEDICINE
Payer: COMMERCIAL

## 2022-07-19 VITALS
SYSTOLIC BLOOD PRESSURE: 161 MMHG | TEMPERATURE: 98 F | RESPIRATION RATE: 20 BRPM | WEIGHT: 293 LBS | DIASTOLIC BLOOD PRESSURE: 92 MMHG | HEART RATE: 70 BPM | BODY MASS INDEX: 43.43 KG/M2 | OXYGEN SATURATION: 99 %

## 2022-07-19 DIAGNOSIS — R19.7 DIARRHEA OF PRESUMED INFECTIOUS ORIGIN: Primary | ICD-10-CM

## 2022-07-19 LAB
ALBUMIN SERPL BCP-MCNC: 3.9 G/DL (ref 3.5–5.2)
ALP SERPL-CCNC: 91 U/L (ref 55–135)
ALT SERPL W/O P-5'-P-CCNC: 88 U/L (ref 10–44)
ANION GAP SERPL CALC-SCNC: 11 MMOL/L (ref 8–16)
AST SERPL-CCNC: 58 U/L (ref 10–40)
BASOPHILS # BLD AUTO: 0.07 K/UL (ref 0–0.2)
BASOPHILS NFR BLD: 0.7 % (ref 0–1.9)
BILIRUB SERPL-MCNC: 0.6 MG/DL (ref 0.1–1)
BUN SERPL-MCNC: 9 MG/DL (ref 6–20)
CALCIUM SERPL-MCNC: 9.5 MG/DL (ref 8.7–10.5)
CHLORIDE SERPL-SCNC: 109 MMOL/L (ref 95–110)
CO2 SERPL-SCNC: 21 MMOL/L (ref 23–29)
CREAT SERPL-MCNC: 0.7 MG/DL (ref 0.5–1.4)
DIFFERENTIAL METHOD: ABNORMAL
EOSINOPHIL # BLD AUTO: 0.9 K/UL (ref 0–0.5)
EOSINOPHIL NFR BLD: 8.5 % (ref 0–8)
ERYTHROCYTE [DISTWIDTH] IN BLOOD BY AUTOMATED COUNT: 15.5 % (ref 11.5–14.5)
EST. GFR  (AFRICAN AMERICAN): >60 ML/MIN/1.73 M^2
EST. GFR  (NON AFRICAN AMERICAN): >60 ML/MIN/1.73 M^2
GLUCOSE SERPL-MCNC: 93 MG/DL (ref 70–110)
HCT VFR BLD AUTO: 39.7 % (ref 37–48.5)
HGB BLD-MCNC: 12.8 G/DL (ref 12–16)
IMM GRANULOCYTES # BLD AUTO: 0.03 K/UL (ref 0–0.04)
IMM GRANULOCYTES NFR BLD AUTO: 0.3 % (ref 0–0.5)
LYMPHOCYTES # BLD AUTO: 2.5 K/UL (ref 1–4.8)
LYMPHOCYTES NFR BLD: 24 % (ref 18–48)
MCH RBC QN AUTO: 25.2 PG (ref 27–31)
MCHC RBC AUTO-ENTMCNC: 32.2 G/DL (ref 32–36)
MCV RBC AUTO: 78 FL (ref 82–98)
MONOCYTES # BLD AUTO: 0.7 K/UL (ref 0.3–1)
MONOCYTES NFR BLD: 6.6 % (ref 4–15)
NEUTROPHILS # BLD AUTO: 6.2 K/UL (ref 1.8–7.7)
NEUTROPHILS NFR BLD: 59.9 % (ref 38–73)
NRBC BLD-RTO: 0 /100 WBC
PLATELET # BLD AUTO: 446 K/UL (ref 150–450)
PMV BLD AUTO: 8.5 FL (ref 9.2–12.9)
POTASSIUM SERPL-SCNC: 3.7 MMOL/L (ref 3.5–5.1)
PROT SERPL-MCNC: 7 G/DL (ref 6–8.4)
RBC # BLD AUTO: 5.08 M/UL (ref 4–5.4)
SODIUM SERPL-SCNC: 141 MMOL/L (ref 136–145)
WBC # BLD AUTO: 10.26 K/UL (ref 3.9–12.7)

## 2022-07-19 PROCEDURE — 80053 COMPREHEN METABOLIC PANEL: CPT | Performed by: NURSE PRACTITIONER

## 2022-07-19 PROCEDURE — 96361 HYDRATE IV INFUSION ADD-ON: CPT

## 2022-07-19 PROCEDURE — 87045 FECES CULTURE AEROBIC BACT: CPT | Performed by: NURSE PRACTITIONER

## 2022-07-19 PROCEDURE — 85025 COMPLETE CBC W/AUTO DIFF WBC: CPT | Performed by: NURSE PRACTITIONER

## 2022-07-19 PROCEDURE — 99284 EMERGENCY DEPT VISIT MOD MDM: CPT | Mod: 25

## 2022-07-19 PROCEDURE — 96374 THER/PROPH/DIAG INJ IV PUSH: CPT

## 2022-07-19 PROCEDURE — 63600175 PHARM REV CODE 636 W HCPCS: Performed by: NURSE PRACTITIONER

## 2022-07-19 PROCEDURE — 80074 ACUTE HEPATITIS PANEL: CPT | Performed by: NURSE PRACTITIONER

## 2022-07-19 PROCEDURE — 87046 STOOL CULTR AEROBIC BACT EA: CPT | Performed by: NURSE PRACTITIONER

## 2022-07-19 PROCEDURE — 87427 SHIGA-LIKE TOXIN AG IA: CPT | Mod: 59 | Performed by: NURSE PRACTITIONER

## 2022-07-19 PROCEDURE — 25000003 PHARM REV CODE 250: Performed by: NURSE PRACTITIONER

## 2022-07-19 PROCEDURE — 36415 COLL VENOUS BLD VENIPUNCTURE: CPT | Performed by: NURSE PRACTITIONER

## 2022-07-19 RX ORDER — SODIUM CHLORIDE 9 MG/ML
1000 INJECTION, SOLUTION INTRAVENOUS
Status: COMPLETED | OUTPATIENT
Start: 2022-07-19 | End: 2022-07-19

## 2022-07-19 RX ORDER — CIPROFLOXACIN 750 MG/1
750 TABLET, FILM COATED ORAL
Status: COMPLETED | OUTPATIENT
Start: 2022-07-19 | End: 2022-07-19

## 2022-07-19 RX ORDER — CIPROFLOXACIN 750 MG/1
750 TABLET, FILM COATED ORAL EVERY 12 HOURS
Qty: 14 TABLET | Refills: 0 | Status: SHIPPED | OUTPATIENT
Start: 2022-07-19 | End: 2022-07-22

## 2022-07-19 RX ORDER — METRONIDAZOLE 500 MG/1
500 TABLET ORAL EVERY 8 HOURS
Qty: 21 TABLET | Refills: 0 | Status: SHIPPED | OUTPATIENT
Start: 2022-07-19 | End: 2022-07-26

## 2022-07-19 RX ORDER — ONDANSETRON 2 MG/ML
4 INJECTION INTRAMUSCULAR; INTRAVENOUS
Status: COMPLETED | OUTPATIENT
Start: 2022-07-19 | End: 2022-07-19

## 2022-07-19 RX ADMIN — CIPROFLOXACIN 750 MG: 750 TABLET, FILM COATED ORAL at 12:07

## 2022-07-19 RX ADMIN — SODIUM CHLORIDE 1000 ML: 0.9 INJECTION, SOLUTION INTRAVENOUS at 12:07

## 2022-07-19 RX ADMIN — ONDANSETRON 4 MG: 2 INJECTION INTRAMUSCULAR; INTRAVENOUS at 12:07

## 2022-07-19 NOTE — ED PROVIDER NOTES
Encounter Date: 7/19/2022       History     Chief Complaint   Patient presents with    Diarrhea     x17 days. Weak.     42-year-old female with complaint of diarrhea for 2 weeks.  Patient reports she returned from [place] and her whole family developed diarrhea.  Patient reports she has negative stool cultures and studies but his stool experiencing diarrhea.  Patient reports diarrhea with eating and drinking.  And no abdominal cramping.  No abdominal pain.  No nausea vomiting.  No fever chills.        Review of patient's allergies indicates:  No Known Allergies  Past Medical History:   Diagnosis Date    Acid reflux     Acquired iron deficiency anemia due to decreased absorption 12/10/2020    Anxiety     Arthritis     Depression      Past Surgical History:   Procedure Laterality Date    COLONOSCOPY N/A 02/10/2021    Procedure: COLONOSCOPY;  Surgeon: Angelika Eldridge MD;  Location: [facility];  Service: Endoscopy;  Laterality: N/A;    COSMETIC SURGERY      ESOPHAGOGASTRODUODENOSCOPY N/A 02/10/2021    Procedure: ESOPHAGOGASTRODUODENOSCOPY (EGD);  Surgeon: Angelika Eldridge MD;  Location: [facility];  Service: Endoscopy;  Laterality: N/A;     Family History   Problem Relation Age of Onset    Cancer Mother     Breast cancer Mother     Diabetes Mellitus Maternal Grandfather     Cancer Paternal Grandmother     Psoriasis Father     Arthritis Father     Colon cancer Paternal Aunt     Ovarian cancer Neg Hx     Thrombophilia Neg Hx      Social History     Tobacco Use    Smoking status: Former Smoker     Packs/day: 0.50     Years: 10.00     Pack years: 5.00     Types: Cigarettes    Smokeless tobacco: Never Used   Substance Use Topics    Alcohol use: Yes     Alcohol/week: 3.0 standard drinks     Types: 3 Glasses of wine per week    Drug use: Yes     Frequency: 2.0 times per week     Types: Marijuana     Review of Systems   Constitutional: Negative for fever.   HENT: Negative for sore throat.     Respiratory: Negative for shortness of breath.    Cardiovascular: Negative for chest pain.   Gastrointestinal: Positive for diarrhea. Negative for nausea.   Genitourinary: Negative for dysuria.   Musculoskeletal: Negative for back pain.   Skin: Negative for rash.   Neurological: Negative for weakness.   Hematological: Does not bruise/bleed easily.       Physical Exam     Initial Vitals [07/19/22 1119]   BP Pulse Resp Temp SpO2   (!) 157/69 64 18 98.1 °F (36.7 °C) 99 %      MAP       --         Physical Exam    Nursing note and vitals reviewed.  Constitutional: She appears well-developed and well-nourished.   HENT:   Head: Normocephalic and atraumatic.   Eyes: Conjunctivae and EOM are normal. Pupils are equal, round, and reactive to light.   Neck: Neck supple.   Normal range of motion.  Cardiovascular: Normal rate, regular rhythm, normal heart sounds and intact distal pulses.   Pulmonary/Chest: Breath sounds normal.   Abdominal: Abdomen is soft. There is no abdominal tenderness. There is no rebound and no guarding.   Musculoskeletal:         General: Normal range of motion.      Cervical back: Normal range of motion and neck supple.     Neurological: She is alert and oriented to person, place, and time. She has normal strength and normal reflexes.   Skin: Skin is warm and dry.   Psychiatric: She has a normal mood and affect. Her behavior is normal. Thought content normal.         ED Course   Procedures  Labs Reviewed   CBC W/ AUTO DIFFERENTIAL - Abnormal; Notable for the following components:       Result Value    MCV 78 (*)     MCH 25.2 (*)     RDW 15.5 (*)     MPV 8.5 (*)     Eos # 0.9 (*)     Eosinophil % 8.5 (*)     All other components within normal limits   COMPREHENSIVE METABOLIC PANEL - Abnormal; Notable for the following components:    CO2 21 (*)     AST 58 (*)     ALT 88 (*)     All other components within normal limits   CULTURE, STOOL   ENTEROHEMORRHAGIC E.COLI   HEPATITIS PANEL, ACUTE          Imaging  Results    None          Medications   0.9%  NaCl infusion (0 mLs Intravenous Stopped 7/19/22 1316)   ciprofloxacin HCl tablet 750 mg (750 mg Oral Given 7/19/22 1259)   ondansetron injection 4 mg (4 mg Intravenous Given 7/19/22 1259)      1:41 PM  Pt's PCP has phoned in prescription of Cipro.  Will have pt follow up with PCP for recheck                    Clinical Impression:   Final diagnoses:  [R19.7] Diarrhea of presumed infectious origin (Primary)          ED Disposition Condition    Discharge Stable        ED Prescriptions     None        Follow-up Information     Follow up With Specialties Details Why Contact Info    [facility]  Schedule an appointment as soon as possible for a visit in 2 days  [phone]           Percy Eden NP  07/19/22 2343

## 2022-07-20 LAB
HAV IGM SERPL QL IA: NEGATIVE
HBV CORE IGM SERPL QL IA: NEGATIVE
HBV SURFACE AG SERPL QL IA: NEGATIVE
HCV AB SERPL QL IA: NEGATIVE

## 2022-07-21 LAB
E COLI SXT1 STL QL IA: NEGATIVE
E COLI SXT2 STL QL IA: NEGATIVE

## 2022-07-22 ENCOUNTER — PATIENT MESSAGE (OUTPATIENT)
Dept: INTERNAL MEDICINE | Facility: CLINIC | Age: 42
End: 2022-07-22
Payer: COMMERCIAL

## 2022-07-22 LAB — O+P STL MICRO: ABNORMAL

## 2022-07-22 RX ORDER — SULFAMETHOXAZOLE AND TRIMETHOPRIM 800; 160 MG/1; MG/1
1 TABLET ORAL 2 TIMES DAILY
Qty: 14 TABLET | Refills: 0 | Status: SHIPPED | OUTPATIENT
Start: 2022-07-22 | End: 2022-07-29

## 2022-07-23 LAB — BACTERIA STL CULT: NORMAL

## 2022-08-01 ENCOUNTER — LAB VISIT (OUTPATIENT)
Dept: LAB | Facility: HOSPITAL | Age: 42
End: 2022-08-01
Attending: PSYCHIATRY & NEUROLOGY
Payer: COMMERCIAL

## 2022-08-01 DIAGNOSIS — L40.0 PLAQUE PSORIASIS: ICD-10-CM

## 2022-08-01 DIAGNOSIS — L40.59 POLYARTICULAR PSORIATIC ARTHRITIS: ICD-10-CM

## 2022-08-01 LAB
ALBUMIN SERPL BCP-MCNC: 3.8 G/DL (ref 3.5–5.2)
ALP SERPL-CCNC: 84 U/L (ref 55–135)
ALT SERPL W/O P-5'-P-CCNC: 54 U/L (ref 10–44)
ANION GAP SERPL CALC-SCNC: 11 MMOL/L (ref 8–16)
AST SERPL-CCNC: 30 U/L (ref 10–40)
BASOPHILS # BLD AUTO: 0.07 K/UL (ref 0–0.2)
BASOPHILS NFR BLD: 0.7 % (ref 0–1.9)
BILIRUB SERPL-MCNC: 0.7 MG/DL (ref 0.1–1)
BUN SERPL-MCNC: 9 MG/DL (ref 6–20)
CALCIUM SERPL-MCNC: 9.5 MG/DL (ref 8.7–10.5)
CHLORIDE SERPL-SCNC: 104 MMOL/L (ref 95–110)
CO2 SERPL-SCNC: 25 MMOL/L (ref 23–29)
CREAT SERPL-MCNC: 0.7 MG/DL (ref 0.5–1.4)
CRP SERPL-MCNC: 3.4 MG/L (ref 0–8.2)
DIFFERENTIAL METHOD: ABNORMAL
EOSINOPHIL # BLD AUTO: 1.1 K/UL (ref 0–0.5)
EOSINOPHIL NFR BLD: 10 % (ref 0–8)
ERYTHROCYTE [DISTWIDTH] IN BLOOD BY AUTOMATED COUNT: 15.9 % (ref 11.5–14.5)
EST. GFR  (NO RACE VARIABLE): >60 ML/MIN/1.73 M^2
GLUCOSE SERPL-MCNC: 93 MG/DL (ref 70–110)
HCT VFR BLD AUTO: 38.4 % (ref 37–48.5)
HGB BLD-MCNC: 12.3 G/DL (ref 12–16)
IMM GRANULOCYTES # BLD AUTO: 0.04 K/UL (ref 0–0.04)
IMM GRANULOCYTES NFR BLD AUTO: 0.4 % (ref 0–0.5)
LYMPHOCYTES # BLD AUTO: 2.3 K/UL (ref 1–4.8)
LYMPHOCYTES NFR BLD: 21.7 % (ref 18–48)
MCH RBC QN AUTO: 25.4 PG (ref 27–31)
MCHC RBC AUTO-ENTMCNC: 32 G/DL (ref 32–36)
MCV RBC AUTO: 79 FL (ref 82–98)
MONOCYTES # BLD AUTO: 0.6 K/UL (ref 0.3–1)
MONOCYTES NFR BLD: 5.4 % (ref 4–15)
NEUTROPHILS # BLD AUTO: 6.5 K/UL (ref 1.8–7.7)
NEUTROPHILS NFR BLD: 61.8 % (ref 38–73)
NRBC BLD-RTO: 0 /100 WBC
PLATELET # BLD AUTO: 460 K/UL (ref 150–450)
PMV BLD AUTO: 8.5 FL (ref 9.2–12.9)
POTASSIUM SERPL-SCNC: 4.2 MMOL/L (ref 3.5–5.1)
PROT SERPL-MCNC: 7 G/DL (ref 6–8.4)
RBC # BLD AUTO: 4.84 M/UL (ref 4–5.4)
SODIUM SERPL-SCNC: 140 MMOL/L (ref 136–145)
WBC # BLD AUTO: 10.53 K/UL (ref 3.9–12.7)

## 2022-08-01 PROCEDURE — 85652 RBC SED RATE AUTOMATED: CPT | Performed by: INTERNAL MEDICINE

## 2022-08-01 PROCEDURE — 86140 C-REACTIVE PROTEIN: CPT | Performed by: INTERNAL MEDICINE

## 2022-08-01 PROCEDURE — 36415 COLL VENOUS BLD VENIPUNCTURE: CPT | Performed by: INTERNAL MEDICINE

## 2022-08-01 PROCEDURE — 85025 COMPLETE CBC W/AUTO DIFF WBC: CPT | Performed by: INTERNAL MEDICINE

## 2022-08-01 PROCEDURE — 80053 COMPREHEN METABOLIC PANEL: CPT | Performed by: INTERNAL MEDICINE

## 2022-08-02 LAB — ERYTHROCYTE [SEDIMENTATION RATE] IN BLOOD BY PHOTOMETRIC METHOD: 13 MM/HR (ref 0–36)

## 2022-09-01 ENCOUNTER — SPECIALTY PHARMACY (OUTPATIENT)
Dept: PHARMACY | Facility: CLINIC | Age: 42
End: 2022-09-01
Payer: COMMERCIAL

## 2022-09-01 DIAGNOSIS — L40.50 PSORIATIC ARTHRITIS: Primary | ICD-10-CM

## 2022-09-01 NOTE — TELEPHONE ENCOUNTER
Incoming call from the patient for refill on Tremfya. Patient last injected on 7/27. She was late on injecting due to sickness. Next injection due 9/21. [facility] to call for refill on 9/14

## 2022-09-20 NOTE — TELEPHONE ENCOUNTER
Specialty Pharmacy - Refill Coordination    Specialty Medication Orders Linked to Encounter      Flowsheet Row Most Recent Value   Medication #1 guselkumab (TREMFYA) 100 mg/mL Syrg (Order#[id number], Rx#[id number])            Refill Questions - Documented Responses      Flowsheet Row Most Recent Value   Patient Availability and HIPAA Verification    Does patient want to proceed with activity? Yes   HIPAA/medical authority confirmed? Yes   Relationship to patient of person spoken to? Self   Refill Screening Questions    Changes to allergies? No   Changes to medications? No   New conditions since last clinic visit? No   Unplanned office visit, urgent care, ED, or hospital admission in the last 4 weeks? No   How does patient/caregiver feel medication is working? Good   Financial problems or insurance changes? No   How many doses of your specialty medications were missed in the last 4 weeks? --  [pt reports parasite infection so she had to hold off last tremfya inject by couple weeks. reports resolution of infection]   Would patient like to speak to a pharmacist? No   When does the patient need to receive the medication? 09/29/22   Refill Delivery Questions    How will the patient receive the medication? Delivery Solange   When does the patient need to receive the medication? 09/29/22   Shipping Address Home   Address in [facility] confirmed and updated if neccessary? Yes   Expected Copay ($) 0   Is the patient able to afford the medication copay? Yes   Payment Method zero copay   Days supply of Refill 56   Supplies needed? No supplies needed   Refill activity completed? Yes   Refill activity plan Refill scheduled   Shipment/Pickup Date: 09/21/22            Current Outpatient Medications   Medication Sig    semaglutide (OZEMPIC SUBQ) Inject into the skin.    celecoxib (CELEBREX) 200 MG capsule Take 1 capsule (200 mg total) by mouth 2 (two) times daily.    cholestyramine (QUESTRAN) 4 gram packet Take 1 packet (4 g  total) by mouth 2 (two) times daily. for 3 days    ENSTILAR 0.005-0.064 % Foam AAA qD prn. Do not use on face or folds.    esomeprazole (NEXIUM) 40 MG capsule TAKE ONE CAPSULE BY MOUTH EVERY DAY BEFORE BREAKFAST    ferrous sulfate (FEOSOL) 325 mg (65 mg iron) Tab tablet Take 1 tablet (325 mg total) by mouth once daily.    FLUoxetine 40 MG capsule TAKE ONE CAPSULE BY MOUTH ONCE DAILY    folic acid (FOLVITE) 1 MG tablet TAKE ONE TABLET BY MOUTH DAILY    guselkumab (TREMFYA) 100 mg/mL Syrg Inject 1 mL (100 mg) into the skin every 8 weeks.    hydroquinone 4 % Crea Apply to dark spots once daily. Use with sunscreen if outdoors    leflunomide (ARAVA) 20 MG Tab TAKE ONE TABLET BY MOUTH ONCE DAILY    methylPREDNISolone (MEDROL DOSEPACK) 4 mg tablet use as directed    ondansetron (ZOFRAN-ODT) 8 MG TbDL Take 1 tablet (8 mg total) by mouth every 12 (twelve) hours as needed (nausea).    tacrolimus (PROTOPIC) 0.1 % ointment APPLY TO THE AFFECTED AREA(S) TWICE DAILY AS NEEDED FOR ECZEMA    VITAMIN D2 1,250 mcg (50,000 unit) capsule TAKE ONE CAPSULE BY MOUTH TWICE A WEEK   Last reviewed on 7/11/2022 11:30 AM by Brea Palomino MA    Review of patient's allergies indicates:  No Known Allergies Last reviewed on  7/22/2022 11:48 AM by Sujey Gay    Interventions added this encounter   Open: [facility] Patient Intervention - Drug therapy adherence: guselkumab (TREMFYA) 100 mg/mL Syrg     Tasks added this encounter   11/16/2022 - Refill Call (Auto Added)   Tasks due within next 3 months   No tasks due.     Amara Powell, PharmD  [facility]  [address] [place] [id number]  Phone: [phone]  Fax: [phone]

## 2022-11-08 ENCOUNTER — OFFICE VISIT (OUTPATIENT)
Dept: RHEUMATOLOGY | Facility: CLINIC | Age: 42
End: 2022-11-08
Payer: COMMERCIAL

## 2022-11-08 ENCOUNTER — LAB VISIT (OUTPATIENT)
Dept: LAB | Facility: HOSPITAL | Age: 42
End: 2022-11-08
Attending: INTERNAL MEDICINE
Payer: COMMERCIAL

## 2022-11-08 VITALS
SYSTOLIC BLOOD PRESSURE: 140 MMHG | HEIGHT: 72 IN | DIASTOLIC BLOOD PRESSURE: 87 MMHG | HEART RATE: 78 BPM | WEIGHT: 293 LBS | BODY MASS INDEX: 39.68 KG/M2

## 2022-11-08 DIAGNOSIS — E66.01 OBESITY, MORBID, BMI 40.0-49.9: ICD-10-CM

## 2022-11-08 DIAGNOSIS — Z79.899 HIGH RISK MEDICATION USE: ICD-10-CM

## 2022-11-08 DIAGNOSIS — D84.9 IMMUNOSUPPRESSED STATUS: ICD-10-CM

## 2022-11-08 DIAGNOSIS — E55.9 VITAMIN D DEFICIENCY: ICD-10-CM

## 2022-11-08 DIAGNOSIS — L40.59 POLYARTICULAR PSORIATIC ARTHRITIS: ICD-10-CM

## 2022-11-08 DIAGNOSIS — L40.0 PLAQUE PSORIASIS: ICD-10-CM

## 2022-11-08 DIAGNOSIS — L40.59 POLYARTICULAR PSORIATIC ARTHRITIS: Primary | ICD-10-CM

## 2022-11-08 LAB
ALBUMIN SERPL BCP-MCNC: 3.7 G/DL (ref 3.5–5.2)
ALP SERPL-CCNC: 80 U/L (ref 55–135)
ALT SERPL W/O P-5'-P-CCNC: 25 U/L (ref 10–44)
ANION GAP SERPL CALC-SCNC: 11 MMOL/L (ref 8–16)
AST SERPL-CCNC: 20 U/L (ref 10–40)
BASOPHILS # BLD AUTO: 0.1 K/UL (ref 0–0.2)
BASOPHILS NFR BLD: 1.1 % (ref 0–1.9)
BILIRUB SERPL-MCNC: 0.8 MG/DL (ref 0.1–1)
BUN SERPL-MCNC: 12 MG/DL (ref 6–20)
CALCIUM SERPL-MCNC: 8.9 MG/DL (ref 8.7–10.5)
CHLORIDE SERPL-SCNC: 104 MMOL/L (ref 95–110)
CO2 SERPL-SCNC: 22 MMOL/L (ref 23–29)
CREAT SERPL-MCNC: 0.8 MG/DL (ref 0.5–1.4)
CRP SERPL-MCNC: 5.1 MG/L (ref 0–8.2)
DIFFERENTIAL METHOD: ABNORMAL
EOSINOPHIL # BLD AUTO: 0.8 K/UL (ref 0–0.5)
EOSINOPHIL NFR BLD: 8.6 % (ref 0–8)
ERYTHROCYTE [DISTWIDTH] IN BLOOD BY AUTOMATED COUNT: 16.5 % (ref 11.5–14.5)
ERYTHROCYTE [SEDIMENTATION RATE] IN BLOOD BY PHOTOMETRIC METHOD: 25 MM/HR (ref 0–36)
EST. GFR  (NO RACE VARIABLE): >60 ML/MIN/1.73 M^2
GLUCOSE SERPL-MCNC: 93 MG/DL (ref 70–110)
HCT VFR BLD AUTO: 37.3 % (ref 37–48.5)
HGB BLD-MCNC: 12 G/DL (ref 12–16)
IMM GRANULOCYTES # BLD AUTO: 0.02 K/UL (ref 0–0.04)
IMM GRANULOCYTES NFR BLD AUTO: 0.2 % (ref 0–0.5)
LYMPHOCYTES # BLD AUTO: 2 K/UL (ref 1–4.8)
LYMPHOCYTES NFR BLD: 21.8 % (ref 18–48)
MCH RBC QN AUTO: 25.6 PG (ref 27–31)
MCHC RBC AUTO-ENTMCNC: 32.2 G/DL (ref 32–36)
MCV RBC AUTO: 80 FL (ref 82–98)
MONOCYTES # BLD AUTO: 0.5 K/UL (ref 0.3–1)
MONOCYTES NFR BLD: 5 % (ref 4–15)
NEUTROPHILS # BLD AUTO: 5.9 K/UL (ref 1.8–7.7)
NEUTROPHILS NFR BLD: 63.3 % (ref 38–73)
NRBC BLD-RTO: 0 /100 WBC
PLATELET # BLD AUTO: 403 K/UL (ref 150–450)
PMV BLD AUTO: 8.2 FL (ref 9.2–12.9)
POTASSIUM SERPL-SCNC: 4 MMOL/L (ref 3.5–5.1)
PROT SERPL-MCNC: 7.2 G/DL (ref 6–8.4)
RBC # BLD AUTO: 4.69 M/UL (ref 4–5.4)
SODIUM SERPL-SCNC: 137 MMOL/L (ref 136–145)
WBC # BLD AUTO: 9.33 K/UL (ref 3.9–12.7)

## 2022-11-08 PROCEDURE — 85025 COMPLETE CBC W/AUTO DIFF WBC: CPT | Performed by: INTERNAL MEDICINE

## 2022-11-08 PROCEDURE — 99215 PR OFFICE/OUTPT VISIT, EST, LEVL V, 40-54 MIN: ICD-10-PCS | Mod: S$GLB,,, | Performed by: INTERNAL MEDICINE

## 2022-11-08 PROCEDURE — 99999 PR PBB SHADOW E&M-EST. PATIENT-LVL III: CPT | Mod: PBBFAC,,, | Performed by: INTERNAL MEDICINE

## 2022-11-08 PROCEDURE — 86140 C-REACTIVE PROTEIN: CPT | Performed by: INTERNAL MEDICINE

## 2022-11-08 PROCEDURE — 36415 COLL VENOUS BLD VENIPUNCTURE: CPT | Performed by: INTERNAL MEDICINE

## 2022-11-08 PROCEDURE — 3077F SYST BP >= 140 MM HG: CPT | Mod: CPTII,S$GLB,, | Performed by: INTERNAL MEDICINE

## 2022-11-08 PROCEDURE — 3008F PR BODY MASS INDEX (BMI) DOCUMENTED: ICD-10-PCS | Mod: CPTII,S$GLB,, | Performed by: INTERNAL MEDICINE

## 2022-11-08 PROCEDURE — 80053 COMPREHEN METABOLIC PANEL: CPT | Performed by: INTERNAL MEDICINE

## 2022-11-08 PROCEDURE — 1159F MED LIST DOCD IN RCRD: CPT | Mod: CPTII,S$GLB,, | Performed by: INTERNAL MEDICINE

## 2022-11-08 PROCEDURE — 3079F DIAST BP 80-89 MM HG: CPT | Mod: CPTII,S$GLB,, | Performed by: INTERNAL MEDICINE

## 2022-11-08 PROCEDURE — 3008F BODY MASS INDEX DOCD: CPT | Mod: CPTII,S$GLB,, | Performed by: INTERNAL MEDICINE

## 2022-11-08 PROCEDURE — 1159F PR MEDICATION LIST DOCUMENTED IN MEDICAL RECORD: ICD-10-PCS | Mod: CPTII,S$GLB,, | Performed by: INTERNAL MEDICINE

## 2022-11-08 PROCEDURE — 85652 RBC SED RATE AUTOMATED: CPT | Performed by: INTERNAL MEDICINE

## 2022-11-08 PROCEDURE — 3079F PR MOST RECENT DIASTOLIC BLOOD PRESSURE 80-89 MM HG: ICD-10-PCS | Mod: CPTII,S$GLB,, | Performed by: INTERNAL MEDICINE

## 2022-11-08 PROCEDURE — 99999 PR PBB SHADOW E&M-EST. PATIENT-LVL III: ICD-10-PCS | Mod: PBBFAC,,, | Performed by: INTERNAL MEDICINE

## 2022-11-08 PROCEDURE — 3077F PR MOST RECENT SYSTOLIC BLOOD PRESSURE >= 140 MM HG: ICD-10-PCS | Mod: CPTII,S$GLB,, | Performed by: INTERNAL MEDICINE

## 2022-11-08 PROCEDURE — 99215 OFFICE O/P EST HI 40 MIN: CPT | Mod: S$GLB,,, | Performed by: INTERNAL MEDICINE

## 2022-11-08 RX ORDER — TIRZEPATIDE 2.5 MG/.5ML
2.5 INJECTION, SOLUTION SUBCUTANEOUS
Qty: 4 PEN | Refills: 0 | Status: SHIPPED | OUTPATIENT
Start: 2022-11-08 | End: 2023-05-09

## 2022-11-08 RX ORDER — TIRZEPATIDE 5 MG/.5ML
5 INJECTION, SOLUTION SUBCUTANEOUS
Qty: 4 PEN | Refills: 11 | Status: SHIPPED | OUTPATIENT
Start: 2022-12-06 | End: 2023-05-09

## 2022-11-08 RX ORDER — FOLIC ACID 1 MG/1
1000 TABLET ORAL DAILY
Qty: 90 TABLET | Refills: 3 | Status: CANCELLED | OUTPATIENT
Start: 2022-11-08

## 2022-11-08 NOTE — PROGRESS NOTES
RHEUMATOLOGY CLINIC FOLLOW UP VISIT  Chief complaints, HPI, ROS, EXAM, Assessment & Plans:-  Janice Nova a 42 y.o. pleasant female comes in for follow-up visit.  She follows up in the Rheumatology Clinic for plaque psoriasis and severe psoriatic arthritis with dactylitis.  After failing multiple medication she was recently treated with Tremfya.  Due to active disease her methotrexate was discontinued and started on Arava along with low-dose 5 mg daily prednisone.  Since adding this combination she reports 90% improvement in her arthritic symptoms with complete resolution of plaque psoriasis.  No joint pain today.  No swelling.  No active dactylitis.  No adverse effects from Arava.  No infections.    Rheumatological review of systems negative physical examination chronic scars but no active plaque psoriasis.  No dactylitis, enthesitis or effusion..    1. Polyarticular psoriatic arthritis    2. Plaque psoriasis    3. High risk medication use    4. Immunosuppressed status    5. Obesity, morbid, BMI 40.0-49.9    6. Vitamin D deficiency      Problem List Items Addressed This Visit       Plaque psoriasis    Vitamin D deficiency    Relevant Orders    Vitamin D    Polyarticular psoriatic arthritis - Primary    Obesity, morbid, BMI 40.0-49.9    Relevant Medications    tirzepatide (MOUNJARO) 2.5 mg/0.5 mL PnIj    tirzepatide (MOUNJARO) 5 mg/0.5 mL PnIj (Start on 12/6/2022)    High risk medication use    Immunosuppressed status       Well controlled plaque psoriasis and psoriatic arthritis on Tremfya and Arava.   Continue the same.  Okay to take Medrol Dosepak p.r.n. for flares.  Continue vitamin D replacement therapy. Check levels next visit.   Advised all precautions.  Compromised immune system secondary to autoimmune disease and use of immunosuppressive drugs. Monitor carefully for infections. Advised to get immediate medical care if any infection. Also  advised strict adherence to age appropriate vaccinations and cancer screenings with PCP.  Try conservative measures for left hand carpal tunnel syndrome as advised.  Consult orthopedic surgery if persistent.  Try Mounjaro for weight loss.  Advised all precautions.  Hold Tremfya and arava if any infection.  Safety labs every 12 weeks.    # Follow up in about 6 months (around 5/8/2023).      Disclaimer: This note was prepared using voice recognition system and is likely to have sound alike errors and is not proof read.  Please call me with any questions.

## 2022-11-21 ENCOUNTER — SPECIALTY PHARMACY (OUTPATIENT)
Dept: PHARMACY | Facility: CLINIC | Age: 42
End: 2022-11-21
Payer: COMMERCIAL

## 2022-11-21 NOTE — TELEPHONE ENCOUNTER
Specialty Pharmacy - Refill Coordination    Specialty Medication Orders Linked to Encounter      Flowsheet Row Most Recent Value   Medication #1 guselkumab (TREMFYA) 100 mg/mL Syrg (Order#[id number], Rx#[id number])            Refill Questions - Documented Responses      Flowsheet Row Most Recent Value   Patient Availability and HIPAA Verification    Does patient want to proceed with activity? Yes   HIPAA/medical authority confirmed? Yes   Relationship to patient of person spoken to? Self   Refill Screening Questions    Changes to allergies? No   Changes to medications? No   New conditions since last clinic visit? No   Unplanned office visit, urgent care, ED, or hospital admission in the last 4 weeks? No   How does patient/caregiver feel medication is working? Good   Financial problems or insurance changes? No   How many doses of your specialty medications were missed in the last 4 weeks? 0   Would patient like to speak to a pharmacist? No   When does the patient need to receive the medication? 11/24/22   Refill Delivery Questions    How will the patient receive the medication? MEDRx   When does the patient need to receive the medication? 11/24/22   Shipping Address Home   Address in [facility] confirmed and updated if neccessary? Yes   Expected Copay ($) 0   Is the patient able to afford the medication copay? Yes   Payment Method zero copay   Days supply of Refill 56   Supplies needed? No supplies needed   Refill activity completed? Yes   Refill activity plan Refill scheduled   Shipment/Pickup Date: 11/22/22            Current Outpatient Medications   Medication Sig    celecoxib (CELEBREX) 200 MG capsule TAKE ONE CAPSULE BY MOUTH TWICE DAILY    esomeprazole (NEXIUM) 40 MG capsule TAKE ONE CAPSULE BY MOUTH EVERY DAY BEFORE BREAKFAST    ferrous sulfate (FEOSOL) 325 mg (65 mg iron) Tab tablet Take 1 tablet (325 mg total) by mouth once daily.    FLUoxetine 40 MG capsule TAKE ONE CAPSULE BY MOUTH ONCE DAILY     folic acid (FOLVITE) 1 MG tablet TAKE ONE TABLET BY MOUTH DAILY    guselkumab (TREMFYA) 100 mg/mL Syrg Inject 1 mL (100 mg) into the skin every 8 weeks.    leflunomide (ARAVA) 20 MG Tab TAKE ONE TABLET BY MOUTH ONCE DAILY    tirzepatide (MOUNJARO) 2.5 mg/0.5 mL PnIj Inject 2.5 mg into the skin every 7 days.    [START ON 12/6/2022] tirzepatide (MOUNJARO) 5 mg/0.5 mL PnIj Inject 5 mg into the skin every 7 days.    VITAMIN D2 1,250 mcg (50,000 unit) capsule TAKE ONE CAPSULE BY MOUTH TWICE A WEEK   Last reviewed on 11/8/2022 12:20 PM by Roxie Penny MA    Review of patient's allergies indicates:  No Known Allergies Last reviewed on  11/8/2022 12:19 PM by Saul Cui      Tasks added this encounter   1/8/2023 - Refill Call (Auto Added)   Tasks due within next 3 months   No tasks due.     [facility]  [address] [facility] [address]  Phone: [phone]  Fax: [phone]

## 2022-11-24 ENCOUNTER — PATIENT MESSAGE (OUTPATIENT)
Dept: OBSTETRICS AND GYNECOLOGY | Facility: CLINIC | Age: 42
End: 2022-11-24
Payer: COMMERCIAL

## 2022-12-02 ENCOUNTER — OFFICE VISIT (OUTPATIENT)
Dept: OBSTETRICS AND GYNECOLOGY | Facility: CLINIC | Age: 42
End: 2022-12-02
Payer: COMMERCIAL

## 2022-12-02 ENCOUNTER — LAB VISIT (OUTPATIENT)
Dept: LAB | Facility: HOSPITAL | Age: 42
End: 2022-12-02
Attending: OBSTETRICS & GYNECOLOGY
Payer: COMMERCIAL

## 2022-12-02 VITALS
SYSTOLIC BLOOD PRESSURE: 114 MMHG | WEIGHT: 293 LBS | BODY MASS INDEX: 41.02 KG/M2 | HEIGHT: 71 IN | DIASTOLIC BLOOD PRESSURE: 70 MMHG

## 2022-12-02 DIAGNOSIS — N93.9 ABNORMAL UTERINE BLEEDING (AUB): Primary | ICD-10-CM

## 2022-12-02 DIAGNOSIS — N93.9 ABNORMAL UTERINE BLEEDING (AUB): ICD-10-CM

## 2022-12-02 DIAGNOSIS — Z11.3 SCREEN FOR STD (SEXUALLY TRANSMITTED DISEASE): ICD-10-CM

## 2022-12-02 DIAGNOSIS — Z12.4 PAP SMEAR FOR CERVICAL CANCER SCREENING: ICD-10-CM

## 2022-12-02 PROCEDURE — 3008F PR BODY MASS INDEX (BMI) DOCUMENTED: ICD-10-PCS | Mod: CPTII,S$GLB,, | Performed by: OBSTETRICS & GYNECOLOGY

## 2022-12-02 PROCEDURE — 1160F PR REVIEW ALL MEDS BY PRESCRIBER/CLIN PHARMACIST DOCUMENTED: ICD-10-PCS | Mod: CPTII,S$GLB,, | Performed by: OBSTETRICS & GYNECOLOGY

## 2022-12-02 PROCEDURE — 87491 CHLMYD TRACH DNA AMP PROBE: CPT | Performed by: OBSTETRICS & GYNECOLOGY

## 2022-12-02 PROCEDURE — 36415 COLL VENOUS BLD VENIPUNCTURE: CPT | Performed by: OBSTETRICS & GYNECOLOGY

## 2022-12-02 PROCEDURE — 3078F PR MOST RECENT DIASTOLIC BLOOD PRESSURE < 80 MM HG: ICD-10-PCS | Mod: CPTII,S$GLB,, | Performed by: OBSTETRICS & GYNECOLOGY

## 2022-12-02 PROCEDURE — 1159F PR MEDICATION LIST DOCUMENTED IN MEDICAL RECORD: ICD-10-PCS | Mod: CPTII,S$GLB,, | Performed by: OBSTETRICS & GYNECOLOGY

## 2022-12-02 PROCEDURE — 3078F DIAST BP <80 MM HG: CPT | Mod: CPTII,S$GLB,, | Performed by: OBSTETRICS & GYNECOLOGY

## 2022-12-02 PROCEDURE — 3074F SYST BP LT 130 MM HG: CPT | Mod: CPTII,S$GLB,, | Performed by: OBSTETRICS & GYNECOLOGY

## 2022-12-02 PROCEDURE — 82728 ASSAY OF FERRITIN: CPT | Performed by: OBSTETRICS & GYNECOLOGY

## 2022-12-02 PROCEDURE — 99999 PR PBB SHADOW E&M-EST. PATIENT-LVL III: CPT | Mod: PBBFAC,,, | Performed by: OBSTETRICS & GYNECOLOGY

## 2022-12-02 PROCEDURE — 99214 PR OFFICE/OUTPT VISIT, EST, LEVL IV, 30-39 MIN: ICD-10-PCS | Mod: S$GLB,,, | Performed by: OBSTETRICS & GYNECOLOGY

## 2022-12-02 PROCEDURE — 88175 CYTOPATH C/V AUTO FLUID REDO: CPT | Performed by: OBSTETRICS & GYNECOLOGY

## 2022-12-02 PROCEDURE — 84466 ASSAY OF TRANSFERRIN: CPT | Performed by: OBSTETRICS & GYNECOLOGY

## 2022-12-02 PROCEDURE — 99999 PR PBB SHADOW E&M-EST. PATIENT-LVL III: ICD-10-PCS | Mod: PBBFAC,,, | Performed by: OBSTETRICS & GYNECOLOGY

## 2022-12-02 PROCEDURE — 1159F MED LIST DOCD IN RCRD: CPT | Mod: CPTII,S$GLB,, | Performed by: OBSTETRICS & GYNECOLOGY

## 2022-12-02 PROCEDURE — 1160F RVW MEDS BY RX/DR IN RCRD: CPT | Mod: CPTII,S$GLB,, | Performed by: OBSTETRICS & GYNECOLOGY

## 2022-12-02 PROCEDURE — 87624 HPV HI-RISK TYP POOLED RSLT: CPT | Performed by: OBSTETRICS & GYNECOLOGY

## 2022-12-02 PROCEDURE — 84443 ASSAY THYROID STIM HORMONE: CPT | Performed by: OBSTETRICS & GYNECOLOGY

## 2022-12-02 PROCEDURE — 87591 N.GONORRHOEAE DNA AMP PROB: CPT | Performed by: OBSTETRICS & GYNECOLOGY

## 2022-12-02 PROCEDURE — 99214 OFFICE O/P EST MOD 30 MIN: CPT | Mod: S$GLB,,, | Performed by: OBSTETRICS & GYNECOLOGY

## 2022-12-02 PROCEDURE — 85025 COMPLETE CBC W/AUTO DIFF WBC: CPT | Performed by: OBSTETRICS & GYNECOLOGY

## 2022-12-02 PROCEDURE — 3074F PR MOST RECENT SYSTOLIC BLOOD PRESSURE < 130 MM HG: ICD-10-PCS | Mod: CPTII,S$GLB,, | Performed by: OBSTETRICS & GYNECOLOGY

## 2022-12-02 PROCEDURE — 3008F BODY MASS INDEX DOCD: CPT | Mod: CPTII,S$GLB,, | Performed by: OBSTETRICS & GYNECOLOGY

## 2022-12-02 NOTE — PROGRESS NOTES
Subjective:       Patient ID: Janice Lawrence is a 42 y.o. female.    Chief Complaint:  Menorrhagia      History of Present Illness    Dysfunctional Uterine Bleeding  Patient complains of recent prolonged menses. She had been bleeding regularly. She is now bleeding every 28 days and menses are lasting 6 days. Dysmenorrhea:moderate, occurring throughout menses. Cyclic symptoms include: none. Pt reports most recent period lasted 23 days and was heavier than normal.  Stopped bleeding 3 days ago.  This was the first time something like this has happened.  Current contraception:   is sterile .  Last pap 2020 NILM.  STD history: none.  Requests breast exam.  Reports weight loss due to Ozempiq use.  Pt reports that both of her sisters required hysterectomy (one for endometriosis and adenomyosis, and the other for endometriosis and cervix dysplasia) and is concerned about this.      GYN & OB History  Patient's last menstrual period was 2022 (exact date).   Date of Last Pap: 2022    OB History    Para Term  AB Living   1 0 0   1     SAB IAB Ectopic Multiple Live Births                  # Outcome Date GA Lbr Nikolas/2nd Weight Sex Delivery Anes PTL Lv   1 AB                Review of Systems  Review of Systems   Constitutional:  Negative for activity change, appetite change, chills, fatigue, fever and unexpected weight change.   Respiratory:  Negative for shortness of breath.    Cardiovascular:  Negative for chest pain, palpitations and leg swelling.   Gastrointestinal:  Negative for abdominal pain, bloating, blood in stool, constipation, diarrhea, nausea and vomiting.   Genitourinary:  Negative for dysmenorrhea, dyspareunia, dysuria, flank pain, frequency, genital sores, hematuria, menorrhagia, menstrual problem, pelvic pain, urgency, vaginal bleeding, vaginal discharge, vaginal pain, urinary incontinence, postcoital bleeding, vaginal dryness and vaginal odor.   Musculoskeletal:   Negative for back pain.   Integumentary:  Negative for breast mass, nipple discharge, breast skin changes and breast tenderness.   Neurological:  Negative for syncope and headaches.   Breast: Negative for asymmetry, lump, mass, mastodynia, nipple discharge, skin changes and tenderness        Objective:    Physical Exam:   Constitutional: She is oriented to person, place, and time. She appears well-developed and well-nourished. No distress.    HENT:   Head: Normocephalic and atraumatic.    Eyes: Pupils are equal, round, and reactive to light. EOM are normal.     Cardiovascular:  Normal rate, regular rhythm and normal heart sounds.             Pulmonary/Chest: Effort normal and breath sounds normal. Right breast exhibits no inverted nipple, no mass, no nipple discharge, no skin change, no tenderness, no bleeding and no swelling. Left breast exhibits no inverted nipple, no mass, no nipple discharge, no skin change, no tenderness, no bleeding and no swelling. Breasts are symmetrical.        Abdominal: Soft. Bowel sounds are normal. She exhibits no distension. There is no abdominal tenderness.     Genitourinary:    Vagina, uterus, right adnexa and left adnexa normal.      Pelvic exam was performed with patient supine.   There is no rash, tenderness, lesion or injury on the right labia. There is no rash, tenderness, lesion or injury on the left labia. Cervix is normal. Right adnexum displays no mass, no tenderness and no fullness. Left adnexum displays no mass, no tenderness and no fullness. No erythema,  no vaginal discharge, tenderness or bleeding in the vagina.    No foreign body in the vagina.      No signs of injury in the vagina.   Cervix exhibits no motion tenderness, no discharge and no friability. Uterus is not deviated, not enlarged and not tender.    Genitourinary Comments: UPT today Negative             Musculoskeletal: Normal range of motion and moves all extremeties. No tenderness or edema.        Neurological: She is alert and oriented to person, place, and time.    Skin: Skin is warm and dry.    Psychiatric: She has a normal mood and affect. Her behavior is normal. Thought content normal.        Assessment:        1. Abnormal uterine bleeding (AUB)    2. Screen for STD (sexually transmitted disease)    3. Pap smear for cervical cancer screening              Plan:      Abnormal uterine bleeding (AUB)  -     CBC Auto Differential; Future; Expected date: 12/02/2022  -     TSH; Future; Expected date: 12/02/2022  -     US Pelvis Comp with Transvag NON-OB (xpd; Future; Expected date: 12/02/2022  -     FERRITIN; Future; Expected date: 12/02/2022  -     IRON AND TIBC; Future; Expected date: 12/02/2022  -     Overall menstrual pattern is normal with isolated prolonged bleed with last cycle.  Likely associated with anovulatory cycle, however, alternate etiologies are still considered.  Pt was counseled on anovulation, including common signs/symptoms and the many factors that influence it.  Pt was also counseled on available treatment options, including the associated risks and benefits of each.  Pt voiced understanding and desires to proceed with expectant monitoring.  -     Pt was counseled on the link between obesity and anovulation.  Pt currently on Ozempiq for weight loss.    Screen for STD (sexually transmitted disease)  -     C. trachomatis/N. gonorrhoeae by AMP DNA    Pap smear for cervical cancer screening  -     Liquid-Based Pap Smear, Screening  -     HPV High Risk Genotypes, PCR  -     Pt was counseled on cervical/vaginal screening guidelines and recommendations.  Last pap NILM on 2020.  If today's pap smear result is negative, next pap smear will be due in 3-5 yrs.  -     Pt was advised on current breast cancer screening recommendations.  Pt desires to proceed with breast exam today and screening MMG is up to date.  -     Follow up with PCP for routine health maintenance needs.    Follow up in about 3  months (around 3/2/2023).

## 2022-12-03 LAB
BASOPHILS # BLD AUTO: 0.09 K/UL (ref 0–0.2)
BASOPHILS NFR BLD: 1.1 % (ref 0–1.9)
C TRACH DNA SPEC QL NAA+PROBE: NOT DETECTED
DIFFERENTIAL METHOD: ABNORMAL
EOSINOPHIL # BLD AUTO: 0.8 K/UL (ref 0–0.5)
EOSINOPHIL NFR BLD: 9.4 % (ref 0–8)
ERYTHROCYTE [DISTWIDTH] IN BLOOD BY AUTOMATED COUNT: 16.3 % (ref 11.5–14.5)
FERRITIN SERPL-MCNC: 16 NG/ML (ref 20–300)
HCT VFR BLD AUTO: 41.4 % (ref 37–48.5)
HGB BLD-MCNC: 12.3 G/DL (ref 12–16)
IMM GRANULOCYTES # BLD AUTO: 0.03 K/UL (ref 0–0.04)
IMM GRANULOCYTES NFR BLD AUTO: 0.4 % (ref 0–0.5)
IRON SERPL-MCNC: 56 UG/DL (ref 30–160)
LYMPHOCYTES # BLD AUTO: 2.1 K/UL (ref 1–4.8)
LYMPHOCYTES NFR BLD: 24.9 % (ref 18–48)
MCH RBC QN AUTO: 24.9 PG (ref 27–31)
MCHC RBC AUTO-ENTMCNC: 29.7 G/DL (ref 32–36)
MCV RBC AUTO: 84 FL (ref 82–98)
MONOCYTES # BLD AUTO: 0.4 K/UL (ref 0.3–1)
MONOCYTES NFR BLD: 5.2 % (ref 4–15)
N GONORRHOEA DNA SPEC QL NAA+PROBE: NOT DETECTED
NEUTROPHILS # BLD AUTO: 4.9 K/UL (ref 1.8–7.7)
NEUTROPHILS NFR BLD: 59 % (ref 38–73)
NRBC BLD-RTO: 0 /100 WBC
PLATELET # BLD AUTO: 545 K/UL (ref 150–450)
PMV BLD AUTO: 8.8 FL (ref 9.2–12.9)
RBC # BLD AUTO: 4.93 M/UL (ref 4–5.4)
SATURATED IRON: 12 % (ref 20–50)
TOTAL IRON BINDING CAPACITY: 456 UG/DL (ref 250–450)
TRANSFERRIN SERPL-MCNC: 308 MG/DL (ref 200–375)
TSH SERPL DL<=0.005 MIU/L-ACNC: 0.5 UIU/ML (ref 0.4–4)
WBC # BLD AUTO: 8.23 K/UL (ref 3.9–12.7)

## 2022-12-05 ENCOUNTER — PATIENT MESSAGE (OUTPATIENT)
Dept: OBSTETRICS AND GYNECOLOGY | Facility: CLINIC | Age: 42
End: 2022-12-05
Payer: COMMERCIAL

## 2022-12-05 ENCOUNTER — HOSPITAL ENCOUNTER (OUTPATIENT)
Dept: RADIOLOGY | Facility: HOSPITAL | Age: 42
Discharge: HOME | End: 2022-12-05
Attending: OBSTETRICS & GYNECOLOGY
Payer: COMMERCIAL

## 2022-12-05 DIAGNOSIS — N93.9 ABNORMAL UTERINE BLEEDING (AUB): ICD-10-CM

## 2022-12-05 PROCEDURE — 76856 US EXAM PELVIC COMPLETE: CPT | Mod: 26,,, | Performed by: RADIOLOGY

## 2022-12-05 PROCEDURE — 76830 TRANSVAGINAL US NON-OB: CPT | Mod: 26,,, | Performed by: RADIOLOGY

## 2022-12-05 PROCEDURE — 76856 US PELVIS COMP WITH TRANSVAG NON-OB (XPD): ICD-10-PCS | Mod: 26,,, | Performed by: RADIOLOGY

## 2022-12-05 PROCEDURE — 76830 US PELVIS COMP WITH TRANSVAG NON-OB (XPD): ICD-10-PCS | Mod: 26,,, | Performed by: RADIOLOGY

## 2022-12-05 PROCEDURE — 76856 US EXAM PELVIC COMPLETE: CPT | Mod: TC

## 2023-01-09 ENCOUNTER — PATIENT MESSAGE (OUTPATIENT)
Dept: PHARMACY | Facility: CLINIC | Age: 43
End: 2023-01-09
Payer: COMMERCIAL

## 2023-01-11 ENCOUNTER — SPECIALTY PHARMACY (OUTPATIENT)
Dept: PHARMACY | Facility: CLINIC | Age: 43
End: 2023-01-11
Payer: COMMERCIAL

## 2023-01-11 NOTE — TELEPHONE ENCOUNTER
Specialty Pharmacy - Refill Coordination    Specialty Medication Orders Linked to Encounter      Flowsheet Row Most Recent Value   Medication #1 guselkumab (TREMFYA) 100 mg/mL Syrg (Order#[id number], Rx#[id number])            Refill Questions - Documented Responses      Flowsheet Row Most Recent Value   Patient Availability and HIPAA Verification    Does patient want to proceed with activity? Yes   HIPAA/medical authority confirmed? Yes   Relationship to patient of person spoken to? Self   Refill Screening Questions    Changes to allergies? No   Changes to medications? No   New conditions since last clinic visit? No   Unplanned office visit, urgent care, ED, or hospital admission in the last 4 weeks? No   How does patient/caregiver feel medication is working? Excellent   Financial problems or insurance changes? No   How many doses of your specialty medications were missed in the last 4 weeks? 0   Would patient like to speak to a pharmacist? No   When does the patient need to receive the medication? 01/18/23   Refill Delivery Questions    How will the patient receive the medication? MEDRx   When does the patient need to receive the medication? 01/18/23   Shipping Address Home   Address in [facility] confirmed and updated if neccessary? Yes   Expected Copay ($) 5   Is the patient able to afford the medication copay? Yes   Payment Method CC on file   Days supply of Refill 56   Supplies needed? No supplies needed   Refill activity completed? Yes   Refill activity plan Refill scheduled   Shipment/Pickup Date: 01/13/23            Current Outpatient Medications   Medication Sig    celecoxib (CELEBREX) 200 MG capsule TAKE ONE CAPSULE BY MOUTH TWICE DAILY    esomeprazole (NEXIUM) 40 MG capsule TAKE ONE TABLET BY MOUTH EVERY DAY BEFORE BREAKFAST    ferrous sulfate (FEOSOL) 325 mg (65 mg iron) Tab tablet Take 1 tablet (325 mg total) by mouth once daily.    FLUoxetine 40 MG capsule TAKE ONE CAPSULE BY MOUTH ONCE DAILY     folic acid (FOLVITE) 1 MG tablet TAKE ONE TABLET BY MOUTH DAILY    guselkumab (TREMFYA) 100 mg/mL Syrg Inject 1 mL (100 mg) into the skin every 8 weeks.    leflunomide (ARAVA) 20 MG Tab TAKE ONE TABLET BY MOUTH ONCE DAILY    tirzepatide (MOUNJARO) 2.5 mg/0.5 mL PnIj Inject 2.5 mg into the skin every 7 days. (Patient not taking: Reported on 12/2/2022)    tirzepatide (MOUNJARO) 5 mg/0.5 mL PnIj Inject 5 mg into the skin every 7 days. (Patient not taking: Reported on 12/2/2022)    VITAMIN D2 1,250 mcg (50,000 unit) capsule TAKE ONE CAPSULE BY MOUTH TWICE A WEEK   Last reviewed on 12/2/2022 10:19 AM by Vaibhav Stinson MD    Review of patient's allergies indicates:  No Known Allergies Last reviewed on  12/2/2022 10:19 AM by Vaibhav Stinson      Tasks added this encounter   3/5/2023 - Refill Call (Auto Added)   Tasks due within next 3 months   4/1/2023 - Clinical - Follow Up Assesement (Annual)     [facility]  [address]  [facility] [address]  Phone: [phone]  Fax: [phone]

## 2023-01-24 ENCOUNTER — TELEPHONE (OUTPATIENT)
Dept: RHEUMATOLOGY | Facility: CLINIC | Age: 43
End: 2023-01-24
Payer: COMMERCIAL

## 2023-01-25 RX ORDER — CELECOXIB 200 MG/1
200 CAPSULE ORAL 2 TIMES DAILY PRN
Qty: 60 CAPSULE | Refills: 3 | Status: SHIPPED | OUTPATIENT
Start: 2023-01-25

## 2023-02-01 ENCOUNTER — TELEPHONE (OUTPATIENT)
Dept: OBSTETRICS AND GYNECOLOGY | Facility: CLINIC | Age: 43
End: 2023-02-01
Payer: COMMERCIAL

## 2023-02-01 NOTE — TELEPHONE ENCOUNTER
Attempted to contact patient regarding scheduled appointment with  on 3/6/2023. Provider has cancelled clinic for surgery and patient appointment will need to be rescheduled. No answer,left callback message. Will send [employer].

## 2023-02-06 ENCOUNTER — LAB VISIT (OUTPATIENT)
Dept: LAB | Facility: HOSPITAL | Age: 43
End: 2023-02-06
Attending: INTERNAL MEDICINE
Payer: COMMERCIAL

## 2023-02-06 DIAGNOSIS — E55.9 VITAMIN D DEFICIENCY: ICD-10-CM

## 2023-02-06 DIAGNOSIS — L40.59 POLYARTICULAR PSORIATIC ARTHRITIS: ICD-10-CM

## 2023-02-06 DIAGNOSIS — L40.0 PLAQUE PSORIASIS: ICD-10-CM

## 2023-02-06 LAB
ALBUMIN SERPL BCP-MCNC: 3.9 G/DL (ref 3.5–5.2)
ALP SERPL-CCNC: 88 U/L (ref 55–135)
ALT SERPL W/O P-5'-P-CCNC: 28 U/L (ref 10–44)
ANION GAP SERPL CALC-SCNC: 11 MMOL/L (ref 8–16)
AST SERPL-CCNC: 22 U/L (ref 10–40)
BASOPHILS # BLD AUTO: 0.1 K/UL (ref 0–0.2)
BASOPHILS NFR BLD: 0.9 % (ref 0–1.9)
BILIRUB SERPL-MCNC: 0.3 MG/DL (ref 0.1–1)
BUN SERPL-MCNC: 13 MG/DL (ref 6–20)
CALCIUM SERPL-MCNC: 8.7 MG/DL (ref 8.7–10.5)
CHLORIDE SERPL-SCNC: 103 MMOL/L (ref 95–110)
CO2 SERPL-SCNC: 22 MMOL/L (ref 23–29)
CREAT SERPL-MCNC: 0.7 MG/DL (ref 0.5–1.4)
CRP SERPL-MCNC: 2.8 MG/L (ref 0–8.2)
DIFFERENTIAL METHOD: ABNORMAL
EOSINOPHIL # BLD AUTO: 0.7 K/UL (ref 0–0.5)
EOSINOPHIL NFR BLD: 6.4 % (ref 0–8)
ERYTHROCYTE [DISTWIDTH] IN BLOOD BY AUTOMATED COUNT: 16.6 % (ref 11.5–14.5)
EST. GFR  (NO RACE VARIABLE): >60 ML/MIN/1.73 M^2
GLUCOSE SERPL-MCNC: 75 MG/DL (ref 70–110)
HCT VFR BLD AUTO: 36 % (ref 37–48.5)
HGB BLD-MCNC: 11.7 G/DL (ref 12–16)
IMM GRANULOCYTES # BLD AUTO: 0.04 K/UL (ref 0–0.04)
IMM GRANULOCYTES NFR BLD AUTO: 0.4 % (ref 0–0.5)
LYMPHOCYTES # BLD AUTO: 3.5 K/UL (ref 1–4.8)
LYMPHOCYTES NFR BLD: 31.4 % (ref 18–48)
MCH RBC QN AUTO: 25.9 PG (ref 27–31)
MCHC RBC AUTO-ENTMCNC: 32.5 G/DL (ref 32–36)
MCV RBC AUTO: 80 FL (ref 82–98)
MONOCYTES # BLD AUTO: 0.8 K/UL (ref 0.3–1)
MONOCYTES NFR BLD: 6.9 % (ref 4–15)
NEUTROPHILS # BLD AUTO: 6 K/UL (ref 1.8–7.7)
NEUTROPHILS NFR BLD: 54 % (ref 38–73)
NRBC BLD-RTO: 0 /100 WBC
PLATELET # BLD AUTO: 465 K/UL (ref 150–450)
PMV BLD AUTO: 8.5 FL (ref 9.2–12.9)
POTASSIUM SERPL-SCNC: 4.3 MMOL/L (ref 3.5–5.1)
PROT SERPL-MCNC: 6.8 G/DL (ref 6–8.4)
RBC # BLD AUTO: 4.51 M/UL (ref 4–5.4)
SODIUM SERPL-SCNC: 136 MMOL/L (ref 136–145)
WBC # BLD AUTO: 11.18 K/UL (ref 3.9–12.7)

## 2023-02-06 PROCEDURE — 36415 COLL VENOUS BLD VENIPUNCTURE: CPT | Performed by: INTERNAL MEDICINE

## 2023-02-06 PROCEDURE — 80053 COMPREHEN METABOLIC PANEL: CPT | Performed by: INTERNAL MEDICINE

## 2023-02-06 PROCEDURE — 85652 RBC SED RATE AUTOMATED: CPT | Performed by: INTERNAL MEDICINE

## 2023-02-06 PROCEDURE — 85025 COMPLETE CBC W/AUTO DIFF WBC: CPT | Performed by: INTERNAL MEDICINE

## 2023-02-06 PROCEDURE — 82306 VITAMIN D 25 HYDROXY: CPT | Performed by: INTERNAL MEDICINE

## 2023-02-06 PROCEDURE — 86140 C-REACTIVE PROTEIN: CPT | Performed by: INTERNAL MEDICINE

## 2023-02-07 LAB
25(OH)D3+25(OH)D2 SERPL-MCNC: 23 NG/ML (ref 30–96)
ERYTHROCYTE [SEDIMENTATION RATE] IN BLOOD BY PHOTOMETRIC METHOD: 23 MM/HR (ref 0–36)

## 2023-02-08 ENCOUNTER — PATIENT MESSAGE (OUTPATIENT)
Dept: OBSTETRICS AND GYNECOLOGY | Facility: CLINIC | Age: 43
End: 2023-02-08
Payer: COMMERCIAL

## 2023-02-08 ENCOUNTER — PATIENT MESSAGE (OUTPATIENT)
Dept: RHEUMATOLOGY | Facility: CLINIC | Age: 43
End: 2023-02-08
Payer: COMMERCIAL

## 2023-02-13 ENCOUNTER — TELEPHONE (OUTPATIENT)
Dept: OBSTETRICS AND GYNECOLOGY | Facility: CLINIC | Age: 43
End: 2023-02-13
Payer: COMMERCIAL

## 2023-02-13 NOTE — TELEPHONE ENCOUNTER
Attempted to contact patient regarding scheduled appointment with  on 3/13/2023. Provider has been booked out for surgery that day and patient appointment will need to be rescheduled. No answer, left callback message. Will send [employer].

## 2023-03-10 ENCOUNTER — SPECIALTY PHARMACY (OUTPATIENT)
Dept: PHARMACY | Facility: CLINIC | Age: 43
End: 2023-03-10
Payer: COMMERCIAL

## 2023-03-10 NOTE — TELEPHONE ENCOUNTER
Specialty Pharmacy - Refill Coordination    Specialty Medication Orders Linked to Encounter      Flowsheet Row Most Recent Value   Medication #1 guselkumab (TREMFYA) 100 mg/mL Syrg (Order#[id number], Rx#[id number])            Refill Questions - Documented Responses      Flowsheet Row Most Recent Value   Patient Availability and HIPAA Verification    Does patient want to proceed with activity? Yes   HIPAA/medical authority confirmed? Yes   Relationship to patient of person spoken to? Self   Refill Screening Questions    Changes to allergies? No   Changes to medications? No   New conditions since last clinic visit? No   Unplanned office visit, urgent care, ED, or hospital admission in the last 4 weeks? No   How does patient/caregiver feel medication is working? Good   Financial problems or insurance changes? No   How many doses of your specialty medications were missed in the last 4 weeks? 0   Would patient like to speak to a pharmacist? No   When does the patient need to receive the medication? 03/16/23   Refill Delivery Questions    How will the patient receive the medication? MEDRx   When does the patient need to receive the medication? 03/16/23   Shipping Address Home   Address in [facility] confirmed and updated if neccessary? Yes   Expected Copay ($) 5   Is the patient able to afford the medication copay? Yes   Payment Method CC on file   Days supply of Refill 56   Supplies needed? No supplies needed   Refill activity completed? Yes   Refill activity plan Refill scheduled   Shipment/Pickup Date: 03/13/23            Current Outpatient Medications   Medication Sig    celecoxib (CELEBREX) 200 MG capsule Take 1 capsule (200 mg total) by mouth 2 (two) times daily as needed for Pain.    esomeprazole (NEXIUM) 40 MG capsule TAKE ONE CAPSULE BY MOUTH EVERY DAY BEFORE BREAKFAST    ferrous sulfate (FEOSOL) 325 mg (65 mg iron) Tab tablet Take 1 tablet (325 mg total) by mouth once daily.    FLUoxetine 40 MG capsule  TAKE ONE CAPSULE BY MOUTH ONCE PER DAY    folic acid (FOLVITE) 1 MG tablet TAKE ONE TABLET BY MOUTH DAILY    guselkumab (TREMFYA) 100 mg/mL Syrg Inject 1 mL (100 mg) into the skin every 8 weeks.    leflunomide (ARAVA) 20 MG Tab TAKE ONE TABLET BY MOUTH ONCE DAILY    tirzepatide (MOUNJARO) 2.5 mg/0.5 mL PnIj Inject 2.5 mg into the skin every 7 days. (Patient not taking: Reported on 12/2/2022)    tirzepatide (MOUNJARO) 5 mg/0.5 mL PnIj Inject 5 mg into the skin every 7 days. (Patient not taking: Reported on 12/2/2022)    VITAMIN D2 1,250 mcg (50,000 unit) capsule TAKE ONE CAPSULE BY MOUTH TWICE A WEEK   Last reviewed on 12/2/2022 10:19 AM by Vaibhav Stinson MD    Review of patient's allergies indicates:  No Known Allergies Last reviewed on  3/6/2023 8:59 AM by Christy Lomeli      Tasks added this encounter   5/2/2023 - Refill Call (Auto Added)   Tasks due within next 3 months   4/1/2023 - Clinical - Follow Up Assesement (Annual)     Carole Hall, PharmD  [facility]  [facility] [address]  [facility] [address]  Phone: [phone]  Fax: [phone]

## 2023-03-27 DIAGNOSIS — L40.59 POLYARTICULAR PSORIATIC ARTHRITIS: ICD-10-CM

## 2023-03-27 RX ORDER — FOLIC ACID 1 MG/1
1000 TABLET ORAL DAILY
Qty: 90 TABLET | Refills: 3 | Status: CANCELLED | OUTPATIENT
Start: 2023-03-27

## 2023-04-25 ENCOUNTER — SPECIALTY PHARMACY (OUTPATIENT)
Dept: PHARMACY | Facility: CLINIC | Age: 43
End: 2023-04-25
Payer: COMMERCIAL

## 2023-04-25 DIAGNOSIS — L40.50 PSORIATIC ARTHRITIS: ICD-10-CM

## 2023-04-25 DIAGNOSIS — L40.0 PLAQUE PSORIASIS: ICD-10-CM

## 2023-04-25 RX ORDER — GUSELKUMAB 100 MG/ML
INJECTION SUBCUTANEOUS
Qty: 2 ML | Refills: 3 | Status: ACTIVE | OUTPATIENT
Start: 2023-04-25

## 2023-04-25 NOTE — TELEPHONE ENCOUNTER
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 female, who is followed by the specialty pharmacy service for management and education of her Tremfya.  She has been on therapy with Temfya since 4/2021.  I have reviewed her electronic medical record and current medication list and determined that specialty medication adjustment Is not needed at this time.    Patient has not experienced adverse events.  She Is adherent reporting 0 missed doses since last review.  Adherence has been encouraged with the following mechanism(s): Proactive refills.  She is meeting goals of therapy and will continue treatment.        3/10/2023 1/11/2023 11/21/2022 9/20/2022 7/6/2022 5/13/2022 3/14/2022   Follow Up Review   # of missed doses 0 0 0  0 0 0   New Medications? No No No No No No No   New Conditions? No No No No No No No   New Allergies? No No No No No No No   Med Effective? Good Excellent Good Good Very good Very good Excellent   Urgent Care? No No No No No No No   Requested Pharmacist? No No No No No No No            Therapy is appropriate to continue.    Therapy is effective: Yes  On scale of 1 to 10, how does patient rank quality of life? (10 - Best): Unable to Assess  Recommendations: none at this time.  Review Method: Chart Review    Tasks added this encounter   No tasks added.   Tasks due within next 3 months   5/2/2023 - Refill Coordination Outreach (1 time occurrence)  4/1/2023 - Clinical Assessment (1 year recurrence)     Cliff Curtis, PharmD  [facility]  [address] [facility] [address]  Phone: [phone]  Fax: [phone]

## 2023-04-25 NOTE — TELEPHONE ENCOUNTER
Incoming return call from patient regarding Tremfya refill. Informed patient that Tremfya is out of refills, but [facility] has sent a refill request and will reach back out when refill is received.    Patient is not due for Tremfya until 5/16.

## 2023-05-01 ENCOUNTER — LAB VISIT (OUTPATIENT)
Dept: LAB | Facility: HOSPITAL | Age: 43
End: 2023-05-01
Attending: INTERNAL MEDICINE
Payer: COMMERCIAL

## 2023-05-01 DIAGNOSIS — L40.0 PLAQUE PSORIASIS: ICD-10-CM

## 2023-05-01 DIAGNOSIS — L40.59 POLYARTICULAR PSORIATIC ARTHRITIS: ICD-10-CM

## 2023-05-01 LAB
ALBUMIN SERPL BCP-MCNC: 3.8 G/DL (ref 3.5–5.2)
ALP SERPL-CCNC: 96 U/L (ref 55–135)
ALT SERPL W/O P-5'-P-CCNC: 29 U/L (ref 10–44)
ANION GAP SERPL CALC-SCNC: 7 MMOL/L (ref 8–16)
AST SERPL-CCNC: 21 U/L (ref 10–40)
BASOPHILS # BLD AUTO: 0.1 K/UL (ref 0–0.2)
BASOPHILS NFR BLD: 1.1 % (ref 0–1.9)
BILIRUB SERPL-MCNC: 0.2 MG/DL (ref 0.1–1)
BUN SERPL-MCNC: 14 MG/DL (ref 6–20)
CALCIUM SERPL-MCNC: 9.1 MG/DL (ref 8.7–10.5)
CHLORIDE SERPL-SCNC: 106 MMOL/L (ref 95–110)
CO2 SERPL-SCNC: 24 MMOL/L (ref 23–29)
CREAT SERPL-MCNC: 0.8 MG/DL (ref 0.5–1.4)
CRP SERPL-MCNC: 2.3 MG/L (ref 0–8.2)
DIFFERENTIAL METHOD: ABNORMAL
EOSINOPHIL # BLD AUTO: 1 K/UL (ref 0–0.5)
EOSINOPHIL NFR BLD: 11.4 % (ref 0–8)
ERYTHROCYTE [DISTWIDTH] IN BLOOD BY AUTOMATED COUNT: 16.4 % (ref 11.5–14.5)
ERYTHROCYTE [SEDIMENTATION RATE] IN BLOOD BY PHOTOMETRIC METHOD: 24 MM/HR (ref 0–36)
EST. GFR  (NO RACE VARIABLE): >60 ML/MIN/1.73 M^2
GLUCOSE SERPL-MCNC: 88 MG/DL (ref 70–110)
HCT VFR BLD AUTO: 36.7 % (ref 37–48.5)
HGB BLD-MCNC: 11.9 G/DL (ref 12–16)
IMM GRANULOCYTES # BLD AUTO: 0.02 K/UL (ref 0–0.04)
IMM GRANULOCYTES NFR BLD AUTO: 0.2 % (ref 0–0.5)
LYMPHOCYTES # BLD AUTO: 2.3 K/UL (ref 1–4.8)
LYMPHOCYTES NFR BLD: 26.1 % (ref 18–48)
MCH RBC QN AUTO: 26.2 PG (ref 27–31)
MCHC RBC AUTO-ENTMCNC: 32.4 G/DL (ref 32–36)
MCV RBC AUTO: 81 FL (ref 82–98)
MONOCYTES # BLD AUTO: 0.5 K/UL (ref 0.3–1)
MONOCYTES NFR BLD: 5.4 % (ref 4–15)
NEUTROPHILS # BLD AUTO: 4.9 K/UL (ref 1.8–7.7)
NEUTROPHILS NFR BLD: 55.8 % (ref 38–73)
NRBC BLD-RTO: 0 /100 WBC
PLATELET # BLD AUTO: 439 K/UL (ref 150–450)
PMV BLD AUTO: 8.3 FL (ref 9.2–12.9)
POTASSIUM SERPL-SCNC: 4.5 MMOL/L (ref 3.5–5.1)
PROT SERPL-MCNC: 7.2 G/DL (ref 6–8.4)
RBC # BLD AUTO: 4.55 M/UL (ref 4–5.4)
SODIUM SERPL-SCNC: 137 MMOL/L (ref 136–145)
WBC # BLD AUTO: 8.74 K/UL (ref 3.9–12.7)

## 2023-05-01 PROCEDURE — 36415 COLL VENOUS BLD VENIPUNCTURE: CPT | Performed by: INTERNAL MEDICINE

## 2023-05-01 PROCEDURE — 85652 RBC SED RATE AUTOMATED: CPT | Performed by: INTERNAL MEDICINE

## 2023-05-01 PROCEDURE — 85025 COMPLETE CBC W/AUTO DIFF WBC: CPT | Performed by: INTERNAL MEDICINE

## 2023-05-01 PROCEDURE — 86140 C-REACTIVE PROTEIN: CPT | Performed by: INTERNAL MEDICINE

## 2023-05-01 PROCEDURE — 80053 COMPREHEN METABOLIC PANEL: CPT | Performed by: INTERNAL MEDICINE

## 2023-05-01 RX ORDER — ESOMEPRAZOLE MAGNESIUM 40 MG/1
CAPSULE, DELAYED RELEASE ORAL
Qty: 30 CAPSULE | Refills: 2 | Status: SHIPPED | OUTPATIENT
Start: 2023-05-01 | End: 2023-07-17

## 2023-05-08 RX ORDER — FLUOXETINE HYDROCHLORIDE 40 MG/1
CAPSULE ORAL
Qty: 30 CAPSULE | Refills: 2 | Status: SHIPPED | OUTPATIENT
Start: 2023-05-08 | End: 2023-11-17

## 2023-05-09 ENCOUNTER — HOSPITAL ENCOUNTER (OUTPATIENT)
Dept: RADIOLOGY | Facility: HOSPITAL | Age: 43
Discharge: HOME | End: 2023-05-09
Attending: INTERNAL MEDICINE
Payer: COMMERCIAL

## 2023-05-09 ENCOUNTER — OFFICE VISIT (OUTPATIENT)
Dept: RHEUMATOLOGY | Facility: CLINIC | Age: 43
End: 2023-05-09
Payer: COMMERCIAL

## 2023-05-09 VITALS
WEIGHT: 293 LBS | DIASTOLIC BLOOD PRESSURE: 91 MMHG | SYSTOLIC BLOOD PRESSURE: 140 MMHG | HEART RATE: 89 BPM | BODY MASS INDEX: 41.02 KG/M2 | HEIGHT: 71 IN

## 2023-05-09 DIAGNOSIS — R20.0 NUMBNESS AND TINGLING IN BOTH HANDS: ICD-10-CM

## 2023-05-09 DIAGNOSIS — Z79.899 HIGH RISK MEDICATION USE: Primary | ICD-10-CM

## 2023-05-09 DIAGNOSIS — M54.12 CERVICAL RADICULOPATHY: ICD-10-CM

## 2023-05-09 DIAGNOSIS — E66.01 OBESITY, MORBID, BMI 40.0-49.9: ICD-10-CM

## 2023-05-09 DIAGNOSIS — L40.59 POLYARTICULAR PSORIATIC ARTHRITIS: ICD-10-CM

## 2023-05-09 DIAGNOSIS — R20.2 NUMBNESS AND TINGLING IN BOTH HANDS: ICD-10-CM

## 2023-05-09 DIAGNOSIS — L40.0 PLAQUE PSORIASIS: ICD-10-CM

## 2023-05-09 DIAGNOSIS — D84.9 IMMUNOSUPPRESSED STATUS: ICD-10-CM

## 2023-05-09 DIAGNOSIS — E55.9 VITAMIN D DEFICIENCY: ICD-10-CM

## 2023-05-09 DIAGNOSIS — D72.10 EOSINOPHILIA, UNSPECIFIED TYPE: ICD-10-CM

## 2023-05-09 DIAGNOSIS — Z79.899 HIGH RISK MEDICATION USE: ICD-10-CM

## 2023-05-09 DIAGNOSIS — L40.59 POLYARTICULAR PSORIATIC ARTHRITIS: Primary | ICD-10-CM

## 2023-05-09 PROCEDURE — 1159F MED LIST DOCD IN RCRD: CPT | Mod: CPTII,S$GLB,, | Performed by: INTERNAL MEDICINE

## 2023-05-09 PROCEDURE — 1160F PR REVIEW ALL MEDS BY PRESCRIBER/CLIN PHARMACIST DOCUMENTED: ICD-10-PCS | Mod: CPTII,S$GLB,, | Performed by: INTERNAL MEDICINE

## 2023-05-09 PROCEDURE — 3080F PR MOST RECENT DIASTOLIC BLOOD PRESSURE >= 90 MM HG: ICD-10-PCS | Mod: CPTII,S$GLB,, | Performed by: INTERNAL MEDICINE

## 2023-05-09 PROCEDURE — 3008F PR BODY MASS INDEX (BMI) DOCUMENTED: ICD-10-PCS | Mod: CPTII,S$GLB,, | Performed by: INTERNAL MEDICINE

## 2023-05-09 PROCEDURE — 72040 X-RAY EXAM NECK SPINE 2-3 VW: CPT | Mod: TC

## 2023-05-09 PROCEDURE — 99215 PR OFFICE/OUTPT VISIT, EST, LEVL V, 40-54 MIN: ICD-10-PCS | Mod: S$GLB,,, | Performed by: INTERNAL MEDICINE

## 2023-05-09 PROCEDURE — 3077F SYST BP >= 140 MM HG: CPT | Mod: CPTII,S$GLB,, | Performed by: INTERNAL MEDICINE

## 2023-05-09 PROCEDURE — 3008F BODY MASS INDEX DOCD: CPT | Mod: CPTII,S$GLB,, | Performed by: INTERNAL MEDICINE

## 2023-05-09 PROCEDURE — 99999 PR PBB SHADOW E&M-EST. PATIENT-LVL IV: CPT | Mod: PBBFAC,,, | Performed by: INTERNAL MEDICINE

## 2023-05-09 PROCEDURE — 3077F PR MOST RECENT SYSTOLIC BLOOD PRESSURE >= 140 MM HG: ICD-10-PCS | Mod: CPTII,S$GLB,, | Performed by: INTERNAL MEDICINE

## 2023-05-09 PROCEDURE — 3080F DIAST BP >= 90 MM HG: CPT | Mod: CPTII,S$GLB,, | Performed by: INTERNAL MEDICINE

## 2023-05-09 PROCEDURE — 72040 X-RAY EXAM NECK SPINE 2-3 VW: CPT | Mod: 26,,, | Performed by: RADIOLOGY

## 2023-05-09 PROCEDURE — 99999 PR PBB SHADOW E&M-EST. PATIENT-LVL IV: ICD-10-PCS | Mod: PBBFAC,,, | Performed by: INTERNAL MEDICINE

## 2023-05-09 PROCEDURE — 72040 XR CERVICAL SPINE AP LATERAL: ICD-10-PCS | Mod: 26,,, | Performed by: RADIOLOGY

## 2023-05-09 PROCEDURE — 1159F PR MEDICATION LIST DOCUMENTED IN MEDICAL RECORD: ICD-10-PCS | Mod: CPTII,S$GLB,, | Performed by: INTERNAL MEDICINE

## 2023-05-09 PROCEDURE — 1160F RVW MEDS BY RX/DR IN RCRD: CPT | Mod: CPTII,S$GLB,, | Performed by: INTERNAL MEDICINE

## 2023-05-09 PROCEDURE — 99215 OFFICE O/P EST HI 40 MIN: CPT | Mod: S$GLB,,, | Performed by: INTERNAL MEDICINE

## 2023-05-09 NOTE — PROGRESS NOTES
RHEUMATOLOGY CLINIC FOLLOW UP VISIT  Chief complaints, HPI, ROS, EXAM, Assessment & Plans:-  Janice Nova a 43 y.o. pleasant female comes in for follow-up visit.  She follows up in the Rheumatology Clinic for plaque psoriasis and severe psoriatic arthritis with dactylitis.  After failing multiple medication she was recently treated with Tremfya.  Due to active disease her methotrexate was discontinued and started on Arava along with low-dose 5 mg daily prednisone.  Since adding this combination she reports 90% improvement in her arthritic symptoms with complete resolution of plaque psoriasis.  No joint pain today.  Mild numbness and tingling of both hands at night. No swelling.  No active dactylitis.  No adverse effects from Arava.  No infections.    Rheumatological review of systems negative physical examination chronic scars but no active plaque psoriasis.  No dactylitis, enthesitis or effusion..  Positive Tinel sign left hand.   1. Polyarticular psoriatic arthritis    2. Plaque psoriasis    3. High risk medication use    4. Immunosuppressed status    5. Obesity, morbid, BMI 40.0-49.9    6. Vitamin D deficiency      Problem List Items Addressed This Visit       Plaque psoriasis    Vitamin D deficiency    Polyarticular psoriatic arthritis - Primary    Obesity, morbid, BMI 40.0-49.9    High risk medication use    Immunosuppressed status      Latest Reference Range & Units 05/01/23 12:14   WBC 3.90 - 12.70 K/uL 8.74   RBC 4.00 - 5.40 M/uL 4.55   Hemoglobin 12.0 - 16.0 g/dL 11.9 (L)   Hematocrit 37.0 - 48.5 % 36.7 (L)   MCV 82 - 98 fL 81 (L)   MCH 27.0 - 31.0 pg 26.2 (L)   MCHC 32.0 - 36.0 g/dL 32.4   RDW 11.5 - 14.5 % 16.4 (H)   Platelets 150 - 450 K/uL 439   MPV 9.2 - 12.9 fL 8.3 (L)   Gran % 38.0 - 73.0 % 55.8   Lymph % 18.0 - 48.0 % 26.1   Mono % 4.0 - 15.0 % 5.4   Eosinophil % 0.0 - 8.0 % 11.4 (H)   Basophil % 0.0 - 1.9 % 1.1   Immature  Granulocytes 0.0 - 0.5 % 0.2   Gran # (ANC) 1.8 - 7.7 K/uL 4.9   Lymph # 1.0 - 4.8 K/uL 2.3   Mono # 0.3 - 1.0 K/uL 0.5   Eos # 0.0 - 0.5 K/uL 1.0 (H)   Baso # 0.00 - 0.20 K/uL 0.10   Immature Grans (Abs) 0.00 - 0.04 K/uL 0.02   nRBC 0 /100 WBC 0   Differential Method  Automated   Sed Rate 0 - 36 mm/Hr 24   Sodium 136 - 145 mmol/L 137   Potassium 3.5 - 5.1 mmol/L 4.5   Chloride 95 - 110 mmol/L 106   CO2 23 - 29 mmol/L 24   Anion Gap 8 - 16 mmol/L 7 (L)   BUN 6 - 20 mg/dL 14   Creatinine 0.5 - 1.4 mg/dL 0.8   eGFR >60 mL/min/1.73 m^2 >60   Glucose 70 - 110 mg/dL 88   Calcium 8.7 - 10.5 mg/dL 9.1   Alkaline Phosphatase 55 - 135 U/L 96   PROTEIN TOTAL 6.0 - 8.4 g/dL 7.2   Albumin 3.5 - 5.2 g/dL 3.8   BILIRUBIN TOTAL 0.1 - 1.0 mg/dL 0.2   AST 10 - 40 U/L 21   ALT 10 - 44 U/L 29   CRP 0.0 - 8.2 mg/L 2.3   (L): Data is abnormally low  (H): Data is abnormally high      Well controlled plaque psoriasis and psoriatic arthritis on Tremfya and Arava.   Continue the same.  Okay to take Medrol Dosepak p.r.n. for flares.  Cervical xray today since the neck pain and radiculopathy symptoms could be related to cervical DJD from large breasts.   Continue vitamin D replacement therapy. Check levels next visit.   Advised all precautions.  Compromised immune system secondary to autoimmune disease and use of immunosuppressive drugs. Monitor carefully for infections. Advised to get immediate medical care if any infection. Also advised strict adherence to age appropriate vaccinations and cancer screenings with PCP.  Try conservative measures for left hand carpal tunnel syndrome as advised.  Consult orthopedic surgery if persistent.  Hold Tremfya and arava if any infection.  Safety labs every 12 weeks.    Total time spent 40 minutes including time needed to review the records, the   patient evaluation, documentation, face-to-face discussion with the patient,   more than 50% of the time was spent on coordination of care and counseling.     Level V visit.    # Follow up in about 6 months (around 11/9/2023).      Disclaimer: This note was prepared using voice recognition system and is likely to have sound alike errors and is not proof read.  Please call me with any questions.

## 2023-05-09 NOTE — TELEPHONE ENCOUNTER
Please refer to previous telephone call.    Specialty Pharmacy - Refill Coordination    Specialty Medication Orders Linked to Encounter      Flowsheet Row Most Recent Value   Medication #1 guselkumab (TREMFYA) 100 mg/mL Syrg (Order#[id number], Rx#[id number])            Refill Questions - Documented Responses      Flowsheet Row Most Recent Value   Patient Availability and HIPAA Verification    Does patient want to proceed with activity? Yes   HIPAA/medical authority confirmed? Yes   Relationship to patient of person spoken to? Self   Refill Screening Questions    Changes to allergies? No   Changes to medications? No   New conditions since last clinic visit? No   Unplanned office visit, urgent care, ED, or hospital admission in the last 4 weeks? No   How does patient/caregiver feel medication is working? Good   Financial problems or insurance changes? No   How many doses of your specialty medications were missed in the last 4 weeks? 0   Would patient like to speak to a pharmacist? No   When does the patient need to receive the medication? 05/16/23   Refill Delivery Questions    How will the patient receive the medication? MEDRx   When does the patient need to receive the medication? 05/16/23   Shipping Address Home   Address in [facility] confirmed and updated if neccessary? Yes   Expected Copay ($) 0   Is the patient able to afford the medication copay? Yes   Payment Method zero copay   Days supply of Refill 56   Supplies needed? No supplies needed   Refill activity completed? Yes   Refill activity plan Refill scheduled   Shipment/Pickup Date: 05/11/23            Current Outpatient Medications   Medication Sig    celecoxib (CELEBREX) 200 MG capsule Take 1 capsule (200 mg total) by mouth 2 (two) times daily as needed for Pain.    esomeprazole (NEXIUM) 40 MG capsule TAKE ONE CAPSULE BY MOUTH EVERY DAY BEFORE BREAKFAST    ferrous sulfate (FEOSOL) 325 mg (65 mg iron) Tab tablet Take 1 tablet (325 mg total) by mouth once daily.    FLUoxetine 40 MG capsule  TAKE ONE CAPSULE BY MOUTH ONCE PER DAY    folic acid (FOLVITE) 1 MG tablet TAKE ONE TABLET BY MOUTH DAILY    guselkumab (TREMFYA) 100 mg/mL Syrg Inject 1 mL (100 mg) into the skin every 8 weeks.    leflunomide (ARAVA) 20 MG Tab TAKE ONE TABLET BY MOUTH ONCE DAILY    tirzepatide (MOUNJARO) 2.5 mg/0.5 mL PnIj Inject 2.5 mg into the skin every 7 days. (Patient not taking: Reported on 12/2/2022)    tirzepatide (MOUNJARO) 5 mg/0.5 mL PnIj Inject 5 mg into the skin every 7 days. (Patient not taking: Reported on 12/2/2022)    VITAMIN D2 1,250 mcg (50,000 unit) capsule TAKE ONE CAPSULE BY MOUTH TWICE A WEEK   Last reviewed on 4/25/2023 11:10 AM by Cliff Curtis PharmD    Review of patient's allergies indicates:  No Known Allergies Last reviewed on  5/1/2023 8:25 AM by Christy Lomeli      Tasks added this encounter   No tasks added.   Tasks due within next 3 months   5/11/2023 - Refill Coordination Outreach (1 time occurrence)     Don Robert, PharmD  [facility]  [address] [place], [place] [address]  [facility] [address]  Phone: [phone]  Fax: [phone]

## 2023-06-12 DIAGNOSIS — L40.0 PLAQUE PSORIASIS: ICD-10-CM

## 2023-06-12 DIAGNOSIS — L40.50 PSORIATIC ARTHRITIS: ICD-10-CM

## 2023-06-12 RX ORDER — LEFLUNOMIDE 20 MG/1
TABLET ORAL
Qty: 30 TABLET | Refills: 2 | Status: SHIPPED | OUTPATIENT
Start: 2023-06-12 | End: 2023-07-24 | Stop reason: SDUPTHER

## 2023-06-29 ENCOUNTER — SPECIALTY PHARMACY (OUTPATIENT)
Dept: PHARMACY | Facility: CLINIC | Age: 43
End: 2023-06-29
Payer: COMMERCIAL

## 2023-07-03 NOTE — TELEPHONE ENCOUNTER
Specialty Pharmacy - Refill Coordination  Specialty Pharmacy - Medication/Referral Authorization    Specialty Medication Orders Linked to Encounter      Flowsheet Row Most Recent Value   Medication #1 guselkumab (TREMFYA) 100 mg/mL Syrg (Order#[id number], Rx#[id number])            Refill Questions - Documented Responses      Flowsheet Row Most Recent Value   Patient Availability and HIPAA Verification    Does patient want to proceed with activity? Yes   HIPAA/medical authority confirmed? Yes   Relationship to patient of person spoken to? Self   Refill Screening Questions    Changes to allergies? No   Changes to medications? No   New conditions since last clinic visit? No   Unplanned office visit, urgent care, ED, or hospital admission in the last 4 weeks? No   How does patient/caregiver feel medication is working? Very good   Financial problems or insurance changes? No   How many doses of your specialty medications were missed in the last 4 weeks? 0   Would patient like to speak to a pharmacist? No   When does the patient need to receive the medication? 07/11/23   Refill Delivery Questions    How will the patient receive the medication? MEDRx   When does the patient need to receive the medication? 07/11/23   Shipping Address Home   Address in [facility] confirmed and updated if neccessary? Yes   Expected Copay ($) 0   Is the patient able to afford the medication copay? Yes   Payment Method zero copay   Days supply of Refill 56   Supplies needed? No supplies needed   Refill activity completed? Yes   Refill activity plan Refill scheduled   Shipment/Pickup Date: 07/06/23            Current Outpatient Medications   Medication Sig    celecoxib (CELEBREX) 200 MG capsule Take 1 capsule (200 mg total) by mouth 2 (two) times daily as needed for Pain.    esomeprazole (NEXIUM) 40 MG capsule TAKE ONE CAPSULE BY MOUTH EVERY DAY BEFORE BREAKFAST    FLUoxetine 40 MG capsule TAKE ONE CAPSULE BY MOUTH ONCE PER DAY    folic  acid (FOLVITE) 1 MG tablet TAKE ONE TABLET BY MOUTH DAILY    guselkumab (TREMFYA) 100 mg/mL Syrg Inject 1 mL (100 mg) into the skin every 8 weeks.    leflunomide (ARAVA) 20 MG Tab TAKE ONE TABLET BY MOUTH ONCE PER DAY    VITAMIN D2 1,250 mcg (50,000 unit) capsule TAKE ONE CAPSULE BY MOUTH TWICE A WEEK   Last reviewed on 5/9/2023  3:26 PM by Saul Cui MD    Review of patient's allergies indicates:  No Known Allergies Last reviewed on  6/12/2023 8:08 AM by Christy Lomeli      Tasks added this encounter   7/3/2024 - Benefits Review (Annual recurrence)   Tasks due within next 3 months   No tasks due.     Anjali Francisco, PharmD  [facility]  [address] [place], [place] [address]  [facility] [address]  Phone: [phone]  Fax: [phone]

## 2023-07-17 RX ORDER — ESOMEPRAZOLE MAGNESIUM 40 MG/1
CAPSULE, DELAYED RELEASE ORAL
Qty: 30 CAPSULE | Refills: 2 | Status: SHIPPED | OUTPATIENT
Start: 2023-07-17 | End: 2023-10-02

## 2023-07-24 ENCOUNTER — OFFICE VISIT (OUTPATIENT)
Dept: ALLERGY | Facility: CLINIC | Age: 43
End: 2023-07-24
Payer: COMMERCIAL

## 2023-07-24 VITALS
HEIGHT: 72 IN | BODY MASS INDEX: 39.68 KG/M2 | TEMPERATURE: 98 F | DIASTOLIC BLOOD PRESSURE: 89 MMHG | HEART RATE: 80 BPM | SYSTOLIC BLOOD PRESSURE: 135 MMHG | WEIGHT: 293 LBS

## 2023-07-24 DIAGNOSIS — L40.0 PLAQUE PSORIASIS: ICD-10-CM

## 2023-07-24 DIAGNOSIS — L30.9 DERMATITIS: Primary | ICD-10-CM

## 2023-07-24 DIAGNOSIS — L40.50 PSORIATIC ARTHRITIS: ICD-10-CM

## 2023-07-24 DIAGNOSIS — D72.10 EOSINOPHILIA, UNSPECIFIED TYPE: ICD-10-CM

## 2023-07-24 PROCEDURE — 99204 PR OFFICE/OUTPT VISIT, NEW, LEVL IV, 45-59 MIN: ICD-10-PCS | Mod: 25,S$GLB,, | Performed by: ALLERGY & IMMUNOLOGY

## 2023-07-24 PROCEDURE — 1159F PR MEDICATION LIST DOCUMENTED IN MEDICAL RECORD: ICD-10-PCS | Mod: CPTII,S$GLB,, | Performed by: ALLERGY & IMMUNOLOGY

## 2023-07-24 PROCEDURE — 1159F MED LIST DOCD IN RCRD: CPT | Mod: CPTII,S$GLB,, | Performed by: ALLERGY & IMMUNOLOGY

## 2023-07-24 PROCEDURE — 3079F DIAST BP 80-89 MM HG: CPT | Mod: CPTII,S$GLB,, | Performed by: ALLERGY & IMMUNOLOGY

## 2023-07-24 PROCEDURE — 99999 PR PBB SHADOW E&M-EST. PATIENT-LVL III: CPT | Mod: PBBFAC,,, | Performed by: ALLERGY & IMMUNOLOGY

## 2023-07-24 PROCEDURE — 3079F PR MOST RECENT DIASTOLIC BLOOD PRESSURE 80-89 MM HG: ICD-10-PCS | Mod: CPTII,S$GLB,, | Performed by: ALLERGY & IMMUNOLOGY

## 2023-07-24 PROCEDURE — 99204 OFFICE O/P NEW MOD 45 MIN: CPT | Mod: 25,S$GLB,, | Performed by: ALLERGY & IMMUNOLOGY

## 2023-07-24 PROCEDURE — 3075F SYST BP GE 130 - 139MM HG: CPT | Mod: CPTII,S$GLB,, | Performed by: ALLERGY & IMMUNOLOGY

## 2023-07-24 PROCEDURE — 95044 PATCH/APPLICATION TESTS: CPT | Mod: S$GLB,,, | Performed by: ALLERGY & IMMUNOLOGY

## 2023-07-24 PROCEDURE — 3075F PR MOST RECENT SYSTOLIC BLOOD PRESS GE 130-139MM HG: ICD-10-PCS | Mod: CPTII,S$GLB,, | Performed by: ALLERGY & IMMUNOLOGY

## 2023-07-24 PROCEDURE — 3008F PR BODY MASS INDEX (BMI) DOCUMENTED: ICD-10-PCS | Mod: CPTII,S$GLB,, | Performed by: ALLERGY & IMMUNOLOGY

## 2023-07-24 PROCEDURE — 95044 PR ALLERGY PATCH TESTS: ICD-10-PCS | Mod: S$GLB,,, | Performed by: ALLERGY & IMMUNOLOGY

## 2023-07-24 PROCEDURE — 99999 PR PBB SHADOW E&M-EST. PATIENT-LVL III: ICD-10-PCS | Mod: PBBFAC,,, | Performed by: ALLERGY & IMMUNOLOGY

## 2023-07-24 PROCEDURE — 3008F BODY MASS INDEX DOCD: CPT | Mod: CPTII,S$GLB,, | Performed by: ALLERGY & IMMUNOLOGY

## 2023-07-24 RX ORDER — LEFLUNOMIDE 20 MG/1
20 TABLET ORAL DAILY
Qty: 30 TABLET | Refills: 2 | Status: SHIPPED | OUTPATIENT
Start: 2023-07-24 | End: 2023-12-26

## 2023-07-24 NOTE — PROGRESS NOTES
Subjective:      Patient ID: Janice Lawrence is a 43 y.o. female.    Referred by Dr. 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   Location: forearm  Rash: raised itchy bumps  Duration: 6-7 months  Medications: steroid creams- not helped          Past Medical History:   Diagnosis Date    Acid reflux     Acquired iron deficiency anemia due to decreased absorption 12/10/2020    Anxiety     Arthritis     Cervical radiculopathy 5/9/2023    Depression         Family History   Problem Relation Age of Onset    Cancer Mother     Breast cancer Mother     Diabetes Mellitus Maternal Grandfather     Cancer Paternal Grandmother     Psoriasis Father     Arthritis Father     Colon cancer Paternal Aunt     Ovarian cancer Neg Hx     Thrombophilia Neg Hx         Current Outpatient Medications on File Prior to Visit   Medication Sig Dispense Refill    celecoxib (CELEBREX) 200 MG capsule Take 1 capsule (200 mg total) by mouth 2 (two) times daily as needed for Pain. 60 capsule 3    esomeprazole (NEXIUM) 40 MG capsule TAKE ONE CAPSULE BY MOUTH EVERY DAY BEFORE BREAKFAST 30 capsule 2    FLUoxetine 40 MG capsule TAKE ONE CAPSULE BY MOUTH ONCE PER DAY 30 capsule 2    folic acid (FOLVITE) 1 MG tablet TAKE ONE TABLET BY MOUTH DAILY 90 tablet 3    guselkumab (TREMFYA) 100 mg/mL Syrg Inject 1 mL (100 mg) into the skin every 8 weeks. 2 mL 3    VITAMIN D2 1,250 mcg (50,000 unit) capsule TAKE ONE CAPSULE BY MOUTH TWICE A WEEK 24 capsule 3    [DISCONTINUED] leflunomide (ARAVA) 20 MG Tab TAKE ONE TABLET BY MOUTH ONCE PER DAY 30 tablet 2     No current facility-administered medications on file prior to visit.        Review of patient's allergies indicates:  No Known Allergies     Environmental History: No pets, social smoker  Review of Systems   Constitutional:  Negative for chills and fever.   HENT:  Negative for  congestion and rhinorrhea.    Eyes:  Negative for discharge and itching.   Respiratory:  Negative for cough, shortness of breath and wheezing.    Cardiovascular:  Negative for chest pain and leg swelling.   Gastrointestinal:  Negative for nausea and vomiting.   Skin:  Positive for rash. Negative for wound.   Allergic/Immunologic: Negative for environmental allergies, food allergies and immunocompromised state.   Neurological:  Negative for facial asymmetry and speech difficulty.   Hematological:  Negative for adenopathy. Does not bruise/bleed easily.   Psychiatric/Behavioral:  Negative for behavioral problems and suicidal ideas.      Objective:   Physical Exam  Vitals reviewed.   Constitutional:       General: She is not in acute distress.     Appearance: Normal appearance. She is well-developed. She is obese. She is not ill-appearing, toxic-appearing or diaphoretic.   HENT:      Head: Normocephalic and atraumatic.      Right Ear: Tympanic membrane, ear canal and external ear normal. There is no impacted cerumen.      Left Ear: Tympanic membrane, ear canal and external ear normal. There is no impacted cerumen.      Nose: Nose normal. No congestion or rhinorrhea.      Mouth/Throat:      Pharynx: No oropharyngeal exudate or posterior oropharyngeal erythema.   Eyes:      General: No scleral icterus.        Right eye: No discharge.         Left eye: No discharge.      Pupils: Pupils are equal, round, and reactive to light.   Neck:      Thyroid: No thyromegaly.   Cardiovascular:      Rate and Rhythm: Normal rate and regular rhythm.      Heart sounds: Normal heart sounds. No murmur heard.    No friction rub. No gallop.   Pulmonary:      Effort: Pulmonary effort is normal. No respiratory distress.      Breath sounds: Normal breath sounds. No stridor. No wheezing, rhonchi or rales.   Chest:      Chest wall: No tenderness.   Musculoskeletal:         General: No swelling, tenderness, deformity or signs of injury. Normal range  of motion.      Cervical back: Normal range of motion and neck supple. No rigidity or tenderness. No muscular tenderness.   Lymphadenopathy:      Cervical: No cervical adenopathy.   Skin:     General: Skin is warm.      Coloration: Skin is not jaundiced or pale.      Findings: No bruising or erythema.   Neurological:      Mental Status: She is alert and oriented to person, place, and time.      Gait: Gait normal.   Psychiatric:         Mood and Affect: Mood normal.         Behavior: Behavior normal.         Thought Content: Thought content normal.         Judgment: Judgment normal.     History of possible allergic contact dermatitis.  Here today for patch test placement.  Instructions have been reviewed with the patient.      Denies recent oral corticosteroid intake.  No involvement of back.      T.R.U.E. Test patch testing placed today (36 allergens, 3 panels).    Assessment:      1. Dermatitis    2. Eosinophilia, unspecified type        Plan:     Suspect she is having a reaction to a product she works with on her job.  Patch testing placement today.    Patient given written and verbal instructions detailing to not have areas in direct contact with any water. Patient instructed to avoid hair washing until 72-96 hours.  Pt instructed to avoid indoor/outdoor activities that could lead to increased sweating.  Will remove patches and perform read at 48 hours and then again at 72 hours. Patient acknowledged understanding.       EDWARD DIGGS spent a total of 31 minutes on the day of the visit.This includes face to face time and non-face to face time preparing to see the patient (eg, review of tests), obtaining and/or reviewing separately obtained history, documenting clinical information in the electronic or other health record, independently interpreting results and communicating results to the patient/family/caregiver, or care coordinator.     CC: Dr. Cui

## 2023-07-26 ENCOUNTER — CLINICAL SUPPORT (OUTPATIENT)
Dept: ALLERGY | Facility: CLINIC | Age: 43
End: 2023-07-26
Payer: COMMERCIAL

## 2023-07-26 PROCEDURE — 99999 PR PBB SHADOW E&M-EST. PATIENT-LVL I: CPT | Mod: PBBFAC,,,

## 2023-07-26 PROCEDURE — 99999 PR PBB SHADOW E&M-EST. PATIENT-LVL I: ICD-10-PCS | Mod: PBBFAC,,,

## 2023-07-26 NOTE — PROGRESS NOTES
History of possible allergic contact dermatitis.  Here today for 48 hour patch test read.  Instructions have been reviewed with the patient.      Denies recent oral corticosteroid intake.  No involvement of back.      T.R.U.E. Test patch testing removed today (36 allergens, 3 panels).  Patient was allowed to sit in examination for 30 minutes prior to reading.  Pt noted to be positive to the following allergens:    All 36 allergens were negative.    Each allergen discussed with patient and patient recommend to avoid products with these allergens.  Pative given T.R.U.E. Test handout on each positive allergen.  The patient acknowledged understanding.

## 2023-07-27 ENCOUNTER — PATIENT MESSAGE (OUTPATIENT)
Dept: ALLERGY | Facility: CLINIC | Age: 43
End: 2023-07-27
Payer: COMMERCIAL

## 2023-08-04 DIAGNOSIS — E55.9 VITAMIN D DEFICIENCY: ICD-10-CM

## 2023-08-04 RX ORDER — ERGOCALCIFEROL 1.25 MG/1
CAPSULE ORAL
Qty: 24 CAPSULE | Refills: 3 | Status: SHIPPED | OUTPATIENT
Start: 2023-08-04

## 2023-08-07 ENCOUNTER — LAB VISIT (OUTPATIENT)
Dept: LAB | Facility: HOSPITAL | Age: 43
End: 2023-08-07
Attending: INTERNAL MEDICINE
Payer: COMMERCIAL

## 2023-08-07 DIAGNOSIS — L40.59 POLYARTICULAR PSORIATIC ARTHRITIS: ICD-10-CM

## 2023-08-07 DIAGNOSIS — D84.9 IMMUNOSUPPRESSED STATUS: ICD-10-CM

## 2023-08-07 DIAGNOSIS — Z79.899 HIGH RISK MEDICATION USE: ICD-10-CM

## 2023-08-07 LAB
ALBUMIN SERPL BCP-MCNC: 4 G/DL (ref 3.5–5.2)
ALP SERPL-CCNC: 84 U/L (ref 55–135)
ALT SERPL W/O P-5'-P-CCNC: 21 U/L (ref 10–44)
ANION GAP SERPL CALC-SCNC: 10 MMOL/L (ref 8–16)
AST SERPL-CCNC: 21 U/L (ref 10–40)
BASOPHILS # BLD AUTO: 0.11 K/UL (ref 0–0.2)
BASOPHILS NFR BLD: 0.9 % (ref 0–1.9)
BILIRUB SERPL-MCNC: 0.5 MG/DL (ref 0.1–1)
BUN SERPL-MCNC: 15 MG/DL (ref 6–20)
CALCIUM SERPL-MCNC: 9.7 MG/DL (ref 8.7–10.5)
CHLORIDE SERPL-SCNC: 106 MMOL/L (ref 95–110)
CO2 SERPL-SCNC: 22 MMOL/L (ref 23–29)
CREAT SERPL-MCNC: 0.8 MG/DL (ref 0.5–1.4)
CRP SERPL-MCNC: 2.2 MG/L (ref 0–8.2)
DIFFERENTIAL METHOD: ABNORMAL
EOSINOPHIL # BLD AUTO: 1 K/UL (ref 0–0.5)
EOSINOPHIL NFR BLD: 7.7 % (ref 0–8)
ERYTHROCYTE [DISTWIDTH] IN BLOOD BY AUTOMATED COUNT: 16.9 % (ref 11.5–14.5)
ERYTHROCYTE [SEDIMENTATION RATE] IN BLOOD BY PHOTOMETRIC METHOD: 22 MM/HR (ref 0–36)
EST. GFR  (NO RACE VARIABLE): >60 ML/MIN/1.73 M^2
GLUCOSE SERPL-MCNC: 119 MG/DL (ref 70–110)
HCT VFR BLD AUTO: 37 % (ref 37–48.5)
HGB BLD-MCNC: 12.1 G/DL (ref 12–16)
IMM GRANULOCYTES # BLD AUTO: 0.04 K/UL (ref 0–0.04)
IMM GRANULOCYTES NFR BLD AUTO: 0.3 % (ref 0–0.5)
LYMPHOCYTES # BLD AUTO: 3.1 K/UL (ref 1–4.8)
LYMPHOCYTES NFR BLD: 24.8 % (ref 18–48)
MCH RBC QN AUTO: 26.1 PG (ref 27–31)
MCHC RBC AUTO-ENTMCNC: 32.7 G/DL (ref 32–36)
MCV RBC AUTO: 80 FL (ref 82–98)
MONOCYTES # BLD AUTO: 0.5 K/UL (ref 0.3–1)
MONOCYTES NFR BLD: 4 % (ref 4–15)
NEUTROPHILS # BLD AUTO: 7.9 K/UL (ref 1.8–7.7)
NEUTROPHILS NFR BLD: 62.3 % (ref 38–73)
NRBC BLD-RTO: 0 /100 WBC
PLATELET # BLD AUTO: 456 K/UL (ref 150–450)
PMV BLD AUTO: 8.3 FL (ref 9.2–12.9)
POTASSIUM SERPL-SCNC: 3.7 MMOL/L (ref 3.5–5.1)
PROT SERPL-MCNC: 7.6 G/DL (ref 6–8.4)
RBC # BLD AUTO: 4.64 M/UL (ref 4–5.4)
SODIUM SERPL-SCNC: 138 MMOL/L (ref 136–145)
WBC # BLD AUTO: 12.6 K/UL (ref 3.9–12.7)

## 2023-08-07 PROCEDURE — 85025 COMPLETE CBC W/AUTO DIFF WBC: CPT | Performed by: INTERNAL MEDICINE

## 2023-08-07 PROCEDURE — 86480 TB TEST CELL IMMUN MEASURE: CPT | Performed by: INTERNAL MEDICINE

## 2023-08-07 PROCEDURE — 36415 COLL VENOUS BLD VENIPUNCTURE: CPT | Performed by: INTERNAL MEDICINE

## 2023-08-07 PROCEDURE — 85652 RBC SED RATE AUTOMATED: CPT | Performed by: INTERNAL MEDICINE

## 2023-08-07 PROCEDURE — 80053 COMPREHEN METABOLIC PANEL: CPT | Performed by: INTERNAL MEDICINE

## 2023-08-07 PROCEDURE — 86140 C-REACTIVE PROTEIN: CPT | Performed by: INTERNAL MEDICINE

## 2023-08-09 LAB
GAMMA INTERFERON BACKGROUND BLD IA-ACNC: 0.01 IU/ML
M TB IFN-G CD4+ BCKGRND COR BLD-ACNC: 0.01 IU/ML
M TB IFN-G CD4+ BCKGRND COR BLD-ACNC: 0.02 IU/ML
MITOGEN IGNF BCKGRD COR BLD-ACNC: 1.21 IU/ML
TB GOLD PLUS: NEGATIVE

## 2023-09-12 ENCOUNTER — PATIENT MESSAGE (OUTPATIENT)
Dept: RHEUMATOLOGY | Facility: CLINIC | Age: 43
End: 2023-09-12
Payer: COMMERCIAL

## 2023-10-02 RX ORDER — ESOMEPRAZOLE MAGNESIUM 40 MG/1
CAPSULE, DELAYED RELEASE ORAL
Qty: 30 CAPSULE | Refills: 11 | Status: SHIPPED | OUTPATIENT
Start: 2023-10-02

## 2023-10-13 ENCOUNTER — PATIENT MESSAGE (OUTPATIENT)
Dept: OBSTETRICS AND GYNECOLOGY | Facility: CLINIC | Age: 43
End: 2023-10-13
Payer: COMMERCIAL

## 2023-10-16 ENCOUNTER — OFFICE VISIT (OUTPATIENT)
Dept: OBSTETRICS AND GYNECOLOGY | Facility: CLINIC | Age: 43
End: 2023-10-16
Payer: COMMERCIAL

## 2023-10-16 VITALS — BODY MASS INDEX: 41.02 KG/M2 | WEIGHT: 293 LBS | HEIGHT: 71 IN

## 2023-10-16 DIAGNOSIS — N92.6 IRREGULAR MENSES: Primary | ICD-10-CM

## 2023-10-16 PROCEDURE — 1159F PR MEDICATION LIST DOCUMENTED IN MEDICAL RECORD: ICD-10-PCS | Mod: CPTII,S$GLB,, | Performed by: OBSTETRICS & GYNECOLOGY

## 2023-10-16 PROCEDURE — 99213 PR OFFICE/OUTPT VISIT, EST, LEVL III, 20-29 MIN: ICD-10-PCS | Mod: S$GLB,,, | Performed by: OBSTETRICS & GYNECOLOGY

## 2023-10-16 PROCEDURE — 1160F RVW MEDS BY RX/DR IN RCRD: CPT | Mod: CPTII,S$GLB,, | Performed by: OBSTETRICS & GYNECOLOGY

## 2023-10-16 PROCEDURE — 99999 PR PBB SHADOW E&M-EST. PATIENT-LVL IV: CPT | Mod: PBBFAC,,, | Performed by: OBSTETRICS & GYNECOLOGY

## 2023-10-16 PROCEDURE — 99999 PR PBB SHADOW E&M-EST. PATIENT-LVL IV: ICD-10-PCS | Mod: PBBFAC,,, | Performed by: OBSTETRICS & GYNECOLOGY

## 2023-10-16 PROCEDURE — 3008F BODY MASS INDEX DOCD: CPT | Mod: CPTII,S$GLB,, | Performed by: OBSTETRICS & GYNECOLOGY

## 2023-10-16 PROCEDURE — 1160F PR REVIEW ALL MEDS BY PRESCRIBER/CLIN PHARMACIST DOCUMENTED: ICD-10-PCS | Mod: CPTII,S$GLB,, | Performed by: OBSTETRICS & GYNECOLOGY

## 2023-10-16 PROCEDURE — 3008F PR BODY MASS INDEX (BMI) DOCUMENTED: ICD-10-PCS | Mod: CPTII,S$GLB,, | Performed by: OBSTETRICS & GYNECOLOGY

## 2023-10-16 PROCEDURE — 99213 OFFICE O/P EST LOW 20 MIN: CPT | Mod: S$GLB,,, | Performed by: OBSTETRICS & GYNECOLOGY

## 2023-10-16 PROCEDURE — 1159F MED LIST DOCD IN RCRD: CPT | Mod: CPTII,S$GLB,, | Performed by: OBSTETRICS & GYNECOLOGY

## 2023-10-16 RX ORDER — CYANOCOBALAMIN (VITAMIN B-12) 500 MCG
TABLET ORAL
COMMUNITY
End: 2024-04-01

## 2023-10-16 RX ORDER — ACETAMINOPHEN AND CODEINE PHOSPHATE 120; 12 MG/5ML; MG/5ML
1 SOLUTION ORAL DAILY
Qty: 28 TABLET | Refills: 11 | Status: SHIPPED | OUTPATIENT
Start: 2023-10-16 | End: 2024-04-01

## 2023-10-16 RX ORDER — ONDANSETRON 4 MG/1
TABLET, ORALLY DISINTEGRATING ORAL
COMMUNITY
Start: 2023-03-21

## 2023-10-16 RX ORDER — LEFLUNOMIDE 10 MG/1
TABLET ORAL
COMMUNITY
End: 2023-10-16 | Stop reason: SDUPTHER

## 2023-10-16 RX ORDER — GUSELKUMAB 100 MG/ML
INJECTION SUBCUTANEOUS
COMMUNITY
End: 2023-10-16 | Stop reason: SDUPTHER

## 2023-10-16 RX ORDER — ESOMEPRAZOLE MAGNESIUM 40 MG/1
CAPSULE, DELAYED RELEASE ORAL
COMMUNITY
End: 2023-10-16 | Stop reason: SDUPTHER

## 2023-10-16 RX ORDER — SERTRALINE HYDROCHLORIDE 100 MG/1
TABLET, FILM COATED ORAL
COMMUNITY
End: 2023-11-15 | Stop reason: ALTCHOICE

## 2023-10-16 NOTE — PROGRESS NOTES
Subjective:      Patient ID: Janice Lawrence is a 43 y.o. female.    Chief Complaint:  Dysmenorrhea      History of Present Illness  HPI  Pt is here for follow up.  Reports that her menses have been regular with periods lasting 6 days.  However, her most recent period came 1 week early, was a lot heavier, and had a lot more cramping than usual.  Pt got concerned about this and made appointment to discuss her options again.  Last pap NILM in .    GYN & OB History  Patient's last menstrual period was 10/13/2023.   Date of Last Pap: 2020    OB History    Para Term  AB Living   1 0 0   1     SAB IAB Ectopic Multiple Live Births                  # Outcome Date GA Lbr Nikolas/2nd Weight Sex Delivery Anes PTL Lv   1 AB                Review of Systems  Review of Systems   Constitutional:  Negative for activity change, appetite change, chills, fatigue, fever and unexpected weight change.   Respiratory:  Negative for shortness of breath.    Cardiovascular:  Negative for chest pain, palpitations and leg swelling.   Gastrointestinal:  Negative for abdominal pain, bloating, blood in stool, constipation, diarrhea, nausea and vomiting.   Genitourinary:  Positive for dysmenorrhea, menorrhagia and menstrual problem. Negative for dyspareunia, dysuria, flank pain, frequency, genital sores, hematuria, pelvic pain, urgency, vaginal bleeding, vaginal discharge, vaginal pain, urinary incontinence, postcoital bleeding, vaginal dryness and vaginal odor.   Musculoskeletal:  Negative for back pain.   Neurological:  Negative for syncope and headaches.          Objective:     Physical Exam:   Constitutional: She is oriented to person, place, and time. She appears well-developed and well-nourished. No distress.                           Neurological: She is alert and oriented to person, place, and time.     Psychiatric: She has a normal mood and affect. Her behavior is normal. Thought content normal.          Assessment:     1. Irregular menses             Plan:     Irregular menses  -     norethindrone (MICRONOR) 0.35 mg tablet; Take 1 tablet (0.35 mg total) by mouth once daily.  Dispense: 28 tablet; Refill: 11  -     Evaluation to date (including labs and ultrasound) has been unremarkable.  Menstrual pattern is normally regular with an isolated early bleed this month.  Pt was advised on options, including expectant vs medical vs Mirena vs ablation vs hysterectomy.  Recommend trial with conservative option first.  Pt voiced understanding and desires to think about options but requests to start with Micronor in the mean time.  Medication details, dosing, risks, side-effects, and interactions were discussed.      Follow up in about 3 months (around 1/16/2024).      No

## 2023-11-09 DIAGNOSIS — N92.6 IRREGULAR MENSES: ICD-10-CM

## 2023-11-09 RX ORDER — ACETAMINOPHEN AND CODEINE PHOSPHATE 120; 12 MG/5ML; MG/5ML
1 SOLUTION ORAL DAILY
Qty: 28 TABLET | Refills: 11 | OUTPATIENT
Start: 2023-11-09 | End: 2024-11-08

## 2023-11-13 ENCOUNTER — LAB VISIT (OUTPATIENT)
Dept: LAB | Facility: HOSPITAL | Age: 43
End: 2023-11-13
Attending: INTERNAL MEDICINE
Payer: COMMERCIAL

## 2023-11-13 DIAGNOSIS — L40.59 POLYARTICULAR PSORIATIC ARTHRITIS: ICD-10-CM

## 2023-11-13 DIAGNOSIS — Z79.899 HIGH RISK MEDICATION USE: ICD-10-CM

## 2023-11-13 LAB
ALBUMIN SERPL BCP-MCNC: 3.9 G/DL (ref 3.5–5.2)
ALP SERPL-CCNC: 95 U/L (ref 55–135)
ALT SERPL W/O P-5'-P-CCNC: 28 U/L (ref 10–44)
ANION GAP SERPL CALC-SCNC: 7 MMOL/L (ref 8–16)
AST SERPL-CCNC: 22 U/L (ref 10–40)
BASOPHILS # BLD AUTO: 0.09 K/UL (ref 0–0.2)
BASOPHILS NFR BLD: 1.1 % (ref 0–1.9)
BILIRUB SERPL-MCNC: 0.5 MG/DL (ref 0.1–1)
BUN SERPL-MCNC: 10 MG/DL (ref 6–20)
CALCIUM SERPL-MCNC: 8.9 MG/DL (ref 8.7–10.5)
CHLORIDE SERPL-SCNC: 108 MMOL/L (ref 95–110)
CO2 SERPL-SCNC: 25 MMOL/L (ref 23–29)
CREAT SERPL-MCNC: 0.8 MG/DL (ref 0.5–1.4)
CRP SERPL-MCNC: 2.6 MG/L (ref 0–8.2)
DIFFERENTIAL METHOD: ABNORMAL
EOSINOPHIL # BLD AUTO: 0.8 K/UL (ref 0–0.5)
EOSINOPHIL NFR BLD: 9 % (ref 0–8)
ERYTHROCYTE [DISTWIDTH] IN BLOOD BY AUTOMATED COUNT: 17.6 % (ref 11.5–14.5)
ERYTHROCYTE [SEDIMENTATION RATE] IN BLOOD BY PHOTOMETRIC METHOD: 20 MM/HR (ref 0–36)
EST. GFR  (NO RACE VARIABLE): >60 ML/MIN/1.73 M^2
GLUCOSE SERPL-MCNC: 80 MG/DL (ref 70–110)
HCT VFR BLD AUTO: 38.5 % (ref 37–48.5)
HGB BLD-MCNC: 12.2 G/DL (ref 12–16)
IMM GRANULOCYTES # BLD AUTO: 0.02 K/UL (ref 0–0.04)
IMM GRANULOCYTES NFR BLD AUTO: 0.2 % (ref 0–0.5)
LYMPHOCYTES # BLD AUTO: 2.2 K/UL (ref 1–4.8)
LYMPHOCYTES NFR BLD: 26.4 % (ref 18–48)
MCH RBC QN AUTO: 25.7 PG (ref 27–31)
MCHC RBC AUTO-ENTMCNC: 31.7 G/DL (ref 32–36)
MCV RBC AUTO: 81 FL (ref 82–98)
MONOCYTES # BLD AUTO: 0.5 K/UL (ref 0.3–1)
MONOCYTES NFR BLD: 6.3 % (ref 4–15)
NEUTROPHILS # BLD AUTO: 4.8 K/UL (ref 1.8–7.7)
NEUTROPHILS NFR BLD: 57 % (ref 38–73)
NRBC BLD-RTO: 0 /100 WBC
PLATELET # BLD AUTO: 465 K/UL (ref 150–450)
PMV BLD AUTO: 8.3 FL (ref 9.2–12.9)
POTASSIUM SERPL-SCNC: 4.3 MMOL/L (ref 3.5–5.1)
PROT SERPL-MCNC: 7.3 G/DL (ref 6–8.4)
RBC # BLD AUTO: 4.75 M/UL (ref 4–5.4)
SODIUM SERPL-SCNC: 140 MMOL/L (ref 136–145)
WBC # BLD AUTO: 8.48 K/UL (ref 3.9–12.7)

## 2023-11-13 PROCEDURE — 36415 COLL VENOUS BLD VENIPUNCTURE: CPT | Performed by: INTERNAL MEDICINE

## 2023-11-13 PROCEDURE — 80053 COMPREHEN METABOLIC PANEL: CPT | Performed by: INTERNAL MEDICINE

## 2023-11-13 PROCEDURE — 85652 RBC SED RATE AUTOMATED: CPT | Performed by: INTERNAL MEDICINE

## 2023-11-13 PROCEDURE — 85025 COMPLETE CBC W/AUTO DIFF WBC: CPT | Performed by: INTERNAL MEDICINE

## 2023-11-13 PROCEDURE — 86140 C-REACTIVE PROTEIN: CPT | Performed by: INTERNAL MEDICINE

## 2023-11-15 ENCOUNTER — OFFICE VISIT (OUTPATIENT)
Dept: RHEUMATOLOGY | Facility: CLINIC | Age: 43
End: 2023-11-15
Payer: COMMERCIAL

## 2023-11-15 ENCOUNTER — TELEPHONE (OUTPATIENT)
Dept: PAIN MEDICINE | Facility: CLINIC | Age: 43
End: 2023-11-15
Payer: COMMERCIAL

## 2023-11-15 VITALS
HEART RATE: 84 BPM | SYSTOLIC BLOOD PRESSURE: 130 MMHG | DIASTOLIC BLOOD PRESSURE: 80 MMHG | WEIGHT: 293 LBS | BODY MASS INDEX: 41.02 KG/M2 | HEIGHT: 71 IN

## 2023-11-15 DIAGNOSIS — M54.31 SCIATICA OF RIGHT SIDE: ICD-10-CM

## 2023-11-15 DIAGNOSIS — Z79.899 DRUG-INDUCED IMMUNODEFICIENCY: ICD-10-CM

## 2023-11-15 DIAGNOSIS — D84.821 DRUG-INDUCED IMMUNODEFICIENCY: ICD-10-CM

## 2023-11-15 DIAGNOSIS — L40.0 PLAQUE PSORIASIS: ICD-10-CM

## 2023-11-15 DIAGNOSIS — L40.59 POLYARTICULAR PSORIATIC ARTHRITIS: Primary | ICD-10-CM

## 2023-11-15 DIAGNOSIS — Z51.81 ENCOUNTER FOR MEDICATION MONITORING: ICD-10-CM

## 2023-11-15 PROCEDURE — 99215 PR OFFICE/OUTPT VISIT, EST, LEVL V, 40-54 MIN: ICD-10-PCS | Mod: 25,S$GLB,, | Performed by: INTERNAL MEDICINE

## 2023-11-15 PROCEDURE — 99999 PR PBB SHADOW E&M-EST. PATIENT-LVL IV: CPT | Mod: PBBFAC,,, | Performed by: INTERNAL MEDICINE

## 2023-11-15 PROCEDURE — 3079F DIAST BP 80-89 MM HG: CPT | Mod: CPTII,S$GLB,, | Performed by: INTERNAL MEDICINE

## 2023-11-15 PROCEDURE — 3008F BODY MASS INDEX DOCD: CPT | Mod: CPTII,S$GLB,, | Performed by: INTERNAL MEDICINE

## 2023-11-15 PROCEDURE — 90694 VACC AIIV4 NO PRSRV 0.5ML IM: CPT | Mod: S$GLB,,, | Performed by: INTERNAL MEDICINE

## 2023-11-15 PROCEDURE — 3008F PR BODY MASS INDEX (BMI) DOCUMENTED: ICD-10-PCS | Mod: CPTII,S$GLB,, | Performed by: INTERNAL MEDICINE

## 2023-11-15 PROCEDURE — 1159F PR MEDICATION LIST DOCUMENTED IN MEDICAL RECORD: ICD-10-PCS | Mod: CPTII,S$GLB,, | Performed by: INTERNAL MEDICINE

## 2023-11-15 PROCEDURE — 3075F PR MOST RECENT SYSTOLIC BLOOD PRESS GE 130-139MM HG: ICD-10-PCS | Mod: CPTII,S$GLB,, | Performed by: INTERNAL MEDICINE

## 2023-11-15 PROCEDURE — 90471 FLU VACCINE - QUADRIVALENT - ADJUVANTED: ICD-10-PCS | Mod: S$GLB,,, | Performed by: INTERNAL MEDICINE

## 2023-11-15 PROCEDURE — 99215 OFFICE O/P EST HI 40 MIN: CPT | Mod: 25,S$GLB,, | Performed by: INTERNAL MEDICINE

## 2023-11-15 PROCEDURE — 3079F PR MOST RECENT DIASTOLIC BLOOD PRESSURE 80-89 MM HG: ICD-10-PCS | Mod: CPTII,S$GLB,, | Performed by: INTERNAL MEDICINE

## 2023-11-15 PROCEDURE — 3075F SYST BP GE 130 - 139MM HG: CPT | Mod: CPTII,S$GLB,, | Performed by: INTERNAL MEDICINE

## 2023-11-15 PROCEDURE — 90694 FLU VACCINE - QUADRIVALENT - ADJUVANTED: ICD-10-PCS | Mod: S$GLB,,, | Performed by: INTERNAL MEDICINE

## 2023-11-15 PROCEDURE — 1159F MED LIST DOCD IN RCRD: CPT | Mod: CPTII,S$GLB,, | Performed by: INTERNAL MEDICINE

## 2023-11-15 PROCEDURE — 1160F RVW MEDS BY RX/DR IN RCRD: CPT | Mod: CPTII,S$GLB,, | Performed by: INTERNAL MEDICINE

## 2023-11-15 PROCEDURE — 99999 PR PBB SHADOW E&M-EST. PATIENT-LVL IV: ICD-10-PCS | Mod: PBBFAC,,, | Performed by: INTERNAL MEDICINE

## 2023-11-15 PROCEDURE — 90471 IMMUNIZATION ADMIN: CPT | Mod: S$GLB,,, | Performed by: INTERNAL MEDICINE

## 2023-11-15 PROCEDURE — 1160F PR REVIEW ALL MEDS BY PRESCRIBER/CLIN PHARMACIST DOCUMENTED: ICD-10-PCS | Mod: CPTII,S$GLB,, | Performed by: INTERNAL MEDICINE

## 2023-11-15 NOTE — PROGRESS NOTES
RHEUMATOLOGY CLINIC FOLLOW UP VISIT  Chief complaints, HPI, ROS, EXAM, Assessment & Plans:-  Janice Nova a 43 y.o. pleasant female comes in for follow-up visit.  She follows up in the Rheumatology Clinic for plaque psoriasis and severe psoriatic arthritis with dactylitis.  After failing multiple medication she was recently treated with Tremfya.  Due to active disease her methotrexate was discontinued and started on Arava along with prn celebrex. She reports doing well today. Sparingly take celebrex. Mild recurrence of pain a week before she takes Tremfya.   No infections.  Mild intermittent right-sided sciatica.    Rheumatological review of systems negative.  Exam shows no synovitis, dactylitis, enthesitis or effusion.  Nontender sacroiliac joints.  1. Polyarticular psoriatic arthritis    2. Plaque psoriasis    3. Encounter for medication monitoring    4. Drug-induced immunodeficiency    5. Sciatica of right side      Problem List Items Addressed This Visit       Plaque psoriasis    Polyarticular psoriatic arthritis - Primary    Encounter for medication monitoring    Drug-induced immunodeficiency    Sciatica of right side    Relevant Orders    Ambulatory referral/consult to Back & Spine Clinic      Latest Reference Range & Units 11/13/23 12:13   WBC 3.90 - 12.70 K/uL 8.48   RBC 4.00 - 5.40 M/uL 4.75   Hemoglobin 12.0 - 16.0 g/dL 12.2   Hematocrit 37.0 - 48.5 % 38.5   MCV 82 - 98 fL 81 (L)   MCH 27.0 - 31.0 pg 25.7 (L)   MCHC 32.0 - 36.0 g/dL 31.7 (L)   RDW 11.5 - 14.5 % 17.6 (H)   Platelet Count 150 - 450 K/uL 465 (H)   MPV 9.2 - 12.9 fL 8.3 (L)   Gran % 38.0 - 73.0 % 57.0   Lymph % 18.0 - 48.0 % 26.4   Mono % 4.0 - 15.0 % 6.3   Eosinophil % 0.0 - 8.0 % 9.0 (H)   Basophil % 0.0 - 1.9 % 1.1   Immature Granulocytes 0.0 - 0.5 % 0.2   Gran # (ANC) 1.8 - 7.7 K/uL 4.8   Lymph # 1.0 - 4.8 K/uL 2.2   Mono # 0.3 - 1.0 K/uL 0.5   Eos # 0.0 - 0.5 K/uL 0.8  (H)   Baso # 0.00 - 0.20 K/uL 0.09   Immature Grans (Abs) 0.00 - 0.04 K/uL 0.02   nRBC 0 /100 WBC 0   Differential Method  Automated   Sed Rate 0 - 36 mm/Hr 20   Sodium 136 - 145 mmol/L 140   Potassium 3.5 - 5.1 mmol/L 4.3   Chloride 95 - 110 mmol/L 108   CO2 23 - 29 mmol/L 25   Anion Gap 8 - 16 mmol/L 7 (L)   BUN 6 - 20 mg/dL 10   Creatinine 0.5 - 1.4 mg/dL 0.8   eGFR >60 mL/min/1.73 m^2 >60   Glucose 70 - 110 mg/dL 80   Calcium 8.7 - 10.5 mg/dL 8.9   ALP 55 - 135 U/L 95   PROTEIN TOTAL 6.0 - 8.4 g/dL 7.3   Albumin 3.5 - 5.2 g/dL 3.9   BILIRUBIN TOTAL 0.1 - 1.0 mg/dL 0.5   AST 10 - 40 U/L 22   ALT 10 - 44 U/L 28   CRP 0.0 - 8.2 mg/L 2.6   (L): Data is abnormally low  (H): Data is abnormally high      Well controlled plaque psoriasis and psoriatic arthritis on Tremfya and Arava.   Continue the same.  Okay to take Celebrex p.r.n. for flares.  Consult back and spine specialist for right-sided sciatica without sacroiliac joint tenderness.  Continue vitamin D replacement therapy. Check levels next visit.   Advised all precautions.  Drug induced immunodeficiency due to use of immunosuppressive drugs. Monitor carefully for infections. Advised to get immediate medical care if any infection. Also advised strict adherence to age appropriate vaccinations and cancer screenings with PCP.    Hold Tremfya and arava if any infection.  Safety labs every 12 weeks.    High-dose flu vaccine today.  Hold Arava for 1 week.      # Follow up in about 6 months (around 5/15/2024).      Disclaimer: This note was prepared using voice recognition system and is likely to have sound alike errors and is not proof read.  Please call me with any questions.

## 2023-11-15 NOTE — PROGRESS NOTES
Administered 0.5 cc Influenza vaccination to right Deltoid. Pt tolerated well. VIS given to patient. Advised patient to wait in lobby 15 minutes after receiving injection to monitor for any reactions.  Pt verbalized understanding.     Lot: [id number]  Exp: 05/10/24          Side Effects:  Soreness, redness, and swelling at injection site, fever, muscle aches, headache, anaphylaxis

## 2023-11-16 ENCOUNTER — TELEPHONE (OUTPATIENT)
Dept: PAIN MEDICINE | Facility: CLINIC | Age: 43
End: 2023-11-16
Payer: COMMERCIAL

## 2023-11-17 RX ORDER — FLUOXETINE HYDROCHLORIDE 40 MG/1
CAPSULE ORAL
Qty: 30 CAPSULE | Refills: 2 | Status: SHIPPED | OUTPATIENT
Start: 2023-11-17 | End: 2024-02-05

## 2023-12-18 ENCOUNTER — PATIENT MESSAGE (OUTPATIENT)
Dept: ADMINISTRATIVE | Facility: OTHER | Age: 43
End: 2023-12-18
Payer: COMMERCIAL

## 2023-12-19 ENCOUNTER — TELEPHONE (OUTPATIENT)
Dept: OBSTETRICS AND GYNECOLOGY | Facility: CLINIC | Age: 43
End: 2023-12-19
Payer: COMMERCIAL

## 2023-12-19 NOTE — TELEPHONE ENCOUNTER
Attempted to contact patient, no answer. Left patient voice mail to return call to clinic.    R/s 1/9

## 2023-12-26 DIAGNOSIS — L40.50 PSORIATIC ARTHRITIS: ICD-10-CM

## 2023-12-26 DIAGNOSIS — L40.0 PLAQUE PSORIASIS: ICD-10-CM

## 2023-12-26 RX ORDER — LEFLUNOMIDE 20 MG/1
TABLET ORAL
Qty: 30 TABLET | Refills: 2 | Status: SHIPPED | OUTPATIENT
Start: 2023-12-26 | End: 2024-04-01 | Stop reason: SDUPTHER

## 2024-01-08 DIAGNOSIS — L40.59 POLYARTICULAR PSORIATIC ARTHRITIS: ICD-10-CM

## 2024-01-08 RX ORDER — FOLIC ACID 1 MG/1
1000 TABLET ORAL DAILY
Qty: 90 TABLET | Refills: 3 | Status: SHIPPED | OUTPATIENT
Start: 2024-01-08

## 2024-01-08 NOTE — PROGRESS NOTES
New Patient Chronic Pain Note (Initial Visit)    Referring Physician: Saul Cui,*    PCP: No, Primary Doctor    Chief Complaint:   Chief Complaint   Patient presents with    Low-back Pain        SUBJECTIVE:    Janice Lawrence is a 43 y.o. female with past medical history significant for anxiety and depression, psoriasis on immunosuppression, morbid obesity, fatty liver, GERD, multi joint osteoarthritis who presents to the clinic for the evaluation of lower back and leg pain.  Patient reports pain has been present for several years, greater than 10 years, without inciting accident injury or trauma.  Pain is constant and today is rated a 6/10.  Pain at its best is a 3/10 and at its worse is a 10/10.  Patient denies preceding accident injury or trauma.  Patient reports pain in a bandlike distribution in the lower back which can radiate down the lateral and posterior aspect of the right lower extremity in L4-S1 distribution to the calf.  Patient denies left lower extremity radiculopathy, bowel or bladder incontinence, saddle anesthesia.  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  She does report with periods of inactivity, her pain can be exacerbated as well.  Pain is marginally improved with sitting, stretching, hot baths.  She does endorse associated weakness in the right lower extremity associated with her pain.  Patient has trialed gabapentin, up to 1200 mg in a day, 5 years prior with marginal improvement in her symptoms.  She is also received 2 prior lumbar epidural steroid injections, greater than 10 years prior with Dr. Luna with ineffective relief.  She has completed several rounds of conventional land-based physical therapy and has continued physician directed physical therapy exercises over the last 8 weeks from 11/08/2023 through 01/09/2024 with marginal improvement in her pain.    Patient reports significant motor  weakness.  Patient denies night fever/night sweats, urinary incontinence, bowel incontinence, significant weight loss, and loss of sensations.      Pain Disability Index Review:         1/9/2024    11:53 AM   Last 3 PDI Scores   Pain Disability Index (PDI) 42       Non-Pharmacologic Treatments:  Physical Therapy/Home Exercise: no  Ice/Heat:yes  TENS: yes  Acupuncture: yes  Massage: yes  Chiropractic: yes    Other: no      Pain Medications:  - Adjuvant Medications:  Celebrex, Fluoxetine    Pain Procedures:   -Dr. Phil Luna: ROCIO    Past Medical History:   Diagnosis Date    Acid reflux     Acquired iron deficiency anemia due to decreased absorption 12/10/2020    Anxiety     Arthritis     Cervical radiculopathy 5/9/2023    Depression      Past Surgical History:   Procedure Laterality Date    COLONOSCOPY N/A 02/10/2021    Procedure: COLONOSCOPY;  Surgeon: Angelika Eldridge MD;  Location: [facility];  Service: Endoscopy;  Laterality: N/A;    COSMETIC SURGERY      ESOPHAGOGASTRODUODENOSCOPY N/A 02/10/2021    Procedure: ESOPHAGOGASTRODUODENOSCOPY (EGD);  Surgeon: Angelika Eldridge MD;  Location: [facility];  Service: Endoscopy;  Laterality: N/A;     Review of patient's allergies indicates:  No Known Allergies    Current Outpatient Medications   Medication Sig    celecoxib (CELEBREX) 200 MG capsule Take 1 capsule (200 mg total) by mouth 2 (two) times daily as needed for Pain.    esomeprazole (NEXIUM) 40 MG capsule TAKE ONE CAPSULE BY MOUTH EVERY DAY BEFORE BREAKFAST    FLUoxetine 40 MG capsule TAKE ONE CAPSULE BY MOUTH ONCE PER DAY    folic acid (FOLVITE) 1 MG tablet Take 1 tablet (1,000 mcg total) by mouth once daily.    folic acid 0.8 mg Cap Folic Acid    guselkumab (TREMFYA) 100 mg/mL Syrg Inject 1 mL (100 mg) into the skin every 8 weeks.    leflunomide (ARAVA) 20 MG Tab TAKE ONE TABLET BY MOUTH ONCE PER DAY    norethindrone (MICRONOR) 0.35 mg tablet Take 1 tablet (0.35 mg total) by mouth once daily. (Patient not  "taking: Reported on 1/9/2024)    ondansetron (ZOFRAN-ODT) 4 MG TbDL 1 tablet Orally every 6 hours as needed for nausea and vomiting    pregabalin (LYRICA) 75 MG capsule Take 1 capsule (75 mg total) by mouth 2 (two) times daily for 10 days, THEN 2 capsules (150 mg total) 2 (two) times daily for 10 days, THEN 3 capsules (225 mg total) 2 (two) times daily for 10 days.    VITAMIN D2 1,250 mcg (50,000 unit) capsule TAKE ONE CAPSULE BY MOUTH TWICE A WEEK     No current facility-administered medications for this visit.       Review of Systems     GENERAL:  No weight loss, malaise or fevers.  HEENT:   No recent changes in vision or hearing  NECK:  Negative for lumps, no difficulty with swallowing.  RESPIRATORY:  Negative for cough, wheezing or shortness of breath, patient denies any recent URI.  CARDIOVASCULAR:  Negative for chest pain or palpitations.  GI:  Negative for abdominal discomfort, blood in stools or black stools or change in bowel habits.  MUSCULOSKELETAL:  See HPI.  SKIN:  Negative for lesions, rash, and itching.  PSYCH:  No mood disorder or recent psychosocial stressors.   HEMATOLOGY/LYMPHOLOGY:  Negative for prolonged bleeding, bruising easily or swollen nodes.    NEURO:   No history of syncope, paralysis, seizures or tremors.  All other reviewed and negative other than HPI.    OBJECTIVE:    /71   Pulse 89   Ht 5' 11" (1.803 m)   Wt (!) 136.7 kg (301 lb 5.9 oz)   BMI 42.03 kg/m²       Physical Exam    GENERAL: Well appearing, in no acute distress, alert and oriented x3.  PSYCH:  Mood and affect appropriate.  SKIN: Skin color, texture, turgor normal, no rashes or lesions.  HEAD/FACE:  Normocephalic, atraumatic. Cranial nerves grossly intact.    CV: RRR with palpation of the radial artery.  PULM: No evidence of respiratory difficulty, symmetric chest rise.  GI:  Soft and non-tender.    BACK: Straight leg raising in the sitting and supine positions is negative to radicular pain.pain to palpation over " the facet joints of the lumbar spine or spinous processes. Normal range of motion without pain reproduction.  EXTREMITIES: Peripheral joint ROM is full and pain free without obvious instability or laxity in all four extremities. No deformities, edema, or skin discoloration. Good capillary refill.  MUSCULOSKELETAL: Able to stand on heels & toes.   Shoulder, hip, and knee provocative maneuvers are negative.  There is no pain with palpation over the sacroiliac joints bilaterally.  Gaenslen's, Distraction/Compression and  FABERs test is negative.  Facet loading test is negative bilaterally.   Bilateral upper and lower extremity strength is normal and symmetric.  No atrophy or tone abnormalities are noted.    RIGHT Lower extremity: Hip flexion 5/5, Hip Abduction 5/5, Hip Adduction 5/5, Knee extension 5/5, Knee flexion 5/5, Ankle dorsiflexion5/5, Extensor hallucis longus 5/5, Ankle plantarflexion 5/5  LEFT Lower extremity:  Hip flexion 5/5, Hip Abduction 5/5,Hip Adduction 5/5, Knee extension 5/5, Knee flexion 5/5, Ankle dorsiflexion 5/5, Extensor hallucis longus 5/5, Ankle plantarflexion 5/5  -Normal testing knee (patellar) jerk and ankle (achilles) jerk    NEURO: Bilateral upper and lower extremity coordination and muscle stretch reflexes are physiologic and symmetric. No loss of sensation is noted.  GAIT: normal.    Imagin16    X-Ray Lumbar Spine AP And Lateral    Narrative  Three views of the lumbar spine    Findings: The vertebral bodies demonstrate normal height.  There is straightening of the normal lordotic curvature. The disk space heights are maintained. No significant facet arthropathy.       23    X-Ray Cervical Spine AP And Lateral    Narrative  EXAM:  XR CERVICAL SPINE AP LATERAL    CLINICAL HISTORY:  Pain    FINDINGS:    Vertebral body height and alignment are maintained.  The disc spaces are normal in height.  No acute fracture is evident.  No prevertebral soft tissue  swelling.          ASSESSMENT: 43 y.o. year old female with     1. Lumbar radiculopathy  Ambulatory referral/consult to [facility]    MRI Lumbar Spine Without Contrast    EMG W/ ULTRASOUND AND NERVE CONDUCTION TEST 2 Extremities      2. Lumbar facet arthropathy        3. Polyarticular psoriatic arthritis              PLAN:   - Interventions:  We discussed considering a lumbar epidural steroid injection with right paramedian approach to address lumbar radiculopathy.  We will 1st review updated lumbar MRI, electromyography studies and other prior interventions.  Explained the risks and benefits of the procedure in detail with the patient today in clinic along with alternative treatment options    - Anticoagulation use: No no anticoagulation     report:  Reviewed and consistent with medication use as prescribed.    - Medications:  --I will start the patient on a Lyrica titration to see if this helps with neuropathic pain.  We discussed increasing the dose gradually to reach a therapeutic goal according to the following algorithm.  We discussed potential side effects of this medication which may include drowsiness,dizziness, dry mouth, constipation or peripheral edema.    -Week 1: Take 1 capsule, 75 mg BID  -Week 2: Take 2 capsules, 150 mg BID  -Week 3: Take 3 capsules, 225 mg BID    - Therapy:   We discussed continuing at home physician directed physical therapy to help manage the patient/s painful condition. The patient was counseled that muscle strengthening will improve the long term prognosis in regards to pain and may also help increase range of motion and mobility.       - Imaging: Reviewed available imaging with patient and answered any questions they had regarding study.  MRI lumbar spine to better evaluate pain and weakness    - Consults/Referrals:  Physical Medicine Rehabilitation for lower extremity electromyography studies to help guide diagnosis and treatment    - Records: Obtain old records  from outside physicians and imaging    - Follow up visit: return to clinic in 4-6 weeks      The above plan and management options were discussed at length with patient. Patient is in agreement with the above and verbalized understanding.    - I discussed the goals of interventional chronic pain management with the patient on today's visit. We discussed a multimodal and systematic approach to pain.  This includes diagnostic and therapeutic injections, adjuvant pharmacologic treatment, physical therapy, and at times psychiatry.  I emphasized the importance of regular exercise, core strengthening and stretching, diet and weight loss as a cornerstone of long-term pain management.    - This condition does not require this patient to take time off of work, and the primary goal of our Pain Management services is to improve the patient's functional capacity.  - Patient Questions: Answered all of the patient's questions regarding diagnoses, therapy, treatment and next steps        Dez Lowe MD  Interventional Pain Management  [facility]    Disclaimer:  This note was prepared using voice recognition system and is likely to have sound alike errors that may have been overlooked even after proof reading.  Please call me with any questions

## 2024-01-09 ENCOUNTER — TELEPHONE (OUTPATIENT)
Dept: PHYSICAL MEDICINE AND REHAB | Facility: CLINIC | Age: 44
End: 2024-01-09
Payer: COMMERCIAL

## 2024-01-09 ENCOUNTER — OFFICE VISIT (OUTPATIENT)
Dept: PAIN MEDICINE | Facility: CLINIC | Age: 44
End: 2024-01-09
Payer: COMMERCIAL

## 2024-01-09 VITALS
WEIGHT: 293 LBS | BODY MASS INDEX: 41.02 KG/M2 | HEIGHT: 71 IN | DIASTOLIC BLOOD PRESSURE: 71 MMHG | SYSTOLIC BLOOD PRESSURE: 118 MMHG | HEART RATE: 89 BPM

## 2024-01-09 DIAGNOSIS — M54.16 LUMBAR RADICULOPATHY: Primary | ICD-10-CM

## 2024-01-09 DIAGNOSIS — L40.59 POLYARTICULAR PSORIATIC ARTHRITIS: ICD-10-CM

## 2024-01-09 DIAGNOSIS — M47.816 LUMBAR FACET ARTHROPATHY: ICD-10-CM

## 2024-01-09 PROCEDURE — 1159F MED LIST DOCD IN RCRD: CPT | Mod: CPTII,S$GLB,, | Performed by: ANESTHESIOLOGY

## 2024-01-09 PROCEDURE — 99999 PR PBB SHADOW E&M-EST. PATIENT-LVL IV: CPT | Mod: PBBFAC,,, | Performed by: ANESTHESIOLOGY

## 2024-01-09 PROCEDURE — 3078F DIAST BP <80 MM HG: CPT | Mod: CPTII,S$GLB,, | Performed by: ANESTHESIOLOGY

## 2024-01-09 PROCEDURE — 3074F SYST BP LT 130 MM HG: CPT | Mod: CPTII,S$GLB,, | Performed by: ANESTHESIOLOGY

## 2024-01-09 PROCEDURE — 3008F BODY MASS INDEX DOCD: CPT | Mod: CPTII,S$GLB,, | Performed by: ANESTHESIOLOGY

## 2024-01-09 PROCEDURE — 99204 OFFICE O/P NEW MOD 45 MIN: CPT | Mod: S$GLB,,, | Performed by: ANESTHESIOLOGY

## 2024-01-09 RX ORDER — PREGABALIN 75 MG/1
CAPSULE ORAL
Qty: 120 CAPSULE | Refills: 0 | Status: SHIPPED | OUTPATIENT
Start: 2024-01-09 | End: 2024-02-08

## 2024-01-09 NOTE — PATIENT INSTRUCTIONS
General Neck and Back Pain    Both neck and back pain are usually caused by injury to the muscles or ligaments of the spine. Sometimes the disks that separate each bone of the spine may cause pain by pressing on a nearby nerve. Back and neck pain may appear after a sudden twisting or bending force (such as in a car accident), or sometimes after a simple awkward movement. In either case, muscle spasm is often present and adds to the pain.  Acute neck and back pain usually gets better in 1 to 2 weeks. Pain related to disk disease, arthritis in the spinal joints or spinal stenosis (narrowing of the spinal canal) can become chronic and last for months or years.  Back and neck pain are common problems. Most people feel better in 1 or 2 weeks, and most of the rest in 1 to 2 months. Most people can remain active.  People experience and describe pain differently.  Pain can be sharp, stabbing, shooting, aching, cramping, or burning  Movement, standing, bending, lifting, sitting, or walking may worsen the pain  Pain can be localized to one spot or area, or it can be more generalized  Pain can spread or radiate upwards, downwards, to the front, or go down your arms  Muscle spasm may occur.  Most of the time mechanical problems with the muscles or spine cause the pain. it is usually caused by an injury, whether known or not, to the muscles or ligaments. While illnesses can cause back pain, it is usually not caused by a serious illness. Pain is usually related to physical activity, whether sports, exercise, work, or normal activity. Sometimes it can occur without an identifiable cause. This can happen simply by stretching or moving wrong, without noting pain at the time. Other causes include:  Overexertion, lifting, pushing, pulling incorrectly or too aggressively.  Sudden twisting, bending or stretching from an accident (car or fall), or accidental movement.  Poor posture  Poor conditioning, lack of regular exercise  Spinal  disc disease or arthritis  Stress  Pregnancy, or illness like appendicitis, bladder or kidney infection, pelvic infections   Home care  For neck pain: Use a comfortable pillow that supports the head and keeps the spine in a neutral position. The position of the head should not be tilted forward or backward.  When in bed, try to find a position of comfort. A firm mattress is best. Try lying flat on your back with pillows under your knees. You can also try lying on your side with your knees bent up towards your chest and a pillow between your knees.  At first, do not try to stretch out the sore spots. If there is a strain, it is not like the good soreness you get after exercising without an injury. In this case, stretching may make it worse.  Avoid prolonged sitting, long car rides or travel. This puts more stress on the lower back than standing or walking.  During the first 24 to 72 hours after an injury, apply an ice pack to the painful area for 20 minutes and then remove it for 20 minutes over a period of 60 to 90 minutes or several times a day.   You can alternate ice and heat therapies. Talk with your healthcare provider about the best treatment for your back or neck pain. As a safety precaution, do not use a heating pad at bedtime. Sleeping with a heating pad can lead to skin burns or tissue damage.  Therapeutic massage can help relax the back and neck muscles without stretching them.  Be aware of safe lifting methods and do not lift anything over 15 pounds until all the pain is gone.  Medications  Talk to your healthcare provider before using medicine, especially if you have other medical problems or are taking other medicines.  You may use over-the-counter medicine to control pain, unless another pain medicine was prescribed. If you have chronic conditions like diabetes, liver or kidney disease, stomach ulcers,  gastrointestinal bleeding, or are taking blood thinner medicines.  Be careful if you are given pain  medicines, narcotics, or medicine for muscle spasm. They can cause drowsiness, and can affect your coordination, reflexes, and judgment. Do not drive or operate heavy machinery.  Follow-up care  Follow up with your healthcare provider, or as advised. Physical therapy or further tests may be needed.  If X-rays were taken, you will be notified of any new findings that may affect your care.  Call 911  Seek emergency medical care if any of the following occur:  Trouble breathing  Confusion  Very drowsy or trouble awakening  Fainting or loss of consciousness  Rapid or very slow heart rate  Loss of bowel or bladder control  When to seek medical advice  Call your healthcare provider right away if any of these occur:  Pain becomes worse or spreads into your arms or legs  Weakness, numbness or pain in one or both arms or legs  Numbness in the groin area  Difficulty walking  Fever of 100.4ºF (38ºC) or higher, or as directed by your healthcare provider  Date Last Reviewed: 7/1/2016  © 1891-6464 [employer]. [address] [place] [address]. All rights reserved. This information is not intended as a substitute for professional medical care. Always follow your healthcare professional's instructions.       Understanding Lumbar Radiculopathy    Lumbar radiculopathy is irritation or inflammation of a nerve root in the low back. It causes symptoms that spread out from the back down one or both legs. To understand this condition, it helps to understand the parts of the spine:  Vertebrae. These are bones that stack to form the spine. The lumbar spine contains the 5 bottom vertebrae.  Disks. These are soft pads of tissue between the vertebrae. They act as shock absorbers for the spine.  Spinal canal. This is a tunnel formed within the stacked vertebrae. In the lumbar spine, nerves run through this canal.  Nerves. These branch off and leave the spinal canal, traveling out to parts of the body. As they leave the  spinal canal, nerves pass through openings between the vertebrae. The nerve root is the part of the nerve that is closest to the spinal canal.  Sciatic nerve. This is a large nerve formed from several nerve roots in the low back. This nerve extends down the back of the leg to the foot.  With lumbar radiculopathy, nerve roots in the low back become irritated. This leads to pain and symptoms. The sciatic nerve is commonly involved, so the condition is often called sciatica.  What causes lumbar radiculopathy?  Aging, injury, poor posture, extra body weight, and other issues can lead to problems in the low back. These problems may then irritate nerve roots. They include:  Damage to a disk in the lumbar spine. The damaged disk may then press on nearby nerve roots.  Degeneration from wear and tear, and aging. This can lead to narrowing (stenosis) of the openings between the vertebrae. The narrowed openings press on nerve roots as they leave the spinal canal.  Unstable spine. This is when a vertebra slips forward. It can then press on a nerve root.  Other, less common things can put pressure on nerves in the low back. These include diabetes, infection, or a tumor.  Symptoms of lumbar radiculopathy  These include:  Pain in the low back  Pain, numbness, tingling, or weakness that travels into the buttocks, hip, groin, or leg  Muscle spasms  Treatment for lumbar radiculopathy  In most cases, your healthcare provider will first try treatments that help relieve symptoms. These may include:  Prescription and over-the-counter pain medicines. These help relieve pain, swelling, and irritation.  Limits on positions and activities that increase pain. But lying in bed or avoiding all movement is only recommended for a short period of time.  Physical therapy, including exercises and stretches. This helps decrease pain and increase movement and function.  Steroid shots into the lower back. This may help relieve symptoms for a  time.  Weight-loss program. If you are overweight, losing extra pounds may help relieve symptoms.  In some cases, you may need surgery to fix the underlying problem. This depends on the cause, the symptoms, and how long the pain has lasted.  Possible complications  Over time, an irritated and inflamed nerve may become damaged. This may lead to long-lasting (permanent) numbness or weakness in your legs and feet. If symptoms change suddenly or get worse, be sure to let your healthcare provider know.  When to call your healthcare provider  Call your healthcare provider right away if you have any of these:  New pain or pain that gets worse  New or increasing weakness, tingling, or numbness in your leg or foot  Problems controlling your bladder or bowel   Date Last Reviewed: 3/10/2016  © 2896-4731 [employer]. [address] [place] [address], [place] [address]. All rights reserved. This information is not intended as a substitute for professional medical care. Always follow your healthcare professional's instructions.       Exercises to Strengthen Your Lower Back  Strong lower back and abdominal muscles work together to support your spine. The exercises below will help strengthen the lower back. It is important that you begin exercising slowly and increase levels gradually.  Always begin any exercise program with stretching. If you feel pain while doing any of these exercises, stop and talk to your doctor about a more specific exercise program that better suits your condition.   Low back stretch  The point of stretching is to make you more flexible and increase your range of motion. Stretch only as much as you are able. Stretch slowly. Do not push your stretch to the limit. If at any point you feel pain while stretching, this is your (temporary) limit.  Lie on your back with your knees bent and both feet on the ground.  Slowly raise your left knee to your chest as you flatten your lower back against the floor. Hold  for 5 seconds.  Relax and repeat the exercise with your right knee.  Do 10 of these exercises for each leg.  Repeat hugging both knees to your chest at the same time.  Building lower back strength  Start your exercise routine with 10 to 30 minutes a day, 1 to 3 times a day.  Initial exercises  Lying on your back:  Ankle pumps: Move your foot up and down, towards your head, and then away. Repeat 10 times with each foot.  Heel slides: Slowly bend your knee, drawing the heel of your foot towards you. Then slide your heel/foot from you, straightening your knee. Do not lift your foot off the floor (this is not a leg lift).  Abdominal contraction: Bend your knees and put your hands on your stomach. Tighten your stomach muscles. Hold for 5 seconds, then relax. Repeat 10 times.  Straight leg raise: Bend one leg at the knee and keep the other leg straight. Tighten your stomach muscles. Slowly lift your straight leg 6 to 12 inches off the floor and hold for up to 5 seconds. Repeat 10 times on each side.  Standing:  Wall squats: Stand with your back against the wall. Move your feet about 12 inches away from the wall. Tighten your stomach muscles, and slowly bend your knees until they are at about a 45 degree angle. Do not go down too far. Hold about 5 seconds. Then slowly return to your starting position. Repeat 10 times.  Heel raises: Stand facing the wall. Slowly raise the heels of your feet up and down, while keeping your toes on the floor. If you have trouble balancing, you can touch the wall with your hands. Repeat 10 times.  More advanced exercises  When you feel comfortable enough, try these exercises.  Kneeling lumbar extension: Begin on your hands and knees. At the same time, raise and straighten your right arm and left leg until they are parallel to the ground. Hold for 2 seconds and come back slowly to a starting position. Repeat with left arm and right leg, alternating 10 times.  Prone lumbar extension: Lie face  down, arms extended overhead, palms on the floor. At the same time, raise your right arm and left leg as high as comfortably possible. Hold for 10 seconds and slowly return to start. Repeat with left arm and right leg, alternating 10 times. Gradually build up to 20 times. (Advanced: Repeat this exercise raising both arms and both legs a few inches off the floor at the same time. Hold for 5 seconds and release.)  Pelvic tilt: Lie on the floor on your back with your knees bent at 90 degrees. Your feet should be flat on the floor. Inhale, exhale, then slowly contract your abdominal muscles bringing your navel toward your spine. Let your pelvis rock back until your lower back is flat on the floor. Hold for 10 seconds while breathing smoothly.  Abdominal crunch: Perform a pelvic tilt (above) flattening your lower back against the floor. Holding the tension in your abdominal muscles, take another breath and raise your shoulder blades off the ground (this is not a full sit-up). Keep your head in line with your body (dont bend your neck forward). Hold for 2 seconds, then slowly lower.  Date Last Reviewed: 6/1/2016  © 0832-6628 [employer]. [address] [place] [address], [place] [address]. All rights reserved. This information is not intended as a substitute for professional medical care. Always follow your healthcare professional's instructions.

## 2024-01-10 ENCOUNTER — PATIENT MESSAGE (OUTPATIENT)
Dept: OBSTETRICS AND GYNECOLOGY | Facility: CLINIC | Age: 44
End: 2024-01-10
Payer: COMMERCIAL

## 2024-01-11 ENCOUNTER — PATIENT MESSAGE (OUTPATIENT)
Dept: OBSTETRICS AND GYNECOLOGY | Facility: CLINIC | Age: 44
End: 2024-01-11
Payer: COMMERCIAL

## 2024-01-22 ENCOUNTER — TELEPHONE (OUTPATIENT)
Dept: PAIN MEDICINE | Facility: CLINIC | Age: 44
End: 2024-01-22
Payer: COMMERCIAL

## 2024-01-22 ENCOUNTER — PATIENT MESSAGE (OUTPATIENT)
Dept: PAIN MEDICINE | Facility: CLINIC | Age: 44
End: 2024-01-22
Payer: COMMERCIAL

## 2024-01-22 ENCOUNTER — OFFICE VISIT (OUTPATIENT)
Dept: PHYSICAL MEDICINE AND REHAB | Facility: CLINIC | Age: 44
End: 2024-01-22
Payer: COMMERCIAL

## 2024-01-22 VITALS
BODY MASS INDEX: 41.02 KG/M2 | HEIGHT: 71 IN | RESPIRATION RATE: 14 BRPM | HEART RATE: 79 BPM | WEIGHT: 293 LBS | SYSTOLIC BLOOD PRESSURE: 133 MMHG | DIASTOLIC BLOOD PRESSURE: 91 MMHG

## 2024-01-22 DIAGNOSIS — M54.16 LUMBAR RADICULOPATHY: ICD-10-CM

## 2024-01-22 PROCEDURE — 95885 MUSC TST DONE W/NERV TST LIM: CPT | Mod: S$GLB,,, | Performed by: PHYSICAL MEDICINE & REHABILITATION

## 2024-01-22 PROCEDURE — 99999 PR PBB SHADOW E&M-EST. PATIENT-LVL III: CPT | Mod: PBBFAC,,, | Performed by: PHYSICAL MEDICINE & REHABILITATION

## 2024-01-22 PROCEDURE — 99499 UNLISTED E&M SERVICE: CPT | Mod: S$GLB,,, | Performed by: PHYSICAL MEDICINE & REHABILITATION

## 2024-01-22 PROCEDURE — 95908 NRV CNDJ TST 3-4 STUDIES: CPT | Mod: S$GLB,,, | Performed by: PHYSICAL MEDICINE & REHABILITATION

## 2024-01-22 NOTE — TELEPHONE ENCOUNTER
----- Message from Deyanira Perez sent at 1/22/2024  3:56 PM CST -----  Contact: [facility]  Pt called to see if the dr office could fax the orders asa, she is at the imaging center. Please fax to [phone], or [phone].    Thanks  TS

## 2024-01-22 NOTE — PROGRESS NOTES
[facility]   [address]  [place] [address]  Phone: [phone]        Full Name: Janice Lawrence YOB: 1980  Patient ID: [id number]      Visit Date: 1/22/2024 13:30  Age: 43 Years 9 Months Old  Examining Physician: Barbara Valdivia M.D.  Referring Physician:   Reason for Referral: leg pain        Chief Complaint   Patient presents with    Back Pain       HPI: This is a 43 y.o.  female being seen in clinic today for evaluation of chronic low back pain with radiation of numbness into her right leg.  Her symptoms can occur with activity and at rest.  Rest provides some relief at times.    History obtained from patient    Past family, medical, social, and surgical history reviewed in chart    Review of Systems:     General- denies lethargy, weight change, fever, chills  Head/neck- denies swallowing difficulties  ENT- denies hearing changes  Cardiovascular-denies chest pain  Pulmonary- denies shortness of breath  GI- denies constipation or bowel incontinence  - denies bladder incontinence  Skin- denies wounds or rashes  Musculoskeletal- denies weakness, denies pain  Neurologic- denies numbness and tingling  Psychiatric- denies depressive or psychotic features, denies anxiety  Lymphatic-denies swelling  Endocrine- denies hypoglycemic symptoms/DM history  All other pertinent systems negative     Physical Examination:  General: Well developed, well nourished female, NAD  HEENT:NCAT EOMI bilaterally   Pulmonary:Normal respirations    Spinal Examination: CERVICAL  Active ROM is within normal limits.  Inspection: No deformity of spinal alignment.  Palpation: No vertebral tenderness to percussion.      Spinal Examination: LUMBAR or THORACIC  Active ROM is limited at endranges  Inspection: No deformity of spinal alignment.    Slr neg    Bilateral Upper and Lower Extremities:  Pulses are 2+ at radial bilaterally.  Shoulder/Elbow/Wrist/Hand ROM   Hip/Knee/Ankle ROM wnl  Bilateral  Extremities show normal capillary refill.  No signs of cyanosis, rubor, edema, skin changes, or dysvascular changes of appendages.  Nails appear intact.    Neurological Exam:  Cranial Nerves:  II-XII grossly intact    Manual Muscle Testing: (Motor 5=normal)  5/5 strength bilateral lower extremities    No focal atrophy is noted of either  lower extremity.    Bilateral Reflexes:  No clonus at knee or ankle.    Sensation: tested to light touch  - intact in legs except dec at right lateral thigh and calf    Gait: Narrow base and good arm swing.      Entire procedure explained to patient prior to proceeding.  Verbal consent obtained        SNC      Nerve / Sites Rec. Site Onset Lat Peak Lat Amp Segments Distance Peak Diff Velocity     ms ms µV  mm ms m/s   R Sural - Ankle (Calf)      Calf Ankle 3.9 5.5 13.3 Calf - Ankle 140  36      2 Ankle 4.3 5.2 10.2 2 - Calf  -0.3    R Superficial peroneal - Ankle      Lat leg Ankle 3.1 3.7 13.7 Lat leg - Ankle 140  46       MNC      Nerve / Sites Muscle Latency Amplitude Duration Rel Amp Segments Distance Lat Diff Velocity     ms mV ms %  mm ms m/s   R Peroneal - EDB      Ankle EDB 3.6 6.6 5.4 100 Ankle - EDB 80        Fibular head EDB 10.1 5.5 6.5 83.8 Fibular head - Ankle 320 6.5 50      Pop fossa EDB 11.6 5.0 6.0 89.8 Pop fossa - Fibular head 80 1.5 53   R Tibial - AH      Ankle AH 4.1 6.9 4.0 100 Ankle - Ankle 80        Pop fossa AH 17.3 0.8  11.4 Pop fossa - Ankle 420 13.2 32       EMG         EMG Summary Table     Spontaneous MUAP Recruitment   Muscle IA Fib PSW Fasc Other Dur. Dur Amp Dur Polys Pattern Effort   R. Rectus femoris N None None None . _NFT_ _NFT_ N N N N Max   R. Extensor digitorum brevis N None None None . _NFT_ _NFT_ N N 1+ Reduced Max   R. Abductor hallucis Incr None None None . _NFT_ _NFT_ N Sl Incr 1+ sl red Max                              INTERPRETATION  -Right superficial peroneal sensory nerve conduction study showed normal peak latency and  amplitude  -Right sural sensory nerve conduction study showed prolonged peak latency and normal amplitude  -Right peroneal motor nerve conduction study showed normal latency, amplitude, and conduction velocity  -Right tibial motor nerve conduction study showed normal latency, amplitude, and conduction velocity  -Needle EMG examination performed to above mentioned muscles       IMPRESSION  ABNORMAL study  2. There is electrodiagnostic evidence of a subacute on chronic radiculopathy of the S1  nerve root and a chronic radiculopathy of the L5 nerve root    PLAN  Discussed in detail for greater than 30 minutes about diagnosis and treatment plan    1. Follow up with referring provider: Dr. Dez Lowe  2. Handouts on lumbar radic provided  3. This study is good for one year. If symptoms worsen or do not improve, please re-consult.    Barbara Valdivia M.D.  Physical Medicine and Rehab

## 2024-01-24 ENCOUNTER — PATIENT MESSAGE (OUTPATIENT)
Dept: OBSTETRICS AND GYNECOLOGY | Facility: CLINIC | Age: 44
End: 2024-01-24
Payer: COMMERCIAL

## 2024-01-25 ENCOUNTER — TELEPHONE (OUTPATIENT)
Dept: PAIN MEDICINE | Facility: CLINIC | Age: 44
End: 2024-01-25
Payer: COMMERCIAL

## 2024-01-26 ENCOUNTER — TELEPHONE (OUTPATIENT)
Dept: PAIN MEDICINE | Facility: CLINIC | Age: 44
End: 2024-01-26
Payer: COMMERCIAL

## 2024-01-26 ENCOUNTER — PATIENT MESSAGE (OUTPATIENT)
Dept: PAIN MEDICINE | Facility: CLINIC | Age: 44
End: 2024-01-26
Payer: COMMERCIAL

## 2024-01-26 NOTE — TELEPHONE ENCOUNTER
----- Message from Gabrielle Soria sent at 1/26/2024  9:26 AM CST -----  Type:  Patient Returning Call    Who Called:pt  Who Left Message for Patient:nurse  Does the patient know what this is regarding?:return call   Would the patient rather a call back or a response via [employer]? call  Best Call Back Number:[phone]  Additional Information: .    Thank you

## 2024-01-26 NOTE — TELEPHONE ENCOUNTER
Attempt to reach patient to schedule MRI appointment . The patient did not answer. Left voice message on patients voice box to call back at earliest convenience to schedule apt.     Bernabe Xie  Medical Assistant

## 2024-01-29 ENCOUNTER — LAB VISIT (OUTPATIENT)
Dept: LAB | Facility: HOSPITAL | Age: 44
End: 2024-01-29
Attending: OBSTETRICS & GYNECOLOGY
Payer: COMMERCIAL

## 2024-01-29 ENCOUNTER — OFFICE VISIT (OUTPATIENT)
Dept: OBSTETRICS AND GYNECOLOGY | Facility: CLINIC | Age: 44
End: 2024-01-29
Payer: COMMERCIAL

## 2024-01-29 VITALS
SYSTOLIC BLOOD PRESSURE: 126 MMHG | WEIGHT: 293 LBS | HEIGHT: 71 IN | DIASTOLIC BLOOD PRESSURE: 72 MMHG | BODY MASS INDEX: 41.02 KG/M2

## 2024-01-29 DIAGNOSIS — N92.6 IRREGULAR MENSES: Primary | ICD-10-CM

## 2024-01-29 DIAGNOSIS — N92.6 IRREGULAR MENSES: ICD-10-CM

## 2024-01-29 LAB
BASOPHILS # BLD AUTO: 0.12 K/UL (ref 0–0.2)
BASOPHILS NFR BLD: 1.3 % (ref 0–1.9)
DIFFERENTIAL METHOD BLD: ABNORMAL
EOSINOPHIL # BLD AUTO: 1 K/UL (ref 0–0.5)
EOSINOPHIL NFR BLD: 10.4 % (ref 0–8)
ERYTHROCYTE [DISTWIDTH] IN BLOOD BY AUTOMATED COUNT: 16.4 % (ref 11.5–14.5)
FERRITIN SERPL-MCNC: 15 NG/ML (ref 20–300)
HCT VFR BLD AUTO: 38.4 % (ref 37–48.5)
HGB BLD-MCNC: 12.1 G/DL (ref 12–16)
IMM GRANULOCYTES # BLD AUTO: 0.03 K/UL (ref 0–0.04)
IMM GRANULOCYTES NFR BLD AUTO: 0.3 % (ref 0–0.5)
IRON SERPL-MCNC: 17 UG/DL (ref 30–160)
LYMPHOCYTES # BLD AUTO: 2.8 K/UL (ref 1–4.8)
LYMPHOCYTES NFR BLD: 30.3 % (ref 18–48)
MCH RBC QN AUTO: 26.3 PG (ref 27–31)
MCHC RBC AUTO-ENTMCNC: 31.5 G/DL (ref 32–36)
MCV RBC AUTO: 84 FL (ref 82–98)
MONOCYTES # BLD AUTO: 0.5 K/UL (ref 0.3–1)
MONOCYTES NFR BLD: 5.8 % (ref 4–15)
NEUTROPHILS # BLD AUTO: 4.8 K/UL (ref 1.8–7.7)
NEUTROPHILS NFR BLD: 51.9 % (ref 38–73)
NRBC BLD-RTO: 0 /100 WBC
PLATELET # BLD AUTO: 428 K/UL (ref 150–450)
PMV BLD AUTO: 8.5 FL (ref 9.2–12.9)
RBC # BLD AUTO: 4.6 M/UL (ref 4–5.4)
SATURATED IRON: 4 % (ref 20–50)
TOTAL IRON BINDING CAPACITY: 450 UG/DL (ref 250–450)
TRANSFERRIN SERPL-MCNC: 304 MG/DL (ref 200–375)
WBC # BLD AUTO: 9.15 K/UL (ref 3.9–12.7)

## 2024-01-29 PROCEDURE — 1160F RVW MEDS BY RX/DR IN RCRD: CPT | Mod: CPTII,S$GLB,, | Performed by: OBSTETRICS & GYNECOLOGY

## 2024-01-29 PROCEDURE — 3078F DIAST BP <80 MM HG: CPT | Mod: CPTII,S$GLB,, | Performed by: OBSTETRICS & GYNECOLOGY

## 2024-01-29 PROCEDURE — 3074F SYST BP LT 130 MM HG: CPT | Mod: CPTII,S$GLB,, | Performed by: OBSTETRICS & GYNECOLOGY

## 2024-01-29 PROCEDURE — 85025 COMPLETE CBC W/AUTO DIFF WBC: CPT | Performed by: OBSTETRICS & GYNECOLOGY

## 2024-01-29 PROCEDURE — 82728 ASSAY OF FERRITIN: CPT | Performed by: OBSTETRICS & GYNECOLOGY

## 2024-01-29 PROCEDURE — 83540 ASSAY OF IRON: CPT | Performed by: OBSTETRICS & GYNECOLOGY

## 2024-01-29 PROCEDURE — 1159F MED LIST DOCD IN RCRD: CPT | Mod: CPTII,S$GLB,, | Performed by: OBSTETRICS & GYNECOLOGY

## 2024-01-29 PROCEDURE — 99999 PR PBB SHADOW E&M-EST. PATIENT-LVL IV: CPT | Mod: PBBFAC,,, | Performed by: OBSTETRICS & GYNECOLOGY

## 2024-01-29 PROCEDURE — 99213 OFFICE O/P EST LOW 20 MIN: CPT | Mod: S$GLB,,, | Performed by: OBSTETRICS & GYNECOLOGY

## 2024-01-29 PROCEDURE — 36415 COLL VENOUS BLD VENIPUNCTURE: CPT | Performed by: OBSTETRICS & GYNECOLOGY

## 2024-01-29 PROCEDURE — 3008F BODY MASS INDEX DOCD: CPT | Mod: CPTII,S$GLB,, | Performed by: OBSTETRICS & GYNECOLOGY

## 2024-01-29 NOTE — PROGRESS NOTES
Subjective:      Patient ID: Janice Lawrence is a 43 y.o. female.    Chief Complaint:  Follow-up      History of Present Illness  Pt is here for follow up.  Reports that her periods have continued to be irregular on Micronor and now desires to discuss surgical options.  Partner is sterile and pt has no desire for future fertility.  Pt also requests iron studies.    GYN & OB History  Patient's last menstrual period was 2024 (exact date).   Date of Last Pap: 2020    OB History    Para Term  AB Living   1 0 0   1     SAB IAB Ectopic Multiple Live Births                  # Outcome Date GA Lbr Nikolas/2nd Weight Sex Delivery Anes PTL Lv   1 AB                Review of Systems  Review of Systems   Constitutional:  Negative for activity change, appetite change, chills, fatigue, fever and unexpected weight change.   Respiratory:  Negative for shortness of breath.    Cardiovascular:  Negative for chest pain, palpitations and leg swelling.   Gastrointestinal:  Positive for abdominal pain. Negative for bloating, blood in stool, constipation, diarrhea, nausea and vomiting.   Genitourinary:  Positive for menorrhagia, menstrual problem and vaginal bleeding. Negative for dysmenorrhea, dyspareunia, dysuria, flank pain, frequency, genital sores, hematuria, pelvic pain, urgency, vaginal discharge, vaginal pain, urinary incontinence, postcoital bleeding, vaginal dryness and vaginal odor.   Musculoskeletal:  Positive for back pain.   Integumentary:  Negative for rash.   Neurological:  Negative for syncope and headaches.          Objective:     Physical Exam:   Constitutional: She is oriented to person, place, and time. She appears well-developed and well-nourished. No distress.                           Neurological: She is alert and oriented to person, place, and time.     Psychiatric: She has a normal mood and affect. Her behavior is normal. Thought content normal.         Assessment:     1. Irregular  menses             Plan:     Irregular menses  -     CBC W/ AUTO DIFFERENTIAL; Future; Expected date: 01/29/2024  -     FERRITIN; Future; Expected date: 01/29/2024  -     IRON AND TIBC; Future; Expected date: 01/29/2024  -     Pt was counseled on AUB, including common signs and symptoms.  Symptoms are highly disruptive and have failed trials of conservative therapy/contributed to anemia.  Pt is done with her fertility and has no desire for future childbearing.  Treatment options were reviewed with pt, including expectant vs medical vs Mirena vs ablation vs hysterectomy.  Pt voiced understanding and desires to proceed with ablation.  Pt desires to proceed with ovarian preservation, but is OK with ovarian removal if deemed necessary.  Surgery details, indications, risks, and benefits were reviewed with pt.  Will have PA contact pt to schedule HSC/D+C/Novasure Ablation.  Pt counseled on need for endometrial sampling prior to ablation.  Pt does not want office based EMB and requests D+C at time of surgery.

## 2024-01-30 ENCOUNTER — TELEPHONE (OUTPATIENT)
Dept: OBSTETRICS AND GYNECOLOGY | Facility: CLINIC | Age: 44
End: 2024-01-30
Payer: COMMERCIAL

## 2024-01-30 ENCOUNTER — PATIENT MESSAGE (OUTPATIENT)
Dept: RHEUMATOLOGY | Facility: CLINIC | Age: 44
End: 2024-01-30
Payer: COMMERCIAL

## 2024-01-30 ENCOUNTER — PATIENT MESSAGE (OUTPATIENT)
Dept: OBSTETRICS AND GYNECOLOGY | Facility: CLINIC | Age: 44
End: 2024-01-30
Payer: COMMERCIAL

## 2024-01-30 DIAGNOSIS — N93.9 ABNORMAL UTERINE BLEEDING (AUB): ICD-10-CM

## 2024-01-30 DIAGNOSIS — N92.6 IRREGULAR MENSES: Primary | ICD-10-CM

## 2024-01-30 NOTE — TELEPHONE ENCOUNTER
----- Message from Vaibhav Stinson MD sent at 1/29/2024  2:28 PM CST -----  Please contact pt to schedule HSC/D+C/Novasure ablation.  Thanks.

## 2024-02-05 RX ORDER — FLUOXETINE HYDROCHLORIDE 40 MG/1
CAPSULE ORAL
Qty: 30 CAPSULE | Refills: 6 | Status: SHIPPED | OUTPATIENT
Start: 2024-02-05

## 2024-02-06 ENCOUNTER — DOCUMENTATION ONLY (OUTPATIENT)
Dept: PAIN MEDICINE | Facility: CLINIC | Age: 44
End: 2024-02-06
Payer: COMMERCIAL

## 2024-02-06 NOTE — PROGRESS NOTES
P2P performed with [facility]: JOIE CARD.     MRI approved.     Approved. Authorization # [id number] Valid through 4/22/2024. Pre-service department notified.    Dr. Dez Lowe

## 2024-02-16 ENCOUNTER — LAB VISIT (OUTPATIENT)
Dept: LAB | Facility: HOSPITAL | Age: 44
End: 2024-02-16
Attending: INTERNAL MEDICINE
Payer: COMMERCIAL

## 2024-02-16 DIAGNOSIS — Z79.899 HIGH RISK MEDICATION USE: ICD-10-CM

## 2024-02-16 DIAGNOSIS — L40.59 POLYARTICULAR PSORIATIC ARTHRITIS: ICD-10-CM

## 2024-02-16 LAB
ALBUMIN SERPL BCP-MCNC: 4.2 G/DL (ref 3.5–5.2)
ALP SERPL-CCNC: 82 U/L (ref 55–135)
ALT SERPL W/O P-5'-P-CCNC: 39 U/L (ref 10–44)
ANION GAP SERPL CALC-SCNC: 11 MMOL/L (ref 8–16)
AST SERPL-CCNC: 33 U/L (ref 10–40)
BASOPHILS # BLD AUTO: 0.08 K/UL (ref 0–0.2)
BASOPHILS NFR BLD: 0.8 % (ref 0–1.9)
BILIRUB SERPL-MCNC: 0.6 MG/DL (ref 0.1–1)
BUN SERPL-MCNC: 14 MG/DL (ref 6–20)
CALCIUM SERPL-MCNC: 10.3 MG/DL (ref 8.7–10.5)
CHLORIDE SERPL-SCNC: 108 MMOL/L (ref 95–110)
CO2 SERPL-SCNC: 20 MMOL/L (ref 23–29)
CREAT SERPL-MCNC: 0.8 MG/DL (ref 0.5–1.4)
CRP SERPL-MCNC: 2.7 MG/L (ref 0–8.2)
DIFFERENTIAL METHOD BLD: ABNORMAL
EOSINOPHIL # BLD AUTO: 0.9 K/UL (ref 0–0.5)
EOSINOPHIL NFR BLD: 9.3 % (ref 0–8)
ERYTHROCYTE [DISTWIDTH] IN BLOOD BY AUTOMATED COUNT: 16.4 % (ref 11.5–14.5)
EST. GFR  (NO RACE VARIABLE): >60 ML/MIN/1.73 M^2
GLUCOSE SERPL-MCNC: 86 MG/DL (ref 70–110)
HCT VFR BLD AUTO: 38.3 % (ref 37–48.5)
HGB BLD-MCNC: 12.7 G/DL (ref 12–16)
IMM GRANULOCYTES # BLD AUTO: 0.03 K/UL (ref 0–0.04)
IMM GRANULOCYTES NFR BLD AUTO: 0.3 % (ref 0–0.5)
LYMPHOCYTES # BLD AUTO: 2.4 K/UL (ref 1–4.8)
LYMPHOCYTES NFR BLD: 24.2 % (ref 18–48)
MCH RBC QN AUTO: 27 PG (ref 27–31)
MCHC RBC AUTO-ENTMCNC: 33.2 G/DL (ref 32–36)
MCV RBC AUTO: 82 FL (ref 82–98)
MONOCYTES # BLD AUTO: 0.6 K/UL (ref 0.3–1)
MONOCYTES NFR BLD: 5.8 % (ref 4–15)
NEUTROPHILS # BLD AUTO: 5.9 K/UL (ref 1.8–7.7)
NEUTROPHILS NFR BLD: 59.6 % (ref 38–73)
NRBC BLD-RTO: 0 /100 WBC
PLATELET # BLD AUTO: 387 K/UL (ref 150–450)
PMV BLD AUTO: 8.3 FL (ref 9.2–12.9)
POTASSIUM SERPL-SCNC: 4.3 MMOL/L (ref 3.5–5.1)
PROT SERPL-MCNC: 7.8 G/DL (ref 6–8.4)
RBC # BLD AUTO: 4.7 M/UL (ref 4–5.4)
SODIUM SERPL-SCNC: 139 MMOL/L (ref 136–145)
WBC # BLD AUTO: 9.97 K/UL (ref 3.9–12.7)

## 2024-02-16 PROCEDURE — 36415 COLL VENOUS BLD VENIPUNCTURE: CPT | Performed by: INTERNAL MEDICINE

## 2024-02-16 PROCEDURE — 85025 COMPLETE CBC W/AUTO DIFF WBC: CPT | Performed by: INTERNAL MEDICINE

## 2024-02-16 PROCEDURE — 86140 C-REACTIVE PROTEIN: CPT | Performed by: INTERNAL MEDICINE

## 2024-02-16 PROCEDURE — 80053 COMPREHEN METABOLIC PANEL: CPT | Performed by: INTERNAL MEDICINE

## 2024-02-16 PROCEDURE — 85652 RBC SED RATE AUTOMATED: CPT | Performed by: INTERNAL MEDICINE

## 2024-02-17 LAB — ERYTHROCYTE [SEDIMENTATION RATE] IN BLOOD BY PHOTOMETRIC METHOD: 29 MM/HR (ref 0–36)

## 2024-02-20 NOTE — PROGRESS NOTES
Established Patient Chronic Pain Note     The patient location is: home  The chief complaint leading to consultation is: back and R leg pain  Visit type: Virtual visit with synchronous audio and video  Total time spent with patient: 11-15m  Each patient to whom he or she provides medical services by telemedicine is: (1) informed of the relationship between the physician and patient and the respective role of any other health care provider with respect to management of the patient; and (2) notified that he or she may decline to receive medical services by telemedicine and may withdraw from such care at any time.    Referring Physician: No ref. provider found    PCP: No, Primary Doctor    Chief Complaint:   Back and R leg pain       SUBJECTIVE:  Interval history 02/21/2024  Patient presents for MRI lumbar spine review.  Today patient again reports pain in the lower back and bilateral lower extremities, worse on the right.  Patient reports pain which on average is rated a 6/10.  Pain presents in a bandlike distribution in the lower back which radiates down bilateral lower extremities in L4-S1 distribution to the calf, 90% on the right.  Patient has been performing physician directed physical therapy exercises at home from 11/08/2023 through 01/09/2024 with marginal improvement in her pain.  She did start Lyrica titration is taking 75 mg once daily with mild improvement in her symptoms.  She denies any side effects from this medication.  She would like to avoid excessive escalation of this dose of medication as she was on higher doses of gabapentin in the past.  Of note patient is a  in his interested in pursuing intervention but would need to work around her schedule.    HPI 02/06/2024  Janice Guthrierosalinemichelet Howard is a 43 y.o. female with past medical history significant for anxiety and depression, psoriasis on immunosuppression, morbid obesity, fatty liver, GERD, multi joint osteoarthritis who presents to  the clinic for the evaluation of lower back and leg pain.  Patient reports pain has been present for several years, greater than 10 years, without inciting accident injury or trauma.  Pain is constant and today is rated a 6/10.  Pain at its best is a 3/10 and at its worse is a 10/10.  Patient denies preceding accident injury or trauma.  Patient reports pain in a bandlike distribution in the lower back which can radiate down the lateral and posterior aspect of the right lower extremity in L4-S1 distribution to the calf.  Patient denies left lower extremity radiculopathy, bowel or bladder incontinence, saddle anesthesia.  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  She does report with periods of inactivity, her pain can be exacerbated as well.  Pain is marginally improved with sitting, stretching, hot baths.  She does endorse associated weakness in the right lower extremity associated with her pain.  Patient has trialed gabapentin, up to 1200 mg in a day, 5 years prior with marginal improvement in her symptoms.  She is also received 2 prior lumbar epidural steroid injections, greater than 10 years prior with Dr. Luna with ineffective relief.  She has completed several rounds of conventional land-based physical therapy and has continued physician directed physical therapy exercises over the last 8 weeks from 11/08/2023 through 01/09/2024 with marginal improvement in her pain.    Patient reports significant motor weakness.  Patient denies night fever/night sweats, urinary incontinence, bowel incontinence, significant weight loss, and loss of sensations.      Pain Disability Index Review:         1/9/2024    11:53 AM   Last 3 PDI Scores   Pain Disability Index (PDI) 42       Non-Pharmacologic Treatments:  Physical Therapy/Home Exercise: no  Ice/Heat:yes  TENS: yes  Acupuncture: yes  Massage: yes  Chiropractic: yes    Other: no      Pain Medications:  -  Adjuvant Medications:  Celebrex, Fluoxetine    Pain Procedures:   -Dr. Phil Luna: ROCIO    Past Medical History:   Diagnosis Date    Acid reflux     Acquired iron deficiency anemia due to decreased absorption 12/10/2020    Anxiety     Arthritis     Cervical radiculopathy 5/9/2023    Depression      Past Surgical History:   Procedure Laterality Date    COLONOSCOPY N/A 02/10/2021    Procedure: COLONOSCOPY;  Surgeon: Angelika Eldridge MD;  Location: [facility];  Service: Endoscopy;  Laterality: N/A;    COSMETIC SURGERY      ESOPHAGOGASTRODUODENOSCOPY N/A 02/10/2021    Procedure: ESOPHAGOGASTRODUODENOSCOPY (EGD);  Surgeon: Angelika Eldridge MD;  Location: [facility];  Service: Endoscopy;  Laterality: N/A;     Review of patient's allergies indicates:  No Known Allergies    Current Outpatient Medications   Medication Sig    celecoxib (CELEBREX) 200 MG capsule Take 1 capsule (200 mg total) by mouth 2 (two) times daily as needed for Pain.    esomeprazole (NEXIUM) 40 MG capsule TAKE ONE CAPSULE BY MOUTH EVERY DAY BEFORE BREAKFAST    FLUoxetine 40 MG capsule TAKE ONE CAPSULE BY MOUTH ONCE PER DAY    folic acid (FOLVITE) 1 MG tablet Take 1 tablet (1,000 mcg total) by mouth once daily.    folic acid 0.8 mg Cap Folic Acid    guselkumab (TREMFYA) 100 mg/mL Syrg Inject 1 mL (100 mg) into the skin every 8 weeks.    leflunomide (ARAVA) 20 MG Tab TAKE ONE TABLET BY MOUTH ONCE PER DAY    norethindrone (MICRONOR) 0.35 mg tablet Take 1 tablet (0.35 mg total) by mouth once daily.    ondansetron (ZOFRAN-ODT) 4 MG TbDL 1 tablet Orally every 6 hours as needed for nausea and vomiting    pregabalin (LYRICA) 75 MG capsule Take 1 capsule (75 mg total) by mouth 2 (two) times daily for 10 days, THEN 2 capsules (150 mg total) 2 (two) times daily for 10 days, THEN 3 capsules (225 mg total) 2 (two) times daily for 10 days.    VITAMIN D2 1,250 mcg (50,000 unit) capsule TAKE ONE CAPSULE BY MOUTH TWICE A WEEK     No current facility-administered  medications for this visit.       Review of Systems     GENERAL:  No weight loss, malaise or fevers.  HEENT:   No recent changes in vision or hearing  NECK:  Negative for lumps, no difficulty with swallowing.  RESPIRATORY:  Negative for cough, wheezing or shortness of breath, patient denies any recent URI.  CARDIOVASCULAR:  Negative for chest pain or palpitations.  GI:  Negative for abdominal discomfort, blood in stools or black stools or change in bowel habits.  MUSCULOSKELETAL:  See HPI.  SKIN:  Negative for lesions, rash, and itching.  PSYCH:  No mood disorder or recent psychosocial stressors.   HEMATOLOGY/LYMPHOLOGY:  Negative for prolonged bleeding, bruising easily or swollen nodes.    NEURO:   No history of syncope, paralysis, seizures or tremors.  All other reviewed and negative other than HPI.    OBJECTIVE:    LMP 01/24/2024 (Exact Date)       Physical Exam    GENERAL: Well appearing, in no acute distress, alert and oriented x3.  PSYCH:  Mood and affect appropriate.  SKIN: Skin color, texture, turgor normal, no rashes or lesions.  HEAD/FACE:  Normocephalic, atraumatic. Cranial nerves grossly intact.    CV: RRR with palpation of the radial artery.  PULM: No evidence of respiratory difficulty, symmetric chest rise.  GI:  Soft and non-tender.    BACK: Straight leg raising in the sitting and supine positions is negative to radicular pain.pain to palpation over the facet joints of the lumbar spine or spinous processes. Normal range of motion without pain reproduction.  EXTREMITIES: Peripheral joint ROM is full and pain free without obvious instability or laxity in all four extremities. No deformities, edema, or skin discoloration. Good capillary refill.  MUSCULOSKELETAL: Able to stand on heels & toes.   Shoulder, hip, and knee provocative maneuvers are negative.  There is no pain with palpation over the sacroiliac joints bilaterally.  Gaenslen's, Distraction/Compression and  FABERs test is negative.  Facet  loading test is negative bilaterally.   Bilateral upper and lower extremity strength is normal and symmetric.  No atrophy or tone abnormalities are noted.    RIGHT Lower extremity: Hip flexion 5/5, Hip Abduction 5/5, Hip Adduction 5/5, Knee extension 5/5, Knee flexion 5/5, Ankle dorsiflexion5/5, Extensor hallucis longus 5/5, Ankle plantarflexion 5/5  LEFT Lower extremity:  Hip flexion 5/5, Hip Abduction 5/5,Hip Adduction 5/5, Knee extension 5/5, Knee flexion 5/5, Ankle dorsiflexion 5/5, Extensor hallucis longus 5/5, Ankle plantarflexion 5/5  -Normal testing knee (patellar) jerk and ankle (achilles) jerk    NEURO: Bilateral upper and lower extremity coordination and muscle stretch reflexes are physiologic and symmetric. No loss of sensation is noted.  GAIT: normal.    Imaging:   EMG 01/22/2024  IMPRESSION  ABNORMAL study  2. There is electrodiagnostic evidence of a subacute on chronic radiculopathy of the S1  nerve root and a chronic radiculopathy of the L5 nerve root    02/22/16    X-Ray Lumbar Spine AP And Lateral    Narrative  Three views of the lumbar spine    Findings: The vertebral bodies demonstrate normal height.  There is straightening of the normal lordotic curvature. The disk space heights are maintained. No significant facet arthropathy.       05/09/23    X-Ray Cervical Spine AP And Lateral    Narrative  EXAM:  XR CERVICAL SPINE AP LATERAL    CLINICAL HISTORY:  Pain    FINDINGS:    Vertebral body height and alignment are maintained.  The disc spaces are normal in height.  No acute fracture is evident.  No prevertebral soft tissue swelling.                    ASSESSMENT: 43 y.o. year old female with     1. Lumbar radiculopathy  Case Request-RAD/Other Procedure Area: R sided L4/5 and L5/S1 TFESI      2. DDD (degenerative disc disease), lumbar        3. Lumbar facet arthropathy                PLAN:   - Interventions:  Schedule for right-sided L4-5 and L5-S1 transforaminal epidural steroid injection to see if  this helps with lumbar radiculopathy.  Explained the risks and benefits of the procedure in detail with the patient today in clinic along with alternative treatment options    - Anticoagulation use: No no anticoagulation     report:  Reviewed and consistent with medication use as prescribed.    - Medications:  -I will start the patient on a Lyrica titration to see if this helps with neuropathic pain.  We discussed increasing the dose gradually to reach a therapeutic goal according to the following algorithm.  We discussed potential side effects of this medication which may include drowsiness,dizziness, dry mouth, constipation or peripheral edema.  -Lyrica 75 mg twice daily    - Therapy:   We discussed continuing at home physician directed physical therapy to help manage the patient/s painful condition. The patient was counseled that muscle strengthening will improve the long term prognosis in regards to pain and may also help increase range of motion and mobility.       - Imaging: Reviewed available imaging (MRI lumbar spine) with patient and answered any questions they had regarding study.     - Consults/Referrals: lower extremity electromyography studies-- reviewed    - Records: Obtain old records from outside physicians and imaging (not received 2.21.24)    - Follow up visit: return to clinic in 4-6 weeks post injection      The above plan and management options were discussed at length with patient. Patient is in agreement with the above and verbalized understanding.    - I discussed the goals of interventional chronic pain management with the patient on today's visit. We discussed a multimodal and systematic approach to pain.  This includes diagnostic and therapeutic injections, adjuvant pharmacologic treatment, physical therapy, and at times psychiatry.  I emphasized the importance of regular exercise, core strengthening and stretching, diet and weight loss as a cornerstone of long-term pain management.     - This condition does not require this patient to take time off of work, and the primary goal of our Pain Management services is to improve the patient's functional capacity.  - Patient Questions: Answered all of the patient's questions regarding diagnoses, therapy, treatment and next steps        Dez Lowe MD  Interventional Pain Management  [facility]    Disclaimer:  This note was prepared using voice recognition system and is likely to have sound alike errors that may have been overlooked even after proof reading.  Please call me with any questions

## 2024-02-20 NOTE — H&P (VIEW-ONLY)
Established Patient Chronic Pain Note     The patient location is: home  The chief complaint leading to consultation is: back and R leg pain  Visit type: Virtual visit with synchronous audio and video  Total time spent with patient: 11-15m  Each patient to whom he or she provides medical services by telemedicine is: (1) informed of the relationship between the physician and patient and the respective role of any other health care provider with respect to management of the patient; and (2) notified that he or she may decline to receive medical services by telemedicine and may withdraw from such care at any time.    Referring Physician: No ref. provider found    PCP: No, Primary Doctor    Chief Complaint:   Back and R leg pain       SUBJECTIVE:  Interval history 02/21/2024  Patient presents for MRI lumbar spine review.  Today patient again reports pain in the lower back and bilateral lower extremities, worse on the right.  Patient reports pain which on average is rated a 6/10.  Pain presents in a bandlike distribution in the lower back which radiates down bilateral lower extremities in L4-S1 distribution to the calf, 90% on the right.  Patient has been performing physician directed physical therapy exercises at home from 11/08/2023 through 01/09/2024 with marginal improvement in her pain.  She did start Lyrica titration is taking 75 mg once daily with mild improvement in her symptoms.  She denies any side effects from this medication.  She would like to avoid excessive escalation of this dose of medication as she was on higher doses of gabapentin in the past.  Of note patient is a  in his interested in pursuing intervention but would need to work around her schedule.    HPI 02/06/2024  Janice Guthrierosalinemichelet Howard is a 43 y.o. female with past medical history significant for anxiety and depression, psoriasis on immunosuppression, morbid obesity, fatty liver, GERD, multi joint osteoarthritis who presents to  the clinic for the evaluation of lower back and leg pain.  Patient reports pain has been present for several years, greater than 10 years, without inciting accident injury or trauma.  Pain is constant and today is rated a 6/10.  Pain at its best is a 3/10 and at its worse is a 10/10.  Patient denies preceding accident injury or trauma.  Patient reports pain in a bandlike distribution in the lower back which can radiate down the lateral and posterior aspect of the right lower extremity in L4-S1 distribution to the calf.  Patient denies left lower extremity radiculopathy, bowel or bladder incontinence, saddle anesthesia.  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  She does report with periods of inactivity, her pain can be exacerbated as well.  Pain is marginally improved with sitting, stretching, hot baths.  She does endorse associated weakness in the right lower extremity associated with her pain.  Patient has trialed gabapentin, up to 1200 mg in a day, 5 years prior with marginal improvement in her symptoms.  She is also received 2 prior lumbar epidural steroid injections, greater than 10 years prior with Dr. Luna with ineffective relief.  She has completed several rounds of conventional land-based physical therapy and has continued physician directed physical therapy exercises over the last 8 weeks from 11/08/2023 through 01/09/2024 with marginal improvement in her pain.    Patient reports significant motor weakness.  Patient denies night fever/night sweats, urinary incontinence, bowel incontinence, significant weight loss, and loss of sensations.      Pain Disability Index Review:         1/9/2024    11:53 AM   Last 3 PDI Scores   Pain Disability Index (PDI) 42       Non-Pharmacologic Treatments:  Physical Therapy/Home Exercise: no  Ice/Heat:yes  TENS: yes  Acupuncture: yes  Massage: yes  Chiropractic: yes    Other: no      Pain Medications:  -  Adjuvant Medications:  Celebrex, Fluoxetine    Pain Procedures:   -Dr. Phil Luna: ROCIO    Past Medical History:   Diagnosis Date    Acid reflux     Acquired iron deficiency anemia due to decreased absorption 12/10/2020    Anxiety     Arthritis     Cervical radiculopathy 5/9/2023    Depression      Past Surgical History:   Procedure Laterality Date    COLONOSCOPY N/A 02/10/2021    Procedure: COLONOSCOPY;  Surgeon: Angelika Eldridge MD;  Location: [facility];  Service: Endoscopy;  Laterality: N/A;    COSMETIC SURGERY      ESOPHAGOGASTRODUODENOSCOPY N/A 02/10/2021    Procedure: ESOPHAGOGASTRODUODENOSCOPY (EGD);  Surgeon: Angelika Eldridge MD;  Location: [facility];  Service: Endoscopy;  Laterality: N/A;     Review of patient's allergies indicates:  No Known Allergies    Current Outpatient Medications   Medication Sig    celecoxib (CELEBREX) 200 MG capsule Take 1 capsule (200 mg total) by mouth 2 (two) times daily as needed for Pain.    esomeprazole (NEXIUM) 40 MG capsule TAKE ONE CAPSULE BY MOUTH EVERY DAY BEFORE BREAKFAST    FLUoxetine 40 MG capsule TAKE ONE CAPSULE BY MOUTH ONCE PER DAY    folic acid (FOLVITE) 1 MG tablet Take 1 tablet (1,000 mcg total) by mouth once daily.    folic acid 0.8 mg Cap Folic Acid    guselkumab (TREMFYA) 100 mg/mL Syrg Inject 1 mL (100 mg) into the skin every 8 weeks.    leflunomide (ARAVA) 20 MG Tab TAKE ONE TABLET BY MOUTH ONCE PER DAY    norethindrone (MICRONOR) 0.35 mg tablet Take 1 tablet (0.35 mg total) by mouth once daily.    ondansetron (ZOFRAN-ODT) 4 MG TbDL 1 tablet Orally every 6 hours as needed for nausea and vomiting    pregabalin (LYRICA) 75 MG capsule Take 1 capsule (75 mg total) by mouth 2 (two) times daily for 10 days, THEN 2 capsules (150 mg total) 2 (two) times daily for 10 days, THEN 3 capsules (225 mg total) 2 (two) times daily for 10 days.    VITAMIN D2 1,250 mcg (50,000 unit) capsule TAKE ONE CAPSULE BY MOUTH TWICE A WEEK     No current facility-administered  medications for this visit.       Review of Systems     GENERAL:  No weight loss, malaise or fevers.  HEENT:   No recent changes in vision or hearing  NECK:  Negative for lumps, no difficulty with swallowing.  RESPIRATORY:  Negative for cough, wheezing or shortness of breath, patient denies any recent URI.  CARDIOVASCULAR:  Negative for chest pain or palpitations.  GI:  Negative for abdominal discomfort, blood in stools or black stools or change in bowel habits.  MUSCULOSKELETAL:  See HPI.  SKIN:  Negative for lesions, rash, and itching.  PSYCH:  No mood disorder or recent psychosocial stressors.   HEMATOLOGY/LYMPHOLOGY:  Negative for prolonged bleeding, bruising easily or swollen nodes.    NEURO:   No history of syncope, paralysis, seizures or tremors.  All other reviewed and negative other than HPI.    OBJECTIVE:    LMP 01/24/2024 (Exact Date)       Physical Exam    GENERAL: Well appearing, in no acute distress, alert and oriented x3.  PSYCH:  Mood and affect appropriate.  SKIN: Skin color, texture, turgor normal, no rashes or lesions.  HEAD/FACE:  Normocephalic, atraumatic. Cranial nerves grossly intact.    CV: RRR with palpation of the radial artery.  PULM: No evidence of respiratory difficulty, symmetric chest rise.  GI:  Soft and non-tender.    BACK: Straight leg raising in the sitting and supine positions is negative to radicular pain.pain to palpation over the facet joints of the lumbar spine or spinous processes. Normal range of motion without pain reproduction.  EXTREMITIES: Peripheral joint ROM is full and pain free without obvious instability or laxity in all four extremities. No deformities, edema, or skin discoloration. Good capillary refill.  MUSCULOSKELETAL: Able to stand on heels & toes.   Shoulder, hip, and knee provocative maneuvers are negative.  There is no pain with palpation over the sacroiliac joints bilaterally.  Gaenslen's, Distraction/Compression and  FABERs test is negative.  Facet  loading test is negative bilaterally.   Bilateral upper and lower extremity strength is normal and symmetric.  No atrophy or tone abnormalities are noted.    RIGHT Lower extremity: Hip flexion 5/5, Hip Abduction 5/5, Hip Adduction 5/5, Knee extension 5/5, Knee flexion 5/5, Ankle dorsiflexion5/5, Extensor hallucis longus 5/5, Ankle plantarflexion 5/5  LEFT Lower extremity:  Hip flexion 5/5, Hip Abduction 5/5,Hip Adduction 5/5, Knee extension 5/5, Knee flexion 5/5, Ankle dorsiflexion 5/5, Extensor hallucis longus 5/5, Ankle plantarflexion 5/5  -Normal testing knee (patellar) jerk and ankle (achilles) jerk    NEURO: Bilateral upper and lower extremity coordination and muscle stretch reflexes are physiologic and symmetric. No loss of sensation is noted.  GAIT: normal.    Imaging:   EMG 01/22/2024  IMPRESSION  ABNORMAL study  2. There is electrodiagnostic evidence of a subacute on chronic radiculopathy of the S1  nerve root and a chronic radiculopathy of the L5 nerve root    02/22/16    X-Ray Lumbar Spine AP And Lateral    Narrative  Three views of the lumbar spine    Findings: The vertebral bodies demonstrate normal height.  There is straightening of the normal lordotic curvature. The disk space heights are maintained. No significant facet arthropathy.       05/09/23    X-Ray Cervical Spine AP And Lateral    Narrative  EXAM:  XR CERVICAL SPINE AP LATERAL    CLINICAL HISTORY:  Pain    FINDINGS:    Vertebral body height and alignment are maintained.  The disc spaces are normal in height.  No acute fracture is evident.  No prevertebral soft tissue swelling.                    ASSESSMENT: 43 y.o. year old female with     1. Lumbar radiculopathy  Case Request-RAD/Other Procedure Area: R sided L4/5 and L5/S1 TFESI      2. DDD (degenerative disc disease), lumbar        3. Lumbar facet arthropathy                PLAN:   - Interventions:  Schedule for right-sided L4-5 and L5-S1 transforaminal epidural steroid injection to see if  this helps with lumbar radiculopathy.  Explained the risks and benefits of the procedure in detail with the patient today in clinic along with alternative treatment options    - Anticoagulation use: No no anticoagulation     report:  Reviewed and consistent with medication use as prescribed.    - Medications:  -I will start the patient on a Lyrica titration to see if this helps with neuropathic pain.  We discussed increasing the dose gradually to reach a therapeutic goal according to the following algorithm.  We discussed potential side effects of this medication which may include drowsiness,dizziness, dry mouth, constipation or peripheral edema.  -Lyrica 75 mg twice daily    - Therapy:   We discussed continuing at home physician directed physical therapy to help manage the patient/s painful condition. The patient was counseled that muscle strengthening will improve the long term prognosis in regards to pain and may also help increase range of motion and mobility.       - Imaging: Reviewed available imaging (MRI lumbar spine) with patient and answered any questions they had regarding study.     - Consults/Referrals: lower extremity electromyography studies-- reviewed    - Records: Obtain old records from outside physicians and imaging (not received 2.21.24)    - Follow up visit: return to clinic in 4-6 weeks post injection      The above plan and management options were discussed at length with patient. Patient is in agreement with the above and verbalized understanding.    - I discussed the goals of interventional chronic pain management with the patient on today's visit. We discussed a multimodal and systematic approach to pain.  This includes diagnostic and therapeutic injections, adjuvant pharmacologic treatment, physical therapy, and at times psychiatry.  I emphasized the importance of regular exercise, core strengthening and stretching, diet and weight loss as a cornerstone of long-term pain management.     - This condition does not require this patient to take time off of work, and the primary goal of our Pain Management services is to improve the patient's functional capacity.  - Patient Questions: Answered all of the patient's questions regarding diagnoses, therapy, treatment and next steps        Dez Lowe MD  Interventional Pain Management  [facility]    Disclaimer:  This note was prepared using voice recognition system and is likely to have sound alike errors that may have been overlooked even after proof reading.  Please call me with any questions

## 2024-02-21 ENCOUNTER — OFFICE VISIT (OUTPATIENT)
Dept: PAIN MEDICINE | Facility: CLINIC | Age: 44
End: 2024-02-21
Payer: COMMERCIAL

## 2024-02-21 ENCOUNTER — PATIENT MESSAGE (OUTPATIENT)
Dept: PAIN MEDICINE | Facility: CLINIC | Age: 44
End: 2024-02-21

## 2024-02-21 DIAGNOSIS — M54.16 LUMBAR RADICULOPATHY: Primary | ICD-10-CM

## 2024-02-21 DIAGNOSIS — M47.816 LUMBAR FACET ARTHROPATHY: ICD-10-CM

## 2024-02-21 DIAGNOSIS — M51.36 DDD (DEGENERATIVE DISC DISEASE), LUMBAR: ICD-10-CM

## 2024-02-21 PROCEDURE — 99214 OFFICE O/P EST MOD 30 MIN: CPT | Mod: 95,,, | Performed by: ANESTHESIOLOGY

## 2024-02-21 PROCEDURE — 1160F RVW MEDS BY RX/DR IN RCRD: CPT | Mod: CPTII,95,, | Performed by: ANESTHESIOLOGY

## 2024-02-21 PROCEDURE — 1159F MED LIST DOCD IN RCRD: CPT | Mod: CPTII,95,, | Performed by: ANESTHESIOLOGY

## 2024-02-27 ENCOUNTER — PATIENT MESSAGE (OUTPATIENT)
Dept: PAIN MEDICINE | Facility: HOSPITAL | Age: 44
End: 2024-02-27
Payer: COMMERCIAL

## 2024-02-27 NOTE — PRE-PROCEDURE INSTRUCTIONS
Spoke with patient regarding procedure scheduled on 3.13     Arrival time 1015     Has patient been sick with fever or on antibiotics within the last 7 days? No     Does the patient have any open wounds, sores or rashes? No     Does the patient have any recent fractures? no     Has patient received a vaccination within the last 7 days? No     Received the COVID vaccination? yes     Has the patient stopped all medications as directed? na     Does patient have a pacemaker, defibrillator, or implantable stimulator? No     Does the patient have a ride to and from procedure and someone reliable to remain with patient?  MOTHER     Is the patient diabetic? no     Does the patient have sleep apnea? Or use O2 at home? no     Is the patient receiving sedation? yes     Is the patient instructed to remain NPO beginning at midnight the night before their procedure? yes     Procedure location confirmed with patient? Yes     Covid- Denies signs/symptoms. Instructed to notify PAT/MD if any changes.

## 2024-03-13 ENCOUNTER — HOSPITAL ENCOUNTER (OUTPATIENT)
Facility: HOSPITAL | Age: 44
Discharge: HOME | End: 2024-03-13
Attending: ANESTHESIOLOGY | Admitting: ANESTHESIOLOGY
Payer: COMMERCIAL

## 2024-03-13 VITALS
WEIGHT: 293 LBS | SYSTOLIC BLOOD PRESSURE: 128 MMHG | BODY MASS INDEX: 41.02 KG/M2 | HEIGHT: 71 IN | TEMPERATURE: 97 F | DIASTOLIC BLOOD PRESSURE: 89 MMHG | OXYGEN SATURATION: 98 % | RESPIRATION RATE: 16 BRPM | HEART RATE: 77 BPM

## 2024-03-13 DIAGNOSIS — M54.16 LUMBAR RADICULOPATHY: ICD-10-CM

## 2024-03-13 PROCEDURE — 64483 NJX AA&/STRD TFRM EPI L/S 1: CPT | Mod: RT,,, | Performed by: ANESTHESIOLOGY

## 2024-03-13 PROCEDURE — 25000003 PHARM REV CODE 250: Performed by: ANESTHESIOLOGY

## 2024-03-13 PROCEDURE — 64484 NJX AA&/STRD TFRM EPI L/S EA: CPT | Mod: RT,,, | Performed by: ANESTHESIOLOGY

## 2024-03-13 PROCEDURE — 64484 NJX AA&/STRD TFRM EPI L/S EA: CPT | Mod: RT | Performed by: ANESTHESIOLOGY

## 2024-03-13 PROCEDURE — 64483 NJX AA&/STRD TFRM EPI L/S 1: CPT | Mod: RT | Performed by: ANESTHESIOLOGY

## 2024-03-13 PROCEDURE — 25500020 PHARM REV CODE 255: Performed by: ANESTHESIOLOGY

## 2024-03-13 PROCEDURE — 63600175 PHARM REV CODE 636 W HCPCS: Performed by: ANESTHESIOLOGY

## 2024-03-13 RX ORDER — DEXAMETHASONE SODIUM PHOSPHATE 10 MG/ML
INJECTION INTRAMUSCULAR; INTRAVENOUS
Status: DISCONTINUED | OUTPATIENT
Start: 2024-03-13 | End: 2024-03-13 | Stop reason: HOSPADM

## 2024-03-13 RX ORDER — INDOMETHACIN 25 MG/1
CAPSULE ORAL
Status: DISCONTINUED | OUTPATIENT
Start: 2024-03-13 | End: 2024-03-13 | Stop reason: HOSPADM

## 2024-03-13 RX ORDER — MIDAZOLAM HYDROCHLORIDE 1 MG/ML
INJECTION, SOLUTION INTRAMUSCULAR; INTRAVENOUS
Status: DISCONTINUED | OUTPATIENT
Start: 2024-03-13 | End: 2024-03-13 | Stop reason: HOSPADM

## 2024-03-13 RX ORDER — BUPIVACAINE HYDROCHLORIDE 2.5 MG/ML
INJECTION, SOLUTION EPIDURAL; INFILTRATION; INTRACAUDAL
Status: DISCONTINUED | OUTPATIENT
Start: 2024-03-13 | End: 2024-03-13 | Stop reason: HOSPADM

## 2024-03-13 RX ORDER — FENTANYL CITRATE 50 UG/ML
INJECTION, SOLUTION INTRAMUSCULAR; INTRAVENOUS
Status: DISCONTINUED | OUTPATIENT
Start: 2024-03-13 | End: 2024-03-13 | Stop reason: HOSPADM

## 2024-03-13 NOTE — DISCHARGE INSTRUCTIONS

## 2024-03-13 NOTE — OP NOTE
Janice Lawrence  43 y.o. female      Vitals:    03/13/24 1127   BP: (!) 157/79   Pulse: 76   Resp: 17   Temp:      Procedure Date: 03/13/2024        INFORMED CONSENT: The procedure, risks, benefits and options were discussed with patient. There are no contraindications to the procedure. The patient expressed understanding and agreed to proceed. The personnel performing the procedure was discussed. I verify that I personally obtained consent prior to the start of the procedure and the signed consent can be found on the patient's chart.       Anesthesia:   Conscious sedation provided by M.D    The patient was monitored with continuous pulse oximetry, EKG, and intermittent blood pressure monitors.  The patient was hemodynamically stable throughout the entire process was responsive to voice, and breathing spontaneously.  Supplemental O2 was provided at 2L/min via nasal cannula.  Patient was comfortable for the duration of the procedure. (See nurse documentation and case log for sedation time)    There was a total of 2mg IV Midazolam and 100mcg Fentanyl titrated for the procedure    Pre Procedure diagnosis: Lumbar radiculopathy [M54.16]  Post-Procedure diagnosis: SAME     Complications: None    Specimens: None      DESCRIPTION OF PROCEDURE: The patient was brought to the procedure room. IV access was obtained prior to the procedure. The patient was positioned prone on the fluoroscopy table. Continuous hemodynamic monitoring was initiated including blood pressure, EKG, and pulse oximetry. . The skin was prepped with chlorhexidine and draped in a sterile fashion.      The  L4/5 and  L5/S1 transforaminal spaces were identified with fluoroscopy in the  AP, oblique, and lateral views.  A 22 gauge spinal quinke needle was then advanced into the area of the trans foraminal spaces right with confirmation of proper needle position using AP, oblique, and lateral fluoroscopic views. Once the needle tip was in the area of  the transforaminal space, and there was no blood, CSF or paraesthesias,  1.5 mL of Omnipaque 300mg/ml was injected on right for a total of 3mL.  Fluoroscopic imaging in the AP and lateral views revealed a clear outline of the spinal nerve with proximal spread of agent through the neural foramen into the epidural space. A total combination of 2mL of Bupivacaine and 10 mg dexamethasone was injected on each side and at each level with displacement of the contrast dye confirming that the medication went into the area of the transforaminal spaces right. A sterile bandaid was applied.   Patient tolerated the procedure well.    Patient was taken back to the recovery room for further observation.     The patient was discharged to home in stable condition

## 2024-03-13 NOTE — DISCHARGE SUMMARY
Discharge Note  Short Stay      SUMMARY     Admit Date: 3/13/2024    Attending Physician: Dez Lowe MD        Discharge Physician: Dez Lowe MD        Discharge Date: 3/13/2024 11:29 AM    Procedure(s) (LRB):  R sided L4/5 and L5/S1 TFESI (Right)    Final Diagnosis: Lumbar radiculopathy [M54.16]    Disposition: Home or self care    Patient Instructions:   Current Discharge Medication List        CONTINUE these medications which have NOT CHANGED    Details   celecoxib (CELEBREX) 200 MG capsule Take 1 capsule (200 mg total) by mouth 2 (two) times daily as needed for Pain.  Qty: 60 capsule, Refills: 3      esomeprazole (NEXIUM) 40 MG capsule TAKE ONE CAPSULE BY MOUTH EVERY DAY BEFORE BREAKFAST  Qty: 30 capsule, Refills: 11      FLUoxetine 40 MG capsule TAKE ONE CAPSULE BY MOUTH ONCE PER DAY  Qty: 30 capsule, Refills: 6      folic acid (FOLVITE) 1 MG tablet Take 1 tablet (1,000 mcg total) by mouth once daily.  Qty: 90 tablet, Refills: 3    Associated Diagnoses: Polyarticular psoriatic arthritis      folic acid 0.8 mg Cap Folic Acid      guselkumab (TREMFYA) 100 mg/mL Syrg Inject 1 mL (100 mg) into the skin every 8 weeks.  Qty: 2 mL, Refills: 3    Associated Diagnoses: Plaque psoriasis; Psoriatic arthritis      leflunomide (ARAVA) 20 MG Tab TAKE ONE TABLET BY MOUTH ONCE PER DAY  Qty: 30 tablet, Refills: 2    Comments: This prescription was filled on 11/29/2023. Any refills authorized will be placed on file.  Associated Diagnoses: Psoriatic arthritis; Plaque psoriasis      norethindrone (MICRONOR) 0.35 mg tablet Take 1 tablet (0.35 mg total) by mouth once daily.  Qty: 28 tablet, Refills: 11    Associated Diagnoses: Irregular menses      ondansetron (ZOFRAN-ODT) 4 MG TbDL 1 tablet Orally every 6 hours as needed for nausea and vomiting      pregabalin (LYRICA) 75 MG capsule Take 1 capsule (75 mg total) by mouth 2 (two) times daily for 10 days, THEN 2 capsules (150 mg total) 2 (two) times daily for 10 days, THEN 3  capsules (225 mg total) 2 (two) times daily for 10 days.  Qty: 120 capsule, Refills: 0      VITAMIN D2 1,250 mcg (50,000 unit) capsule TAKE ONE CAPSULE BY MOUTH TWICE A WEEK  Qty: 24 capsule, Refills: 3    Associated Diagnoses: Vitamin D deficiency                 Discharge Diagnosis: Lumbar radiculopathy [M54.16]  Condition on Discharge: Stable with no complications to procedure   Diet on Discharge: Same as before.  Activity: as per instruction sheet.  Discharge to: Home with a responsible adult.  Follow up: 2-4 weeks       Please call the office at [phone] if you experience any weakness or loss of sensation, fever > 101.5, pain uncontrolled with oral medications, persistent nausea/vomiting/or diarrhea, redness or drainage from the incisions, or any other worrisome concerns. If physician on call was not reached or could not communicate with our office for any reason please go to the nearest emergency department

## 2024-04-01 ENCOUNTER — TELEPHONE (OUTPATIENT)
Dept: PREADMISSION TESTING | Facility: HOSPITAL | Age: 44
End: 2024-04-01
Payer: COMMERCIAL

## 2024-04-01 ENCOUNTER — LAB VISIT (OUTPATIENT)
Dept: LAB | Facility: HOSPITAL | Age: 44
End: 2024-04-01
Attending: OBSTETRICS & GYNECOLOGY
Payer: COMMERCIAL

## 2024-04-01 ENCOUNTER — OFFICE VISIT (OUTPATIENT)
Dept: OBSTETRICS AND GYNECOLOGY | Facility: CLINIC | Age: 44
End: 2024-04-01
Payer: COMMERCIAL

## 2024-04-01 VITALS
WEIGHT: 293 LBS | SYSTOLIC BLOOD PRESSURE: 126 MMHG | HEIGHT: 71 IN | BODY MASS INDEX: 41.02 KG/M2 | DIASTOLIC BLOOD PRESSURE: 80 MMHG

## 2024-04-01 DIAGNOSIS — N92.6 IRREGULAR MENSES: Primary | ICD-10-CM

## 2024-04-01 DIAGNOSIS — N92.6 IRREGULAR MENSES: ICD-10-CM

## 2024-04-01 DIAGNOSIS — L40.50 PSORIATIC ARTHRITIS: ICD-10-CM

## 2024-04-01 DIAGNOSIS — Z30.2 REQUEST FOR STERILIZATION: ICD-10-CM

## 2024-04-01 DIAGNOSIS — L40.0 PLAQUE PSORIASIS: ICD-10-CM

## 2024-04-01 PROBLEM — Z51.81 ENCOUNTER FOR MEDICATION MONITORING: Status: RESOLVED | Noted: 2023-11-15 | Resolved: 2024-04-01

## 2024-04-01 LAB
ANION GAP SERPL CALC-SCNC: 9 MMOL/L (ref 8–16)
BASOPHILS # BLD AUTO: 0.13 K/UL (ref 0–0.2)
BASOPHILS NFR BLD: 1.5 % (ref 0–1.9)
BUN SERPL-MCNC: 15 MG/DL (ref 6–20)
CALCIUM SERPL-MCNC: 9.1 MG/DL (ref 8.7–10.5)
CHLORIDE SERPL-SCNC: 108 MMOL/L (ref 95–110)
CO2 SERPL-SCNC: 21 MMOL/L (ref 23–29)
CREAT SERPL-MCNC: 0.8 MG/DL (ref 0.5–1.4)
DIFFERENTIAL METHOD BLD: ABNORMAL
EOSINOPHIL # BLD AUTO: 0.9 K/UL (ref 0–0.5)
EOSINOPHIL NFR BLD: 10.7 % (ref 0–8)
ERYTHROCYTE [DISTWIDTH] IN BLOOD BY AUTOMATED COUNT: 16 % (ref 11.5–14.5)
EST. GFR  (NO RACE VARIABLE): >60 ML/MIN/1.73 M^2
GLUCOSE SERPL-MCNC: 104 MG/DL (ref 70–110)
HCG INTACT+B SERPL-ACNC: <2.4 MIU/ML
HCT VFR BLD AUTO: 36.5 % (ref 37–48.5)
HGB BLD-MCNC: 11.9 G/DL (ref 12–16)
IMM GRANULOCYTES # BLD AUTO: 0.03 K/UL (ref 0–0.04)
IMM GRANULOCYTES NFR BLD AUTO: 0.3 % (ref 0–0.5)
LYMPHOCYTES # BLD AUTO: 2.3 K/UL (ref 1–4.8)
LYMPHOCYTES NFR BLD: 26.5 % (ref 18–48)
MCH RBC QN AUTO: 27.7 PG (ref 27–31)
MCHC RBC AUTO-ENTMCNC: 32.6 G/DL (ref 32–36)
MCV RBC AUTO: 85 FL (ref 82–98)
MONOCYTES # BLD AUTO: 0.5 K/UL (ref 0.3–1)
MONOCYTES NFR BLD: 5.8 % (ref 4–15)
NEUTROPHILS # BLD AUTO: 4.9 K/UL (ref 1.8–7.7)
NEUTROPHILS NFR BLD: 55.2 % (ref 38–73)
NRBC BLD-RTO: 0 /100 WBC
PLATELET # BLD AUTO: 422 K/UL (ref 150–450)
PMV BLD AUTO: 9.3 FL (ref 9.2–12.9)
POTASSIUM SERPL-SCNC: 4.3 MMOL/L (ref 3.5–5.1)
RBC # BLD AUTO: 4.3 M/UL (ref 4–5.4)
SODIUM SERPL-SCNC: 138 MMOL/L (ref 136–145)
WBC # BLD AUTO: 8.82 K/UL (ref 3.9–12.7)

## 2024-04-01 PROCEDURE — 99999 PR PBB SHADOW E&M-EST. PATIENT-LVL III: CPT | Mod: PBBFAC,,, | Performed by: OBSTETRICS & GYNECOLOGY

## 2024-04-01 PROCEDURE — 99499 UNLISTED E&M SERVICE: CPT | Mod: S$GLB,,, | Performed by: OBSTETRICS & GYNECOLOGY

## 2024-04-01 PROCEDURE — 85025 COMPLETE CBC W/AUTO DIFF WBC: CPT | Performed by: OBSTETRICS & GYNECOLOGY

## 2024-04-01 PROCEDURE — 36415 COLL VENOUS BLD VENIPUNCTURE: CPT | Performed by: OBSTETRICS & GYNECOLOGY

## 2024-04-01 PROCEDURE — 80048 BASIC METABOLIC PNL TOTAL CA: CPT | Performed by: OBSTETRICS & GYNECOLOGY

## 2024-04-01 PROCEDURE — 84702 CHORIONIC GONADOTROPIN TEST: CPT | Performed by: OBSTETRICS & GYNECOLOGY

## 2024-04-01 RX ORDER — FAMOTIDINE 20 MG/1
20 TABLET, FILM COATED ORAL
Status: CANCELLED | OUTPATIENT
Start: 2024-04-01

## 2024-04-01 RX ORDER — SODIUM CHLORIDE 9 MG/ML
INJECTION, SOLUTION INTRAVENOUS CONTINUOUS
Status: CANCELLED | OUTPATIENT
Start: 2024-04-01

## 2024-04-01 RX ORDER — MUPIROCIN 20 MG/G
OINTMENT TOPICAL
Status: CANCELLED | OUTPATIENT
Start: 2024-04-01

## 2024-04-01 NOTE — TELEPHONE ENCOUNTER
Pre op instructions reviewed with Pt over telephone, verbalized understanding.    To confirm, Surgery is scheduled on 4/08/24. We will call you late afternoon the business day prior to surgery with your arrival time.    *Please report to the [facility] (1st Floor) located off of [facility] (2nd Entrance/Building on the left, in front of the flag pole).  Address: [address] [place] [address] [place] [address]      INSTRUCTIONS IMPORTANT!!!  DO NOT Eat, Drink, or Smoke after 12 midnight unless instructed otherwise by your Surgeon. OK to brush teeth, no gum, candy or mints!    >>>MEDICATION INSTRUCTIONS<<<: Morning of Surgery, take small sip of water with ONLY these medications:  NEXIUM  LYRICA  FLUOXETINE    *Diabetic/ Prediabetic Patients: !!!If you take diabetic or weight loss medication, Do NOT take morning of surgery unless instructed by Doctor!!!  Metformin to be stopped 24 hrs prior to surgery.   Ozempic/ Mounjaro/ Wegovy/ Trulicity/ Semaglutide injections or weight loss medication to be stopped 7 days prior to surgery.    Vitamins/supplements/ OTC Aspirin products: Stop 7 days prior to surgery!    Weight Loss Injections/medications: Stop 7 days prior to surgery!    ____  Avoid Alcoholic beverages 3 days prior to surgery, as it can thin the blood.  ____  NO Acrylic/fake nails or nail polish worn day of surgery (specifically hand/arm & foot surgeries).  ____  NO powder, lotions, deodorants, oils or cream on body.  ____  Remove all jewelry & piercings & foreign objects before arrival & leave at home.  ____  Remove Dentures, Hearing Aids & Contact Lens prior to surgery.  ____  Bring photo ID and insurance information to hospital (Leave Valuables at Home).  ____  If going home the same day, arrange for a ride home. You will not be able to drive for 24 hrs if Anesthesia was used.   ____  Females (ages 11-60): may need to give a urine sample the morning of surgery; please see Pre op Nurse prior to using  the restroom.  ____  Males: Stop ED medications (Viagra, Cialis) 24 hrs prior to surgery.  ____  Wear clean, loose fitting clothing to allow for dressings/ bandages.      Bathing Instructions:    -Shower with anti-bacterial Soap (Hibiclens or Dial) the night before surgery and the morning of.   -Do not use Hibiclens on your face or genitals.   -Apply clean clothes after shower.  -Do not shave your face or body 2 days prior to surgery unless instructed otherwise by your Surgeon.  -Do not shave pubic hair 7 days prior to surgery (gyn pt's).    [facility]/Ride Policy:  Only 2 adults allowed in pre op/recovery area during your procedure. You MUST HAVE A RIDE HOME from a responsible adult that you know and trust. Medical Transport, Uber or Lyft can ONLY be used if patient has a responsible adult to accompany them during ride home.       *Signs and symptoms of Infection Before or After Surgery:               !!!If you experience any fever, chills, nausea/ vomiting, foul odor/ excessive drainage from surgical site, flu-like symptoms, new wounds or cuts, PLEASE CALL THE SURGEON OFFICE at [phone] or SEND MESSAGE THROUGH [employer] PORTAL!!!       *If you are running late the morning of surgery, please call the Hospital Surgery Dept @ [phone].     *Billing questions:  [phone]       Thank you,  -[facility] Pre Admit Dept.  [phone] or [phone]  M-F 7:30 am-4:00 pm (Closed Major Holidays)

## 2024-04-01 NOTE — PROGRESS NOTES
Pt is here for pre-operative visit.  Pt reports no complaints.  Ready for surgery.  Pt states that she has thought about her options and now desires permanent sterilization surgery at time of surgery.    ROS Negative     Physical Exam:  See H+P    A/P:  Irregular menses  -     Procedure risks, benefits, and alternatives were discussed.  Pt voiced understanding and expressed consent for HSC/D+C/Novasure Ablation/LSC Bilateral Salpingectomy.  Hospital forms were reviewed with pt and signed.  Pre-operative instructions were given.    Request for sterilization  -    Pt was counseled on fertility planning options, including contraception and sterilization options.  Pt is done with her fertility and desires permanent sterilization.  Surgery details, indications, risks, benefits, and alternatives were discussed.  In particular, irreversible nature of surgery was discussed in detail.

## 2024-04-01 NOTE — H&P
[facility] GYN [facility]  Obstetrics & Gynecology  History & Physical    Patient Name: Janice Lawrence  MRN: [id number]  Admission Date: (Not on file)  Primary Care Provider: Patria, Primary Doctor    Subjective:     Chief Complaint/Reason for Admission: surgery    History of Present Illness:   43 yo  with history of irregular menses and desire for permanent sterilization is here for scheduled surgery.    Current Outpatient Medications on File Prior to Visit   Medication Sig    celecoxib (CELEBREX) 200 MG capsule Take 1 capsule (200 mg total) by mouth 2 (two) times daily as needed for Pain.    esomeprazole (NEXIUM) 40 MG capsule TAKE ONE CAPSULE BY MOUTH EVERY DAY BEFORE BREAKFAST    FLUoxetine 40 MG capsule TAKE ONE CAPSULE BY MOUTH ONCE PER DAY    folic acid (FOLVITE) 1 MG tablet Take 1 tablet (1,000 mcg total) by mouth once daily.    guselkumab (TREMFYA) 100 mg/mL Syrg Inject 1 mL (100 mg) into the skin every 8 weeks.    leflunomide (ARAVA) 20 MG Tab TAKE ONE TABLET BY MOUTH ONCE PER DAY (Patient taking differently: Take 20 mg by mouth once daily.)    ondansetron (ZOFRAN-ODT) 4 MG TbDL 1 tablet Orally every 6 hours as needed for nausea and vomiting    VITAMIN D2 1,250 mcg (50,000 unit) capsule TAKE ONE CAPSULE BY MOUTH TWICE A WEEK    [DISCONTINUED] norethindrone (MICRONOR) 0.35 mg tablet Take 1 tablet (0.35 mg total) by mouth once daily.    pregabalin (LYRICA) 75 MG capsule Take 1 capsule (75 mg total) by mouth 2 (two) times daily for 10 days, THEN 2 capsules (150 mg total) 2 (two) times daily for 10 days, THEN 3 capsules (225 mg total) 2 (two) times daily for 10 days.    [DISCONTINUED] folic acid 0.8 mg Cap Folic Acid     No current facility-administered medications on file prior to visit.       Review of patient's allergies indicates:  No Known Allergies    Past Medical History:   Diagnosis Date    Acid reflux     Acquired iron deficiency anemia due to decreased absorption 12/10/2020    Anxiety      Arthritis     Cervical radiculopathy 2023    Depression      OB History    Para Term  AB Living   1 0 0   1     SAB IAB Ectopic Multiple Live Births                  # Outcome Date GA Lbr Nikolas/2nd Weight Sex Delivery Anes PTL Lv   1 AB              Past Surgical History:   Procedure Laterality Date    COLONOSCOPY N/A 02/10/2021    Procedure: COLONOSCOPY;  Surgeon: Angelika Eldridge MD;  Location: [facility];  Service: Endoscopy;  Laterality: N/A;    COSMETIC SURGERY      ESOPHAGOGASTRODUODENOSCOPY N/A 02/10/2021    Procedure: ESOPHAGOGASTRODUODENOSCOPY (EGD);  Surgeon: Angelika Eldridge MD;  Location: [facility] ENDO;  Service: Endoscopy;  Laterality: N/A;    SELECTIVE INJECTION OF ANESTHETIC AGENT AROUND LUMBAR SPINAL NERVE ROOT BY TRANSFORAMINAL APPROACH Right 3/13/2024    Procedure: R sided L4/5 and L5/S1 TFESI;  Surgeon: Dez Lowe MD;  Location: [facility] PAIN [facility]GT;  Service: Pain Management;  Laterality: Right;     Family History       Problem Relation (Age of Onset)    Arthritis Father    Breast cancer Mother    Cancer Mother, Paternal Grandmother    Colon cancer Paternal Aunt    Diabetes Mellitus Maternal Grandfather    Psoriasis Father          Tobacco Use    Smoking status: Former     Current packs/day: 0.50     Average packs/day: 0.5 packs/day for 10.0 years (5.0 ttl pk-yrs)     Types: Cigarettes    Smokeless tobacco: Never   Substance and Sexual Activity    Alcohol use: Yes     Alcohol/week: 3.0 standard drinks of alcohol     Types: 3 Glasses of wine per week     Comment: soc    Drug use: Not Currently     Frequency: 2.0 times per week     Types: Marijuana    Sexual activity: Yes     Birth control/protection: None     Comment: mut mog     Review of Systems   Constitutional:  Negative for activity change, appetite change, chills, fatigue, fever and unexpected weight change.   Respiratory:  Negative for shortness of breath.    Cardiovascular:  Negative for chest pain, palpitations and leg  swelling.   Gastrointestinal:  Positive for abdominal pain. Negative for bloating, blood in stool, constipation, diarrhea, nausea and vomiting.   Genitourinary:  Positive for menorrhagia and menstrual problem. Negative for dysmenorrhea, dyspareunia, dysuria, flank pain, frequency, genital sores, hematuria, pelvic pain, urgency, vaginal bleeding, vaginal discharge, vaginal pain, urinary incontinence, postcoital bleeding, vaginal dryness and vaginal odor.   Musculoskeletal:  Positive for back pain.   Neurological:  Negative for syncope and headaches.     Objective:     Vital Signs (Most Recent):  BP: 126/80 (04/01/24 1400) Vital Signs (24h Range):  [unfilled]     Weight: (!) 143.7 kg (316 lb 12.8 oz)  Body mass index is 44.18 kg/m².  Patient's last menstrual period was 03/25/2024 (exact date).    Physical Exam:   Constitutional: She is oriented to person, place, and time. She appears well-developed and well-nourished. No distress.    HENT:   Head: Normocephalic and atraumatic.    Eyes: Pupils are equal, round, and reactive to light. EOM are normal.     Cardiovascular:  Normal rate, regular rhythm and normal heart sounds.             Pulmonary/Chest: Effort normal and breath sounds normal.        Abdominal: Soft. Bowel sounds are normal. She exhibits no distension. There is no abdominal tenderness.             Musculoskeletal: Normal range of motion and moves all extremeties. No tenderness or edema.       Neurological: She is alert and oriented to person, place, and time.    Skin: Skin is warm and dry.    Psychiatric: She has a normal mood and affect. Her behavior is normal. Thought content normal.       Laboratory:  I have personallly reviewed all pertinent lab results from the last 24 hours.    Diagnostic Results:  Labs: Reviewed  US: Reviewed  Pap reviewed    Assessment/Plan:     There are no hospital problems to display for this patient.    Irregular menses  -     Procedure risks, benefits, and alternatives were  discussed.  Pt voiced understanding and expressed consent for HSC/D+C/Novasure Ablation/LSC Bilateral Salpingectomy.  Hospital forms were reviewed with pt and signed.  Pre-operative instructions were given.    Request for sterilization  -    Pt was counseled on fertility planning options, including contraception and sterilization options.  Pt is done with her fertility and desires permanent sterilization.  Surgery details, indications, risks, benefits, and alternatives were discussed.  In particular, irreversible nature of surgery was discussed in detail.       Vaibhav Stinson MD  Obstetrics & Gynecology  [facility] GYN [facility]

## 2024-04-01 NOTE — H&P (VIEW-ONLY)
[facility] GYN [facility]  Obstetrics & Gynecology  History & Physical    Patient Name: Janice Lawrence  MRN: [id number]  Admission Date: (Not on file)  Primary Care Provider: Patria, Primary Doctor    Subjective:     Chief Complaint/Reason for Admission: surgery    History of Present Illness:   43 yo  with history of irregular menses and desire for permanent sterilization is here for scheduled surgery.    Current Outpatient Medications on File Prior to Visit   Medication Sig    celecoxib (CELEBREX) 200 MG capsule Take 1 capsule (200 mg total) by mouth 2 (two) times daily as needed for Pain.    esomeprazole (NEXIUM) 40 MG capsule TAKE ONE CAPSULE BY MOUTH EVERY DAY BEFORE BREAKFAST    FLUoxetine 40 MG capsule TAKE ONE CAPSULE BY MOUTH ONCE PER DAY    folic acid (FOLVITE) 1 MG tablet Take 1 tablet (1,000 mcg total) by mouth once daily.    guselkumab (TREMFYA) 100 mg/mL Syrg Inject 1 mL (100 mg) into the skin every 8 weeks.    leflunomide (ARAVA) 20 MG Tab TAKE ONE TABLET BY MOUTH ONCE PER DAY (Patient taking differently: Take 20 mg by mouth once daily.)    ondansetron (ZOFRAN-ODT) 4 MG TbDL 1 tablet Orally every 6 hours as needed for nausea and vomiting    VITAMIN D2 1,250 mcg (50,000 unit) capsule TAKE ONE CAPSULE BY MOUTH TWICE A WEEK    [DISCONTINUED] norethindrone (MICRONOR) 0.35 mg tablet Take 1 tablet (0.35 mg total) by mouth once daily.    pregabalin (LYRICA) 75 MG capsule Take 1 capsule (75 mg total) by mouth 2 (two) times daily for 10 days, THEN 2 capsules (150 mg total) 2 (two) times daily for 10 days, THEN 3 capsules (225 mg total) 2 (two) times daily for 10 days.    [DISCONTINUED] folic acid 0.8 mg Cap Folic Acid     No current facility-administered medications on file prior to visit.       Review of patient's allergies indicates:  No Known Allergies    Past Medical History:   Diagnosis Date    Acid reflux     Acquired iron deficiency anemia due to decreased absorption 12/10/2020    Anxiety      Arthritis     Cervical radiculopathy 2023    Depression      OB History    Para Term  AB Living   1 0 0   1     SAB IAB Ectopic Multiple Live Births                  # Outcome Date GA Lbr Nikolas/2nd Weight Sex Delivery Anes PTL Lv   1 AB              Past Surgical History:   Procedure Laterality Date    COLONOSCOPY N/A 02/10/2021    Procedure: COLONOSCOPY;  Surgeon: Angelika Eldridge MD;  Location: [facility];  Service: Endoscopy;  Laterality: N/A;    COSMETIC SURGERY      ESOPHAGOGASTRODUODENOSCOPY N/A 02/10/2021    Procedure: ESOPHAGOGASTRODUODENOSCOPY (EGD);  Surgeon: Angelika Eldridge MD;  Location: [facility] ENDO;  Service: Endoscopy;  Laterality: N/A;    SELECTIVE INJECTION OF ANESTHETIC AGENT AROUND LUMBAR SPINAL NERVE ROOT BY TRANSFORAMINAL APPROACH Right 3/13/2024    Procedure: R sided L4/5 and L5/S1 TFESI;  Surgeon: Dez Lowe MD;  Location: [facility] PAIN MGT;  Service: Pain Management;  Laterality: Right;     Family History       Problem Relation (Age of Onset)    Arthritis Father    Breast cancer Mother    Cancer Mother, Paternal Grandmother    Colon cancer Paternal Aunt    Diabetes Mellitus Maternal Grandfather    Psoriasis Father          Tobacco Use    Smoking status: Former     Current packs/day: 0.50     Average packs/day: 0.5 packs/day for 10.0 years (5.0 ttl pk-yrs)     Types: Cigarettes    Smokeless tobacco: Never   Substance and Sexual Activity    Alcohol use: Yes     Alcohol/week: 3.0 standard drinks of alcohol     Types: 3 Glasses of wine per week     Comment: soc    Drug use: Not Currently     Frequency: 2.0 times per week     Types: Marijuana    Sexual activity: Yes     Birth control/protection: None     Comment: mut mog     Review of Systems   Constitutional:  Negative for activity change, appetite change, chills, fatigue, fever and unexpected weight change.   Respiratory:  Negative for shortness of breath.    Cardiovascular:  Negative for chest pain, palpitations and leg  swelling.   Gastrointestinal:  Positive for abdominal pain. Negative for bloating, blood in stool, constipation, diarrhea, nausea and vomiting.   Genitourinary:  Positive for menorrhagia and menstrual problem. Negative for dysmenorrhea, dyspareunia, dysuria, flank pain, frequency, genital sores, hematuria, pelvic pain, urgency, vaginal bleeding, vaginal discharge, vaginal pain, urinary incontinence, postcoital bleeding, vaginal dryness and vaginal odor.   Musculoskeletal:  Positive for back pain.   Neurological:  Negative for syncope and headaches.     Objective:     Vital Signs (Most Recent):  BP: 126/80 (04/01/24 1400) Vital Signs (24h Range):  [unfilled]     Weight: (!) 143.7 kg (316 lb 12.8 oz)  Body mass index is 44.18 kg/m².  Patient's last menstrual period was 03/25/2024 (exact date).    Physical Exam:   Constitutional: She is oriented to person, place, and time. She appears well-developed and well-nourished. No distress.    HENT:   Head: Normocephalic and atraumatic.    Eyes: Pupils are equal, round, and reactive to light. EOM are normal.     Cardiovascular:  Normal rate, regular rhythm and normal heart sounds.             Pulmonary/Chest: Effort normal and breath sounds normal.        Abdominal: Soft. Bowel sounds are normal. She exhibits no distension. There is no abdominal tenderness.             Musculoskeletal: Normal range of motion and moves all extremeties. No tenderness or edema.       Neurological: She is alert and oriented to person, place, and time.    Skin: Skin is warm and dry.    Psychiatric: She has a normal mood and affect. Her behavior is normal. Thought content normal.       Laboratory:  I have personallly reviewed all pertinent lab results from the last 24 hours.    Diagnostic Results:  Labs: Reviewed  US: Reviewed  Pap reviewed    Assessment/Plan:     There are no hospital problems to display for this patient.    Irregular menses  -     Procedure risks, benefits, and alternatives were  discussed.  Pt voiced understanding and expressed consent for HSC/D+C/Novasure Ablation/LSC Bilateral Salpingectomy.  Hospital forms were reviewed with pt and signed.  Pre-operative instructions were given.    Request for sterilization  -    Pt was counseled on fertility planning options, including contraception and sterilization options.  Pt is done with her fertility and desires permanent sterilization.  Surgery details, indications, risks, benefits, and alternatives were discussed.  In particular, irreversible nature of surgery was discussed in detail.       Vaibhav Stinson MD  Obstetrics & Gynecology  [facility] OB GYN [facility]

## 2024-04-02 DIAGNOSIS — L40.50 PSORIATIC ARTHRITIS: ICD-10-CM

## 2024-04-02 DIAGNOSIS — L40.0 PLAQUE PSORIASIS: ICD-10-CM

## 2024-04-02 RX ORDER — LEFLUNOMIDE 20 MG/1
TABLET ORAL
Qty: 30 TABLET | Refills: 2 | Status: SHIPPED | OUTPATIENT
Start: 2024-04-02 | End: 2024-04-02 | Stop reason: SDUPTHER

## 2024-04-02 RX ORDER — LEFLUNOMIDE 20 MG/1
TABLET ORAL
Qty: 30 TABLET | Refills: 2 | Status: SHIPPED | OUTPATIENT
Start: 2024-04-02

## 2024-04-02 NOTE — PROGRESS NOTES
"Established Patient Chronic Pain Note     The patient location is: home  The chief complaint leading to consultation is: back and R leg pain  Visit type: Virtual visit with synchronous audio and video  Total time spent with patient: 11-15m  Each patient to whom he or she provides medical services by telemedicine is: (1) informed of the relationship between the physician and patient and the respective role of any other health care provider with respect to management of the patient; and (2) notified that he or she may decline to receive medical services by telemedicine and may withdraw from such care at any time.    Referring Physician: No ref. provider found    PCP: No, Primary Doctor    Chief Complaint:   Back and R leg pain       SUBJECTIVE:  Interval history 04/03/2024  Patient presents status post right-sided L4-5 and L5-S1 transforaminal epidural steroid injection 03/13/2024.  Patient reports 100% resolution of lower extremity radicular symptoms.  Today she reports myofascial pain remaining in the lower back.  She reports muscles are trying to loosen." She is continued physician directed physical therapy exercises at home.  She recently refilled Lyrica which she is taking 75 mg twice daily.  She denies any side effects from this medication.  Today she denies significant lower extremity radiculopathy, lower extremity weakness, bowel or bladder incontinence or saddle anesthesia.      Interval history 02/21/2024  Patient presents for MRI lumbar spine review.  Today patient again reports pain in the lower back and bilateral lower extremities, worse on the right.  Patient reports pain which on average is rated a 6/10.  Pain presents in a bandlike distribution in the lower back which radiates down bilateral lower extremities in L4-S1 distribution to the calf, 90% on the right.  Patient has been performing physician directed physical therapy exercises at home from 11/08/2023 through 01/09/2024 with marginal " improvement in her pain.  She did start Lyrica titration is taking 75 mg once daily with mild improvement in her symptoms.  She denies any side effects from this medication.  She would like to avoid excessive escalation of this dose of medication as she was on higher doses of gabapentin in the past.  Of note patient is a  in his interested in pursuing intervention but would need to work around her schedule.    HPI 02/06/2024  Janice Lawrence is a 44 y.o. female with past medical history significant for anxiety and depression, psoriasis on immunosuppression, morbid obesity, fatty liver, GERD, multi joint osteoarthritis who presents to the clinic for the evaluation of lower back and leg pain.  Patient reports pain has been present for several years, greater than 10 years, without inciting accident injury or trauma.  Pain is constant and today is rated a 6/10.  Pain at its best is a 3/10 and at its worse is a 10/10.  Patient denies preceding accident injury or trauma.  Patient reports pain in a bandlike distribution in the lower back which can radiate down the lateral and posterior aspect of the right lower extremity in L4-S1 distribution to the calf.  Patient denies left lower extremity radiculopathy, bowel or bladder incontinence, saddle anesthesia.  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  She does report with periods of inactivity, her pain can be exacerbated as well.  Pain is marginally improved with sitting, stretching, hot baths.  She does endorse associated weakness in the right lower extremity associated with her pain.  Patient has trialed gabapentin, up to 1200 mg in a day, 5 years prior with marginal improvement in her symptoms.  She is also received 2 prior lumbar epidural steroid injections, greater than 10 years prior with Dr. Luna with ineffective relief.  She has completed several rounds of conventional  land-based physical therapy and has continued physician directed physical therapy exercises over the last 8 weeks from 11/08/2023 through 01/09/2024 with marginal improvement in her pain.    Patient reports significant motor weakness.  Patient denies night fever/night sweats, urinary incontinence, bowel incontinence, significant weight loss, and loss of sensations.      Pain Disability Index Review:         1/9/2024    11:53 AM   Last 3 PDI Scores   Pain Disability Index (PDI) 42       Non-Pharmacologic Treatments:  Physical Therapy/Home Exercise: no  Ice/Heat:yes  TENS: yes  Acupuncture: yes  Massage: yes  Chiropractic: yes    Other: no      Pain Medications:  - Adjuvant Medications:  Celebrex, Fluoxetine    Pain Procedures:   -Dr. Phil Luna: ROCIO Lowe:  -03/13/2024: Right-sided L4-5 and L5-S1 transforaminal epidural steroid injection    Past Medical History:   Diagnosis Date    Acid reflux     Acquired iron deficiency anemia due to decreased absorption 12/10/2020    Anxiety     Arthritis     Cervical radiculopathy 5/9/2023    Depression      Past Surgical History:   Procedure Laterality Date    COLONOSCOPY N/A 02/10/2021    Procedure: COLONOSCOPY;  Surgeon: Angelika Eldridge MD;  Location: [facility];  Service: Endoscopy;  Laterality: N/A;    COSMETIC SURGERY      ESOPHAGOGASTRODUODENOSCOPY N/A 02/10/2021    Procedure: ESOPHAGOGASTRODUODENOSCOPY (EGD);  Surgeon: Angelika Eldridge MD;  Location: [facility];  Service: Endoscopy;  Laterality: N/A;    SELECTIVE INJECTION OF ANESTHETIC AGENT AROUND LUMBAR SPINAL NERVE ROOT BY TRANSFORAMINAL APPROACH Right 3/13/2024    Procedure: R sided L4/5 and L5/S1 TFESI;  Surgeon: Dez Lowe MD;  Location: [facility];  Service: Pain Management;  Laterality: Right;     Review of patient's allergies indicates:  No Known Allergies    Current Outpatient Medications   Medication Sig    celecoxib (CELEBREX) 200 MG capsule Take 1 capsule (200 mg total) by mouth 2  (two) times daily as needed for Pain.    esomeprazole (NEXIUM) 40 MG capsule TAKE ONE CAPSULE BY MOUTH EVERY DAY BEFORE BREAKFAST    FLUoxetine 40 MG capsule TAKE ONE CAPSULE BY MOUTH ONCE PER DAY    folic acid (FOLVITE) 1 MG tablet Take 1 tablet (1,000 mcg total) by mouth once daily.    guselkumab (TREMFYA) 100 mg/mL Syrg Inject 1 mL (100 mg) into the skin every 8 weeks.    leflunomide (ARAVA) 20 MG Tab TAKE ONE TABLET BY MOUTH ONCE PER DAY  Strength: 20 mg    ondansetron (ZOFRAN-ODT) 4 MG TbDL 1 tablet Orally every 6 hours as needed for nausea and vomiting    pregabalin (LYRICA) 75 MG capsule Take 1 capsule (75 mg total) by mouth 2 (two) times daily for 10 days, THEN 2 capsules (150 mg total) 2 (two) times daily for 10 days, THEN 3 capsules (225 mg total) 2 (two) times daily for 10 days.    VITAMIN D2 1,250 mcg (50,000 unit) capsule TAKE ONE CAPSULE BY MOUTH TWICE A WEEK     No current facility-administered medications for this visit.       Review of Systems     GENERAL:  No weight loss, malaise or fevers.  HEENT:   No recent changes in vision or hearing  NECK:  Negative for lumps, no difficulty with swallowing.  RESPIRATORY:  Negative for cough, wheezing or shortness of breath, patient denies any recent URI.  CARDIOVASCULAR:  Negative for chest pain or palpitations.  GI:  Negative for abdominal discomfort, blood in stools or black stools or change in bowel habits.  MUSCULOSKELETAL:  See HPI.  SKIN:  Negative for lesions, rash, and itching.  PSYCH:  No mood disorder or recent psychosocial stressors.   HEMATOLOGY/LYMPHOLOGY:  Negative for prolonged bleeding, bruising easily or swollen nodes.    NEURO:   No history of syncope, paralysis, seizures or tremors.  All other reviewed and negative other than HPI.    OBJECTIVE:    LMP 03/25/2024 (Exact Date)       Physical Exam    GENERAL: Well appearing, in no acute distress, alert and oriented x3.  PSYCH:  Mood and affect appropriate.  SKIN: Skin color, texture, turgor  normal, no rashes or lesions.  HEAD/FACE:  Normocephalic, atraumatic. Cranial nerves grossly intact.    CV: RRR with palpation of the radial artery.  PULM: No evidence of respiratory difficulty, symmetric chest rise.  GI:  Soft and non-tender.    BACK: Straight leg raising in the sitting and supine positions is negative to radicular pain.pain to palpation over the facet joints of the lumbar spine or spinous processes. Normal range of motion without pain reproduction.  EXTREMITIES: Peripheral joint ROM is full and pain free without obvious instability or laxity in all four extremities. No deformities, edema, or skin discoloration. Good capillary refill.  MUSCULOSKELETAL: Able to stand on heels & toes.   Shoulder, hip, and knee provocative maneuvers are negative.  There is no pain with palpation over the sacroiliac joints bilaterally.  Gaenslen's, Distraction/Compression and  FABERs test is negative.  Facet loading test is negative bilaterally.   Bilateral upper and lower extremity strength is normal and symmetric.  No atrophy or tone abnormalities are noted.    RIGHT Lower extremity: Hip flexion 5/5, Hip Abduction 5/5, Hip Adduction 5/5, Knee extension 5/5, Knee flexion 5/5, Ankle dorsiflexion5/5, Extensor hallucis longus 5/5, Ankle plantarflexion 5/5  LEFT Lower extremity:  Hip flexion 5/5, Hip Abduction 5/5,Hip Adduction 5/5, Knee extension 5/5, Knee flexion 5/5, Ankle dorsiflexion 5/5, Extensor hallucis longus 5/5, Ankle plantarflexion 5/5  -Normal testing knee (patellar) jerk and ankle (achilles) jerk    NEURO: Bilateral upper and lower extremity coordination and muscle stretch reflexes are physiologic and symmetric. No loss of sensation is noted.  GAIT: normal.    Imaging:   EMG 01/22/2024  IMPRESSION  ABNORMAL study  2. There is electrodiagnostic evidence of a subacute on chronic radiculopathy of the S1  nerve root and a chronic radiculopathy of the L5 nerve root    02/22/16    X-Ray Lumbar Spine AP And  Lateral    Narrative  Three views of the lumbar spine    Findings: The vertebral bodies demonstrate normal height.  There is straightening of the normal lordotic curvature. The disk space heights are maintained. No significant facet arthropathy.       05/09/23    X-Ray Cervical Spine AP And Lateral    Narrative  EXAM:  XR CERVICAL SPINE AP LATERAL    CLINICAL HISTORY:  Pain    FINDINGS:    Vertebral body height and alignment are maintained.  The disc spaces are normal in height.  No acute fracture is evident.  No prevertebral soft tissue swelling.                    ASSESSMENT: 44 y.o. year old female with     1. Lumbar radiculopathy        2. DDD (degenerative disc disease), lumbar        3. Lumbar facet arthropathy                PLAN:   - Interventions:  Patient has 100% sustained relief in right lower extremity radicular symptoms following right-sided L4-5 and L5-S1 transforaminal epidural steroid injection.  We discussed repeating this procedure in the future should radicular symptoms exacerbate.    - Anticoagulation use: No no anticoagulation     report:  Reviewed and consistent with medication use as prescribed.    - Medications:  -Continue Lyrica.  We discussed potential side effects of this medication which may include drowsiness,dizziness, dry mouth, constipation or peripheral edema.  -Lyrica 75 mg twice daily    - Therapy:   We discussed continuing at home physician directed physical therapy to help manage the patient/s painful condition. The patient was counseled that muscle strengthening will improve the long term prognosis in regards to pain and may also help increase range of motion and mobility.     - Imaging: Reviewed available imaging (MRI lumbar spine) with patient and answered any questions they had regarding study.     - Consults/Referrals: lower extremity electromyography studies-- reviewed    - Records: Obtain old records from outside physicians and imaging (not received 04/03/2024)    -  Follow up visit: return to clinic 6 months.  Scheduling ticket sent.      The above plan and management options were discussed at length with patient. Patient is in agreement with the above and verbalized understanding.    - I discussed the goals of interventional chronic pain management with the patient on today's visit. We discussed a multimodal and systematic approach to pain.  This includes diagnostic and therapeutic injections, adjuvant pharmacologic treatment, physical therapy, and at times psychiatry.  I emphasized the importance of regular exercise, core strengthening and stretching, diet and weight loss as a cornerstone of long-term pain management.    - This condition does not require this patient to take time off of work, and the primary goal of our Pain Management services is to improve the patient's functional capacity.  - Patient Questions: Answered all of the patient's questions regarding diagnoses, therapy, treatment and next steps        Dez Lowe MD  Interventional Pain Management  [facility]    Disclaimer:  This note was prepared using voice recognition system and is likely to have sound alike errors that may have been overlooked even after proof reading.  Please call me with any questions

## 2024-04-03 ENCOUNTER — PATIENT MESSAGE (OUTPATIENT)
Dept: OBSTETRICS AND GYNECOLOGY | Facility: CLINIC | Age: 44
End: 2024-04-03
Payer: COMMERCIAL

## 2024-04-03 ENCOUNTER — PATIENT MESSAGE (OUTPATIENT)
Dept: PAIN MEDICINE | Facility: CLINIC | Age: 44
End: 2024-04-03

## 2024-04-03 ENCOUNTER — OFFICE VISIT (OUTPATIENT)
Dept: PAIN MEDICINE | Facility: CLINIC | Age: 44
End: 2024-04-03
Payer: COMMERCIAL

## 2024-04-03 DIAGNOSIS — M51.36 DDD (DEGENERATIVE DISC DISEASE), LUMBAR: ICD-10-CM

## 2024-04-03 DIAGNOSIS — M47.816 LUMBAR FACET ARTHROPATHY: ICD-10-CM

## 2024-04-03 DIAGNOSIS — M54.16 LUMBAR RADICULOPATHY: Primary | ICD-10-CM

## 2024-04-03 PROCEDURE — 99214 OFFICE O/P EST MOD 30 MIN: CPT | Mod: 95,,, | Performed by: ANESTHESIOLOGY

## 2024-04-04 ENCOUNTER — PATIENT MESSAGE (OUTPATIENT)
Dept: OBSTETRICS AND GYNECOLOGY | Facility: CLINIC | Age: 44
End: 2024-04-04
Payer: COMMERCIAL

## 2024-04-05 ENCOUNTER — TELEPHONE (OUTPATIENT)
Dept: PREADMISSION TESTING | Facility: HOSPITAL | Age: 44
End: 2024-04-05
Payer: COMMERCIAL

## 2024-04-05 NOTE — TELEPHONE ENCOUNTER
Called and spoke with pt sister about the following:     Please arrive to [facility] at 6am on 4/8/24 for your scheduled procedure.  Address: [address] [place] [address] [place] [id number] ([address] Building on left, 1st Floor Lobby)  >>>NO eating or drinking after midnight unless instructed otherwise by your Surgeon<<<    Thank you,  -[facility]  Mon-Fri 7:30 am - 4 pm [phone]

## 2024-04-07 ENCOUNTER — ANESTHESIA EVENT (OUTPATIENT)
Dept: SURGERY | Facility: HOSPITAL | Age: 44
End: 2024-04-07
Payer: COMMERCIAL

## 2024-04-07 NOTE — ANESTHESIA PREPROCEDURE EVALUATION
04/07/2024  Janice Lawrence is a 44 y.o., female.    Patient Active Problem List   Diagnosis    Plaque psoriasis    Polyarthritis    Low back pain without sciatica    Gastroesophageal reflux disease without esophagitis    Vitamin D deficiency    Psoriatic arthritis    Polyarticular psoriatic arthritis    Obesity, morbid, BMI 40.0-49.9    Tendinopathy of right rotator cuff    Major depressive disorder in partial remission    High risk medication use    Chronic pain of both knees    Immunosuppressed status    Iron deficiency anemia    Fatty liver    Decreased range of motion    Pain    Swelling    Weakness    Anxiety and depression    Eosinophilia    Cervical radiculopathy    Numbness and tingling in both hands    Drug-induced immunodeficiency    Sciatica of right side    Lumbar radiculopathy     Past Surgical History:   Procedure Laterality Date    COLONOSCOPY N/A 02/10/2021    Procedure: COLONOSCOPY;  Surgeon: Angelika Eldridge MD;  Location: [facility];  Service: Endoscopy;  Laterality: N/A;    COSMETIC SURGERY      ESOPHAGOGASTRODUODENOSCOPY N/A 02/10/2021    Procedure: ESOPHAGOGASTRODUODENOSCOPY (EGD);  Surgeon: Angelika Eldridge MD;  Location: [facility];  Service: Endoscopy;  Laterality: N/A;    SELECTIVE INJECTION OF ANESTHETIC AGENT AROUND LUMBAR SPINAL NERVE ROOT BY TRANSFORAMINAL APPROACH Right 3/13/2024    Procedure: R sided L4/5 and L5/S1 TFESI;  Surgeon: Dez Lowe MD;  Location: [facility];  Service: Pain Management;  Laterality: Right;       Pre-op Assessment    I have reviewed the Patient Summary Reports.    I have reviewed the NPO Status.   I have reviewed the Medications.     Review of Systems  Anesthesia Hx:  No problems with previous Anesthesia                Social:  Former Smoker       Hematology/Oncology:  Hematology Normal                                      Cardiovascular:  Cardiovascular Normal                  ECG has been reviewed.                          Pulmonary:  Pulmonary Normal                       Renal/:  Renal/ Normal                 Hepatic/GI:     GERD   Fatty liver          Musculoskeletal:     Low back pain without sciatica  Lumbar radiculopathy                Neurological:    Neuromuscular Disease,       Cervical radiculopathy                            Endocrine:  Endocrine Normal          Morbid Obesity / BMI > 40  Psych:   anxiety depression                Physical Exam  General: Well nourished    Airway:  Mallampati: II   Mouth Opening: Normal  TM Distance: Normal  Neck ROM: Normal ROM    Dental:  Intact        Anesthesia Plan  Type of Anesthesia, risks & benefits discussed:    Anesthesia Type: Gen ETT, Gen Supraglottic Airway  Intra-op Monitoring Plan: Standard ASA Monitors  Post Op Pain Control Plan: multimodal analgesia  Induction:  IV  Airway Plan: , Post-Induction  Informed Consent: Informed consent signed with the Patient and all parties understand the risks and agree with anesthesia plan.  All questions answered.   ASA Score: 3    Ready For Surgery From Anesthesia Perspective.     .      Chemistry        Component Value Date/Time     04/01/2024 1436    K 4.3 04/01/2024 1436     04/01/2024 1436    CO2 21 (L) 04/01/2024 1436    BUN 15 04/01/2024 1436    CREATININE 0.8 04/01/2024 1436     04/01/2024 1436        Component Value Date/Time    CALCIUM 9.1 04/01/2024 1436    ALKPHOS 82 02/16/2024 1401    AST 33 02/16/2024 1401    ALT 39 02/16/2024 1401    BILITOT 0.6 02/16/2024 1401    ESTGFRAFRICA >60 07/19/2022 1155    EGFRNONAA >60 07/19/2022 1155        Lab Results   Component Value Date    WBC 8.82 04/01/2024    HGB 11.9 (L) 04/01/2024    HCT 36.5 (L) 04/01/2024    MCV 85 04/01/2024     04/01/2024

## 2024-04-08 ENCOUNTER — PATIENT MESSAGE (OUTPATIENT)
Dept: RHEUMATOLOGY | Facility: CLINIC | Age: 44
End: 2024-04-08
Payer: COMMERCIAL

## 2024-04-08 ENCOUNTER — ANESTHESIA (OUTPATIENT)
Dept: SURGERY | Facility: HOSPITAL | Age: 44
End: 2024-04-08
Payer: COMMERCIAL

## 2024-04-08 ENCOUNTER — HOSPITAL ENCOUNTER (OUTPATIENT)
Facility: HOSPITAL | Age: 44
Discharge: HOME | End: 2024-04-08
Attending: OBSTETRICS & GYNECOLOGY | Admitting: OBSTETRICS & GYNECOLOGY
Payer: COMMERCIAL

## 2024-04-08 DIAGNOSIS — Z90.79 STATUS POST BILATERAL SALPINGECTOMY: Primary | ICD-10-CM

## 2024-04-08 DIAGNOSIS — N92.6 IRREGULAR MENSES: ICD-10-CM

## 2024-04-08 DIAGNOSIS — Z98.890 STATUS POST ENDOMETRIAL ABLATION: ICD-10-CM

## 2024-04-08 PROBLEM — Z90.710 STATUS POST HYSTERECTOMY: Status: ACTIVE | Noted: 2024-04-08

## 2024-04-08 PROCEDURE — 58661 LAPAROSCOPY REMOVE ADNEXA: CPT | Mod: 51,50,, | Performed by: OBSTETRICS & GYNECOLOGY

## 2024-04-08 PROCEDURE — 88305 TISSUE EXAM BY PATHOLOGIST: CPT | Mod: 26,,, | Performed by: PATHOLOGY

## 2024-04-08 PROCEDURE — 58563 HYSTEROSCOPY ABLATION: CPT | Mod: ,,, | Performed by: OBSTETRICS & GYNECOLOGY

## 2024-04-08 PROCEDURE — 71000015 HC POSTOP RECOV 1ST HR: Performed by: OBSTETRICS & GYNECOLOGY

## 2024-04-08 PROCEDURE — 63600175 PHARM REV CODE 636 W HCPCS: Mod: JZ,JG | Performed by: NURSE ANESTHETIST, CERTIFIED REGISTERED

## 2024-04-08 PROCEDURE — 36000709 HC OR TIME LEV III EA ADD 15 MIN: Performed by: OBSTETRICS & GYNECOLOGY

## 2024-04-08 PROCEDURE — 88305 TISSUE EXAM BY PATHOLOGIST: CPT | Performed by: PATHOLOGY

## 2024-04-08 PROCEDURE — 88302 TISSUE EXAM BY PATHOLOGIST: CPT | Mod: 26,,, | Performed by: PATHOLOGY

## 2024-04-08 PROCEDURE — 63600175 PHARM REV CODE 636 W HCPCS: Performed by: NURSE ANESTHETIST, CERTIFIED REGISTERED

## 2024-04-08 PROCEDURE — 63600175 PHARM REV CODE 636 W HCPCS: Performed by: ANESTHESIOLOGY

## 2024-04-08 PROCEDURE — 25000003 PHARM REV CODE 250: Performed by: NURSE ANESTHETIST, CERTIFIED REGISTERED

## 2024-04-08 PROCEDURE — 25000242 PHARM REV CODE 250 ALT 637 W/ HCPCS: Performed by: NURSE ANESTHETIST, CERTIFIED REGISTERED

## 2024-04-08 PROCEDURE — 36000708 HC OR TIME LEV III 1ST 15 MIN: Performed by: OBSTETRICS & GYNECOLOGY

## 2024-04-08 PROCEDURE — 37000008 HC ANESTHESIA 1ST 15 MINUTES: Performed by: OBSTETRICS & GYNECOLOGY

## 2024-04-08 PROCEDURE — 71000033 HC RECOVERY, INTIAL HOUR: Performed by: OBSTETRICS & GYNECOLOGY

## 2024-04-08 PROCEDURE — 27201423 OPTIME MED/SURG SUP & DEVICES STERILE SUPPLY: Performed by: OBSTETRICS & GYNECOLOGY

## 2024-04-08 PROCEDURE — 37000009 HC ANESTHESIA EA ADD 15 MINS: Performed by: OBSTETRICS & GYNECOLOGY

## 2024-04-08 PROCEDURE — 88302 TISSUE EXAM BY PATHOLOGIST: CPT | Mod: 59 | Performed by: PATHOLOGY

## 2024-04-08 PROCEDURE — 71000039 HC RECOVERY, EACH ADD'L HOUR: Performed by: OBSTETRICS & GYNECOLOGY

## 2024-04-08 RX ORDER — SODIUM CHLORIDE 9 MG/ML
INJECTION, SOLUTION INTRAVENOUS CONTINUOUS
Status: DISCONTINUED | OUTPATIENT
Start: 2024-04-08 | End: 2024-04-08 | Stop reason: HOSPADM

## 2024-04-08 RX ORDER — PROPOFOL 10 MG/ML
VIAL (ML) INTRAVENOUS
Status: DISCONTINUED | OUTPATIENT
Start: 2024-04-08 | End: 2024-04-08

## 2024-04-08 RX ORDER — PROMETHAZINE HYDROCHLORIDE 25 MG/1
25 TABLET ORAL EVERY 6 HOURS PRN
Status: DISCONTINUED | OUTPATIENT
Start: 2024-04-08 | End: 2024-04-08 | Stop reason: HOSPADM

## 2024-04-08 RX ORDER — ALBUTEROL SULFATE 90 UG/1
AEROSOL, METERED RESPIRATORY (INHALATION)
Status: DISCONTINUED | OUTPATIENT
Start: 2024-04-08 | End: 2024-04-08

## 2024-04-08 RX ORDER — HYDROMORPHONE HYDROCHLORIDE 2 MG/ML
0.2 INJECTION, SOLUTION INTRAMUSCULAR; INTRAVENOUS; SUBCUTANEOUS EVERY 5 MIN PRN
Status: DISCONTINUED | OUTPATIENT
Start: 2024-04-08 | End: 2024-04-08 | Stop reason: HOSPADM

## 2024-04-08 RX ORDER — FAMOTIDINE 20 MG/1
20 TABLET, FILM COATED ORAL
Status: DISCONTINUED | OUTPATIENT
Start: 2024-04-08 | End: 2024-04-08 | Stop reason: HOSPADM

## 2024-04-08 RX ORDER — OXYCODONE AND ACETAMINOPHEN 5; 325 MG/1; MG/1
1 TABLET ORAL
Status: DISCONTINUED | OUTPATIENT
Start: 2024-04-08 | End: 2024-04-08 | Stop reason: HOSPADM

## 2024-04-08 RX ORDER — DIPHENHYDRAMINE HYDROCHLORIDE 50 MG/ML
25 INJECTION INTRAMUSCULAR; INTRAVENOUS EVERY 4 HOURS PRN
Status: CANCELLED | OUTPATIENT
Start: 2024-04-08

## 2024-04-08 RX ORDER — HYDROCODONE BITARTRATE AND ACETAMINOPHEN 10; 325 MG/1; MG/1
1 TABLET ORAL EVERY 4 HOURS PRN
Status: CANCELLED | OUTPATIENT
Start: 2024-04-08

## 2024-04-08 RX ORDER — KETOROLAC TROMETHAMINE 30 MG/ML
30 INJECTION, SOLUTION INTRAMUSCULAR; INTRAVENOUS EVERY 6 HOURS
Status: DISCONTINUED | OUTPATIENT
Start: 2024-04-08 | End: 2024-04-08 | Stop reason: HOSPADM

## 2024-04-08 RX ORDER — MUPIROCIN 20 MG/G
OINTMENT TOPICAL
Status: DISCONTINUED | OUTPATIENT
Start: 2024-04-08 | End: 2024-04-08 | Stop reason: HOSPADM

## 2024-04-08 RX ORDER — FENTANYL CITRATE 50 UG/ML
INJECTION, SOLUTION INTRAMUSCULAR; INTRAVENOUS
Status: DISCONTINUED | OUTPATIENT
Start: 2024-04-08 | End: 2024-04-08

## 2024-04-08 RX ORDER — KETOROLAC TROMETHAMINE 30 MG/ML
15 INJECTION, SOLUTION INTRAMUSCULAR; INTRAVENOUS EVERY 8 HOURS PRN
Status: DISCONTINUED | OUTPATIENT
Start: 2024-04-08 | End: 2024-04-08 | Stop reason: HOSPADM

## 2024-04-08 RX ORDER — ONDANSETRON 8 MG/1
8 TABLET, ORALLY DISINTEGRATING ORAL EVERY 8 HOURS PRN
Status: DISCONTINUED | OUTPATIENT
Start: 2024-04-08 | End: 2024-04-08 | Stop reason: HOSPADM

## 2024-04-08 RX ORDER — HYDROCODONE BITARTRATE AND ACETAMINOPHEN 5; 325 MG/1; MG/1
1 TABLET ORAL EVERY 4 HOURS PRN
Status: CANCELLED | OUTPATIENT
Start: 2024-04-08

## 2024-04-08 RX ORDER — PROCHLORPERAZINE EDISYLATE 5 MG/ML
2.5 INJECTION INTRAMUSCULAR; INTRAVENOUS EVERY 6 HOURS PRN
Status: DISCONTINUED | OUTPATIENT
Start: 2024-04-08 | End: 2024-04-08 | Stop reason: HOSPADM

## 2024-04-08 RX ORDER — ROCURONIUM BROMIDE 10 MG/ML
INJECTION, SOLUTION INTRAVENOUS
Status: DISCONTINUED | OUTPATIENT
Start: 2024-04-08 | End: 2024-04-08

## 2024-04-08 RX ORDER — ONDANSETRON 4 MG/1
4 TABLET, ORALLY DISINTEGRATING ORAL EVERY 6 HOURS PRN
Qty: 10 TABLET | Refills: 0 | Status: SHIPPED | OUTPATIENT
Start: 2024-04-08

## 2024-04-08 RX ORDER — SUCCINYLCHOLINE CHLORIDE 20 MG/ML
INJECTION INTRAMUSCULAR; INTRAVENOUS
Status: DISCONTINUED | OUTPATIENT
Start: 2024-04-08 | End: 2024-04-08

## 2024-04-08 RX ORDER — HYDROMORPHONE HYDROCHLORIDE 2 MG/ML
1 INJECTION, SOLUTION INTRAMUSCULAR; INTRAVENOUS; SUBCUTANEOUS EVERY 6 HOURS PRN
Status: CANCELLED | OUTPATIENT
Start: 2024-04-08

## 2024-04-08 RX ORDER — SODIUM CHLORIDE, SODIUM LACTATE, POTASSIUM CHLORIDE, CALCIUM CHLORIDE 600; 310; 30; 20 MG/100ML; MG/100ML; MG/100ML; MG/100ML
INJECTION, SOLUTION INTRAVENOUS CONTINUOUS PRN
Status: DISCONTINUED | OUTPATIENT
Start: 2024-04-08 | End: 2024-04-08

## 2024-04-08 RX ORDER — HYDROCODONE BITARTRATE AND ACETAMINOPHEN 5; 325 MG/1; MG/1
1 TABLET ORAL EVERY 4 HOURS PRN
Qty: 14 TABLET | Refills: 0 | Status: SHIPPED | OUTPATIENT
Start: 2024-04-08

## 2024-04-08 RX ORDER — ONDANSETRON HYDROCHLORIDE 2 MG/ML
4 INJECTION, SOLUTION INTRAVENOUS DAILY PRN
Status: DISCONTINUED | OUTPATIENT
Start: 2024-04-08 | End: 2024-04-08 | Stop reason: HOSPADM

## 2024-04-08 RX ORDER — IBUPROFEN 800 MG/1
800 TABLET ORAL EVERY 8 HOURS PRN
Qty: 30 TABLET | Refills: 0 | Status: SHIPPED | OUTPATIENT
Start: 2024-04-08 | End: 2024-05-17

## 2024-04-08 RX ORDER — MIDAZOLAM HYDROCHLORIDE 1 MG/ML
INJECTION INTRAMUSCULAR; INTRAVENOUS
Status: DISCONTINUED | OUTPATIENT
Start: 2024-04-08 | End: 2024-04-08

## 2024-04-08 RX ORDER — LIDOCAINE HYDROCHLORIDE 20 MG/ML
INJECTION INTRAVENOUS
Status: DISCONTINUED | OUTPATIENT
Start: 2024-04-08 | End: 2024-04-08

## 2024-04-08 RX ORDER — ACETAMINOPHEN 10 MG/ML
INJECTION, SOLUTION INTRAVENOUS
Status: DISCONTINUED | OUTPATIENT
Start: 2024-04-08 | End: 2024-04-08

## 2024-04-08 RX ORDER — DEXAMETHASONE SODIUM PHOSPHATE 4 MG/ML
INJECTION, SOLUTION INTRA-ARTICULAR; INTRALESIONAL; INTRAMUSCULAR; INTRAVENOUS; SOFT TISSUE
Status: DISCONTINUED | OUTPATIENT
Start: 2024-04-08 | End: 2024-04-08

## 2024-04-08 RX ORDER — ONDANSETRON HYDROCHLORIDE 2 MG/ML
INJECTION, SOLUTION INTRAVENOUS
Status: DISCONTINUED | OUTPATIENT
Start: 2024-04-08 | End: 2024-04-08

## 2024-04-08 RX ORDER — DIPHENHYDRAMINE HCL 25 MG
25 CAPSULE ORAL EVERY 4 HOURS PRN
Status: CANCELLED | OUTPATIENT
Start: 2024-04-08

## 2024-04-08 RX ADMIN — LIDOCAINE HYDROCHLORIDE 50 MG: 20 INJECTION INTRAVENOUS at 07:04

## 2024-04-08 RX ADMIN — FENTANYL CITRATE 50 MCG: 50 INJECTION, SOLUTION INTRAMUSCULAR; INTRAVENOUS at 08:04

## 2024-04-08 RX ADMIN — MIDAZOLAM HYDROCHLORIDE 2 MG: 1 INJECTION, SOLUTION INTRAMUSCULAR; INTRAVENOUS at 07:04

## 2024-04-08 RX ADMIN — ROCURONIUM BROMIDE 5 MG: 10 SOLUTION INTRAVENOUS at 07:04

## 2024-04-08 RX ADMIN — FENTANYL CITRATE 50 MCG: 50 INJECTION, SOLUTION INTRAMUSCULAR; INTRAVENOUS at 07:04

## 2024-04-08 RX ADMIN — ALBUTEROL SULFATE 2 PUFF: 90 AEROSOL, METERED RESPIRATORY (INHALATION) at 09:04

## 2024-04-08 RX ADMIN — HYDROMORPHONE HYDROCHLORIDE 0.2 MG: 2 INJECTION INTRAMUSCULAR; INTRAVENOUS; SUBCUTANEOUS at 09:04

## 2024-04-08 RX ADMIN — ROCURONIUM BROMIDE 30 MG: 10 SOLUTION INTRAVENOUS at 08:04

## 2024-04-08 RX ADMIN — PROPOFOL 150 MG: 10 INJECTION, EMULSION INTRAVENOUS at 07:04

## 2024-04-08 RX ADMIN — GLYCOPYRROLATE 0.2 MG: 0.2 INJECTION, SOLUTION INTRAMUSCULAR; INTRAVENOUS at 08:04

## 2024-04-08 RX ADMIN — SUCCINYLCHOLINE CHLORIDE 100 MG: 20 INJECTION, SOLUTION INTRAMUSCULAR; INTRAVENOUS; PARENTERAL at 07:04

## 2024-04-08 RX ADMIN — PROCHLORPERAZINE EDISYLATE 2.5 MG: 5 INJECTION INTRAMUSCULAR; INTRAVENOUS at 09:04

## 2024-04-08 RX ADMIN — SODIUM CHLORIDE, SODIUM LACTATE, POTASSIUM CHLORIDE, AND CALCIUM CHLORIDE: 600; 310; 30; 20 INJECTION, SOLUTION INTRAVENOUS at 07:04

## 2024-04-08 RX ADMIN — ONDANSETRON 4 MG: 2 INJECTION INTRAMUSCULAR; INTRAVENOUS at 08:04

## 2024-04-08 RX ADMIN — KETOROLAC TROMETHAMINE 15 MG: 30 INJECTION, SOLUTION INTRAMUSCULAR at 09:04

## 2024-04-08 RX ADMIN — DEXAMETHASONE SODIUM PHOSPHATE 8 MG: 4 INJECTION, SOLUTION INTRA-ARTICULAR; INTRALESIONAL; INTRAMUSCULAR; INTRAVENOUS; SOFT TISSUE at 08:04

## 2024-04-08 RX ADMIN — ACETAMINOPHEN 1000 MG: 10 INJECTION INTRAVENOUS at 08:04

## 2024-04-08 NOTE — OP NOTE
DATE:  04/08/2024    PRE-PROCEDURE COUNSELING:  Patient counseled on the risks, benefits, and alternatives to procedure.  Please see preoperative consents.   SCDs were applied and working prior to anesthesia induction.    PRE-PROCEDURE DIAGNOSIS:   Menorrhagia  Desires permanent sterilization    POST-PROCEDURE DIAGNOSIS: Same    ANESTHESIA: General Endotracheal Anesthesia    PROCEDURE:    Hysteroscopy  Dilation and Curettage  Novasure Endometrial Ablation  Laparoscopic Bilateral Salpingectomy    SURGEON:  Vaibhav Stinson MD    ASSISTANT: LALITHA Butcher    FINDINGS: Normal appearing endometrial lining with no discrete lesions; small uterus with minimal descent and no adnexal masses, uterus sounded to 9 cm.  Novasure details: sounding length 9 cm, cervix length 4 cm, cavity length 5 cm, cavity width 4.4 cm, power 121 agosto, time 67 seconds.    SPECIMEN:   Endometrial Curettings  Right fallopian tube  Left fallopian Tube    EBL: 5 mL     COMPLICATIONS:  None    IMPLANTS:  None    PROCEDURE IN DETAIL:  TIME OUT PERFORMED  SCDs were on prior to anesthesia induction.  Bimanual examination was performed after anesthesia was obtained and above findings noted.  Patient was placed in dorsal lithotomy position then prepped and draped in standard fashion.  A weighted speculum was placed into the vagina and a single toothed tenaculum was applied to the anterior lip of the cervix.  Uterus was sounded to 9 cm and cervix length 4 cm (cavity length 5 cm).  Cavity width was measured at 4.4 cm.  The cervix was then sequentially dilated using the Anne-Marie dilators.  The hysteroscope was then gently inserted and the uterine cavity was distended using Normal saline.  General survey revealed a normal appearing endometrium without lesions.  Both tubal ostia were readily visualized.  Hysteroscope was then removed and Novasure device gently inserted in standard fashion.  System was tested and passed.  System was then activated at 121  agosto of power for 67 seconds.  The device was then removed without difficulty.  The hysteroscope was then gently reinserted and the cavity distended.  General survey revealed an endometrium appearance consistent with post-ablative changes.  No lesions or abnormalities were visualized.  All instruments were then removed from the uterus and vagina and good hemostasis was noted prior to the conclusion of the procedure.  All instrument and lap counts correct times two.  Pt was taken to Recovery room in stable condition.    Attention was then turned to the abdomen where the anterior abdominal wall was grasped with towel clamps and elevated.  A Veress needle was gently introduced through the umbilicus and water drop test performed to confirm intraabdominal placement.  The abdomen was insufflated with CO2 gas to an intraabdominal pressure of 15 mmHg.  Veress was then removed and a 5 mm  incision was made in the umbilicus.  A 5 mm trocar was placed into the abdomen and general anatomic survey was performed revealing the above findings. Under direct visualization, ancillary 8 mm RLQ and a 5 mm LLQ ports were placed.  The fallopian tubes were grasped and excised in their entirety with the Harmonic Ace instrument.  Specimens were removed throught the RLQ port and sent to pathology.  Excellent hemostasis was noted.  Abdomen was deflated and all instruments were removed.  Skin wounds were approximated with 4-0 Monocryl and an adhesive glue layer was added for reinforcement.  Lap, sharp, and instrument counts were correct x 2.  Pictures were taken and added to the chart.      Patient was sent to the recovery room in stable condition    CONDITION: stable    DISPOSITION: recovery room

## 2024-04-08 NOTE — DISCHARGE SUMMARY
Discharge Note  Short Stay      SUMMARY     Admit Date: 4/8/2024    Attending Physician: Vaibhav Stinson MD     Discharge Physician: Vaibhav Stinson MD    Discharge Date: 4/8/2024 9:16 AM    Final Diagnosis:   1. Status post bilateral salpingectomy    2. Irregular menses    3. Status post endometrial ablation        Disposition: Home or Self Care    Condition:  Stable    Hospital Course:  Pt was admitted for scheduled surgery.  HSC/D+C/Novasure Ablation/LSC Bilateral Salpingectomy was performed without complications.  Post-operative course was unremarkable and pt recovered well.  Pt was discharged upon meeting all discharge criteria.  Pt was counseled on post-operative care and warning sign instructions prior to her discharge.      Patient Instructions:   Current Discharge Medication List        START taking these medications    Details   HYDROcodone-acetaminophen (NORCO) 5-325 mg per tablet Take 1 tablet by mouth every 4 (four) hours as needed for Pain.  Qty: 14 tablet, Refills: 0    Comments: Quantity prescribed more than 7 day supply? No  Associated Diagnoses: Status post endometrial ablation; Status post bilateral salpingectomy      ibuprofen (ADVIL,MOTRIN) 800 MG tablet Take 1 tablet (800 mg total) by mouth every 8 (eight) hours as needed for Pain.  Qty: 30 tablet, Refills: 0    Associated Diagnoses: Status post endometrial ablation; Status post bilateral salpingectomy      !! ondansetron (ZOFRAN-ODT) 4 MG TbDL Take 1 tablet (4 mg total) by mouth every 6 (six) hours as needed (nausea).  Qty: 10 tablet, Refills: 0    Associated Diagnoses: Status post endometrial ablation; Status post bilateral salpingectomy       !! - Potential duplicate medications found. Please discuss with provider.        CONTINUE these medications which have NOT CHANGED    Details   celecoxib (CELEBREX) 200 MG capsule Take 1 capsule (200 mg total) by mouth 2 (two) times daily as needed for Pain.  Qty: 60 capsule, Refills: 3       esomeprazole (NEXIUM) 40 MG capsule TAKE ONE CAPSULE BY MOUTH EVERY DAY BEFORE BREAKFAST  Qty: 30 capsule, Refills: 11      FLUoxetine 40 MG capsule TAKE ONE CAPSULE BY MOUTH ONCE PER DAY  Qty: 30 capsule, Refills: 6      folic acid (FOLVITE) 1 MG tablet Take 1 tablet (1,000 mcg total) by mouth once daily.  Qty: 90 tablet, Refills: 3    Associated Diagnoses: Polyarticular psoriatic arthritis      guselkumab (TREMFYA) 100 mg/mL Syrg Inject 1 mL (100 mg) into the skin every 8 weeks.  Qty: 2 mL, Refills: 3    Associated Diagnoses: Plaque psoriasis; Psoriatic arthritis      !! ondansetron (ZOFRAN-ODT) 4 MG TbDL 1 tablet Orally every 6 hours as needed for nausea and vomiting      pregabalin (LYRICA) 75 MG capsule Take 1 capsule (75 mg total) by mouth 2 (two) times daily for 10 days, THEN 2 capsules (150 mg total) 2 (two) times daily for 10 days, THEN 3 capsules (225 mg total) 2 (two) times daily for 10 days.  Qty: 120 capsule, Refills: 0      VITAMIN D2 1,250 mcg (50,000 unit) capsule TAKE ONE CAPSULE BY MOUTH TWICE A WEEK  Qty: 24 capsule, Refills: 3    Associated Diagnoses: Vitamin D deficiency      leflunomide (ARAVA) 20 MG Tab TAKE ONE TABLET BY MOUTH ONCE PER DAY  Strength: 20 mg  Qty: 30 tablet, Refills: 2    Comments: This prescription was filled on 11/29/2023. Any refills authorized will be placed on file.  Associated Diagnoses: Psoriatic arthritis; Plaque psoriasis       !! - Potential duplicate medications found. Please discuss with provider.          Discharge Procedure Orders (must include Diet, Follow-up, Activity)   Discharge Procedure Orders (must include Diet, Follow-up, Activity)   Diet general     Other restrictions (specify):   Order Comments: Light activity and pelvic rest x 2 weeks     Call MD for:  extreme fatigue     Call MD for:  persistent dizziness or light-headedness     Call MD for:  hives     Call MD for:  redness, tenderness, or signs of infection (pain, swelling, redness, odor or  green/yellow discharge around incision site)     Call MD for:  difficulty breathing, headache or visual disturbances     Call MD for:  severe uncontrolled pain     Call MD for:  persistent nausea and vomiting     Call MD for:  temperature >100.4     No dressing needed         Follow-up Information       Vaibhav Stinson MD Follow up in 4 week(s).    Specialty: Obstetrics and Gynecology  Why: Post-operative visit  Contact information:  [address]  [place] [id number] [phone]

## 2024-04-08 NOTE — ANESTHESIA POSTPROCEDURE EVALUATION
Anesthesia Post Evaluation    Patient: Janice Lawrence    Procedure(s) Performed: Procedure(s) (LRB):  HYSTEROSCOPY, WITH DILATION AND CURETTAGE OF UTERUS AND HYDROTHERMAL ENDOMETRIAL ABLATION (N/A)  SALPINGECTOMY, LAPAROSCOPIC (Bilateral)    Final Anesthesia Type: general      Patient location during evaluation: PACU  Patient participation: Yes- Able to Participate  Level of consciousness: awake and alert  Post-procedure vital signs: reviewed and stable  Pain management: adequate  Airway patency: patent  PRIETO mitigation strategies: Verification of full reversal of neuromuscular block  PONV status at discharge: No PONV  Anesthetic complications: no      Cardiovascular status: hemodynamically stable  Respiratory status: spontaneous ventilation  Hydration status: euvolemic  Follow-up not needed.              Vitals Value Taken Time   /77 04/08/24 0920   Temp  04/08/24 0925   Pulse 87 04/08/24 0924   Resp 17 04/08/24 0924   SpO2 99 % 04/08/24 0924   Vitals shown include unvalidated device data.      No case tracking events are documented in the log.      Pain/Curtis Score: No data recorded

## 2024-04-08 NOTE — ANESTHESIA PROCEDURE NOTES
Intubation    Date/Time: 4/8/2024 8:00 AM    Performed by: Madison Amador CRNA  Authorized by: Alexis Aguirre II, MD    Intubation:     Induction:  Intravenous    Intubated:  Postinduction    Mask Ventilation:  Easy mask    Attempts:  1    Attempted By:  CRNA    Method of Intubation:  Video laryngoscopy    Blade:  Malik 3    Laryngeal View Grade: Grade I - full view of cords      Difficult Airway Encountered?: No      Complications:  None    Airway Device:  Oral endotracheal tube    Airway Device Size:  7.0    Style/Cuff Inflation:  Cuffed (inflated to minimal occlusive pressure)    Inflation Amount (mL):  7    Tube secured:  22    Secured at:  The lips    Placement Verified By:  Capnometry    Complicating Factors:  None    Findings Post-Intubation:  BS equal bilateral and atraumatic/condition of teeth unchanged

## 2024-04-08 NOTE — TRANSFER OF CARE
"Anesthesia Transfer of Care Note    Patient: Janice Lawrence    Procedure(s) Performed: Procedure(s) (LRB):  HYSTEROSCOPY, WITH DILATION AND CURETTAGE OF UTERUS AND HYDROTHERMAL ENDOMETRIAL ABLATION (N/A)  SALPINGECTOMY, LAPAROSCOPIC (Bilateral)    Patient location: PACU    Anesthesia Type: general    Transport from OR: Transported from OR on room air with adequate spontaneous ventilation    Post pain: adequate analgesia    Post assessment: no apparent anesthetic complications and tolerated procedure well    Post vital signs: stable    Level of consciousness: awake, alert and oriented    Nausea/Vomiting: no nausea/vomiting    Complications: none    Transfer of care protocol was followed      Last vitals: Visit Vitals  BP (!) 162/72   Pulse 75   Temp 36.9 °C (98.4 °F) (Temporal)   Resp 18   Ht 5' 11" (1.803 m)   Wt (!) 141 kg (310 lb 13.6 oz)   LMP 03/25/2024   SpO2 96%   Breastfeeding No   BMI 43.35 kg/m²     "

## 2024-04-09 VITALS
SYSTOLIC BLOOD PRESSURE: 132 MMHG | WEIGHT: 293 LBS | HEIGHT: 71 IN | HEART RATE: 74 BPM | BODY MASS INDEX: 41.02 KG/M2 | DIASTOLIC BLOOD PRESSURE: 74 MMHG | RESPIRATION RATE: 19 BRPM | OXYGEN SATURATION: 98 % | TEMPERATURE: 98 F

## 2024-04-09 LAB
FINAL PATHOLOGIC DIAGNOSIS: NORMAL
GROSS: NORMAL
Lab: NORMAL

## 2024-04-17 ENCOUNTER — PATIENT MESSAGE (OUTPATIENT)
Dept: RHEUMATOLOGY | Facility: CLINIC | Age: 44
End: 2024-04-17
Payer: COMMERCIAL

## 2024-04-29 ENCOUNTER — PATIENT MESSAGE (OUTPATIENT)
Dept: OBSTETRICS AND GYNECOLOGY | Facility: CLINIC | Age: 44
End: 2024-04-29
Payer: COMMERCIAL

## 2024-05-06 ENCOUNTER — OFFICE VISIT (OUTPATIENT)
Dept: OBSTETRICS AND GYNECOLOGY | Facility: CLINIC | Age: 44
End: 2024-05-06
Payer: COMMERCIAL

## 2024-05-06 VITALS
HEIGHT: 71 IN | DIASTOLIC BLOOD PRESSURE: 94 MMHG | SYSTOLIC BLOOD PRESSURE: 154 MMHG | WEIGHT: 293 LBS | BODY MASS INDEX: 41.02 KG/M2

## 2024-05-06 DIAGNOSIS — B96.89 BV (BACTERIAL VAGINOSIS): ICD-10-CM

## 2024-05-06 DIAGNOSIS — N76.0 BV (BACTERIAL VAGINOSIS): ICD-10-CM

## 2024-05-06 DIAGNOSIS — Z98.890 STATUS POST ENDOMETRIAL ABLATION: Primary | ICD-10-CM

## 2024-05-06 DIAGNOSIS — Z90.79 STATUS POST BILATERAL SALPINGECTOMY: ICD-10-CM

## 2024-05-06 PROCEDURE — 1159F MED LIST DOCD IN RCRD: CPT | Mod: CPTII,S$GLB,, | Performed by: OBSTETRICS & GYNECOLOGY

## 2024-05-06 PROCEDURE — 99999 PR PBB SHADOW E&M-EST. PATIENT-LVL III: CPT | Mod: PBBFAC,,, | Performed by: OBSTETRICS & GYNECOLOGY

## 2024-05-06 PROCEDURE — 3080F DIAST BP >= 90 MM HG: CPT | Mod: CPTII,S$GLB,, | Performed by: OBSTETRICS & GYNECOLOGY

## 2024-05-06 PROCEDURE — 3077F SYST BP >= 140 MM HG: CPT | Mod: CPTII,S$GLB,, | Performed by: OBSTETRICS & GYNECOLOGY

## 2024-05-06 PROCEDURE — 1160F RVW MEDS BY RX/DR IN RCRD: CPT | Mod: CPTII,S$GLB,, | Performed by: OBSTETRICS & GYNECOLOGY

## 2024-05-06 PROCEDURE — 99024 POSTOP FOLLOW-UP VISIT: CPT | Mod: S$GLB,,, | Performed by: OBSTETRICS & GYNECOLOGY

## 2024-05-06 PROCEDURE — 87210 SMEAR WET MOUNT SALINE/INK: CPT | Mod: QW,S$GLB,, | Performed by: OBSTETRICS & GYNECOLOGY

## 2024-05-06 RX ORDER — METRONIDAZOLE 500 MG/1
500 TABLET ORAL 2 TIMES DAILY
Qty: 14 TABLET | Refills: 0 | Status: SHIPPED | OUTPATIENT
Start: 2024-05-06 | End: 2024-05-13

## 2024-05-06 NOTE — PROGRESS NOTES
Subjective     Patient ID: Janice Lawrence is a 44 y.o. female.    Chief Complaint:  Post-op Evaluation      History of Present Illness  HPI  Pt is s/p HSC/D+C/Novasure Ablation/LSC Bilateral Salpingectomy on 2024.  Pt is here for her routine post-op visit.  Pt is doing well and is happy with results.  Denies pain or vaginal bleeding.  Reports normal bowel and bladder function.  Denies intercourse or strenuous activity since surgery.  Reports complaints of malodorous vaginal discharge.    GYN & OB History  No LMP recorded (lmp unknown). Patient has had an ablation.   Date of Last Pap: 2020    OB History    Para Term  AB Living   1 0 0   1     SAB IAB Ectopic Multiple Live Births                  # Outcome Date GA Lbr Nikolas/2nd Weight Sex Type Anes PTL Lv   1 AB                Review of Systems  Review of Systems   Constitutional:  Negative for activity change, appetite change, chills, fatigue, fever and unexpected weight change.   Respiratory:  Negative for shortness of breath.    Cardiovascular:  Negative for chest pain, palpitations and leg swelling.   Gastrointestinal:  Negative for abdominal pain, bloating, blood in stool, constipation, diarrhea, nausea and vomiting.   Genitourinary:  Positive for vaginal discharge and vaginal odor. Negative for dysmenorrhea, dyspareunia, dysuria, flank pain, frequency, genital sores, hematuria, menorrhagia, menstrual problem, pelvic pain, urgency, vaginal bleeding, vaginal pain, urinary incontinence, postcoital bleeding and vaginal dryness.   Musculoskeletal:  Negative for back pain.   Neurological:  Negative for syncope and headaches.          Objective   Physical Exam:   Constitutional: She is oriented to person, place, and time. She appears well-developed and well-nourished. No distress.       Cardiovascular:  Normal rate and regular rhythm.             Pulmonary/Chest: Effort normal and breath sounds normal.        Abdominal: Soft. Bowel  sounds are normal. She exhibits abdominal incision (all wounds healed well). She exhibits no distension. There is no abdominal tenderness.     Genitourinary:    Uterus, right adnexa and left adnexa normal.      Pelvic exam was performed with patient supine.   There is no rash, tenderness, lesion or injury on the right labia. There is no rash, tenderness, lesion or injury on the left labia. Cervix is normal. Right adnexum displays no mass, no tenderness and no fullness. Left adnexum displays no mass, no tenderness and no fullness. There is vaginal discharge in the vagina. No erythema, tenderness or bleeding in the vagina.    No foreign body in the vagina.      No signs of injury in the vagina.   Cervix exhibits no motion tenderness, no discharge and no friability. Uterus is not deviated, not enlarged and not tender.    Genitourinary Comments: Wet prep: many clue cells; negative for yeast or trichomonas             Musculoskeletal: Normal range of motion and moves all extremeties. No tenderness or edema.       Neurological: She is alert and oriented to person, place, and time.    Skin: Skin is warm and dry.    Psychiatric: She has a normal mood and affect. Her behavior is normal. Thought content normal.            Assessment and Plan     1. Status post endometrial ablation    2. Status post bilateral salpingectomy    3. BV (bacterial vaginosis)           Plan:  Status post endometrial ablation  -     Pt doing well and is happy with results.  Pathology benign.  Pt cleared for all activities.    Status post bilateral salpingectomy    BV (bacterial vaginosis)  -     POCT Wet Prep  -     metroNIDAZOLE (FLAGYL) 500 MG tablet; Take 1 tablet (500 mg total) by mouth 2 (two) times daily. for 7 days  Dispense: 14 tablet; Refill: 0  -     Medication details, dosing, risks, side-effects, and interactions were discussed.      Follow up for Annual exam.

## 2024-05-15 ENCOUNTER — LAB VISIT (OUTPATIENT)
Dept: LAB | Facility: HOSPITAL | Age: 44
End: 2024-05-15
Attending: INTERNAL MEDICINE
Payer: COMMERCIAL

## 2024-05-15 DIAGNOSIS — L40.59 POLYARTICULAR PSORIATIC ARTHRITIS: ICD-10-CM

## 2024-05-15 DIAGNOSIS — Z79.899 HIGH RISK MEDICATION USE: ICD-10-CM

## 2024-05-15 LAB
ALBUMIN SERPL BCP-MCNC: 3.9 G/DL (ref 3.5–5.2)
ALP SERPL-CCNC: 70 U/L (ref 55–135)
ALT SERPL W/O P-5'-P-CCNC: 27 U/L (ref 10–44)
ANION GAP SERPL CALC-SCNC: 9 MMOL/L (ref 8–16)
AST SERPL-CCNC: 25 U/L (ref 10–40)
BASOPHILS # BLD AUTO: 0.09 K/UL (ref 0–0.2)
BASOPHILS NFR BLD: 1 % (ref 0–1.9)
BILIRUB SERPL-MCNC: 0.3 MG/DL (ref 0.1–1)
BUN SERPL-MCNC: 16 MG/DL (ref 6–20)
CALCIUM SERPL-MCNC: 9.5 MG/DL (ref 8.7–10.5)
CHLORIDE SERPL-SCNC: 106 MMOL/L (ref 95–110)
CO2 SERPL-SCNC: 22 MMOL/L (ref 23–29)
CREAT SERPL-MCNC: 0.8 MG/DL (ref 0.5–1.4)
CRP SERPL-MCNC: 2.7 MG/L (ref 0–8.2)
DIFFERENTIAL METHOD BLD: ABNORMAL
EOSINOPHIL # BLD AUTO: 1 K/UL (ref 0–0.5)
EOSINOPHIL NFR BLD: 10.7 % (ref 0–8)
ERYTHROCYTE [DISTWIDTH] IN BLOOD BY AUTOMATED COUNT: 16 % (ref 11.5–14.5)
ERYTHROCYTE [SEDIMENTATION RATE] IN BLOOD BY PHOTOMETRIC METHOD: 23 MM/HR (ref 0–36)
EST. GFR  (NO RACE VARIABLE): >60 ML/MIN/1.73 M^2
GLUCOSE SERPL-MCNC: 88 MG/DL (ref 70–110)
HCT VFR BLD AUTO: 37 % (ref 37–48.5)
HGB BLD-MCNC: 12.2 G/DL (ref 12–16)
IMM GRANULOCYTES # BLD AUTO: 0.02 K/UL (ref 0–0.04)
IMM GRANULOCYTES NFR BLD AUTO: 0.2 % (ref 0–0.5)
LYMPHOCYTES # BLD AUTO: 2.8 K/UL (ref 1–4.8)
LYMPHOCYTES NFR BLD: 30.9 % (ref 18–48)
MCH RBC QN AUTO: 27.1 PG (ref 27–31)
MCHC RBC AUTO-ENTMCNC: 33 G/DL (ref 32–36)
MCV RBC AUTO: 82 FL (ref 82–98)
MONOCYTES # BLD AUTO: 0.6 K/UL (ref 0.3–1)
MONOCYTES NFR BLD: 6 % (ref 4–15)
NEUTROPHILS # BLD AUTO: 4.7 K/UL (ref 1.8–7.7)
NEUTROPHILS NFR BLD: 51.2 % (ref 38–73)
NRBC BLD-RTO: 0 /100 WBC
PLATELET # BLD AUTO: 442 K/UL (ref 150–450)
PMV BLD AUTO: 8.4 FL (ref 9.2–12.9)
POTASSIUM SERPL-SCNC: 4.1 MMOL/L (ref 3.5–5.1)
PROT SERPL-MCNC: 7.5 G/DL (ref 6–8.4)
RBC # BLD AUTO: 4.5 M/UL (ref 4–5.4)
SODIUM SERPL-SCNC: 137 MMOL/L (ref 136–145)
WBC # BLD AUTO: 9.14 K/UL (ref 3.9–12.7)

## 2024-05-15 PROCEDURE — 36415 COLL VENOUS BLD VENIPUNCTURE: CPT | Performed by: INTERNAL MEDICINE

## 2024-05-15 PROCEDURE — 80053 COMPREHEN METABOLIC PANEL: CPT | Performed by: INTERNAL MEDICINE

## 2024-05-15 PROCEDURE — 85025 COMPLETE CBC W/AUTO DIFF WBC: CPT | Performed by: INTERNAL MEDICINE

## 2024-05-15 PROCEDURE — 86140 C-REACTIVE PROTEIN: CPT | Performed by: INTERNAL MEDICINE

## 2024-05-15 PROCEDURE — 85652 RBC SED RATE AUTOMATED: CPT | Performed by: INTERNAL MEDICINE

## 2024-05-15 RX ORDER — PREGABALIN 75 MG/1
75 CAPSULE ORAL 2 TIMES DAILY
Qty: 60 CAPSULE | Refills: 2 | Status: SHIPPED | OUTPATIENT
Start: 2024-05-15

## 2024-05-15 RX ORDER — PREGABALIN 75 MG/1
CAPSULE ORAL
Qty: 120 CAPSULE | Refills: 0 | Status: CANCELLED | OUTPATIENT
Start: 2024-05-15 | End: 2024-06-13

## 2024-05-15 RX ORDER — PREGABALIN 75 MG/1
75 CAPSULE ORAL 2 TIMES DAILY
COMMUNITY
End: 2024-05-15 | Stop reason: SDUPTHER

## 2024-05-15 NOTE — TELEPHONE ENCOUNTER
Pt is requesting for a refill of: pregabalin (LYRICA) 75 MG capsule   Last filed:1/09/24   Last encounter: 4/03/24   Up coming apt: send pt a virtual link   [facility]   Is this something you can do?

## 2024-05-17 ENCOUNTER — OFFICE VISIT (OUTPATIENT)
Dept: RHEUMATOLOGY | Facility: CLINIC | Age: 44
End: 2024-05-17
Payer: COMMERCIAL

## 2024-05-17 DIAGNOSIS — Z51.81 ENCOUNTER FOR MEDICATION MONITORING: ICD-10-CM

## 2024-05-17 DIAGNOSIS — L40.0 PLAQUE PSORIASIS: ICD-10-CM

## 2024-05-17 DIAGNOSIS — D84.821 DRUG-INDUCED IMMUNODEFICIENCY: ICD-10-CM

## 2024-05-17 DIAGNOSIS — Z79.899 DRUG-INDUCED IMMUNODEFICIENCY: ICD-10-CM

## 2024-05-17 DIAGNOSIS — L40.59 POLYARTICULAR PSORIATIC ARTHRITIS: Primary | ICD-10-CM

## 2024-05-17 DIAGNOSIS — E66.01 OBESITY, MORBID, BMI 40.0-49.9: ICD-10-CM

## 2024-05-17 PROCEDURE — 1160F RVW MEDS BY RX/DR IN RCRD: CPT | Mod: CPTII,95,, | Performed by: INTERNAL MEDICINE

## 2024-05-17 PROCEDURE — 99214 OFFICE O/P EST MOD 30 MIN: CPT | Mod: 95,,, | Performed by: INTERNAL MEDICINE

## 2024-05-17 PROCEDURE — 1159F MED LIST DOCD IN RCRD: CPT | Mod: CPTII,95,, | Performed by: INTERNAL MEDICINE

## 2024-05-17 NOTE — Clinical Note
Please schedule all 4 labs in 3 months .  Repeat all 4 labs in 6 months with virtual follow-up.  Thanks.

## 2024-05-17 NOTE — PROGRESS NOTES
RHEUMATOLOGY FOLLOW UP - TELE VISIT     The patient location is: [place]  The chief complaint leading to consultation is:  Follow-up for psoriasis and psoriatic arthritis.  Visit type: Virtual visit with synchronous audio and video  Total time spent with patient: 10 minutes  Each patient to whom he or she provides medical services by telemedicine is:  (1) informed of the relationship between the physician and patient and the respective role of any other health care provider with respect to management of the patient; and (2) notified that he or she may decline to receive medical services by telemedicine and may withdraw from such care at any time.    Chief complaints, HPI, ROS, EXAM, Assessment & Plans:-  Janice Nova a 44 y.o. pleasant female seen today for follow-up visit.  She follows up in the Rheumatology Clinic for plaque psoriasis and severe psoriatic arthritis with dactylitis.  After failing multiple medication she is presently on Tremfya and leflunomide.  Failed methotrexate.  Take Celebrex once daily except 10 days prior to the scheduled dose of Tremfya where she takes twice daily.  She reports doing well today.  No infections.  Rheumatological review of systems negative.  Exam shows no synovitis or dactylitis.  100% fist formation bilateral hands.    1. Polyarticular psoriatic arthritis    2. Plaque psoriasis    3. Encounter for medication monitoring    4. Drug-induced immunodeficiency    5. Obesity, morbid, BMI 40.0-49.9      Problem List Items Addressed This Visit       Drug-induced immunodeficiency    Encounter for medication monitoring    Obesity, morbid, BMI 40.0-49.9    Plaque psoriasis    Polyarticular psoriatic arthritis - Primary      Latest Reference Range & Units 05/15/24 16:45   WBC 3.90 - 12.70 K/uL 9.14   RBC 4.00 - 5.40 M/uL 4.50   Hemoglobin 12.0 - 16.0 g/dL 12.2   Hematocrit 37.0 - 48.5 % 37.0   MCV 82 - 98 fL 82   MCH 27.0 - 31.0 pg 27.1   MCHC 32.0 - 36.0 g/dL 33.0   RDW 11.5  - 14.5 % 16.0 (H)   Platelet Count 150 - 450 K/uL 442   MPV 9.2 - 12.9 fL 8.4 (L)   Gran % 38.0 - 73.0 % 51.2   Lymph % 18.0 - 48.0 % 30.9   Mono % 4.0 - 15.0 % 6.0   Eos % 0.0 - 8.0 % 10.7 (H)   Basophil % 0.0 - 1.9 % 1.0   Immature Granulocytes 0.0 - 0.5 % 0.2   Gran # (ANC) 1.8 - 7.7 K/uL 4.7   Lymph # 1.0 - 4.8 K/uL 2.8   Mono # 0.3 - 1.0 K/uL 0.6   Eos # 0.0 - 0.5 K/uL 1.0 (H)   Baso # 0.00 - 0.20 K/uL 0.09   Immature Grans (Abs) 0.00 - 0.04 K/uL 0.02   nRBC 0 /100 WBC 0   Differential Method  Automated   Sed Rate 0 - 36 mm/Hr 23   Sodium 136 - 145 mmol/L 137   Potassium 3.5 - 5.1 mmol/L 4.1   Chloride 95 - 110 mmol/L 106   CO2 23 - 29 mmol/L 22 (L)   Anion Gap 8 - 16 mmol/L 9   BUN 6 - 20 mg/dL 16   Creatinine 0.5 - 1.4 mg/dL 0.8   eGFR >60 mL/min/1.73 m^2 >60   Glucose 70 - 110 mg/dL 88   Calcium 8.7 - 10.5 mg/dL 9.5   ALP 55 - 135 U/L 70   PROTEIN TOTAL 6.0 - 8.4 g/dL 7.5   Albumin 3.5 - 5.2 g/dL 3.9   BILIRUBIN TOTAL 0.1 - 1.0 mg/dL 0.3   AST 10 - 40 U/L 25   ALT 10 - 44 U/L 27   CRP 0.0 - 8.2 mg/L 2.7   (H): Data is abnormally high  (L): Data is abnormally low      Well controlled plaque psoriasis and psoriatic arthritis on Tremfya and leflunomide 20 mg daily along with p.r.n. Celebrex.  Continue with safety labs every 12 weeks.  Drug induced immunodeficiency due to use of immunosuppressive drugs. Monitor carefully for infections. Advised to get immediate medical care if any infection. Also advised strict adherence to age appropriate vaccinations and cancer screenings with PCP.  Hold Tremfya and Arava if any infection  I have explained all of the above in detail and the patient understands all of the current recommendation(s). I have answered all questions to the best of my ability and to their complete satisfaction.   # Follow up in about 6 months (around 11/17/2024).      Past Medical History:   Diagnosis Date    Acid reflux     Acquired iron deficiency anemia due to decreased absorption 12/10/2020     Anxiety     Arthritis     Cervical radiculopathy 5/9/2023    Depression        Past Surgical History:   Procedure Laterality Date    COLONOSCOPY N/A 02/10/2021    Procedure: COLONOSCOPY;  Surgeon: Angelika Eldridge MD;  Location: [facility];  Service: Endoscopy;  Laterality: N/A;    COSMETIC SURGERY      ESOPHAGOGASTRODUODENOSCOPY N/A 02/10/2021    Procedure: ESOPHAGOGASTRODUODENOSCOPY (EGD);  Surgeon: Angelika Eldridge MD;  Location: [facility];  Service: Endoscopy;  Laterality: N/A;    HYSTEROSCOPY WITH HYDROTHERMAL ABLATION OF ENDOMETRIUM WITH DILATION AND CURETTAGE N/A 4/8/2024    Procedure: HYSTEROSCOPY, WITH DILATION AND CURETTAGE OF UTERUS AND HYDROTHERMAL ENDOMETRIAL ABLATION;  Surgeon: Vaibhav Stinson MD;  Location: [facility];  Service: OB/GYN;  Laterality: N/A;  NOVASURE    LAPAROSCOPIC SALPINGECTOMY Bilateral 4/8/2024    Procedure: SALPINGECTOMY, LAPAROSCOPIC;  Surgeon: Vaibhav Stinson MD;  Location: [facility] OR;  Service: OB/GYN;  Laterality: Bilateral;    SELECTIVE INJECTION OF ANESTHETIC AGENT AROUND LUMBAR SPINAL NERVE ROOT BY TRANSFORAMINAL APPROACH Right 3/13/2024    Procedure: R sided L4/5 and L5/S1 TFESI;  Surgeon: Dez Lowe MD;  Location: [facility] PAIN T;  Service: Pain Management;  Laterality: Right;        Social History     Tobacco Use    Smoking status: Former     Current packs/day: 0.50     Average packs/day: 0.5 packs/day for 10.0 years (5.0 ttl pk-yrs)     Types: Cigarettes    Smokeless tobacco: Never   Substance Use Topics    Alcohol use: Yes     Alcohol/week: 3.0 standard drinks of alcohol     Types: 3 Glasses of wine per week     Comment: soc    Drug use: Not Currently     Frequency: 2.0 times per week     Types: Marijuana       Family History   Problem Relation Name Age of Onset    Cancer Mother      Breast cancer Mother      Diabetes Mellitus Maternal Grandfather      Cancer Paternal Grandmother      Psoriasis Father      Arthritis Father      Colon cancer Paternal Aunt      Ovarian  cancer Neg Hx      Thrombophilia Neg Hx         Review of patient's allergies indicates:  No Known Allergies    Medication List with Changes/Refills   Current Medications    CELECOXIB (CELEBREX) 200 MG CAPSULE    Take 1 capsule (200 mg total) by mouth 2 (two) times daily as needed for Pain.    ESOMEPRAZOLE (NEXIUM) 40 MG CAPSULE    TAKE ONE CAPSULE BY MOUTH EVERY DAY BEFORE BREAKFAST    FLUOXETINE 40 MG CAPSULE    TAKE ONE CAPSULE BY MOUTH ONCE PER DAY    FOLIC ACID (FOLVITE) 1 MG TABLET    Take 1 tablet (1,000 mcg total) by mouth once daily.    GUSELKUMAB (TREMFYA) 100 MG/ML SYRG    Inject 1 mL (100 mg) into the skin every 8 weeks.    HYDROCODONE-ACETAMINOPHEN (NORCO) 5-325 MG PER TABLET    Take 1 tablet by mouth every 4 (four) hours as needed for Pain.    LEFLUNOMIDE (ARAVA) 20 MG TAB    TAKE ONE TABLET BY MOUTH ONCE PER DAY  Strength: 20 mg    ONDANSETRON (ZOFRAN-ODT) 4 MG TBDL    1 tablet Orally every 6 hours as needed for nausea and vomiting    ONDANSETRON (ZOFRAN-ODT) 4 MG TBDL    Take 1 tablet (4 mg total) by mouth every 6 (six) hours as needed (nausea).    PREGABALIN (LYRICA) 75 MG CAPSULE    Take 1 capsule (75 mg total) by mouth 2 (two) times daily.    VITAMIN D2 1,250 MCG (50,000 UNIT) CAPSULE    TAKE ONE CAPSULE BY MOUTH TWICE A WEEK   Discontinued Medications    IBUPROFEN (ADVIL,MOTRIN) 800 MG TABLET    Take 1 tablet (800 mg total) by mouth every 8 (eight) hours as needed for Pain.       Disclaimer: This note was prepared using voice recognition system and is likely to have sound alike errors and is not proof read.  Please call me with any questions.

## 2024-05-20 ENCOUNTER — TELEPHONE (OUTPATIENT)
Dept: RHEUMATOLOGY | Facility: CLINIC | Age: 44
End: 2024-05-20
Payer: COMMERCIAL

## 2024-05-20 DIAGNOSIS — L40.0 PLAQUE PSORIASIS: ICD-10-CM

## 2024-05-20 DIAGNOSIS — L40.59 POLYARTICULAR PSORIATIC ARTHRITIS: Primary | ICD-10-CM

## 2024-05-20 DIAGNOSIS — Z51.81 ENCOUNTER FOR MEDICATION MONITORING: ICD-10-CM

## 2024-05-22 ENCOUNTER — TELEPHONE (OUTPATIENT)
Dept: PAIN MEDICINE | Facility: CLINIC | Age: 44
End: 2024-05-22
Payer: COMMERCIAL

## 2024-05-22 NOTE — TELEPHONE ENCOUNTER
Called pt in regards to her stating she would like to talk about getting nerve burns but patient didn't answer LVM to call back at earliest convenience.    Candace SERRANO

## 2024-05-29 ENCOUNTER — PATIENT MESSAGE (OUTPATIENT)
Dept: SURGERY | Facility: HOSPITAL | Age: 44
End: 2024-05-29
Payer: COMMERCIAL

## 2024-05-29 NOTE — PROGRESS NOTES
"Established Patient - TeleHealth Visit    The patient location is: [place]  The chief complaint leading to consultation is: chronic pain     Visit type: audiovisual    Face to Face time with patient: 10-15 minutes  20 minutes of total time spent on the encounter, which includes face to face time and non-face to face time preparing to see the patient (eg, review of tests), Obtaining and/or reviewing separately obtained history, Documenting clinical information in the electronic or other health record, Independently interpreting results (not separately reported) and communicating results to the patient/family/caregiver, or Care coordination (not separately reported).     Each patient to whom he or she provides medical services by telemedicine is:  (1) informed of the relationship between the physician and patient and the respective role of any other health care provider with respect to management of the patient; and (2) notified that he or she may decline to receive medical services by telemedicine and may withdraw from such care at any time.      Referring Physician: Dr. Cui (Rheumatology)     PCP: No, Primary Doctor    Chief Complaint:   Axial low back pain  R leg pain - improved after KATHARINE        SUBJECTIVE:    Interval History (5/30/2024):  Janice Lawrence presents today for follow-up visit.  Patient was last seen about 6 weeks ago.  She continues to report resolution radicular pain after KATHARINE.  She complains now of axial low back pain with no radiation into the legs. Patient reports pain as 9/10 with activity.    Interval history 04/03/2024  Patient presents status post right-sided L4-5 and L5-S1 transforaminal epidural steroid injection 03/13/2024.  Patient reports 100% resolution of lower extremity radicular symptoms.  Today she reports myofascial pain remaining in the lower back.  She reports muscles are trying to loosen." She is continued physician directed physical therapy exercises at home.  She " recently refilled Lyrica which she is taking 75 mg twice daily.  She denies any side effects from this medication.  Today she denies significant lower extremity radiculopathy, lower extremity weakness, bowel or bladder incontinence or saddle anesthesia.    Interval history 02/21/2024  Patient presents for MRI lumbar spine review.  Today patient again reports pain in the lower back and bilateral lower extremities, worse on the right.  Patient reports pain which on average is rated a 6/10.  Pain presents in a bandlike distribution in the lower back which radiates down bilateral lower extremities in L4-S1 distribution to the calf, 90% on the right.  Patient has been performing physician directed physical therapy exercises at home from 11/08/2023 through 01/09/2024 with marginal improvement in her pain.  She did start Lyrica titration is taking 75 mg once daily with mild improvement in her symptoms.  She denies any side effects from this medication.  She would like to avoid excessive escalation of this dose of medication as she was on higher doses of gabapentin in the past.  Of note patient is a  in his interested in pursuing intervention but would need to work around her schedule.    INITIAL HPI 02/06/2024  Janice Lawrence is a 43 y.o. female with past medical history significant for anxiety and depression, psoriasis on immunosuppression, morbid obesity, fatty liver, GERD, multi joint osteoarthritis who presents to the clinic for the evaluation of lower back and leg pain.  Patient reports pain has been present for several years, greater than 10 years, without inciting accident injury or trauma.  Pain is constant and today is rated a 6/10.  Pain at its best is a 3/10 and at its worse is a 10/10.  Patient denies preceding accident injury or trauma.  Patient reports pain in a bandlike distribution in the lower back which can radiate down the lateral and posterior aspect of the right lower extremity  in L4-S1 distribution to the calf.  Patient denies left lower extremity radiculopathy, bowel or bladder incontinence, saddle anesthesia.  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  She does report with periods of inactivity, her pain can be exacerbated as well.  Pain is marginally improved with sitting, stretching, hot baths.  She does endorse associated weakness in the right lower extremity associated with her pain.  Patient has trialed gabapentin, up to 1200 mg in a day, 5 years prior with marginal improvement in her symptoms.  She is also received 2 prior lumbar epidural steroid injections, greater than 10 years prior with Dr. Luna with ineffective relief.  She has completed several rounds of conventional land-based physical therapy and has continued physician directed physical therapy exercises over the last 8 weeks from 11/08/2023 through 01/09/2024 with marginal improvement in her pain.    Patient reports significant motor weakness.  Patient denies night fever/night sweats, urinary incontinence, bowel incontinence, significant weight loss, and loss of sensations.      Pain Disability Index (PDI) Score Review:      1/9/2024    11:53 AM   Last 3 PDI Scores   Pain Disability Index (PDI) 42         Non-Pharmacologic Treatments:  Physical Therapy/Home Exercise: no  Ice/Heat:yes  TENS: yes  Acupuncture: yes  Massage: yes  Chiropractic: yes    Other: no      Pain Medications:  - Adjuvant Medications:  Celebrex, Fluoxetine    Pain Procedures:   External:  -Dr. Phil Luna: ROC[facility] Herbert Lowe:  -03/13/2024: Right-sided L4-5 and L5-S1 transforaminal epidural steroid injection         Review of Systems:  GENERAL:  No weight loss, malaise or fevers.  HEENT:   No recent changes in vision or hearing  NECK:  Negative for lumps, no difficulty with swallowing.  RESPIRATORY:  Negative for cough, wheezing or shortness of breath, patient denies any recent  URI.  CARDIOVASCULAR:  Negative for chest pain or palpitations.  GI:  Negative for abdominal discomfort, blood in stools or black stools or change in bowel habits.  MUSCULOSKELETAL:  See HPI.  SKIN:  Negative for lesions, rash, and itching.  PSYCH:  No mood disorder or recent psychosocial stressors.   HEMATOLOGY/LYMPHOLOGY:  Negative for prolonged bleeding, bruising easily or swollen nodes.    NEURO:   No history of syncope, paralysis, seizures or tremors.  All other reviewed and negative other than HPI.        OBJECTIVE:    Physical Exam:  Vitals:    05/30/24 0826   Resp: 17    There is no height or weight on file to calculate BMI.   (reviewed on 5/30/2024)    GENERAL: Well appearing, in no acute distress, alert and oriented x3.  PSYCH:  Mood and affect appropriate.  SKIN: Skin color, texture, turgor normal, no rashes or lesions.  HEAD/FACE:  Normocephalic, atraumatic. Cranial nerves grossly intact.    PULM: No evidence of respiratory difficulty, symmetric chest rise.  GI:  Soft and non-tender.    BACK: Straight leg raising in the sitting and supine positions is negative to radicular pain.pain to palpation over the facet joints of the lumbar spine or spinous processes. Normal range of motion without pain reproduction.  EXTREMITIES: Peripheral joint ROM is full and pain free without obvious instability or laxity in all four extremities. No deformities, edema, or skin discoloration. Good capillary refill.  MUSCULOSKELETAL: Able to stand on heels & toes.   Shoulder, hip, and knee provocative maneuvers are negative.  There is no pain with palpation over the sacroiliac joints bilaterally.  Gaenslen's, Distraction/Compression and  FABERs test is negative.  Facet loading test is negative bilaterally.   Bilateral upper and lower extremity strength is normal and symmetric.  No atrophy or tone abnormalities are noted.    RIGHT Lower extremity: Hip flexion 5/5, Hip Abduction 5/5, Hip Adduction 5/5, Knee extension 5/5, Knee  flexion 5/5, Ankle dorsiflexion5/5, Extensor hallucis longus 5/5, Ankle plantarflexion 5/5  LEFT Lower extremity:  Hip flexion 5/5, Hip Abduction 5/5,Hip Adduction 5/5, Knee extension 5/5, Knee flexion 5/5, Ankle dorsiflexion 5/5, Extensor hallucis longus 5/5, Ankle plantarflexion 5/5  -Normal testing knee (patellar) jerk and ankle (achilles) jerk    NEURO: Bilateral upper and lower extremity coordination and muscle stretch reflexes are physiologic and symmetric. No loss of sensation is noted.  GAIT: normal.        Imaging (Reviewed on 5/30/2024):  EMG 01/22/2024  IMPRESSION  ABNORMAL study  2. There is electrodiagnostic evidence of a subacute on chronic radiculopathy of the S1  nerve root and a chronic radiculopathy of the L5 nerve root    02/22/16 X-Ray Lumbar Spine AP And Lateral  Findings: The vertebral bodies demonstrate normal height.  There is straightening of the normal lordotic curvature. The disk space heights are maintained. No significant facet arthropathy.      05/09/23 X-Ray Cervical Spine AP And Lateral  FINDINGS:  Vertebral body height and alignment are maintained.  The disc spaces are normal in height.  No acute fracture is evident.  No prevertebral soft tissue swelling.      02/12/2024 LUMBAR MRI              ASSESSMENT: 44 y.o. year old female with     1. Lumbar spondylosis  Case Request-RAD/Other Procedure Area: Diagnostic Bilateral L3-5 MBB #1 with RN IV sedation    Case Request-RAD/Other Procedure Area: Diagnostic Bilateral L3-5 MBB #2 with RN IV sedation    Case Request-RAD/Other Procedure Area: Bilateral L3-5 RFA with RN IV sedation      2. Lumbar facet joint pain        3. DDD (degenerative disc disease), lumbar        4. Right lumbar radiculopathy-resolved after KATHARINE              PLAN:   - Interventions:   - Schedule Diagnostic L3-5 MBB #1. Patient is not taking prescription blood thinners or ASA.  previously explained the risks and benefits of the procedure in detail with the patient in  clinic along with alternative treatment options, and the patient elected to pursue the intervention at this time.    Call patient to get for % relief to determine potential RFA eligibility.  1st MBB: if > 80% pain relief, schedule 2nd diagnostic MBB (orders in).  If 2nd MBB: if > 80% pain relief, schedule bilateral L3-5 RFA (orders in).    - Patient has 100% sustained relief in right lower extremity radicular symptoms following right-sided L4-5 and L5-S1 transforaminal epidural steroid injection.  We discussed repeating this procedure in the future should radicular symptoms exacerbate.    - Anticoagulation use: No no anticoagulation      - Medications:  -Continue Lyrica (pregabalin) 75mg BID.  We discussed potential side effects of this medication which may include drowsiness,dizziness, dry mouth, constipation or peripheral edema.  Can increase to TID if needed after RFA.      report:  Reviewed and consistent with medication use as prescribed.      - Therapy:   We discussed continuing at home physician directed physical therapy to help manage the patient/s painful condition. The patient was counseled that muscle strengthening will improve the long term prognosis in regards to pain and may also help increase range of motion and mobility.     - Imaging: Reviewed available imaging (MRI lumbar spine 2024) with patient and answered any questions they had regarding study.     - Records: Obtain old records from outside physicians and imaging (not received 04/03/2024)      - Follow up visit: 4-6 weeks post RFA - virtual visit - [facility]    - Patient Questions: Answered all of the patient's questions regarding diagnosis, therapy, and treatment.    - This condition does not require this patient to take time off of work, and the primary goal of our Pain Management services is to improve the patient's functional capacity.   - I discussed the risks, benefits, and alternatives to potential treatment options. All questions and  concerns were fully addressed today in clinic.         Emelina Scott PA-C  Interventional Pain Management - [facility]    Disclaimer:  This note was prepared using voice recognition system and is likely to have sound alike errors that may have been overlooked even after proof reading.  Please call me with any questions.

## 2024-05-29 NOTE — H&P (VIEW-ONLY)
"Established Patient - TeleHealth Visit    The patient location is: [place]  The chief complaint leading to consultation is: chronic pain     Visit type: audiovisual    Face to Face time with patient: 10-15 minutes  20 minutes of total time spent on the encounter, which includes face to face time and non-face to face time preparing to see the patient (eg, review of tests), Obtaining and/or reviewing separately obtained history, Documenting clinical information in the electronic or other health record, Independently interpreting results (not separately reported) and communicating results to the patient/family/caregiver, or Care coordination (not separately reported).     Each patient to whom he or she provides medical services by telemedicine is:  (1) informed of the relationship between the physician and patient and the respective role of any other health care provider with respect to management of the patient; and (2) notified that he or she may decline to receive medical services by telemedicine and may withdraw from such care at any time.      Referring Physician: Dr. Cui (Rheumatology)     PCP: No, Primary Doctor    Chief Complaint:   Axial low back pain  R leg pain - improved after KATHARINE        SUBJECTIVE:    Interval History (5/30/2024):  Janice Lawrence presents today for follow-up visit.  Patient was last seen about 6 weeks ago.  She continues to report resolution radicular pain after KATHARINE.  She complains now of axial low back pain with no radiation into the legs. Patient reports pain as 9/10 with activity.    Interval history 04/03/2024  Patient presents status post right-sided L4-5 and L5-S1 transforaminal epidural steroid injection 03/13/2024.  Patient reports 100% resolution of lower extremity radicular symptoms.  Today she reports myofascial pain remaining in the lower back.  She reports muscles are trying to loosen." She is continued physician directed physical therapy exercises at home.  She " recently refilled Lyrica which she is taking 75 mg twice daily.  She denies any side effects from this medication.  Today she denies significant lower extremity radiculopathy, lower extremity weakness, bowel or bladder incontinence or saddle anesthesia.    Interval history 02/21/2024  Patient presents for MRI lumbar spine review.  Today patient again reports pain in the lower back and bilateral lower extremities, worse on the right.  Patient reports pain which on average is rated a 6/10.  Pain presents in a bandlike distribution in the lower back which radiates down bilateral lower extremities in L4-S1 distribution to the calf, 90% on the right.  Patient has been performing physician directed physical therapy exercises at home from 11/08/2023 through 01/09/2024 with marginal improvement in her pain.  She did start Lyrica titration is taking 75 mg once daily with mild improvement in her symptoms.  She denies any side effects from this medication.  She would like to avoid excessive escalation of this dose of medication as she was on higher doses of gabapentin in the past.  Of note patient is a  in his interested in pursuing intervention but would need to work around her schedule.    INITIAL HPI 02/06/2024  Janice Lawrence is a 43 y.o. female with past medical history significant for anxiety and depression, psoriasis on immunosuppression, morbid obesity, fatty liver, GERD, multi joint osteoarthritis who presents to the clinic for the evaluation of lower back and leg pain.  Patient reports pain has been present for several years, greater than 10 years, without inciting accident injury or trauma.  Pain is constant and today is rated a 6/10.  Pain at its best is a 3/10 and at its worse is a 10/10.  Patient denies preceding accident injury or trauma.  Patient reports pain in a bandlike distribution in the lower back which can radiate down the lateral and posterior aspect of the right lower extremity  in L4-S1 distribution to the calf.  Patient denies left lower extremity radiculopathy, bowel or bladder incontinence, saddle anesthesia.  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  She does report with periods of inactivity, her pain can be exacerbated as well.  Pain is marginally improved with sitting, stretching, hot baths.  She does endorse associated weakness in the right lower extremity associated with her pain.  Patient has trialed gabapentin, up to 1200 mg in a day, 5 years prior with marginal improvement in her symptoms.  She is also received 2 prior lumbar epidural steroid injections, greater than 10 years prior with Dr. Luna with ineffective relief.  She has completed several rounds of conventional land-based physical therapy and has continued physician directed physical therapy exercises over the last 8 weeks from 11/08/2023 through 01/09/2024 with marginal improvement in her pain.    Patient reports significant motor weakness.  Patient denies night fever/night sweats, urinary incontinence, bowel incontinence, significant weight loss, and loss of sensations.      Pain Disability Index (PDI) Score Review:      1/9/2024    11:53 AM   Last 3 PDI Scores   Pain Disability Index (PDI) 42         Non-Pharmacologic Treatments:  Physical Therapy/Home Exercise: no  Ice/Heat:yes  TENS: yes  Acupuncture: yes  Massage: yes  Chiropractic: yes    Other: no      Pain Medications:  - Adjuvant Medications:  Celebrex, Fluoxetine    Pain Procedures:   External:  -Dr. Phil Luna: ROCIO Herbert Lowe:  -03/13/2024: Right-sided L4-5 and L5-S1 transforaminal epidural steroid injection         Review of Systems:  GENERAL:  No weight loss, malaise or fevers.  HEENT:   No recent changes in vision or hearing  NECK:  Negative for lumps, no difficulty with swallowing.  RESPIRATORY:  Negative for cough, wheezing or shortness of breath, patient denies any recent  URI.  CARDIOVASCULAR:  Negative for chest pain or palpitations.  GI:  Negative for abdominal discomfort, blood in stools or black stools or change in bowel habits.  MUSCULOSKELETAL:  See HPI.  SKIN:  Negative for lesions, rash, and itching.  PSYCH:  No mood disorder or recent psychosocial stressors.   HEMATOLOGY/LYMPHOLOGY:  Negative for prolonged bleeding, bruising easily or swollen nodes.    NEURO:   No history of syncope, paralysis, seizures or tremors.  All other reviewed and negative other than HPI.        OBJECTIVE:    Physical Exam:  Vitals:    05/30/24 0826   Resp: 17    There is no height or weight on file to calculate BMI.   (reviewed on 5/30/2024)    GENERAL: Well appearing, in no acute distress, alert and oriented x3.  PSYCH:  Mood and affect appropriate.  SKIN: Skin color, texture, turgor normal, no rashes or lesions.  HEAD/FACE:  Normocephalic, atraumatic. Cranial nerves grossly intact.    PULM: No evidence of respiratory difficulty, symmetric chest rise.  GI:  Soft and non-tender.    BACK: Straight leg raising in the sitting and supine positions is negative to radicular pain.pain to palpation over the facet joints of the lumbar spine or spinous processes. Normal range of motion without pain reproduction.  EXTREMITIES: Peripheral joint ROM is full and pain free without obvious instability or laxity in all four extremities. No deformities, edema, or skin discoloration. Good capillary refill.  MUSCULOSKELETAL: Able to stand on heels & toes.   Shoulder, hip, and knee provocative maneuvers are negative.  There is no pain with palpation over the sacroiliac joints bilaterally.  Gaenslen's, Distraction/Compression and  FABERs test is negative.  Facet loading test is negative bilaterally.   Bilateral upper and lower extremity strength is normal and symmetric.  No atrophy or tone abnormalities are noted.    RIGHT Lower extremity: Hip flexion 5/5, Hip Abduction 5/5, Hip Adduction 5/5, Knee extension 5/5, Knee  flexion 5/5, Ankle dorsiflexion5/5, Extensor hallucis longus 5/5, Ankle plantarflexion 5/5  LEFT Lower extremity:  Hip flexion 5/5, Hip Abduction 5/5,Hip Adduction 5/5, Knee extension 5/5, Knee flexion 5/5, Ankle dorsiflexion 5/5, Extensor hallucis longus 5/5, Ankle plantarflexion 5/5  -Normal testing knee (patellar) jerk and ankle (achilles) jerk    NEURO: Bilateral upper and lower extremity coordination and muscle stretch reflexes are physiologic and symmetric. No loss of sensation is noted.  GAIT: normal.        Imaging (Reviewed on 5/30/2024):  EMG 01/22/2024  IMPRESSION  ABNORMAL study  2. There is electrodiagnostic evidence of a subacute on chronic radiculopathy of the S1  nerve root and a chronic radiculopathy of the L5 nerve root    02/22/16 X-Ray Lumbar Spine AP And Lateral  Findings: The vertebral bodies demonstrate normal height.  There is straightening of the normal lordotic curvature. The disk space heights are maintained. No significant facet arthropathy.      05/09/23 X-Ray Cervical Spine AP And Lateral  FINDINGS:  Vertebral body height and alignment are maintained.  The disc spaces are normal in height.  No acute fracture is evident.  No prevertebral soft tissue swelling.      02/12/2024 LUMBAR MRI              ASSESSMENT: 44 y.o. year old female with     1. Lumbar spondylosis  Case Request-RAD/Other Procedure Area: Diagnostic Bilateral L3-5 MBB #1 with RN IV sedation    Case Request-RAD/Other Procedure Area: Diagnostic Bilateral L3-5 MBB #2 with RN IV sedation    Case Request-RAD/Other Procedure Area: Bilateral L3-5 RFA with RN IV sedation      2. Lumbar facet joint pain        3. DDD (degenerative disc disease), lumbar        4. Right lumbar radiculopathy-resolved after KATHARINE              PLAN:   - Interventions:   - Schedule Diagnostic L3-5 MBB #1. Patient is not taking prescription blood thinners or ASA.  previously explained the risks and benefits of the procedure in detail with the patient in  clinic along with alternative treatment options, and the patient elected to pursue the intervention at this time.    Call patient to get for % relief to determine potential RFA eligibility.  1st MBB: if > 80% pain relief, schedule 2nd diagnostic MBB (orders in).  If 2nd MBB: if > 80% pain relief, schedule bilateral L3-5 RFA (orders in).    - Patient has 100% sustained relief in right lower extremity radicular symptoms following right-sided L4-5 and L5-S1 transforaminal epidural steroid injection.  We discussed repeating this procedure in the future should radicular symptoms exacerbate.    - Anticoagulation use: No no anticoagulation      - Medications:  -Continue Lyrica (pregabalin) 75mg BID.  We discussed potential side effects of this medication which may include drowsiness,dizziness, dry mouth, constipation or peripheral edema.  Can increase to TID if needed after RFA.      report:  Reviewed and consistent with medication use as prescribed.      - Therapy:   We discussed continuing at home physician directed physical therapy to help manage the patient/s painful condition. The patient was counseled that muscle strengthening will improve the long term prognosis in regards to pain and may also help increase range of motion and mobility.     - Imaging: Reviewed available imaging (MRI lumbar spine 2024) with patient and answered any questions they had regarding study.     - Records: Obtain old records from outside physicians and imaging (not received 04/03/2024)      - Follow up visit: 4-6 weeks post RFA - virtual visit - [facility]    - Patient Questions: Answered all of the patient's questions regarding diagnosis, therapy, and treatment.    - This condition does not require this patient to take time off of work, and the primary goal of our Pain Management services is to improve the patient's functional capacity.   - I discussed the risks, benefits, and alternatives to potential treatment options. All questions and  concerns were fully addressed today in clinic.         Emelina Scott PA-C  Interventional Pain Management - [facility]    Disclaimer:  This note was prepared using voice recognition system and is likely to have sound alike errors that may have been overlooked even after proof reading.  Please call me with any questions.

## 2024-05-30 ENCOUNTER — OFFICE VISIT (OUTPATIENT)
Dept: PAIN MEDICINE | Facility: CLINIC | Age: 44
End: 2024-05-30
Payer: COMMERCIAL

## 2024-05-30 VITALS — RESPIRATION RATE: 17 BRPM

## 2024-05-30 DIAGNOSIS — M51.36 DDD (DEGENERATIVE DISC DISEASE), LUMBAR: ICD-10-CM

## 2024-05-30 DIAGNOSIS — M54.16 RIGHT LUMBAR RADICULOPATHY: ICD-10-CM

## 2024-05-30 DIAGNOSIS — M54.59 LUMBAR FACET JOINT PAIN: ICD-10-CM

## 2024-05-30 DIAGNOSIS — M47.816 LUMBAR SPONDYLOSIS: Primary | ICD-10-CM

## 2024-05-30 PROCEDURE — 1159F MED LIST DOCD IN RCRD: CPT | Mod: CPTII,95,, | Performed by: PHYSICIAN ASSISTANT

## 2024-05-30 PROCEDURE — 1160F RVW MEDS BY RX/DR IN RCRD: CPT | Mod: CPTII,95,, | Performed by: PHYSICIAN ASSISTANT

## 2024-05-30 PROCEDURE — 99214 OFFICE O/P EST MOD 30 MIN: CPT | Mod: 95,,, | Performed by: PHYSICIAN ASSISTANT

## 2024-05-30 NOTE — PATIENT INSTRUCTIONS
"Diagnostic Medial Branch Block - Patient Information    What are facet joints?  Bones called vertebrae make up your spine. Each vertebra has facets (flat surfaces) that touch where the vertebrae fit together. These form a structure called a facet joint on each side of the vertebrae.Back or neck pain may be caused by a problem with your facet joints. If these joints are blocked or numbed, they will not be able to transfer the painful sensation to the brain.   Therefore, this procedure is completed to see if your back pain is (solely or in part) caused by the facet joints.        How is the procedure performed?  The patient lies on his/her stomach. The skin of the back or neck is cleansed with antiseptic solution and a local anesthetic in injected to numb the area. A small needle is then guided using an X-ray to the targeted facet joints which are then numbed. An anesthetic is then injected into the joint.   The injection takes about 15 minutes to complete.    Where will your procedure be performed?  The treatment is done in a hospital or surgery center. Youll be asked to fill out some forms, including a consent form. You may also be examined prior to.         Will the injection hurt?  There is some discomfort with needle insertion which we minimize by numbing the skin over the joint with a   local anesthetic. You may elect to have a small amount of sedating medication to help with discomfort and to   help you relax.     How long does the effect last?  The pain relief will only last a few hours/ less than 12 hours. Pain relief in the first few hours after   the injection is the most important as this tells us our diagnosis of facet joint mediated pain is correct. If the "test injection" provides pain relief, we can discuss other options available for extended pain relief, such a radiofrequency ablation (RFA).    What is the next step after the injection?  Our staff will call you the next day to document pain relief " obtained after the procedure. If the pain relief is significant, our final plan would be to move forward with next part of the treatment: RFA (radiofrequency ablation), which can provide an extended pain relief from 6 months to 18 months. To note: many insurances require 2 diagnostic medial branch blocks prior to moving forward with RFA for the first time.    What are the risks and side effects?  Serious side effects and complications are rare. The most common problem after the injection is having pain in   the area of the injection for a few days. The other complications are infection, bleeding and nerve injury. These complications are minimized by stopping blood thinners, using sterile technique, and fluoroscopy for xray needle guidance.    After the Procedure:  Most often, you can go home in about an hour. Our procedure center requires you to have an adult friend or relative drive you to and from the facility. The anesthetic wears off in a few hours. When it does, your back or neck may feel more sore than usual. This is normal. Take it easy for the rest of the day.      ______________________________________________________________________    If lumbar medial branch block is successful in pain relief (at least 80% pain relief short-term); for longer last relief, we can try:    Lumbar Radiofrequency Ablation (RFA)    What is a facet joint pain?  The spine is made of vertebrae, which makes up the spine. The vertebrae are connected to each other with facet joints, which allows the bending and rotational spine movements. As the joints become inflamed and irritated, there is a small medial branch nerve that transmits the pain signal from the joint to the brain. Furthermore, spine pain may worsen during the extension of spine.       How does lumbar RFA bring pain relief?  The pain is produced due to inflammation thoracic facet joint which is transmitted via medial branch nerve to the central nervous system. By ablating  the medial branch nerve via radiofrequency waves, this results in decreased mid-back pain caused by the facet joints.     How is the lumbar RFA performed?  After sterile preparation of the lumbar region, the injection site is localized under X-ray. Following the local anesthetic applied to the injection site, which can help decrease the injection site pain, and then the special radiofrequency needle is guided toward the target lumbar facet joint area with the help of Xray. After the target is reached, the area is stimulated with to rule out any lumbar nerve root involvement. Thereafter, small amount of local anesthetic with steroid is injected for to minimize postablation procedure pain, and then the radiofrequency ablation is performed. Lastly, the needle is taken out at the end of the procedure.      What to expect after the lumbar RFA?  This is an outpatient procedure. Patient should expect to receive full relief over 3-8 weeks. In some patients, pain may worsen slightly before improvement.    How long the relief from the lumbar RFA would last for?  It varies from patient to patient. Usually, the relief can last from 6 months, up to 18 months.    Do the medial branch nerves ever grow back? And can the lumbar RFA procedure repeated?  Yes, the medial branch nerves do grow back. Yes, the RFA can be repeated in order to achieve the previous pain relief.

## 2024-06-03 DIAGNOSIS — L40.50 PSORIATIC ARTHRITIS: ICD-10-CM

## 2024-06-03 DIAGNOSIS — L40.0 PLAQUE PSORIASIS: ICD-10-CM

## 2024-06-03 RX ORDER — GUSELKUMAB 100 MG/ML
INJECTION SUBCUTANEOUS
Qty: 2 ML | Refills: 3 | Status: ACTIVE | OUTPATIENT
Start: 2024-06-03

## 2024-06-05 ENCOUNTER — PATIENT MESSAGE (OUTPATIENT)
Dept: PAIN MEDICINE | Facility: CLINIC | Age: 44
End: 2024-06-05
Payer: COMMERCIAL

## 2024-06-05 ENCOUNTER — PATIENT MESSAGE (OUTPATIENT)
Dept: SURGERY | Facility: HOSPITAL | Age: 44
End: 2024-06-05
Payer: COMMERCIAL

## 2024-06-13 NOTE — PRE-PROCEDURE INSTRUCTIONS
Spoke with patient regarding procedure scheduled on 6.26     Arrival time 1100     Has patient been sick with fever or on antibiotics within the last 7 days? No     Does the patient have any open wounds, sores or rashes? No     Does the patient have any recent fractures? no     Has patient received a vaccination within the last 7 days? No     Received the COVID vaccination? yes     Has the patient stopped all medications as directed? na      Does patient have a pacemaker, defibrillator, or implantable stimulator? No     Does the patient have a ride to and from procedure and someone reliable to remain with patient?  mother      Is the patient diabetic? no     Does the patient have sleep apnea? Or use O2 at home? no     Is the patient receiving sedation? yes     Is the patient instructed to remain NPO beginning at midnight the night before their procedure? yes     Procedure location confirmed with patient? Yes     Covid- Denies signs/symptoms. Instructed to notify PAT/MD if any changes.

## 2024-06-21 DIAGNOSIS — E55.9 VITAMIN D DEFICIENCY: ICD-10-CM

## 2024-06-21 RX ORDER — ERGOCALCIFEROL 1.25 1/1
CAPSULE ORAL
Qty: 24 CAPSULE | Refills: 3 | Status: SHIPPED | OUTPATIENT
Start: 2024-06-21

## 2024-06-26 ENCOUNTER — HOSPITAL ENCOUNTER (OUTPATIENT)
Facility: HOSPITAL | Age: 44
Discharge: HOME | End: 2024-06-26
Attending: ANESTHESIOLOGY | Admitting: ANESTHESIOLOGY
Payer: COMMERCIAL

## 2024-06-26 VITALS
WEIGHT: 293 LBS | TEMPERATURE: 98 F | HEART RATE: 74 BPM | SYSTOLIC BLOOD PRESSURE: 137 MMHG | OXYGEN SATURATION: 99 % | HEIGHT: 71 IN | RESPIRATION RATE: 14 BRPM | BODY MASS INDEX: 41.02 KG/M2 | DIASTOLIC BLOOD PRESSURE: 85 MMHG

## 2024-06-26 DIAGNOSIS — M47.816 LUMBAR SPONDYLOSIS: ICD-10-CM

## 2024-06-26 LAB
B-HCG UR QL: NEGATIVE
CTP QC/QA: YES

## 2024-06-26 PROCEDURE — 63600175 PHARM REV CODE 636 W HCPCS: Performed by: ANESTHESIOLOGY

## 2024-06-26 PROCEDURE — 64494 INJ PARAVERT F JNT L/S 2 LEV: CPT | Mod: 50,,, | Performed by: ANESTHESIOLOGY

## 2024-06-26 PROCEDURE — 64494 INJ PARAVERT F JNT L/S 2 LEV: CPT | Mod: 50 | Performed by: ANESTHESIOLOGY

## 2024-06-26 PROCEDURE — 64493 INJ PARAVERT F JNT L/S 1 LEV: CPT | Mod: 50,,, | Performed by: ANESTHESIOLOGY

## 2024-06-26 PROCEDURE — 25000003 PHARM REV CODE 250: Performed by: ANESTHESIOLOGY

## 2024-06-26 PROCEDURE — 81025 URINE PREGNANCY TEST: CPT | Performed by: ANESTHESIOLOGY

## 2024-06-26 PROCEDURE — 64493 INJ PARAVERT F JNT L/S 1 LEV: CPT | Mod: 50 | Performed by: ANESTHESIOLOGY

## 2024-06-26 RX ORDER — BUPIVACAINE HYDROCHLORIDE 5 MG/ML
INJECTION, SOLUTION EPIDURAL; INTRACAUDAL
Status: DISCONTINUED | OUTPATIENT
Start: 2024-06-26 | End: 2024-06-26 | Stop reason: HOSPADM

## 2024-06-26 RX ORDER — FENTANYL CITRATE 50 UG/ML
INJECTION, SOLUTION INTRAMUSCULAR; INTRAVENOUS
Status: DISCONTINUED | OUTPATIENT
Start: 2024-06-26 | End: 2024-06-26 | Stop reason: HOSPADM

## 2024-06-26 RX ORDER — SODIUM BICARBONATE 1 MEQ/ML
SYRINGE (ML) INTRAVENOUS
Status: DISCONTINUED | OUTPATIENT
Start: 2024-06-26 | End: 2024-06-26 | Stop reason: HOSPADM

## 2024-06-26 RX ORDER — MIDAZOLAM HYDROCHLORIDE 1 MG/ML
INJECTION, SOLUTION INTRAMUSCULAR; INTRAVENOUS
Status: DISCONTINUED | OUTPATIENT
Start: 2024-06-26 | End: 2024-06-26 | Stop reason: HOSPADM

## 2024-06-26 NOTE — DISCHARGE SUMMARY
Discharge Note  Short Stay      SUMMARY     Admit Date: 6/26/2024    Attending Physician: Dez Lowe MD        Discharge Physician: Dez Lowe MD        Discharge Date: 6/26/2024 12:23 PM    Procedure(s) (LRB):  Diagnostic Bilateral L3-5 MBB #1 with RN IV sedation (Bilateral)    Final Diagnosis: Lumbar spondylosis [M47.816]    Disposition: Home or self care    Patient Instructions:   Current Discharge Medication List        CONTINUE these medications which have NOT CHANGED    Details   celecoxib (CELEBREX) 200 MG capsule Take 1 capsule (200 mg total) by mouth 2 (two) times daily as needed for Pain.  Qty: 60 capsule, Refills: 3      esomeprazole (NEXIUM) 40 MG capsule TAKE ONE CAPSULE BY MOUTH EVERY DAY BEFORE BREAKFAST  Qty: 30 capsule, Refills: 11      FLUoxetine 40 MG capsule TAKE ONE CAPSULE BY MOUTH ONCE PER DAY  Qty: 30 capsule, Refills: 6      folic acid (FOLVITE) 1 MG tablet Take 1 tablet (1,000 mcg total) by mouth once daily.  Qty: 90 tablet, Refills: 3    Associated Diagnoses: Polyarticular psoriatic arthritis      leflunomide (ARAVA) 20 MG Tab TAKE ONE TABLET BY MOUTH ONCE PER DAY  Strength: 20 mg  Qty: 30 tablet, Refills: 2    Comments: This prescription was filled on 11/29/2023. Any refills authorized will be placed on file.  Associated Diagnoses: Psoriatic arthritis; Plaque psoriasis      pregabalin (LYRICA) 75 MG capsule Take 1 capsule (75 mg total) by mouth 2 (two) times daily.  Qty: 60 capsule, Refills: 2      VITAMIN D2 1,250 mcg (50,000 unit) capsule TAKE ONE CAPSULE BY MOUTH TWICE A WEEK  Qty: 24 capsule, Refills: 3    Associated Diagnoses: Vitamin D deficiency      guselkumab (TREMFYA) 100 mg/mL Syrg Inject 1 mL (100 mg) into the skin every 8 weeks.  Qty: 2 mL, Refills: 3    Associated Diagnoses: Plaque psoriasis; Psoriatic arthritis      HYDROcodone-acetaminophen (NORCO) 5-325 mg per tablet Take 1 tablet by mouth every 4 (four) hours as needed for Pain.  Qty: 14 tablet, Refills: 0     Comments: Quantity prescribed more than 7 day supply? No  Associated Diagnoses: Status post endometrial ablation; Status post bilateral salpingectomy      !! ondansetron (ZOFRAN-ODT) 4 MG TbDL 1 tablet Orally every 6 hours as needed for nausea and vomiting      !! ondansetron (ZOFRAN-ODT) 4 MG TbDL Take 1 tablet (4 mg total) by mouth every 6 (six) hours as needed (nausea).  Qty: 10 tablet, Refills: 0    Associated Diagnoses: Status post endometrial ablation; Status post bilateral salpingectomy       !! - Potential duplicate medications found. Please discuss with provider.              Discharge Diagnosis: Lumbar spondylosis [M47.816]  Condition on Discharge: Stable with no complications to procedure   Diet on Discharge: Same as before.  Activity: as per instruction sheet.  Discharge to: Home with a responsible adult.  Follow up: 2-4 weeks       Please call the office at [phone] if you experience any weakness or loss of sensation, fever > 101.5, pain uncontrolled with oral medications, persistent nausea/vomiting/or diarrhea, redness or drainage from the incisions, or any other worrisome concerns. If physician on call was not reached or could not communicate with our office for any reason please go to the nearest emergency department

## 2024-06-26 NOTE — OP NOTE
Janice Lawrence  44 y.o. female      Vitals:    06/26/24 1220   BP: (!) 175/78   Pulse: 68   Resp: 18   Temp:        Procedure Date: 06/26/2024        INFORMED CONSENT: The procedure, risks, benefits and options were discussed with patient. There are no contraindications to the procedure. The patient expressed understanding and agreed to proceed. The personnel performing the procedure was discussed. I verify that I personally obtained consent prior to the start of the procedure and the signed consent can be found on the patient's chart.       Anesthesia:   Conscious sedation provided by M.D    The patient was monitored with continuous pulse oximetry, EKG, and intermittent blood pressure monitors.  The patient was hemodynamically stable throughout the entire process was responsive to voice, and breathing spontaneously.  Supplemental O2 was provided at 2L/min via nasal cannula.  Patient was comfortable for the duration of the procedure. (See nurse documentation and case log for sedation time)    There was a total of 2mg IV Midazolam and 50mcg Fentanyl titrated for the procedure     Pre Procedure diagnosis: Lumbar spondylosis [M47.816]  Post-Procedure diagnosis: SAME     PROCEDURE: bilateral L3,4,5 LUMBAR FACET MEDIAL BRANCH NERVE BLOCK        DESCRIPTION OF PROCEDURE:The patient was brought to the procedure room. After performing time out. IV access was obtained prior to the procedure. The patient was positioned prone on the fluoroscopy table. Continuous hemodynamic monitoring was initiated including blood pressure, EKG, and pulse oximetry. The area of the lumbar spine was prepped chlorhexidine and draped into a sterile field. Fluoroscopy was used to identify the location of the bilateral side L3, L4, and L5 medial branch nerves at the junctions of the superior articular process and the transverse processes of L4, L5, and the sacral ala respectively. Skin anesthesia was achieved using 5 cc of Lidocaine 1%  "over the injection sites. A 22 gauge, 3 1/2" spinal needle was slowly inserted at each level using AP, lateral and oblique fluoroscopic imaging. Negative aspiration for blood or CSF was confirmed.  8 ml bupivacaine 0.25% ONLY was injected at all sites in divided doses. The needles were removed and bleeding was nil. A sterile dressing was applied. No specimens collected. Patient was taken back to the PACU for observation .       Blood Loss: Nill  Specimen: None    Dez S Lowe    "

## 2024-06-26 NOTE — DISCHARGE INSTRUCTIONS

## 2024-06-27 ENCOUNTER — TELEPHONE (OUTPATIENT)
Dept: PAIN MEDICINE | Facility: CLINIC | Age: 44
End: 2024-06-27
Payer: COMMERCIAL

## 2024-06-27 NOTE — TELEPHONE ENCOUNTER
----- Message from Jessee Mcdonough sent at 6/27/2024  1:58 PM CDT -----  .Type:  Patient Returning Call    Who Called:Janice  Who Left Message for Patient:Nurse  Does the patient know what this is regarding?:Yes  Would the patient rather a call back or a response via [facility]? Call back  Best Call Back Number:.[phone]  Additional Information: Na      Thanks  VINCE

## 2024-06-27 NOTE — TELEPHONE ENCOUNTER
Reach out to the patient to received their % relief from the MBB procedure. Pt reported 90 %. Inform patient that I will inform the provider and  I will give them a call with more information once the provides responds back. Pt understood.

## 2024-07-10 DIAGNOSIS — L40.0 PLAQUE PSORIASIS: ICD-10-CM

## 2024-07-10 DIAGNOSIS — L40.50 PSORIATIC ARTHRITIS: ICD-10-CM

## 2024-07-10 RX ORDER — LEFLUNOMIDE 20 MG/1
TABLET ORAL
Qty: 30 TABLET | Refills: 2 | Status: SHIPPED | OUTPATIENT
Start: 2024-07-10

## 2024-07-11 ENCOUNTER — PATIENT MESSAGE (OUTPATIENT)
Dept: PAIN MEDICINE | Facility: HOSPITAL | Age: 44
End: 2024-07-11
Payer: COMMERCIAL

## 2024-07-11 NOTE — PRE-PROCEDURE INSTRUCTIONS
Spoke with patient regarding procedure scheduled on 7.17     Arrival time 1045     Has patient been sick with fever or on antibiotics within the last 7 days? No     Does the patient have any open wounds, sores or rashes? No     Does the patient have any recent fractures? no     Has patient received a vaccination within the last 7 days? No     Received the COVID vaccination? yes     Has the patient stopped all medications as directed? na     Does patient have a pacemaker, defibrillator, or implantable stimulator? No     Does the patient have a ride to and from procedure and someone reliable to remain with patient?  mother     Is the patient diabetic? no     Does the patient have sleep apnea? Or use O2 at home? no     Is the patient receiving sedation? yes     Is the patient instructed to remain NPO beginning at midnight the night before their procedure? yes     Procedure location confirmed with patient? Yes     Covid- Denies signs/symptoms. Instructed to notify PAT/MD if any changes.

## 2024-07-17 ENCOUNTER — HOSPITAL ENCOUNTER (OUTPATIENT)
Facility: HOSPITAL | Age: 44
Discharge: HOME | End: 2024-07-17
Attending: ANESTHESIOLOGY | Admitting: ANESTHESIOLOGY
Payer: COMMERCIAL

## 2024-07-17 VITALS
WEIGHT: 293 LBS | HEART RATE: 69 BPM | HEIGHT: 71 IN | TEMPERATURE: 98 F | DIASTOLIC BLOOD PRESSURE: 79 MMHG | RESPIRATION RATE: 16 BRPM | SYSTOLIC BLOOD PRESSURE: 146 MMHG | BODY MASS INDEX: 41.02 KG/M2 | OXYGEN SATURATION: 96 %

## 2024-07-17 DIAGNOSIS — M47.816 LUMBAR SPONDYLOSIS: ICD-10-CM

## 2024-07-17 LAB
B-HCG UR QL: NEGATIVE
CTP QC/QA: YES

## 2024-07-17 PROCEDURE — 64494 INJ PARAVERT F JNT L/S 2 LEV: CPT | Mod: 50 | Performed by: ANESTHESIOLOGY

## 2024-07-17 PROCEDURE — 81025 URINE PREGNANCY TEST: CPT | Performed by: ANESTHESIOLOGY

## 2024-07-17 PROCEDURE — 63600175 PHARM REV CODE 636 W HCPCS: Mod: JZ,JG | Performed by: ANESTHESIOLOGY

## 2024-07-17 PROCEDURE — 25000003 PHARM REV CODE 250: Performed by: ANESTHESIOLOGY

## 2024-07-17 PROCEDURE — 64493 INJ PARAVERT F JNT L/S 1 LEV: CPT | Mod: 50,,, | Performed by: ANESTHESIOLOGY

## 2024-07-17 PROCEDURE — 64493 INJ PARAVERT F JNT L/S 1 LEV: CPT | Mod: 50 | Performed by: ANESTHESIOLOGY

## 2024-07-17 PROCEDURE — 64494 INJ PARAVERT F JNT L/S 2 LEV: CPT | Mod: 50,,, | Performed by: ANESTHESIOLOGY

## 2024-07-17 RX ORDER — INDOMETHACIN 25 MG/1
CAPSULE ORAL
Status: DISCONTINUED | OUTPATIENT
Start: 2024-07-17 | End: 2024-07-17 | Stop reason: HOSPADM

## 2024-07-17 RX ORDER — FENTANYL CITRATE 50 UG/ML
INJECTION, SOLUTION INTRAMUSCULAR; INTRAVENOUS
Status: DISCONTINUED | OUTPATIENT
Start: 2024-07-17 | End: 2024-07-17 | Stop reason: HOSPADM

## 2024-07-17 RX ORDER — BUPIVACAINE HYDROCHLORIDE 5 MG/ML
INJECTION, SOLUTION EPIDURAL; INTRACAUDAL
Status: DISCONTINUED | OUTPATIENT
Start: 2024-07-17 | End: 2024-07-17 | Stop reason: HOSPADM

## 2024-07-17 RX ORDER — MIDAZOLAM HYDROCHLORIDE 1 MG/ML
INJECTION INTRAMUSCULAR; INTRAVENOUS
Status: DISCONTINUED | OUTPATIENT
Start: 2024-07-17 | End: 2024-07-17 | Stop reason: HOSPADM

## 2024-07-17 NOTE — DISCHARGE SUMMARY
Discharge Note  Short Stay      SUMMARY     Admit Date: 7/17/2024    Attending Physician: Dez Lowe MD        Discharge Physician: Dez Lowe MD        Discharge Date: 7/17/2024 11:42 AM    Procedure(s) (LRB):  Diagnostic Bilateral L3-5 MBB #2 with RN IV sedation (Bilateral)    Final Diagnosis: Lumbar spondylosis [M47.816]    Disposition: Home or self care    Patient Instructions:   Current Discharge Medication List        CONTINUE these medications which have NOT CHANGED    Details   celecoxib (CELEBREX) 200 MG capsule Take 1 capsule (200 mg total) by mouth 2 (two) times daily as needed for Pain.  Qty: 60 capsule, Refills: 3      esomeprazole (NEXIUM) 40 MG capsule TAKE ONE CAPSULE BY MOUTH EVERY DAY BEFORE BREAKFAST  Qty: 30 capsule, Refills: 11      FLUoxetine 40 MG capsule TAKE ONE CAPSULE BY MOUTH ONCE PER DAY  Qty: 30 capsule, Refills: 6      folic acid (FOLVITE) 1 MG tablet Take 1 tablet (1,000 mcg total) by mouth once daily.  Qty: 90 tablet, Refills: 3    Associated Diagnoses: Polyarticular psoriatic arthritis      guselkumab (TREMFYA) 100 mg/mL Syrg Inject 1 mL (100 mg) into the skin every 8 weeks.  Qty: 2 mL, Refills: 3    Associated Diagnoses: Plaque psoriasis; Psoriatic arthritis      HYDROcodone-acetaminophen (NORCO) 5-325 mg per tablet Take 1 tablet by mouth every 4 (four) hours as needed for Pain.  Qty: 14 tablet, Refills: 0    Comments: Quantity prescribed more than 7 day supply? No  Associated Diagnoses: Status post endometrial ablation; Status post bilateral salpingectomy      leflunomide (ARAVA) 20 MG Tab TAKE ONE TABLET BY MOUTH ONCE PER DAY  Strength: 20 mg  Qty: 30 tablet, Refills: 2    Comments: This prescription was filled on 11/29/2023. Any refills authorized will be placed on file.  Associated Diagnoses: Psoriatic arthritis; Plaque psoriasis      !! ondansetron (ZOFRAN-ODT) 4 MG TbDL 1 tablet Orally every 6 hours as needed for nausea and vomiting      !! ondansetron (ZOFRAN-ODT) 4 MG  TbDL Take 1 tablet (4 mg total) by mouth every 6 (six) hours as needed (nausea).  Qty: 10 tablet, Refills: 0    Associated Diagnoses: Status post endometrial ablation; Status post bilateral salpingectomy      pregabalin (LYRICA) 75 MG capsule Take 1 capsule (75 mg total) by mouth 2 (two) times daily.  Qty: 60 capsule, Refills: 2      VITAMIN D2 1,250 mcg (50,000 unit) capsule TAKE ONE CAPSULE BY MOUTH TWICE A WEEK  Qty: 24 capsule, Refills: 3    Associated Diagnoses: Vitamin D deficiency       !! - Potential duplicate medications found. Please discuss with provider.              Discharge Diagnosis: Lumbar spondylosis [M47.816]  Condition on Discharge: Stable with no complications to procedure   Diet on Discharge: Same as before.  Activity: as per instruction sheet.  Discharge to: Home with a responsible adult.  Follow up: 2-4 weeks       Please call the office at [phone] if you experience any weakness or loss of sensation, fever > 101.5, pain uncontrolled with oral medications, persistent nausea/vomiting/or diarrhea, redness or drainage from the incisions, or any other worrisome concerns. If physician on call was not reached or could not communicate with our office for any reason please go to the nearest emergency department

## 2024-07-17 NOTE — OP NOTE
Janice Lawrence  44 y.o. female      Vitals:    07/17/24 1139   BP: (!) 154/76   Pulse: 71   Resp: 16   Temp:        Procedure Date: 07/17/2024        INFORMED CONSENT: The procedure, risks, benefits and options were discussed with patient. There are no contraindications to the procedure. The patient expressed understanding and agreed to proceed. The personnel performing the procedure was discussed. I verify that I personally obtained consent prior to the start of the procedure and the signed consent can be found on the patient's chart.       Anesthesia:   Conscious sedation provided by M.D    The patient was monitored with continuous pulse oximetry, EKG, and intermittent blood pressure monitors.  The patient was hemodynamically stable throughout the entire process was responsive to voice, and breathing spontaneously.  Supplemental O2 was provided at 2L/min via nasal cannula.  Patient was comfortable for the duration of the procedure. (See nurse documentation and case log for sedation time)    There was a total of 2mg IV Midazolam and 100mcg Fentanyl titrated for the procedure     Pre Procedure diagnosis: Lumbar spondylosis [M47.816]  Post-Procedure diagnosis: SAME     PROCEDURE: bilateral L3,4,5 LUMBAR FACET MEDIAL BRANCH NERVE BLOCK        DESCRIPTION OF PROCEDURE:The patient was brought to the procedure room. After performing time out. IV access was obtained prior to the procedure. The patient was positioned prone on the fluoroscopy table. Continuous hemodynamic monitoring was initiated including blood pressure, EKG, and pulse oximetry. The area of the lumbar spine was prepped chlorhexidine and draped into a sterile field. Fluoroscopy was used to identify the location of the bilateral side L3, L4, and L5 medial branch nerves at the junctions of the superior articular process and the transverse processes of L4, L5, and the sacral ala respectively. Skin anesthesia was achieved using 5 cc of Lidocaine 1%  "over the injection sites. A 22 gauge, 3 1/2" spinal needle was slowly inserted at each level using AP, lateral and oblique fluoroscopic imaging. Negative aspiration for blood or CSF was confirmed.  8 ml bupivacaine 0.25% ONLY was injected at all sites in divided doses. The needles were removed and bleeding was nil. A sterile dressing was applied. No specimens collected. Patient was taken back to the PACU for observation .       Blood Loss: Nill  Specimen: None    Dez S Lowe    "

## 2024-07-17 NOTE — DISCHARGE INSTRUCTIONS

## 2024-07-17 NOTE — H&P
HPI  Patient presenting for Procedure(s) (LRB):  Diagnostic Bilateral L3-5 MBB #2 with RN IV sedation (Bilateral)     Patient on Anti-coagulation No    No health changes since previous encounter    Past Medical History:   Diagnosis Date    Acid reflux     Acquired iron deficiency anemia due to decreased absorption 12/10/2020    Anxiety     Arthritis     Cervical radiculopathy 5/9/2023    Depression      Past Surgical History:   Procedure Laterality Date    COLONOSCOPY N/A 02/10/2021    Procedure: COLONOSCOPY;  Surgeon: Angelika Eldridge MD;  Location: [facility];  Service: Endoscopy;  Laterality: N/A;    COSMETIC SURGERY      ESOPHAGOGASTRODUODENOSCOPY N/A 02/10/2021    Procedure: ESOPHAGOGASTRODUODENOSCOPY (EGD);  Surgeon: Angelika Eldridge MD;  Location: [facility];  Service: Endoscopy;  Laterality: N/A;    HYSTEROSCOPY WITH HYDROTHERMAL ABLATION OF ENDOMETRIUM WITH DILATION AND CURETTAGE N/A 4/8/2024    Procedure: HYSTEROSCOPY, WITH DILATION AND CURETTAGE OF UTERUS AND HYDROTHERMAL ENDOMETRIAL ABLATION;  Surgeon: Vaibhav Stinson MD;  Location: [facility];  Service: OB/GYN;  Laterality: N/A;  NOVASURE    INJECTION OF ANESTHETIC AGENT AROUND MEDIAL BRANCH NERVES INNERVATING LUMBAR FACET JOINT Bilateral 6/26/2024    Procedure: Diagnostic Bilateral L3-5 MBB #1 with RN IV sedation;  Surgeon: Dez Lowe MD;  Location: [facility] PAIN T;  Service: Pain Management;  Laterality: Bilateral;    LAPAROSCOPIC SALPINGECTOMY Bilateral 4/8/2024    Procedure: SALPINGECTOMY, LAPAROSCOPIC;  Surgeon: Vaibhav Stinson MD;  Location: [facility] OR;  Service: OB/GYN;  Laterality: Bilateral;    SELECTIVE INJECTION OF ANESTHETIC AGENT AROUND LUMBAR SPINAL NERVE ROOT BY TRANSFORAMINAL APPROACH Right 3/13/2024    Procedure: R sided L4/5 and L5/S1 TFESI;  Surgeon: Dez Lowe MD;  Location: [facility] PAIN MGT;  Service: Pain Management;  Laterality: Right;     Review of patient's allergies indicates:  No Known Allergies     No current  facility-administered medications on file prior to encounter.     Current Outpatient Medications on File Prior to Encounter   Medication Sig Dispense Refill    celecoxib (CELEBREX) 200 MG capsule Take 1 capsule (200 mg total) by mouth 2 (two) times daily as needed for Pain. 60 capsule 3    esomeprazole (NEXIUM) 40 MG capsule TAKE ONE CAPSULE BY MOUTH EVERY DAY BEFORE BREAKFAST 30 capsule 11    FLUoxetine 40 MG capsule TAKE ONE CAPSULE BY MOUTH ONCE PER DAY 30 capsule 6    folic acid (FOLVITE) 1 MG tablet Take 1 tablet (1,000 mcg total) by mouth once daily. 90 tablet 3    HYDROcodone-acetaminophen (NORCO) 5-325 mg per tablet Take 1 tablet by mouth every 4 (four) hours as needed for Pain. 14 tablet 0    ondansetron (ZOFRAN-ODT) 4 MG TbDL 1 tablet Orally every 6 hours as needed for nausea and vomiting      ondansetron (ZOFRAN-ODT) 4 MG TbDL Take 1 tablet (4 mg total) by mouth every 6 (six) hours as needed (nausea). 10 tablet 0    pregabalin (LYRICA) 75 MG capsule Take 1 capsule (75 mg total) by mouth 2 (two) times daily. 60 capsule 2        PMHx, PSHx, Allergies, Medications reviewed in epic    ROS negative except pain complaints in HPI    OBJECTIVE:    There were no vitals taken for this visit.    PHYSICAL EXAMINATION:    GENERAL: Well appearing, in no acute distress, alert and oriented x3.  PSYCH:  Mood and affect appropriate.  SKIN: Skin color, texture, turgor normal, no rashes or lesions which will impact the procedure.  CV: RRR with palpation of the radial artery.  PULM: No evidence of respiratory difficulty, symmetric chest rise. Clear to auscultation.  NEURO: Cranial nerves grossly intact.    Plan:    Proceed with procedure as planned Procedure(s) (LRB):  Diagnostic Bilateral L3-5 MBB #2 with RN IV sedation (Bilateral)    Dez Lowe MD  07/17/2024

## 2024-07-18 ENCOUNTER — TELEPHONE (OUTPATIENT)
Dept: PAIN MEDICINE | Facility: CLINIC | Age: 44
End: 2024-07-18
Payer: COMMERCIAL

## 2024-07-18 DIAGNOSIS — M47.816 LUMBAR SPONDYLOSIS: Primary | ICD-10-CM

## 2024-07-18 NOTE — TELEPHONE ENCOUNTER
Reach out to the patient to received their % relief from the MBB procedure. Pt reported 90 %. 10 hours  Inform patient that I will inform the provider and  I will give them a call with more information once the provides responds back. Pt understood.

## 2024-07-22 ENCOUNTER — OFFICE VISIT (OUTPATIENT)
Dept: OPHTHALMOLOGY | Facility: CLINIC | Age: 44
End: 2024-07-22
Payer: COMMERCIAL

## 2024-07-22 DIAGNOSIS — H52.13 MYOPIA WITH ASTIGMATISM AND PRESBYOPIA, BILATERAL: ICD-10-CM

## 2024-07-22 DIAGNOSIS — H01.02A SQUAMOUS BLEPHARITIS OF UPPER AND LOWER EYELIDS OF BOTH EYES: Primary | ICD-10-CM

## 2024-07-22 DIAGNOSIS — H40.053 BILATERAL OCULAR HYPERTENSION: ICD-10-CM

## 2024-07-22 DIAGNOSIS — H52.203 MYOPIA WITH ASTIGMATISM AND PRESBYOPIA, BILATERAL: ICD-10-CM

## 2024-07-22 DIAGNOSIS — H01.02B SQUAMOUS BLEPHARITIS OF UPPER AND LOWER EYELIDS OF BOTH EYES: Primary | ICD-10-CM

## 2024-07-22 DIAGNOSIS — H52.4 MYOPIA WITH ASTIGMATISM AND PRESBYOPIA, BILATERAL: ICD-10-CM

## 2024-07-22 PROCEDURE — 99999 PR PBB SHADOW E&M-EST. PATIENT-LVL III: CPT | Mod: PBBFAC,,, | Performed by: OPTOMETRIST

## 2024-07-22 PROCEDURE — 92015 DETERMINE REFRACTIVE STATE: CPT | Mod: ,,, | Performed by: OPTOMETRIST

## 2024-07-22 PROCEDURE — 99203 OFFICE O/P NEW LOW 30 MIN: CPT | Mod: ,,, | Performed by: OPTOMETRIST

## 2024-07-22 PROCEDURE — 92020 GONIOSCOPY: CPT | Mod: ,,, | Performed by: OPTOMETRIST

## 2024-07-22 PROCEDURE — 92310 CONTACT LENS FITTING OU: CPT | Mod: CSM,S$GLB,, | Performed by: OPTOMETRIST

## 2024-07-22 RX ORDER — MOXIFLOXACIN 5 MG/ML
1 SOLUTION/ DROPS OPHTHALMIC 4 TIMES DAILY
Qty: 3 ML | Refills: 0 | Status: SHIPPED | OUTPATIENT
Start: 2024-07-22 | End: 2024-07-29

## 2024-07-22 NOTE — PROGRESS NOTES
HPI     Annual Exam            Comments: New to [facility]  Fhx glaucoma-grandfather  Vision changes since last eye exam?: yes distance and reading    Any eye pain today: 2 days ago OU started draining like pink eye    Other ocular symptoms: no    Interested in contact lens fitting today? yes                     Last edited by Heather Garcia on 7/22/2024  8:52 AM.            Assessment /Plan     For exam results, see Encounter Report.    1. Squamous blepharitis of upper and lower eyelids of both eyes  -     moxifloxacin (VIGAMOX) 0.5 % ophthalmic solution; Place 1 drop into both eyes 4 (four) times daily. for 7 days  Dispense: 3 mL; Refill: 0  Exam findings are consistent with mildblepharitis. Recommended patient begin Warm compresses and Moxifloxacin  QID x 7 days .     2. Bilateral ocular hypertension  Thicker than average cornea pach, gOCT normal. Continue close observation off drops at this time for changes.     3. Myopia with astigmatism and presbyopia, bilateral  Eyeglass Final Rx       Eyeglass Final Rx         Sphere Cylinder Axis Add    Right -0.50 +0.50 031 +1.25    Left -0.50 +0.25 095 +1.25      Type: PAL    Expiration Date: 7/22/2025   64mm PD                  Contact Lens Final Rx       Final Contact Lens Rx         Brand Base Curve Diameter Sphere    Right Acuvue Oasys 1 Day 8.50 14.3 -0.50    Left Acuvue Oasys 1 Day 8.50 14.3 -0.50      Expiration Date: 7/22/2025    Replacement: Daily    Wearing Schedule: Daily Wear                  Contact lens trials fitted in office today. Contact lens hygiene reviewed. Patient able to insert the lenses themselves with minimal difficulty. Patient ok to finalize Contact lens after 1 week of wear. RTC if still having difficulty with CTL trial after 1 week.        RTC 1 yr for dilated eye exam or sooner if any changes to vision.   Discussed above and answered questions.

## 2024-07-23 ENCOUNTER — PATIENT MESSAGE (OUTPATIENT)
Dept: OPTOMETRY | Facility: CLINIC | Age: 44
End: 2024-07-23
Payer: COMMERCIAL

## 2024-08-02 ENCOUNTER — HOSPITAL ENCOUNTER (OUTPATIENT)
Facility: HOSPITAL | Age: 44
Discharge: HOME | End: 2024-08-02
Attending: ANESTHESIOLOGY | Admitting: ANESTHESIOLOGY
Payer: COMMERCIAL

## 2024-08-02 VITALS
SYSTOLIC BLOOD PRESSURE: 158 MMHG | HEIGHT: 71 IN | OXYGEN SATURATION: 100 % | WEIGHT: 293 LBS | TEMPERATURE: 98 F | BODY MASS INDEX: 41.02 KG/M2 | DIASTOLIC BLOOD PRESSURE: 91 MMHG | HEART RATE: 80 BPM | RESPIRATION RATE: 16 BRPM

## 2024-08-02 DIAGNOSIS — M47.816 LUMBAR SPONDYLOSIS: ICD-10-CM

## 2024-08-02 LAB
B-HCG UR QL: NEGATIVE
CTP QC/QA: YES

## 2024-08-02 PROCEDURE — 63600175 PHARM REV CODE 636 W HCPCS: Performed by: ANESTHESIOLOGY

## 2024-08-02 PROCEDURE — 64636 DESTROY L/S FACET JNT ADDL: CPT | Mod: 50,,, | Performed by: ANESTHESIOLOGY

## 2024-08-02 PROCEDURE — 25000003 PHARM REV CODE 250: Performed by: ANESTHESIOLOGY

## 2024-08-02 PROCEDURE — 99152 MOD SED SAME PHYS/QHP 5/>YRS: CPT | Performed by: ANESTHESIOLOGY

## 2024-08-02 PROCEDURE — 64635 DESTROY LUMB/SAC FACET JNT: CPT | Mod: 50,,, | Performed by: ANESTHESIOLOGY

## 2024-08-02 PROCEDURE — 81025 URINE PREGNANCY TEST: CPT | Performed by: ANESTHESIOLOGY

## 2024-08-02 PROCEDURE — 64635 DESTROY LUMB/SAC FACET JNT: CPT | Mod: 50 | Performed by: ANESTHESIOLOGY

## 2024-08-02 PROCEDURE — 64636 DESTROY L/S FACET JNT ADDL: CPT | Mod: 50 | Performed by: ANESTHESIOLOGY

## 2024-08-02 RX ORDER — LIDOCAINE HYDROCHLORIDE 20 MG/ML
INJECTION, SOLUTION EPIDURAL; INFILTRATION; INTRACAUDAL; PERINEURAL
Status: DISCONTINUED | OUTPATIENT
Start: 2024-08-02 | End: 2024-08-02 | Stop reason: HOSPADM

## 2024-08-02 RX ORDER — MIDAZOLAM HYDROCHLORIDE 1 MG/ML
INJECTION, SOLUTION INTRAMUSCULAR; INTRAVENOUS
Status: DISCONTINUED | OUTPATIENT
Start: 2024-08-02 | End: 2024-08-02 | Stop reason: HOSPADM

## 2024-08-02 RX ORDER — FENTANYL CITRATE 50 UG/ML
INJECTION, SOLUTION INTRAMUSCULAR; INTRAVENOUS
Status: DISCONTINUED | OUTPATIENT
Start: 2024-08-02 | End: 2024-08-02 | Stop reason: HOSPADM

## 2024-08-02 RX ORDER — ONDANSETRON HYDROCHLORIDE 2 MG/ML
INJECTION, SOLUTION INTRAVENOUS
Status: DISCONTINUED | OUTPATIENT
Start: 2024-08-02 | End: 2024-08-02 | Stop reason: HOSPADM

## 2024-08-02 RX ORDER — METHYLPREDNISOLONE ACETATE 40 MG/ML
INJECTION, SUSPENSION INTRA-ARTICULAR; INTRALESIONAL; INTRAMUSCULAR; SOFT TISSUE
Status: DISCONTINUED | OUTPATIENT
Start: 2024-08-02 | End: 2024-08-02 | Stop reason: HOSPADM

## 2024-08-02 RX ORDER — BUPIVACAINE HYDROCHLORIDE 2.5 MG/ML
INJECTION, SOLUTION EPIDURAL; INFILTRATION; INTRACAUDAL
Status: DISCONTINUED | OUTPATIENT
Start: 2024-08-02 | End: 2024-08-02 | Stop reason: HOSPADM

## 2024-08-02 RX ORDER — SODIUM BICARBONATE 1 MEQ/ML
SYRINGE (ML) INTRAVENOUS
Status: DISCONTINUED | OUTPATIENT
Start: 2024-08-02 | End: 2024-08-02 | Stop reason: HOSPADM

## 2024-08-04 ENCOUNTER — PATIENT MESSAGE (OUTPATIENT)
Dept: PAIN MEDICINE | Facility: CLINIC | Age: 44
End: 2024-08-04
Payer: COMMERCIAL

## 2024-08-05 RX ORDER — FLUOXETINE HYDROCHLORIDE 40 MG/1
CAPSULE ORAL
Qty: 30 CAPSULE | Refills: 6 | Status: SHIPPED | OUTPATIENT
Start: 2024-08-05

## 2024-08-08 RX ORDER — ESOMEPRAZOLE MAGNESIUM 40 MG/1
CAPSULE, DELAYED RELEASE ORAL
Qty: 30 CAPSULE | Refills: 11 | Status: SHIPPED | OUTPATIENT
Start: 2024-08-08

## 2024-08-13 ENCOUNTER — PATIENT MESSAGE (OUTPATIENT)
Dept: PAIN MEDICINE | Facility: CLINIC | Age: 44
End: 2024-08-13
Payer: COMMERCIAL

## 2024-08-20 ENCOUNTER — OFFICE VISIT (OUTPATIENT)
Dept: INTERNAL MEDICINE | Facility: CLINIC | Age: 44
End: 2024-08-20
Payer: COMMERCIAL

## 2024-08-20 VITALS
OXYGEN SATURATION: 98 % | HEART RATE: 82 BPM | WEIGHT: 293 LBS | TEMPERATURE: 99 F | DIASTOLIC BLOOD PRESSURE: 96 MMHG | SYSTOLIC BLOOD PRESSURE: 144 MMHG | HEIGHT: 71 IN | BODY MASS INDEX: 41.02 KG/M2

## 2024-08-20 DIAGNOSIS — R51.9 NONINTRACTABLE HEADACHE, UNSPECIFIED CHRONICITY PATTERN, UNSPECIFIED HEADACHE TYPE: ICD-10-CM

## 2024-08-20 DIAGNOSIS — U07.1 COVID: Primary | ICD-10-CM

## 2024-08-20 DIAGNOSIS — R52 GENERALIZED BODY ACHES: ICD-10-CM

## 2024-08-20 DIAGNOSIS — J02.9 SORE THROAT: ICD-10-CM

## 2024-08-20 LAB
CTP QC/QA: YES
MOLECULAR STREP A: NEGATIVE
POC MOLECULAR INFLUENZA A AGN: NEGATIVE
POC MOLECULAR INFLUENZA B AGN: NEGATIVE
SARS-COV-2 RDRP RESP QL NAA+PROBE: POSITIVE

## 2024-08-20 PROCEDURE — 99999 PR PBB SHADOW E&M-EST. PATIENT-LVL IV: CPT | Mod: PBBFAC,,, | Performed by: NURSE PRACTITIONER

## 2024-08-20 PROCEDURE — 99214 OFFICE O/P EST MOD 30 MIN: CPT | Mod: S$GLB,,, | Performed by: NURSE PRACTITIONER

## 2024-08-20 PROCEDURE — 87502 INFLUENZA DNA AMP PROBE: CPT | Mod: QW,S$GLB,, | Performed by: NURSE PRACTITIONER

## 2024-08-20 PROCEDURE — 3077F SYST BP >= 140 MM HG: CPT | Mod: CPTII,S$GLB,, | Performed by: NURSE PRACTITIONER

## 2024-08-20 PROCEDURE — 3080F DIAST BP >= 90 MM HG: CPT | Mod: CPTII,S$GLB,, | Performed by: NURSE PRACTITIONER

## 2024-08-20 PROCEDURE — 87651 STREP A DNA AMP PROBE: CPT | Mod: QW,S$GLB,, | Performed by: NURSE PRACTITIONER

## 2024-08-20 PROCEDURE — 1159F MED LIST DOCD IN RCRD: CPT | Mod: CPTII,S$GLB,, | Performed by: NURSE PRACTITIONER

## 2024-08-20 PROCEDURE — 3008F BODY MASS INDEX DOCD: CPT | Mod: CPTII,S$GLB,, | Performed by: NURSE PRACTITIONER

## 2024-08-20 PROCEDURE — 87635 SARS-COV-2 COVID-19 AMP PRB: CPT | Mod: QW,S$GLB,, | Performed by: NURSE PRACTITIONER

## 2024-08-20 NOTE — PROGRESS NOTES
Subjective:       Patient ID: Janice Lawrence is a 44 y.o. female.    Chief Complaint: Sore Throat, Generalized Body Aches, Diarrhea, Back Pain (Frequent urination), and Headache    Sore Throat   Associated symptoms include diarrhea and headaches. Pertinent negatives include no congestion, coughing, ear pain, neck pain, shortness of breath or trouble swallowing.   Diarrhea   Associated symptoms include a fever and headaches. Pertinent negatives include no arthralgias, chills or coughing.   Back Pain  Associated symptoms include a fever and headaches. Pertinent negatives include no chest pain, dysuria, numbness or weakness.   Headache   Associated symptoms include back pain, a fever, muscle aches, nausea and a sore throat. Pertinent negatives include no coughing, dizziness, ear pain, eye pain, neck pain, numbness, photophobia, rhinorrhea, seizures, sinus pressure, tinnitus or weakness.   Fever   This is a new problem. The current episode started in the past 7 days. The problem occurs daily. The problem has been unchanged. Her temperature was unmeasured prior to arrival. The temperature was taken using a tympanic thermometer. Associated symptoms include diarrhea, headaches, muscle aches, nausea, sleepiness and a sore throat. Pertinent negatives include no chest pain, congestion, coughing, ear pain, rash or wheezing. She has tried acetaminophen for the symptoms. The treatment provided moderate relief. Her past medical history is not significant for immunocompromised state.           Past Medical History:   Diagnosis Date    Acid reflux     Acquired iron deficiency anemia due to decreased absorption 12/10/2020    Anxiety     Arthritis     Cervical radiculopathy 5/9/2023    Depression      Past Surgical History:   Procedure Laterality Date    COLONOSCOPY N/A 02/10/2021    Procedure: COLONOSCOPY;  Surgeon: Angelika Eldridge MD;  Location: [facility];  Service: Endoscopy;  Laterality: N/A;    COSMETIC SURGERY       ESOPHAGOGASTRODUODENOSCOPY N/A 02/10/2021    Procedure: ESOPHAGOGASTRODUODENOSCOPY (EGD);  Surgeon: Angelika Eldridge MD;  Location: [facility] ENDO;  Service: Endoscopy;  Laterality: N/A;    HYSTEROSCOPY WITH HYDROTHERMAL ABLATION OF ENDOMETRIUM WITH DILATION AND CURETTAGE N/A 4/8/2024    Procedure: HYSTEROSCOPY, WITH DILATION AND CURETTAGE OF UTERUS AND HYDROTHERMAL ENDOMETRIAL ABLATION;  Surgeon: Vaibhav Stinson MD;  Location: [facility] OR;  Service: OB/GYN;  Laterality: N/A;  NOVASURE    INJECTION OF ANESTHETIC AGENT AROUND MEDIAL BRANCH NERVES INNERVATING LUMBAR FACET JOINT Bilateral 6/26/2024    Procedure: Diagnostic Bilateral L3-5 MBB #1 with RN IV sedation;  Surgeon: Dez Lowe MD;  Location: [facility] PAIN MGT;  Service: Pain Management;  Laterality: Bilateral;    INJECTION OF ANESTHETIC AGENT AROUND MEDIAL BRANCH NERVES INNERVATING LUMBAR FACET JOINT Bilateral 7/17/2024    Procedure: Diagnostic Bilateral L3-5 MBB #2 with RN IV sedation;  Surgeon: Dez Lowe MD;  Location: [facility] PAIN MGT;  Service: Pain Management;  Laterality: Bilateral;    LAPAROSCOPIC SALPINGECTOMY Bilateral 4/8/2024    Procedure: SALPINGECTOMY, LAPAROSCOPIC;  Surgeon: Vaibhav Stinson MD;  Location: [facility] OR;  Service: OB/GYN;  Laterality: Bilateral;    RADIOFREQUENCY THERMOCOAGULATION Bilateral 8/2/2024    Procedure: Bilateral L3-5 Lumbar RFA;  Surgeon: Dez Lowe MD;  Location: [facility] PAIN MGT;  Service: Pain Management;  Laterality: Bilateral;    SELECTIVE INJECTION OF ANESTHETIC AGENT AROUND LUMBAR SPINAL NERVE ROOT BY TRANSFORAMINAL APPROACH Right 3/13/2024    Procedure: R sided L4/5 and L5/S1 TFESI;  Surgeon: Dez Lowe MD;  Location: [facility] PAIN MGT;  Service: Pain Management;  Laterality: Right;     Social History     Socioeconomic History    Marital status: Single   Occupational History    Occupation: hairdresser   Tobacco Use    Smoking status: Former     Current packs/day: 0.50     Average packs/day: 0.5 packs/day for  10.0 years (5.0 ttl pk-yrs)     Types: Cigarettes    Smokeless tobacco: Never   Substance and Sexual Activity    Alcohol use: Yes     Alcohol/week: 3.0 standard drinks of alcohol     Types: 3 Glasses of wine per week     Comment: soc    Drug use: Not Currently     Frequency: 2.0 times per week     Types: Marijuana    Sexual activity: Yes     Birth control/protection: None     Comment: mut mog   Other Topics Concern    Are you pregnant or think you may be? No    Breast-feeding No     Review of patient's allergies indicates:  No Known Allergies  Current Outpatient Medications   Medication Sig    celecoxib (CELEBREX) 200 MG capsule Take 1 capsule (200 mg total) by mouth 2 (two) times daily as needed for Pain.    esomeprazole (NEXIUM) 40 MG capsule TAKE ONE CAPSULE BY MOUTH EVERY DAY BEFORE BREAKFAST    FLUoxetine 40 MG capsule TAKE ONE CAPSULE BY MOUTH ONCE PER DAY    folic acid (FOLVITE) 1 MG tablet Take 1 tablet (1,000 mcg total) by mouth once daily.    guselkumab (TREMFYA) 100 mg/mL Syrg Inject 1 mL (100 mg) into the skin every 8 weeks.    leflunomide (ARAVA) 20 MG Tab TAKE ONE TABLET BY MOUTH ONCE PER DAY  Strength: 20 mg    ondansetron (ZOFRAN-ODT) 4 MG TbDL 1 tablet Orally every 6 hours as needed for nausea and vomiting    pregabalin (LYRICA) 75 MG capsule Take 1 capsule (75 mg total) by mouth 2 (two) times daily.    VITAMIN D2 1,250 mcg (50,000 unit) capsule TAKE ONE CAPSULE BY MOUTH TWICE A WEEK    HYDROcodone-acetaminophen (NORCO) 5-325 mg per tablet Take 1 tablet by mouth every 4 (four) hours as needed for Pain.    ondansetron (ZOFRAN-ODT) 4 MG TbDL Take 1 tablet (4 mg total) by mouth every 6 (six) hours as needed (nausea).     No current facility-administered medications for this visit.           Review of Systems   Constitutional:  Positive for fever. Negative for activity change, appetite change, chills, diaphoresis, fatigue and unexpected weight change.   HENT:  Positive for sore throat. Negative for  congestion, ear pain, postnasal drip, rhinorrhea, sinus pressure, sinus pain, sneezing, tinnitus, trouble swallowing and voice change.    Eyes:  Negative for photophobia, pain and visual disturbance.   Respiratory:  Negative for cough, chest tightness, shortness of breath and wheezing.    Cardiovascular:  Negative for chest pain, palpitations and leg swelling.   Gastrointestinal:  Positive for diarrhea and nausea. Negative for abdominal distention and constipation.   Genitourinary:  Negative for decreased urine volume, difficulty urinating, dysuria, flank pain, frequency, hematuria and urgency.   Musculoskeletal:  Positive for back pain. Negative for arthralgias, joint swelling, neck pain and neck stiffness.   Skin:  Negative for rash.   Allergic/Immunologic: Negative for immunocompromised state.   Neurological:  Positive for headaches. Negative for dizziness, tremors, seizures, syncope, facial asymmetry, speech difficulty, weakness, light-headedness and numbness.   Hematological:  Negative for adenopathy. Does not bruise/bleed easily.   Psychiatric/Behavioral:  Negative for confusion and sleep disturbance.        Objective:      Physical Exam  Vitals reviewed.   Cardiovascular:      Rate and Rhythm: Normal rate and regular rhythm.      Heart sounds: Normal heart sounds.   Pulmonary:      Effort: Pulmonary effort is normal.      Breath sounds: Normal breath sounds.   Neurological:      Mental Status: She is alert.         Assessment:     Vitals:    08/20/24 1127   BP: (!) 144/96   Pulse: 82   Temp: 99.2 °F (37.3 °C)         1. COVID    2. Generalized body aches    3. Sore throat    4. Nonintractable headache, unspecified chronicity pattern, unspecified headache type        Plan:   COVID    Generalized body aches  -     POCT COVID-19 Rapid Screening  -     POCT Influenza A/B Molecular  -     POCT Strep A, Molecular    Sore throat  -     POCT COVID-19 Rapid Screening  -     POCT Influenza A/B Molecular  -     POCT  Strep A, Molecular    Nonintractable headache, unspecified chronicity pattern, unspecified headache type  -     POCT COVID-19 Rapid Screening  -     POCT Influenza A/B Molecular  -     POCT Strep A, Molecular    +covid  Isolation precations/supportive care discussed  Declines paxlovid  S/s that warrant re-eval discussed with pt

## 2024-08-26 ENCOUNTER — TELEPHONE (OUTPATIENT)
Dept: PAIN MEDICINE | Facility: CLINIC | Age: 44
End: 2024-08-26
Payer: COMMERCIAL

## 2024-08-27 NOTE — H&P (VIEW-ONLY)
"Chronic Pain -- Established Patient (Follow-up visit)      Referring Physician: Dr. Cui (Rheumatology)     PCP: No, Primary Doctor    Chief Complaint:   Chief Complaint   Patient presents with    Follow-up      Axial low back pain-improved after lumbar RFA  R leg pain         SUBJECTIVE:    Interval History (8/28/2024): Patient presents today for follow-up visit.  she underwent Bilateral L3-5 Lumbar RFA on 8/2/24 (almost 4 weeks ago).  The patient reports that she is/was better following the procedure.  she reports 75% pain relief.  The changes lasted 4 weeks so far.  The changes have continued through this visit.  Patient reports pain as 3/10 today.  She does report that she is starting to have some right leg pain with walking.  Pain can get up to 8/10 with activity.  She had been doing well previously after the KATHARINE, but more recently, the radicular pain is returning.  She did really well after the previous KATHARINE.  She continues taking Lyrica twice a day.    Interval History (5/30/2024):  Janice Lawrence presents today for follow-up visit.  Patient was last seen about 6 weeks ago.  She continues to report resolution radicular pain after KATHARINE.  She complains now of axial low back pain with no radiation into the legs. Patient reports pain as 9/10 with activity.    Interval history 04/03/2024  Patient presents status post right-sided L4-5 and L5-S1 transforaminal epidural steroid injection 03/13/2024.  Patient reports 100% resolution of lower extremity radicular symptoms.  Today she reports myofascial pain remaining in the lower back.  She reports muscles are trying to loosen." She is continued physician directed physical therapy exercises at home.  She recently refilled Lyrica which she is taking 75 mg twice daily.  She denies any side effects from this medication.  Today she denies significant lower extremity radiculopathy, lower extremity weakness, bowel or bladder incontinence or saddle " anesthesia.    Interval history 02/21/2024  Patient presents for MRI lumbar spine review.  Today patient again reports pain in the lower back and bilateral lower extremities, worse on the right.  Patient reports pain which on average is rated a 6/10.  Pain presents in a bandlike distribution in the lower back which radiates down bilateral lower extremities in L4-S1 distribution to the calf, 90% on the right.  Patient has been performing physician directed physical therapy exercises at home from 11/08/2023 through 01/09/2024 with marginal improvement in her pain.  She did start Lyrica titration is taking 75 mg once daily with mild improvement in her symptoms.  She denies any side effects from this medication.  She would like to avoid excessive escalation of this dose of medication as she was on higher doses of gabapentin in the past.  Of note patient is a  in his interested in pursuing intervention but would need to work around her schedule.    INITIAL HPI 02/06/2024  Janice Lawrence is a 43 y.o. female with past medical history significant for anxiety and depression, psoriasis on immunosuppression, morbid obesity, fatty liver, GERD, multi joint osteoarthritis who presents to the clinic for the evaluation of lower back and leg pain.  Patient reports pain has been present for several years, greater than 10 years, without inciting accident injury or trauma.  Pain is constant and today is rated a 6/10.  Pain at its best is a 3/10 and at its worse is a 10/10.  Patient denies preceding accident injury or trauma.  Patient reports pain in a bandlike distribution in the lower back which can radiate down the lateral and posterior aspect of the right lower extremity in L4-S1 distribution to the calf.  Patient denies left lower extremity radiculopathy, bowel or bladder incontinence, saddle anesthesia.  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   She does report with periods of inactivity, her pain can be exacerbated as well.  Pain is marginally improved with sitting, stretching, hot baths.  She does endorse associated weakness in the right lower extremity associated with her pain.  Patient has trialed gabapentin, up to 1200 mg in a day, 5 years prior with marginal improvement in her symptoms.  She is also received 2 prior lumbar epidural steroid injections, greater than 10 years prior with Dr. Luna with ineffective relief.  She has completed several rounds of conventional land-based physical therapy and has continued physician directed physical therapy exercises over the last 8 weeks from 11/08/2023 through 01/09/2024 with marginal improvement in her pain.    Patient reports significant motor weakness.  Patient denies night fever/night sweats, urinary incontinence, bowel incontinence, significant weight loss, and loss of sensations.      Pain Disability Index (PDI) Score Review:      8/28/2024     8:07 AM 1/9/2024    11:53 AM   Last 3 PDI Scores   Pain Disability Index (PDI) 10 42         Non-Pharmacologic Treatments:  Physical Therapy/Home Exercise: no  Ice/Heat:yes  TENS: yes  Acupuncture: yes  Massage: yes  Chiropractic: yes    Other: no      Pain Medications:  - Adjuvant Medications:  Celebrex, Fluoxetine    Pain Procedures:   External:  -Dr. Phil Luna: ROCIO Herbert Lowe:  -03/13/2024: Right-sided L4-5 and L5-S1 transforaminal epidural steroid injection  -06/26/2024 and 07/17/2024: diagnostic bilateral L3-5 MBB #1 and #2 with 90% pain relief  -8/2/2024: Bilateral L3-5 Lumbar RFA with 75% pain relief            Review of Systems:  GENERAL:  No weight loss, malaise or fevers.  HEENT:   No recent changes in vision or hearing  NECK:  Negative for lumps, no difficulty with swallowing.  RESPIRATORY:  Negative for cough, wheezing or shortness of breath, patient denies any recent URI.  CARDIOVASCULAR:  Negative for chest pain or palpitations.  GI:   "Negative for abdominal discomfort, blood in stools or black stools or change in bowel habits.  MUSCULOSKELETAL:  See HPI.  SKIN:  Negative for lesions, rash, and itching.  PSYCH:  No mood disorder or recent psychosocial stressors.   HEMATOLOGY/LYMPHOLOGY:  Negative for prolonged bleeding, bruising easily or swollen nodes.    NEURO:   No history of syncope, paralysis, seizures or tremors.  All other reviewed and negative other than HPI.        OBJECTIVE:    Physical Exam:  Vitals:    08/28/24 0805   BP: (!) 144/88   Pulse: 77   Weight: (!) 143.8 kg (317 lb)   Height: 5' 11" (1.803 m)   PainSc:   3   PainLoc: Back   Body mass index is 44.21 kg/m².   (reviewed on 8/28/2024)    GENERAL: Well appearing, in no acute distress, alert and oriented x3.  PSYCH:  Mood and affect appropriate.  SKIN: Skin color, texture, turgor normal, no rashes or lesions.  HEAD/FACE:  Normocephalic, atraumatic. Cranial nerves grossly intact.    PULM: No evidence of respiratory difficulty, symmetric chest rise.    BACK: SLR is equivocal to radicular pain on the right. Pain to palpation over the facet joints of the lumbar spine or spinous processes-improved.  Pain with lumbar flexion.  EXTREMITIES: Peripheral joint ROM is full and pain free without obvious instability or laxity in all four extremities. No deformities, edema, or skin discoloration.    MUSCULOSKELETAL: Able to stand on heels & toes.   Shoulder, hip, and knee provocative maneuvers are negative.  There is no pain with palpation over the sacroiliac joints bilaterally.  Gaenslen's, Distraction/Compression and  FABERs test is negative.  Facet loading test is negative bilaterally.   Bilateral upper and lower extremity strength is normal and symmetric.  No atrophy or tone abnormalities are noted.    RIGHT Lower extremity: Hip flexion 5/5, Hip Abduction 5/5, Hip Adduction 5/5, Knee extension 5/5, Knee flexion 5/5, Ankle dorsiflexion5/5, Extensor hallucis longus 5/5, Ankle plantarflexion " 5/5  LEFT Lower extremity:  Hip flexion 5/5, Hip Abduction 5/5,Hip Adduction 5/5, Knee extension 5/5, Knee flexion 5/5, Ankle dorsiflexion 5/5, Extensor hallucis longus 5/5, Ankle plantarflexion 5/5  -Normal testing knee (patellar) jerk and ankle (achilles) jerk    NEURO: BUE & BLE coordination and muscle stretch reflexes are physiologic and symmetric. No loss of sensation is noted.  GAIT: normal.        Imaging/ Diagnostic Studies/ Labs (Reviewed on 8/28/2024):      EMG 01/22/2024  ABNORMAL study  2. There is electrodiagnostic evidence of a subacute on chronic radiculopathy of the S1  nerve root and a chronic radiculopathy of the L5 nerve root    02/22/16 X-Ray Lumbar Spine AP And Lateral  Findings: The vertebral bodies demonstrate normal height.  There is straightening of the normal lordotic curvature. The disk space heights are maintained. No significant facet arthropathy.      05/09/23 X-Ray Cervical Spine AP And Lateral  FINDINGS:  Vertebral body height and alignment are maintained.  The disc spaces are normal in height.  No acute fracture is evident.  No prevertebral soft tissue swelling.      02/12/2024 LUMBAR MRI              ASSESSMENT: 44 y.o. year old female with     1. Lumbar spondylosis        2. Lumbar facet joint pain        3. Right lumbar radiculopathy  Case Request-RAD/Other Procedure Area: Right L4/5 + L5/S1 TF KATHARINE          PLAN:   - Interventions:   - S/p Bilateral L3-5 Lumbar RFA on 8/2/24 with 75% pain relief.    - Schedule right L4/5 + L5/S1 TF KATHARINE. Patient is not taking prescription blood thinners or ASA.   Patient has 100% sustained relief in right lower extremity radicular symptoms following right-sided L4-5 and L5-S1 transforaminal epidural steroid injection for about 6 months.      - Anticoagulation use: No no anticoagulation      - Medications:  -Continue Lyrica (pregabalin) 75mg BID.  We discussed potential side effects of this medication which may include drowsiness,dizziness, dry  mouth, constipation or peripheral edema.  Can increase to TID if needed in the future.     report:  Reviewed and consistent with medication use as prescribed.      - Therapy:   We discussed continuing at home physician directed physical therapy to help manage the patient/s painful condition. The patient was counseled that muscle strengthening will improve the long term prognosis in regards to pain and may also help increase range of motion and mobility.     - Imaging: Reviewed available imaging (2/12/2024 LUMBAR MRI) with patient and answered any questions they had regarding study.     - Records:   Unable to obtain old records and imaging from outside physicians - Dr. Luna.    - Follow up visit: 4-6 weeks post KATHARINE- virtual visit - [facility]    Future Appointments   Date Time Provider Department Center   10/18/2024  8:20 AM Emelina Scott PA-C [facility] [place]   11/13/2024 10:10 AM LABORATORY, [facility] [place]   11/20/2024 12:15 PM Saul Cui MD [facility] [place]      - Patient Questions: Answered all of the patient's questions regarding diagnosis, therapy, and treatment.    - This condition does not require this patient to take time off of work, and the primary goal of our Pain Management services is to improve the patient's functional capacity.   - I discussed the risks, benefits, and alternatives to potential treatment options. All questions and concerns were fully addressed today in clinic.         Emelina Scott PA-C  Interventional Pain Management - [facility]    Disclaimer:  This note was prepared using voice recognition system and is likely to have sound alike errors that may have been overlooked even after proof reading.  Please call me with any questions.

## 2024-08-27 NOTE — PROGRESS NOTES
"Chronic Pain -- Established Patient (Follow-up visit)      Referring Physician: Dr. Cui (Rheumatology)     PCP: No, Primary Doctor    Chief Complaint:   Chief Complaint   Patient presents with    Follow-up      Axial low back pain-improved after lumbar RFA  R leg pain         SUBJECTIVE:    Interval History (8/28/2024): Patient presents today for follow-up visit.  she underwent Bilateral L3-5 Lumbar RFA on 8/2/24 (almost 4 weeks ago).  The patient reports that she is/was better following the procedure.  she reports 75% pain relief.  The changes lasted 4 weeks so far.  The changes have continued through this visit.  Patient reports pain as 3/10 today.  She does report that she is starting to have some right leg pain with walking.  Pain can get up to 8/10 with activity.  She had been doing well previously after the KATHARINE, but more recently, the radicular pain is returning.  She did really well after the previous KATHARINE.  She continues taking Lyrica twice a day.    Interval History (5/30/2024):  Janice Lawrence presents today for follow-up visit.  Patient was last seen about 6 weeks ago.  She continues to report resolution radicular pain after KATHARINE.  She complains now of axial low back pain with no radiation into the legs. Patient reports pain as 9/10 with activity.    Interval history 04/03/2024  Patient presents status post right-sided L4-5 and L5-S1 transforaminal epidural steroid injection 03/13/2024.  Patient reports 100% resolution of lower extremity radicular symptoms.  Today she reports myofascial pain remaining in the lower back.  She reports muscles are trying to loosen." She is continued physician directed physical therapy exercises at home.  She recently refilled Lyrica which she is taking 75 mg twice daily.  She denies any side effects from this medication.  Today she denies significant lower extremity radiculopathy, lower extremity weakness, bowel or bladder incontinence or saddle " anesthesia.    Interval history 02/21/2024  Patient presents for MRI lumbar spine review.  Today patient again reports pain in the lower back and bilateral lower extremities, worse on the right.  Patient reports pain which on average is rated a 6/10.  Pain presents in a bandlike distribution in the lower back which radiates down bilateral lower extremities in L4-S1 distribution to the calf, 90% on the right.  Patient has been performing physician directed physical therapy exercises at home from 11/08/2023 through 01/09/2024 with marginal improvement in her pain.  She did start Lyrica titration is taking 75 mg once daily with mild improvement in her symptoms.  She denies any side effects from this medication.  She would like to avoid excessive escalation of this dose of medication as she was on higher doses of gabapentin in the past.  Of note patient is a  in his interested in pursuing intervention but would need to work around her schedule.    INITIAL HPI 02/06/2024  Janice Lawrence is a 43 y.o. female with past medical history significant for anxiety and depression, psoriasis on immunosuppression, morbid obesity, fatty liver, GERD, multi joint osteoarthritis who presents to the clinic for the evaluation of lower back and leg pain.  Patient reports pain has been present for several years, greater than 10 years, without inciting accident injury or trauma.  Pain is constant and today is rated a 6/10.  Pain at its best is a 3/10 and at its worse is a 10/10.  Patient denies preceding accident injury or trauma.  Patient reports pain in a bandlike distribution in the lower back which can radiate down the lateral and posterior aspect of the right lower extremity in L4-S1 distribution to the calf.  Patient denies left lower extremity radiculopathy, bowel or bladder incontinence, saddle anesthesia.  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   She does report with periods of inactivity, her pain can be exacerbated as well.  Pain is marginally improved with sitting, stretching, hot baths.  She does endorse associated weakness in the right lower extremity associated with her pain.  Patient has trialed gabapentin, up to 1200 mg in a day, 5 years prior with marginal improvement in her symptoms.  She is also received 2 prior lumbar epidural steroid injections, greater than 10 years prior with Dr. Luna with ineffective relief.  She has completed several rounds of conventional land-based physical therapy and has continued physician directed physical therapy exercises over the last 8 weeks from 11/08/2023 through 01/09/2024 with marginal improvement in her pain.    Patient reports significant motor weakness.  Patient denies night fever/night sweats, urinary incontinence, bowel incontinence, significant weight loss, and loss of sensations.      Pain Disability Index (PDI) Score Review:      8/28/2024     8:07 AM 1/9/2024    11:53 AM   Last 3 PDI Scores   Pain Disability Index (PDI) 10 42         Non-Pharmacologic Treatments:  Physical Therapy/Home Exercise: no  Ice/Heat:yes  TENS: yes  Acupuncture: yes  Massage: yes  Chiropractic: yes    Other: no      Pain Medications:  - Adjuvant Medications:  Celebrex, Fluoxetine    Pain Procedures:   External:  -Dr. Phil Luna: [facility] Herbert Lowe:  -03/13/2024: Right-sided L4-5 and L5-S1 transforaminal epidural steroid injection  -06/26/2024 and 07/17/2024: diagnostic bilateral L3-5 MBB #1 and #2 with 90% pain relief  -8/2/2024: Bilateral L3-5 Lumbar RFA with 75% pain relief            Review of Systems:  GENERAL:  No weight loss, malaise or fevers.  HEENT:   No recent changes in vision or hearing  NECK:  Negative for lumps, no difficulty with swallowing.  RESPIRATORY:  Negative for cough, wheezing or shortness of breath, patient denies any recent URI.  CARDIOVASCULAR:  Negative for chest pain or palpitations.  GI:   "Negative for abdominal discomfort, blood in stools or black stools or change in bowel habits.  MUSCULOSKELETAL:  See HPI.  SKIN:  Negative for lesions, rash, and itching.  PSYCH:  No mood disorder or recent psychosocial stressors.   HEMATOLOGY/LYMPHOLOGY:  Negative for prolonged bleeding, bruising easily or swollen nodes.    NEURO:   No history of syncope, paralysis, seizures or tremors.  All other reviewed and negative other than HPI.        OBJECTIVE:    Physical Exam:  Vitals:    08/28/24 0805   BP: (!) 144/88   Pulse: 77   Weight: (!) 143.8 kg (317 lb)   Height: 5' 11" (1.803 m)   PainSc:   3   PainLoc: Back   Body mass index is 44.21 kg/m².   (reviewed on 8/28/2024)    GENERAL: Well appearing, in no acute distress, alert and oriented x3.  PSYCH:  Mood and affect appropriate.  SKIN: Skin color, texture, turgor normal, no rashes or lesions.  HEAD/FACE:  Normocephalic, atraumatic. Cranial nerves grossly intact.    PULM: No evidence of respiratory difficulty, symmetric chest rise.    BACK: SLR is equivocal to radicular pain on the right. Pain to palpation over the facet joints of the lumbar spine or spinous processes-improved.  Pain with lumbar flexion.  EXTREMITIES: Peripheral joint ROM is full and pain free without obvious instability or laxity in all four extremities. No deformities, edema, or skin discoloration.    MUSCULOSKELETAL: Able to stand on heels & toes.   Shoulder, hip, and knee provocative maneuvers are negative.  There is no pain with palpation over the sacroiliac joints bilaterally.  Gaenslen's, Distraction/Compression and  FABERs test is negative.  Facet loading test is negative bilaterally.   Bilateral upper and lower extremity strength is normal and symmetric.  No atrophy or tone abnormalities are noted.    RIGHT Lower extremity: Hip flexion 5/5, Hip Abduction 5/5, Hip Adduction 5/5, Knee extension 5/5, Knee flexion 5/5, Ankle dorsiflexion5/5, Extensor hallucis longus 5/5, Ankle plantarflexion " 5/5  LEFT Lower extremity:  Hip flexion 5/5, Hip Abduction 5/5,Hip Adduction 5/5, Knee extension 5/5, Knee flexion 5/5, Ankle dorsiflexion 5/5, Extensor hallucis longus 5/5, Ankle plantarflexion 5/5  -Normal testing knee (patellar) jerk and ankle (achilles) jerk    NEURO: BUE & BLE coordination and muscle stretch reflexes are physiologic and symmetric. No loss of sensation is noted.  GAIT: normal.        Imaging/ Diagnostic Studies/ Labs (Reviewed on 8/28/2024):      EMG 01/22/2024  ABNORMAL study  2. There is electrodiagnostic evidence of a subacute on chronic radiculopathy of the S1  nerve root and a chronic radiculopathy of the L5 nerve root    02/22/16 X-Ray Lumbar Spine AP And Lateral  Findings: The vertebral bodies demonstrate normal height.  There is straightening of the normal lordotic curvature. The disk space heights are maintained. No significant facet arthropathy.      05/09/23 X-Ray Cervical Spine AP And Lateral  FINDINGS:  Vertebral body height and alignment are maintained.  The disc spaces are normal in height.  No acute fracture is evident.  No prevertebral soft tissue swelling.      02/12/2024 LUMBAR MRI              ASSESSMENT: 44 y.o. year old female with     1. Lumbar spondylosis        2. Lumbar facet joint pain        3. Right lumbar radiculopathy  Case Request-RAD/Other Procedure Area: Right L4/5 + L5/S1 TF KATHARINE          PLAN:   - Interventions:   - S/p Bilateral L3-5 Lumbar RFA on 8/2/24 with 75% pain relief.    - Schedule right L4/5 + L5/S1 TF KATHARINE. Patient is not taking prescription blood thinners or ASA.   Patient has 100% sustained relief in right lower extremity radicular symptoms following right-sided L4-5 and L5-S1 transforaminal epidural steroid injection for about 6 months.      - Anticoagulation use: No no anticoagulation      - Medications:  -Continue Lyrica (pregabalin) 75mg BID.  We discussed potential side effects of this medication which may include drowsiness,dizziness, dry  mouth, constipation or peripheral edema.  Can increase to TID if needed in the future.     report:  Reviewed and consistent with medication use as prescribed.      - Therapy:   We discussed continuing at home physician directed physical therapy to help manage the patient/s painful condition. The patient was counseled that muscle strengthening will improve the long term prognosis in regards to pain and may also help increase range of motion and mobility.     - Imaging: Reviewed available imaging (2/12/2024 LUMBAR MRI) with patient and answered any questions they had regarding study.     - Records:   Unable to obtain old records and imaging from outside physicians - Dr. Luna.    - Follow up visit: 4-6 weeks post KATHARINE- virtual visit - [facility]    Future Appointments   Date Time Provider Department Center   10/18/2024  8:20 AM Emelina Scott PA-C [facility] [place]   11/13/2024 10:10 AM LABORATORY, [facility] [place]   11/20/2024 12:15 PM [place] Cui [place] [facility] [place]      - Patient Questions: Answered all of the patient's questions regarding diagnosis, therapy, and treatment.    - This condition does not require this patient to take time off of work, and the primary goal of our Pain Management services is to improve the patient's functional capacity.   - I discussed the risks, benefits, and alternatives to potential treatment options. All questions and concerns were fully addressed today in clinic.         Emelina Scott PA-C  Interventional Pain Management - [facility]    Disclaimer:  This note was prepared using voice recognition system and is likely to have sound alike errors that may have been overlooked even after proof reading.  Please call me with any questions.

## 2024-08-28 ENCOUNTER — OFFICE VISIT (OUTPATIENT)
Dept: PAIN MEDICINE | Facility: CLINIC | Age: 44
End: 2024-08-28
Payer: COMMERCIAL

## 2024-08-28 ENCOUNTER — LAB VISIT (OUTPATIENT)
Dept: LAB | Facility: HOSPITAL | Age: 44
End: 2024-08-28
Attending: INTERNAL MEDICINE
Payer: COMMERCIAL

## 2024-08-28 VITALS
WEIGHT: 293 LBS | BODY MASS INDEX: 41.02 KG/M2 | HEART RATE: 77 BPM | HEIGHT: 71 IN | SYSTOLIC BLOOD PRESSURE: 144 MMHG | DIASTOLIC BLOOD PRESSURE: 88 MMHG

## 2024-08-28 DIAGNOSIS — M47.816 LUMBAR SPONDYLOSIS: Primary | ICD-10-CM

## 2024-08-28 DIAGNOSIS — Z51.81 ENCOUNTER FOR MEDICATION MONITORING: ICD-10-CM

## 2024-08-28 DIAGNOSIS — L40.59 POLYARTICULAR PSORIATIC ARTHRITIS: ICD-10-CM

## 2024-08-28 DIAGNOSIS — Z79.899 HIGH RISK MEDICATION USE: ICD-10-CM

## 2024-08-28 DIAGNOSIS — M54.16 RIGHT LUMBAR RADICULOPATHY: ICD-10-CM

## 2024-08-28 DIAGNOSIS — M54.59 LUMBAR FACET JOINT PAIN: ICD-10-CM

## 2024-08-28 DIAGNOSIS — L40.0 PLAQUE PSORIASIS: ICD-10-CM

## 2024-08-28 LAB
ALBUMIN SERPL BCP-MCNC: 3.6 G/DL (ref 3.5–5.2)
ALP SERPL-CCNC: 88 U/L (ref 55–135)
ALT SERPL W/O P-5'-P-CCNC: 51 U/L (ref 10–44)
ANION GAP SERPL CALC-SCNC: 14 MMOL/L (ref 8–16)
AST SERPL-CCNC: 30 U/L (ref 10–40)
BASOPHILS # BLD AUTO: 0.08 K/UL (ref 0–0.2)
BASOPHILS NFR BLD: 1.1 % (ref 0–1.9)
BILIRUB SERPL-MCNC: 0.5 MG/DL (ref 0.1–1)
BUN SERPL-MCNC: 16 MG/DL (ref 6–20)
CALCIUM SERPL-MCNC: 9.1 MG/DL (ref 8.7–10.5)
CHLORIDE SERPL-SCNC: 106 MMOL/L (ref 95–110)
CO2 SERPL-SCNC: 19 MMOL/L (ref 23–29)
CREAT SERPL-MCNC: 0.7 MG/DL (ref 0.5–1.4)
CRP SERPL-MCNC: 2 MG/L (ref 0–8.2)
DIFFERENTIAL METHOD BLD: ABNORMAL
EOSINOPHIL # BLD AUTO: 0.6 K/UL (ref 0–0.5)
EOSINOPHIL NFR BLD: 9 % (ref 0–8)
ERYTHROCYTE [DISTWIDTH] IN BLOOD BY AUTOMATED COUNT: 15.9 % (ref 11.5–14.5)
ERYTHROCYTE [SEDIMENTATION RATE] IN BLOOD BY WESTERGREN METHOD: 20 MM/HR (ref 0–20)
EST. GFR  (NO RACE VARIABLE): >60 ML/MIN/1.73 M^2
GLUCOSE SERPL-MCNC: 108 MG/DL (ref 70–110)
HCT VFR BLD AUTO: 40 % (ref 37–48.5)
HGB BLD-MCNC: 12.8 G/DL (ref 12–16)
IMM GRANULOCYTES # BLD AUTO: 0.04 K/UL (ref 0–0.04)
IMM GRANULOCYTES NFR BLD AUTO: 0.6 % (ref 0–0.5)
LYMPHOCYTES # BLD AUTO: 2.5 K/UL (ref 1–4.8)
LYMPHOCYTES NFR BLD: 35.6 % (ref 18–48)
MCH RBC QN AUTO: 26.8 PG (ref 27–31)
MCHC RBC AUTO-ENTMCNC: 32 G/DL (ref 32–36)
MCV RBC AUTO: 84 FL (ref 82–98)
MONOCYTES # BLD AUTO: 0.5 K/UL (ref 0.3–1)
MONOCYTES NFR BLD: 6.6 % (ref 4–15)
NEUTROPHILS # BLD AUTO: 3.4 K/UL (ref 1.8–7.7)
NEUTROPHILS NFR BLD: 47.1 % (ref 38–73)
NRBC BLD-RTO: 0 /100 WBC
PLATELET # BLD AUTO: 386 K/UL (ref 150–450)
PMV BLD AUTO: 8.1 FL (ref 9.2–12.9)
POTASSIUM SERPL-SCNC: 4.1 MMOL/L (ref 3.5–5.1)
PROT SERPL-MCNC: 7.3 G/DL (ref 6–8.4)
RBC # BLD AUTO: 4.78 M/UL (ref 4–5.4)
SODIUM SERPL-SCNC: 139 MMOL/L (ref 136–145)
WBC # BLD AUTO: 7.14 K/UL (ref 3.9–12.7)

## 2024-08-28 PROCEDURE — 1160F RVW MEDS BY RX/DR IN RCRD: CPT | Mod: CPTII,S$GLB,, | Performed by: PHYSICIAN ASSISTANT

## 2024-08-28 PROCEDURE — 80053 COMPREHEN METABOLIC PANEL: CPT | Performed by: INTERNAL MEDICINE

## 2024-08-28 PROCEDURE — 36415 COLL VENOUS BLD VENIPUNCTURE: CPT | Performed by: INTERNAL MEDICINE

## 2024-08-28 PROCEDURE — 85651 RBC SED RATE NONAUTOMATED: CPT | Performed by: INTERNAL MEDICINE

## 2024-08-28 PROCEDURE — 99214 OFFICE O/P EST MOD 30 MIN: CPT | Mod: S$GLB,,, | Performed by: PHYSICIAN ASSISTANT

## 2024-08-28 PROCEDURE — 85025 COMPLETE CBC W/AUTO DIFF WBC: CPT | Performed by: INTERNAL MEDICINE

## 2024-08-28 PROCEDURE — 86140 C-REACTIVE PROTEIN: CPT | Performed by: INTERNAL MEDICINE

## 2024-08-28 PROCEDURE — 99999 PR PBB SHADOW E&M-EST. PATIENT-LVL III: CPT | Mod: PBBFAC,,, | Performed by: PHYSICIAN ASSISTANT

## 2024-08-28 PROCEDURE — 3079F DIAST BP 80-89 MM HG: CPT | Mod: CPTII,S$GLB,, | Performed by: PHYSICIAN ASSISTANT

## 2024-08-28 PROCEDURE — 3077F SYST BP >= 140 MM HG: CPT | Mod: CPTII,S$GLB,, | Performed by: PHYSICIAN ASSISTANT

## 2024-08-28 PROCEDURE — 3008F BODY MASS INDEX DOCD: CPT | Mod: CPTII,S$GLB,, | Performed by: PHYSICIAN ASSISTANT

## 2024-08-28 PROCEDURE — 1159F MED LIST DOCD IN RCRD: CPT | Mod: CPTII,S$GLB,, | Performed by: PHYSICIAN ASSISTANT

## 2024-08-29 ENCOUNTER — PATIENT MESSAGE (OUTPATIENT)
Dept: RHEUMATOLOGY | Facility: CLINIC | Age: 44
End: 2024-08-29
Payer: COMMERCIAL

## 2024-09-10 NOTE — PRE-PROCEDURE INSTRUCTIONS
Spoke with patient regarding procedure scheduled on 9.18    Arrival time 1230     Has patient been sick with fever or on antibiotics within the last 7 days? No     Does the patient have any open wounds, sores or rashes? No     Does the patient have any recent fractures? no     Has patient received a vaccination within the last 7 days? No     Received the COVID vaccination? yes     Has the patient stopped all medications as directed? na     Does patient have a pacemaker, defibrillator, or implantable stimulator? No     Does the patient have a ride to and from procedure and someone reliable to remain with patient?  sister     Is the patient diabetic? no     Does the patient have sleep apnea? Or use O2 at home? no     Is the patient receiving sedation? Yes      Is the patient instructed to remain NPO beginning at midnight the night before their procedure? yes     Procedure location confirmed with patient? Yes     Covid- Denies signs/symptoms. Instructed to notify PAT/MD if any changes.

## 2024-09-18 ENCOUNTER — HOSPITAL ENCOUNTER (OUTPATIENT)
Facility: HOSPITAL | Age: 44
Discharge: HOME | End: 2024-09-18
Attending: ANESTHESIOLOGY | Admitting: ANESTHESIOLOGY
Payer: COMMERCIAL

## 2024-09-18 VITALS
DIASTOLIC BLOOD PRESSURE: 78 MMHG | RESPIRATION RATE: 16 BRPM | WEIGHT: 293 LBS | HEART RATE: 70 BPM | BODY MASS INDEX: 41.02 KG/M2 | HEIGHT: 71 IN | OXYGEN SATURATION: 99 % | SYSTOLIC BLOOD PRESSURE: 140 MMHG

## 2024-09-18 DIAGNOSIS — M54.16 LUMBAR RADICULOPATHY: ICD-10-CM

## 2024-09-18 LAB
B-HCG UR QL: NEGATIVE
CTP QC/QA: YES

## 2024-09-18 PROCEDURE — 64483 NJX AA&/STRD TFRM EPI L/S 1: CPT | Mod: RT,,, | Performed by: ANESTHESIOLOGY

## 2024-09-18 PROCEDURE — 64483 NJX AA&/STRD TFRM EPI L/S 1: CPT | Mod: RT | Performed by: ANESTHESIOLOGY

## 2024-09-18 PROCEDURE — 64484 NJX AA&/STRD TFRM EPI L/S EA: CPT | Mod: RT | Performed by: ANESTHESIOLOGY

## 2024-09-18 PROCEDURE — 25500020 PHARM REV CODE 255: Performed by: ANESTHESIOLOGY

## 2024-09-18 PROCEDURE — 25000003 PHARM REV CODE 250: Performed by: ANESTHESIOLOGY

## 2024-09-18 PROCEDURE — 63600175 PHARM REV CODE 636 W HCPCS: Mod: JZ,JG | Performed by: ANESTHESIOLOGY

## 2024-09-18 PROCEDURE — 81025 URINE PREGNANCY TEST: CPT | Performed by: ANESTHESIOLOGY

## 2024-09-18 PROCEDURE — 64484 NJX AA&/STRD TFRM EPI L/S EA: CPT | Mod: RT,,, | Performed by: ANESTHESIOLOGY

## 2024-09-18 RX ORDER — INDOMETHACIN 25 MG/1
CAPSULE ORAL
Status: DISCONTINUED | OUTPATIENT
Start: 2024-09-18 | End: 2024-09-18 | Stop reason: HOSPADM

## 2024-09-18 RX ORDER — FENTANYL CITRATE 50 UG/ML
INJECTION, SOLUTION INTRAMUSCULAR; INTRAVENOUS
Status: DISCONTINUED | OUTPATIENT
Start: 2024-09-18 | End: 2024-09-18 | Stop reason: HOSPADM

## 2024-09-18 RX ORDER — DEXAMETHASONE SODIUM PHOSPHATE 10 MG/ML
INJECTION INTRAMUSCULAR; INTRAVENOUS
Status: DISCONTINUED | OUTPATIENT
Start: 2024-09-18 | End: 2024-09-18 | Stop reason: HOSPADM

## 2024-09-18 RX ORDER — MIDAZOLAM HYDROCHLORIDE 1 MG/ML
INJECTION, SOLUTION INTRAMUSCULAR; INTRAVENOUS
Status: DISCONTINUED | OUTPATIENT
Start: 2024-09-18 | End: 2024-09-18 | Stop reason: HOSPADM

## 2024-09-18 RX ORDER — BUPIVACAINE HYDROCHLORIDE 2.5 MG/ML
INJECTION, SOLUTION EPIDURAL; INFILTRATION; INTRACAUDAL
Status: DISCONTINUED | OUTPATIENT
Start: 2024-09-18 | End: 2024-09-18 | Stop reason: HOSPADM

## 2024-09-18 NOTE — DISCHARGE INSTRUCTIONS

## 2024-09-18 NOTE — DISCHARGE SUMMARY
Discharge Note  Short Stay      SUMMARY     Admit Date: 9/18/2024    Attending Physician: Dez Lowe MD        Discharge Physician: Dez Lowe MD        Discharge Date: 9/18/2024 12:58 PM    Procedure(s) (LRB):  Right L4/5 + L5/S1 TF KATHARINE (Right)    Final Diagnosis: Right lumbar radiculopathy [M54.16]    Disposition: Home or self care    Patient Instructions:   Current Discharge Medication List        CONTINUE these medications which have NOT CHANGED    Details   celecoxib (CELEBREX) 200 MG capsule Take 1 capsule (200 mg total) by mouth 2 (two) times daily as needed for Pain.  Qty: 60 capsule, Refills: 3      esomeprazole (NEXIUM) 40 MG capsule TAKE ONE CAPSULE BY MOUTH EVERY DAY BEFORE BREAKFAST  Qty: 30 capsule, Refills: 11      FLUoxetine 40 MG capsule TAKE ONE CAPSULE BY MOUTH ONCE PER DAY  Qty: 30 capsule, Refills: 6      folic acid (FOLVITE) 1 MG tablet Take 1 tablet (1,000 mcg total) by mouth once daily.  Qty: 90 tablet, Refills: 3    Associated Diagnoses: Polyarticular psoriatic arthritis      guselkumab (TREMFYA) 100 mg/mL Syrg Inject 1 mL (100 mg) into the skin every 8 weeks.  Qty: 2 mL, Refills: 3    Associated Diagnoses: Plaque psoriasis; Psoriatic arthritis      leflunomide (ARAVA) 20 MG Tab TAKE ONE TABLET BY MOUTH ONCE PER DAY  Strength: 20 mg  Qty: 30 tablet, Refills: 2    Comments: This prescription was filled on 11/29/2023. Any refills authorized will be placed on file.  Associated Diagnoses: Psoriatic arthritis; Plaque psoriasis      ondansetron (ZOFRAN-ODT) 4 MG TbDL 1 tablet Orally every 6 hours as needed for nausea and vomiting      pregabalin (LYRICA) 75 MG capsule Take 1 capsule (75 mg total) by mouth 2 (two) times daily.  Qty: 60 capsule, Refills: 2      VITAMIN D2 1,250 mcg (50,000 unit) capsule TAKE ONE CAPSULE BY MOUTH TWICE A WEEK  Qty: 24 capsule, Refills: 3    Associated Diagnoses: Vitamin D deficiency                 Discharge Diagnosis: Right lumbar radiculopathy  [M54.16]  Condition on Discharge: Stable with no complications to procedure   Diet on Discharge: Same as before.  Activity: as per instruction sheet.  Discharge to: Home with a responsible adult.  Follow up: 2-4 weeks       Please call the office at [phone] if you experience any weakness or loss of sensation, fever > 101.5, pain uncontrolled with oral medications, persistent nausea/vomiting/or diarrhea, redness or drainage from the incisions, or any other worrisome concerns. If physician on call was not reached or could not communicate with our office for any reason please go to the nearest emergency department

## 2024-09-18 NOTE — OP NOTE
Janice Lawrence  44 y.o. female      Vitals:    09/18/24 1225   BP: (!) 185/87   Pulse: 95   Resp: 16     Procedure Date 09/18/2024        INFORMED CONSENT: The procedure, risks, benefits and options were discussed with patient. There are no contraindications to the procedure. The patient expressed understanding and agreed to proceed. The personnel performing the procedure was discussed. I verify that I personally obtained consent prior to the start of the procedure and the signed consent can be found on the patient's chart.       Anesthesia:   Conscious sedation provided by M.D    The patient was monitored with continuous pulse oximetry, EKG, and intermittent blood pressure monitors.  The patient was hemodynamically stable throughout the entire process was responsive to voice, and breathing spontaneously.  Supplemental O2 was provided at 2L/min via nasal cannula.  Patient was comfortable for the duration of the procedure. (See nurse documentation and case log for sedation time)    There was a total of 2mg IV Midazolam and 100mcg Fentanyl titrated for the procedure    Pre Procedure diagnosis: Right lumbar radiculopathy [M54.16]  Post-Procedure diagnosis: SAME     Complications: None    Specimens: None      DESCRIPTION OF PROCEDURE: The patient was brought to the procedure room. IV access was obtained prior to the procedure. The patient was positioned prone on the fluoroscopy table. Continuous hemodynamic monitoring was initiated including blood pressure, EKG, and pulse oximetry. . The skin was prepped with chlorhexidine and draped in a sterile fashion.      The  L4/5 and  L5/S1 transforaminal spaces were identified with fluoroscopy in the  AP, oblique, and lateral views.  A 22 gauge spinal quinke needle was then advanced into the area of the trans foraminal spaces right with confirmation of proper needle position using AP, oblique, and lateral fluoroscopic views. Once the needle tip was in the area of the  transforaminal space, and there was no blood, CSF or paraesthesias,  1.5 mL of Omnipaque 300mg/ml was injected on right for a total of 3mL.  Fluoroscopic imaging in the AP and lateral views revealed a clear outline of the spinal nerve with proximal spread of agent through the neural foramen into the epidural space. A total combination of 2mL of Bupivacaine and 10 mg dexamethasone was injected on each side and at each level with displacement of the contrast dye confirming that the medication went into the area of the transforaminal spaces right. A sterile bandaid was applied.   Patient tolerated the procedure well.    Patient was taken back to the recovery room for further observation.     The patient was discharged to home in stable condition

## 2024-10-17 RX ORDER — PREGABALIN 75 MG/1
75 CAPSULE ORAL 2 TIMES DAILY
Qty: 60 CAPSULE | Refills: 2 | Status: SHIPPED | OUTPATIENT
Start: 2024-10-17

## 2024-10-17 NOTE — TELEPHONE ENCOUNTER
Pt is requesting for a refill of: pregabalin (LYRICA) 75 MG capsule   Last filed:7/15/27  Last encounter: 8/28/24  Last proc: 9/18/04  Up coming apt: 10/18/24  Pharmacy:[facility]   Is this something you can do?

## 2024-10-17 NOTE — PROGRESS NOTES
Established Patient - TeleHealth Visit    The patient location is: [place]  The chief complaint leading to consultation is: chronic pain     Visit type: audiovisual    Face to Face time with patient: 10-15 minutes  20 minutes of total time spent on the encounter, which includes face to face time and non-face to face time preparing to see the patient (eg, review of tests), Obtaining and/or reviewing separately obtained history, Documenting clinical information in the electronic or other health record, Independently interpreting results (not separately reported) and communicating results to the patient/family/caregiver, or Care coordination (not separately reported).     Each patient to whom he or she provides medical services by telemedicine is:  (1) informed of the relationship between the physician and patient and the respective role of any other health care provider with respect to management of the patient; and (2) notified that he or she may decline to receive medical services by telemedicine and may withdraw from such care at any time.      Chronic Pain -- Established Patient (Follow-up visit)      Referring Physician: Dr. Cui (Rheumatology)     PCP: No, Primary Doctor    Chief Complaint:   Chief Complaint   Patient presents with    Follow-up      Axial low back pain-improved after lumbar RFA  R leg pain -improved with KATHARINE       SUBJECTIVE:      Interval History (10/18/2024): Janice Lawrence presents today for follow-up telemedicine visit.  she underwent right L4/5 + L5/S1 TF KATHARINE on 9/18/24 (4 weeks ago).  The patient reports that she is/was better following the procedure.  she reports 80% pain relief.  The changes lasted 4 weeks so far.  The changes have continued through this visit.  Patient reports pain as 2/10 today.    Interval History (8/28/2024): Patient presents today for follow-up visit.  she underwent Bilateral L3-5 Lumbar RFA on 8/2/24 (almost 4 weeks ago).  The patient reports that she  "is/was better following the procedure.  she reports 75% pain relief.  The changes lasted 4 weeks so far.  The changes have continued through this visit.  Patient reports pain as 3/10 today.  She does report that she is starting to have some right leg pain with walking.  Pain can get up to 8/10 with activity.  She had been doing well previously after the KATHARINE, but more recently, the radicular pain is returning.  She did really well after the previous KATHARINE.  She continues taking Lyrica twice a day.    Interval History (5/30/2024):  Janice Lawrence presents today for follow-up visit.  Patient was last seen about 6 weeks ago.  She continues to report resolution radicular pain after KATHARINE.  She complains now of axial low back pain with no radiation into the legs. Patient reports pain as 9/10 with activity.    Interval history 04/03/2024  Patient presents status post right-sided L4-5 and L5-S1 transforaminal epidural steroid injection 03/13/2024.  Patient reports 100% resolution of lower extremity radicular symptoms.  Today she reports myofascial pain remaining in the lower back.  She reports muscles are trying to loosen." She is continued physician directed physical therapy exercises at home.  She recently refilled Lyrica which she is taking 75 mg twice daily.  She denies any side effects from this medication.  Today she denies significant lower extremity radiculopathy, lower extremity weakness, bowel or bladder incontinence or saddle anesthesia.    Interval history 02/21/2024  Patient presents for MRI lumbar spine review.  Today patient again reports pain in the lower back and bilateral lower extremities, worse on the right.  Patient reports pain which on average is rated a 6/10.  Pain presents in a bandlike distribution in the lower back which radiates down bilateral lower extremities in L4-S1 distribution to the calf, 90% on the right.  Patient has been performing physician directed physical therapy exercises " at home from 11/08/2023 through 01/09/2024 with marginal improvement in her pain.  She did start Lyrica titration is taking 75 mg once daily with mild improvement in her symptoms.  She denies any side effects from this medication.  She would like to avoid excessive escalation of this dose of medication as she was on higher doses of gabapentin in the past.  Of note patient is a  in his interested in pursuing intervention but would need to work around her schedule.    INITIAL HPI 02/06/2024  Janice Lawrence is a 43 y.o. female with past medical history significant for anxiety and depression, psoriasis on immunosuppression, morbid obesity, fatty liver, GERD, multi joint osteoarthritis who presents to the clinic for the evaluation of lower back and leg pain.  Patient reports pain has been present for several years, greater than 10 years, without inciting accident injury or trauma.  Pain is constant and today is rated a 6/10.  Pain at its best is a 3/10 and at its worse is a 10/10.  Patient denies preceding accident injury or trauma.  Patient reports pain in a bandlike distribution in the lower back which can radiate down the lateral and posterior aspect of the right lower extremity in L4-S1 distribution to the calf.  Patient denies left lower extremity radiculopathy, bowel or bladder incontinence, saddle anesthesia.  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  She does report with periods of inactivity, her pain can be exacerbated as well.  Pain is marginally improved with sitting, stretching, hot baths.  She does endorse associated weakness in the right lower extremity associated with her pain.  Patient has trialed gabapentin, up to 1200 mg in a day, 5 years prior with marginal improvement in her symptoms.  She is also received 2 prior lumbar epidural steroid injections, greater than 10 years prior with Dr. Luna with ineffective  relief.  She has completed several rounds of conventional land-based physical therapy and has continued physician directed physical therapy exercises over the last 8 weeks from 11/08/2023 through 01/09/2024 with marginal improvement in her pain.    Patient reports significant motor weakness.  Patient denies night fever/night sweats, urinary incontinence, bowel incontinence, significant weight loss, and loss of sensations.      Pain Disability Index (PDI) Score Review:      8/28/2024     8:07 AM 1/9/2024    11:53 AM   Last 3 PDI Scores   Pain Disability Index (PDI) 10 42         Non-Pharmacologic Treatments:  Physical Therapy/Home Exercise: no  Ice/Heat:yes  TENS: yes  Acupuncture: yes  Massage: yes  Chiropractic: yes    Other: no      Pain Medications:  - Adjuvant Medications:  Celebrex, Fluoxetine    Pain Procedures:   External:  -Dr. Phil Luna: [facility] Herbert Lowe:  -3/13/2024: Right-sided L4-5 and L5-S1 transforaminal epidural steroid injection  -6/26/2024 and 07/17/2024: diagnostic bilateral L3-5 MBB #1 and #2 with 90% pain relief  -8/2/2024: Bilateral L3-5 Lumbar RFA with 75% pain relief   -9/18/2024: right L4/5 + L5/S1 TF KATHARINE with  80% pain relief               Review of Systems:  GENERAL:  No weight loss, malaise or fevers.  HEENT:   No recent changes in vision or hearing  NECK:  Negative for lumps, no difficulty with swallowing.  RESPIRATORY:  Negative for cough, wheezing or shortness of breath, patient denies any recent URI.  CARDIOVASCULAR:  Negative for chest pain or palpitations.  GI:  Negative for abdominal discomfort, blood in stools or black stools or change in bowel habits.  MUSCULOSKELETAL:  See HPI.  SKIN:  Negative for lesions, rash, and itching.  PSYCH:  No mood disorder or recent psychosocial stressors.   HEMATOLOGY/LYMPHOLOGY:  Negative for prolonged bleeding, bruising easily or swollen nodes.    NEURO:   No history of syncope, paralysis, seizures or tremors.  All other reviewed and negative  "other than HPI.        OBJECTIVE:    Telemedicine Physical Exam:   Vitals:    10/18/24 0824   Height: 5' 11" (1.803 m)  Comment: pt reported     Body mass index is 44.69 kg/m².   (reviewed on 10/18/2024)     GENERAL: Well appearing, in no acute distress, alert and oriented x3.  Cooperative.  PSYCH:  Mood and affect appropriate.  SKIN: Skin color & texture with no obvious abnormalities.    HEAD/FACE:  Normocephalic, atraumatic.    PULM:  No difficulty breathing. No nasal flaring. No obvious wheezing.  EXTREMITIES: No obvious deformities. Moving all extremities well, appears to have symmetric strength throughout.  MUSCULOSKELETAL: No obvious atrophy abnormalities are noted.   NEURO: No obvious neurologic deficit.   GAIT: sitting.     Physical Exam: last in clinic visit:  GENERAL: Well appearing, in no acute distress, alert and oriented x3.  PSYCH:  Mood and affect appropriate.  SKIN: Skin color, texture, turgor normal, no rashes or lesions.  HEAD/FACE:  Normocephalic, atraumatic. Cranial nerves grossly intact.    PULM: No evidence of respiratory difficulty, symmetric chest rise.    BACK: SLR is equivocal to radicular pain on the right. Pain to palpation over the facet joints of the lumbar spine or spinous processes-improved.  Pain with lumbar flexion.  EXTREMITIES: Peripheral joint ROM is full and pain free without obvious instability or laxity in all four extremities. No deformities, edema, or skin discoloration.    MUSCULOSKELETAL: Able to stand on heels & toes.   Shoulder, hip, and knee provocative maneuvers are negative.  There is no pain with palpation over the sacroiliac joints bilaterally.  Gaenslen's, Distraction/Compression and  FABERs test is negative.  Facet loading test is negative bilaterally.   Bilateral upper and lower extremity strength is normal and symmetric.  No atrophy or tone abnormalities are noted.    RIGHT Lower extremity: Hip flexion 5/5, Hip Abduction 5/5, Hip Adduction 5/5, Knee extension " 5/5, Knee flexion 5/5, Ankle dorsiflexion5/5, Extensor hallucis longus 5/5, Ankle plantarflexion 5/5  LEFT Lower extremity:  Hip flexion 5/5, Hip Abduction 5/5,Hip Adduction 5/5, Knee extension 5/5, Knee flexion 5/5, Ankle dorsiflexion 5/5, Extensor hallucis longus 5/5, Ankle plantarflexion 5/5  -Normal testing knee (patellar) jerk and ankle (achilles) jerk    NEURO: BUE & BLE coordination and muscle stretch reflexes are physiologic and symmetric. No loss of sensation is noted.  GAIT: normal.        Imaging/ Diagnostic Studies/ Labs (Reviewed on 10/18/2024):    EMG 01/22/2024  ABNORMAL study  2. There is electrodiagnostic evidence of a subacute on chronic radiculopathy of the S1  nerve root and a chronic radiculopathy of the L5 nerve root    02/22/16 X-Ray Lumbar Spine AP And Lateral  Findings: The vertebral bodies demonstrate normal height.  There is straightening of the normal lordotic curvature. The disk space heights are maintained. No significant facet arthropathy.    05/09/23 X-Ray Cervical Spine AP And Lateral  FINDINGS:  Vertebral body height and alignment are maintained.  The disc spaces are normal in height.  No acute fracture is evident.  No prevertebral soft tissue swelling.      02/12/2024 LUMBAR MRI              ASSESSMENT: 44 y.o. year old female with     1. Right lumbar radiculopathy        2. Degeneration of intervertebral disc of lumbar region with lower extremity pain        3. Lumbar spondylosis        4. Lumbar facet joint pain        5. Polyarticular psoriatic arthritis              PLAN:   - Interventions:   -S/p right L4/5 + L5/S1 TF KATHARINE on 9/18/24 with 80% pain relief.  Patient has 100% sustained relief in right lower extremity radicular symptoms following right-sided L4-5 and L5-S1 transforaminal epidural steroid injection for about 6 months.      - S/p Bilateral L3-5 Lumbar RFA on 8/2/24 with 75% pain relief.  Patient has 100% sustained relief with axial low back pain symptoms following  lumbar RFA in August of 2024.  Can repeat if symptoms returned.    - Anticoagulation use: No no anticoagulation      - Medications:  -Continue Lyrica (pregabalin) 75mg BID.  We discussed potential side effects of this medication which may include drowsiness,dizziness, dry mouth, constipation or peripheral edema.  Can increase to TID if needed in the future.     report:  Reviewed and consistent with medication use as prescribed.      - Therapy:   We discussed continuing at home physician directed physical therapy to help manage the patient/s painful condition. The patient was counseled that muscle strengthening will improve the long term prognosis in regards to pain and may also help increase range of motion and mobility.     - Imaging: Reviewed available imaging (2/12/2024 LUMBAR MRI) with patient and answered any questions they had regarding study.     - Records: Unable to obtain old records and imaging from outside physicians - Dr. Luna.    - Follow up visit: 3 months - virtual visit  - will send online ticket scheduling on LMN-1     Future Appointments   Date Time Provider Department Center   11/13/2024 10:10 AM [facility], [facility] [place]   11/20/2024 12:15 PM [place] [facility] [place]      - Patient Questions: Answered all of the patient's questions regarding diagnosis, therapy, and treatment.    - This condition does not require this patient to take time off of work, and the primary goal of our Pain Management services is to improve the patient's functional capacity.   - I discussed the risks, benefits, and alternatives to potential treatment options. All questions and concerns were fully addressed today in clinic.         Emelina Scott PA-C  Interventional Pain Management - [facility]    Disclaimer:  This note was prepared using voice recognition system and is likely to have sound alike errors that may have been overlooked even after proof reading.   Please call me with any questions.

## 2024-10-18 ENCOUNTER — PATIENT MESSAGE (OUTPATIENT)
Dept: PAIN MEDICINE | Facility: CLINIC | Age: 44
End: 2024-10-18

## 2024-10-18 ENCOUNTER — OFFICE VISIT (OUTPATIENT)
Dept: PAIN MEDICINE | Facility: CLINIC | Age: 44
End: 2024-10-18
Payer: COMMERCIAL

## 2024-10-18 VITALS — HEIGHT: 71 IN | BODY MASS INDEX: 44.69 KG/M2

## 2024-10-18 DIAGNOSIS — M47.816 LUMBAR SPONDYLOSIS: ICD-10-CM

## 2024-10-18 DIAGNOSIS — M54.59 LUMBAR FACET JOINT PAIN: ICD-10-CM

## 2024-10-18 DIAGNOSIS — M51.361 DEGENERATION OF INTERVERTEBRAL DISC OF LUMBAR REGION WITH LOWER EXTREMITY PAIN: ICD-10-CM

## 2024-10-18 DIAGNOSIS — M54.16 RIGHT LUMBAR RADICULOPATHY: Primary | ICD-10-CM

## 2024-10-18 DIAGNOSIS — L40.59 POLYARTICULAR PSORIATIC ARTHRITIS: ICD-10-CM

## 2024-11-18 ENCOUNTER — LAB VISIT (OUTPATIENT)
Dept: LAB | Facility: HOSPITAL | Age: 44
End: 2024-11-18
Attending: INTERNAL MEDICINE
Payer: COMMERCIAL

## 2024-11-18 DIAGNOSIS — L40.59 POLYARTICULAR PSORIATIC ARTHRITIS: ICD-10-CM

## 2024-11-18 DIAGNOSIS — L40.0 PLAQUE PSORIASIS: ICD-10-CM

## 2024-11-18 DIAGNOSIS — Z79.899 HIGH RISK MEDICATION USE: ICD-10-CM

## 2024-11-18 DIAGNOSIS — Z51.81 ENCOUNTER FOR MEDICATION MONITORING: ICD-10-CM

## 2024-11-18 LAB
ALBUMIN SERPL BCP-MCNC: 3.7 G/DL (ref 3.5–5.2)
ALP SERPL-CCNC: 74 U/L (ref 40–150)
ALT SERPL W/O P-5'-P-CCNC: 19 U/L (ref 10–44)
ANION GAP SERPL CALC-SCNC: 7 MMOL/L (ref 8–16)
AST SERPL-CCNC: 17 U/L (ref 10–40)
BASOPHILS # BLD AUTO: 0.07 K/UL (ref 0–0.2)
BASOPHILS NFR BLD: 1 % (ref 0–1.9)
BILIRUB SERPL-MCNC: 0.3 MG/DL (ref 0.1–1)
BUN SERPL-MCNC: 15 MG/DL (ref 6–20)
CALCIUM SERPL-MCNC: 9.7 MG/DL (ref 8.7–10.5)
CHLORIDE SERPL-SCNC: 107 MMOL/L (ref 95–110)
CO2 SERPL-SCNC: 25 MMOL/L (ref 23–29)
CREAT SERPL-MCNC: 0.8 MG/DL (ref 0.5–1.4)
CRP SERPL-MCNC: 2.4 MG/L (ref 0–8.2)
DIFFERENTIAL METHOD BLD: ABNORMAL
EOSINOPHIL # BLD AUTO: 0.9 K/UL (ref 0–0.5)
EOSINOPHIL NFR BLD: 12.9 % (ref 0–8)
ERYTHROCYTE [DISTWIDTH] IN BLOOD BY AUTOMATED COUNT: 15.4 % (ref 11.5–14.5)
ERYTHROCYTE [SEDIMENTATION RATE] IN BLOOD BY WESTERGREN METHOD: 28 MM/HR (ref 0–36)
EST. GFR  (NO RACE VARIABLE): >60 ML/MIN/1.73 M^2
GLUCOSE SERPL-MCNC: 84 MG/DL (ref 70–110)
HCT VFR BLD AUTO: 39.5 % (ref 37–48.5)
HGB BLD-MCNC: 12.7 G/DL (ref 12–16)
IMM GRANULOCYTES # BLD AUTO: 0.02 K/UL (ref 0–0.04)
IMM GRANULOCYTES NFR BLD AUTO: 0.3 % (ref 0–0.5)
LYMPHOCYTES # BLD AUTO: 1.8 K/UL (ref 1–4.8)
LYMPHOCYTES NFR BLD: 25.4 % (ref 18–48)
MCH RBC QN AUTO: 27.3 PG (ref 27–31)
MCHC RBC AUTO-ENTMCNC: 32.2 G/DL (ref 32–36)
MCV RBC AUTO: 85 FL (ref 82–98)
MONOCYTES # BLD AUTO: 0.4 K/UL (ref 0.3–1)
MONOCYTES NFR BLD: 6.2 % (ref 4–15)
NEUTROPHILS # BLD AUTO: 3.9 K/UL (ref 1.8–7.7)
NEUTROPHILS NFR BLD: 54.2 % (ref 38–73)
NRBC BLD-RTO: 0 /100 WBC
PLATELET # BLD AUTO: 365 K/UL (ref 150–450)
PMV BLD AUTO: 8.4 FL (ref 9.2–12.9)
POTASSIUM SERPL-SCNC: 4.5 MMOL/L (ref 3.5–5.1)
PROT SERPL-MCNC: 7.3 G/DL (ref 6–8.4)
RBC # BLD AUTO: 4.65 M/UL (ref 4–5.4)
SODIUM SERPL-SCNC: 139 MMOL/L (ref 136–145)
WBC # BLD AUTO: 7.12 K/UL (ref 3.9–12.7)

## 2024-11-18 PROCEDURE — 80053 COMPREHEN METABOLIC PANEL: CPT | Performed by: INTERNAL MEDICINE

## 2024-11-18 PROCEDURE — 85025 COMPLETE CBC W/AUTO DIFF WBC: CPT | Performed by: INTERNAL MEDICINE

## 2024-11-18 PROCEDURE — 85651 RBC SED RATE NONAUTOMATED: CPT | Performed by: INTERNAL MEDICINE

## 2024-11-18 PROCEDURE — 36415 COLL VENOUS BLD VENIPUNCTURE: CPT | Performed by: INTERNAL MEDICINE

## 2024-11-18 PROCEDURE — 86140 C-REACTIVE PROTEIN: CPT | Performed by: INTERNAL MEDICINE

## 2024-11-20 ENCOUNTER — OFFICE VISIT (OUTPATIENT)
Dept: RHEUMATOLOGY | Facility: CLINIC | Age: 44
End: 2024-11-20
Payer: COMMERCIAL

## 2024-11-20 ENCOUNTER — TELEPHONE (OUTPATIENT)
Dept: RHEUMATOLOGY | Facility: CLINIC | Age: 44
End: 2024-11-20
Payer: COMMERCIAL

## 2024-11-20 ENCOUNTER — PATIENT MESSAGE (OUTPATIENT)
Dept: RHEUMATOLOGY | Facility: CLINIC | Age: 44
End: 2024-11-20

## 2024-11-20 DIAGNOSIS — D84.821 DRUG-INDUCED IMMUNODEFICIENCY: ICD-10-CM

## 2024-11-20 DIAGNOSIS — L40.50 PSORIATIC ARTHRITIS: Primary | ICD-10-CM

## 2024-11-20 DIAGNOSIS — E55.9 VITAMIN D DEFICIENCY: ICD-10-CM

## 2024-11-20 DIAGNOSIS — L40.50 PSORIATIC ARTHRITIS: ICD-10-CM

## 2024-11-20 DIAGNOSIS — Z51.81 ENCOUNTER FOR MEDICATION MONITORING: ICD-10-CM

## 2024-11-20 DIAGNOSIS — Z79.899 DRUG-INDUCED IMMUNODEFICIENCY: ICD-10-CM

## 2024-11-20 DIAGNOSIS — L40.0 PLAQUE PSORIASIS: ICD-10-CM

## 2024-11-20 PROCEDURE — 99214 OFFICE O/P EST MOD 30 MIN: CPT | Mod: 95,,, | Performed by: INTERNAL MEDICINE

## 2024-11-20 PROCEDURE — 1159F MED LIST DOCD IN RCRD: CPT | Mod: CPTII,95,, | Performed by: INTERNAL MEDICINE

## 2024-11-20 PROCEDURE — G2211 COMPLEX E/M VISIT ADD ON: HCPCS | Mod: 95,,, | Performed by: INTERNAL MEDICINE

## 2024-11-20 PROCEDURE — 1160F RVW MEDS BY RX/DR IN RCRD: CPT | Mod: CPTII,95,, | Performed by: INTERNAL MEDICINE

## 2024-11-20 RX ORDER — CELECOXIB 200 MG/1
200 CAPSULE ORAL DAILY
Qty: 90 CAPSULE | Refills: 3 | Status: SHIPPED | OUTPATIENT
Start: 2024-11-20

## 2024-11-20 RX ORDER — LEFLUNOMIDE 20 MG/1
20 TABLET ORAL DAILY
Qty: 30 TABLET | Refills: 4 | Status: SHIPPED | OUTPATIENT
Start: 2024-11-20

## 2024-11-20 NOTE — Clinical Note
Amy,  Could you please look into her options of getting tremfya every 6 weeks since she always flares 2 weeks prior to her next dose? Thanks.

## 2024-11-20 NOTE — PROGRESS NOTES
RHEUMATOLOGY FOLLOW UP - TELE VISIT     The patient location is: [place]  The chief complaint leading to consultation is:  Follow-up for psoriasis and psoriatic arthritis.  Visit type: Virtual visit with synchronous audio and video  Total time spent with patient: 15 minutes  Each patient to whom he or she provides medical services by telemedicine is:  (1) informed of the relationship between the physician and patient and the respective role of any other health care provider with respect to management of the patient; and (2) notified that he or she may decline to receive medical services by telemedicine and may withdraw from such care at any time.    Chief complaints, HPI, ROS, EXAM, Assessment & Plans:-  Janice Nova a 44 y.o. pleasant female seen today for follow-up visit.  She follows up in the Rheumatology Clinic for plaque psoriasis and severe psoriatic arthritis with dactylitis.  After failing multiple medication she is presently on Tremfya and leflunomide.  Failed methotrexate.  Take Celebrex once daily except 10 days prior to the scheduled dose of Tremfya where she takes twice daily.  Still has severe flares within 6 weeks of Tremfya dose and symptoms improves after next dose.  No infections.  Rheumatological review of systems negative.  Exam shows no synovitis or dactylitis.  100% fist formation bilateral hands.    1. Psoriatic arthritis    2. Plaque psoriasis    3. Drug-induced immunodeficiency    4. Encounter for medication monitoring    5. Vitamin D deficiency      Problem List Items Addressed This Visit       Drug-induced immunodeficiency    Encounter for medication monitoring    Plaque psoriasis    Psoriatic arthritis - Primary    Vitamin D deficiency      Latest Reference Range & Units 11/18/24 10:10   WBC 3.90 - 12.70 K/uL 7.12   RBC 4.00 - 5.40 M/uL 4.65   Hemoglobin 12.0 - 16.0 g/dL 12.7   Hematocrit 37.0 - 48.5 % 39.5   MCV 82 - 98 fL 85   MCH 27.0 - 31.0 pg 27.3   MCHC 32.0 - 36.0 g/dL  32.2   RDW 11.5 - 14.5 % 15.4 (H)   Platelet Count 150 - 450 K/uL 365   MPV 9.2 - 12.9 fL 8.4 (L)   Gran % 38.0 - 73.0 % 54.2   Lymph % 18.0 - 48.0 % 25.4   Mono % 4.0 - 15.0 % 6.2   Eos % 0.0 - 8.0 % 12.9 (H)   Basophil % 0.0 - 1.9 % 1.0   Immature Granulocytes 0.0 - 0.5 % 0.3   Gran # (ANC) 1.8 - 7.7 K/uL 3.9   Lymph # 1.0 - 4.8 K/uL 1.8   Mono # 0.3 - 1.0 K/uL 0.4   Eos # 0.0 - 0.5 K/uL 0.9 (H)   Baso # 0.00 - 0.20 K/uL 0.07   Immature Grans (Abs) 0.00 - 0.04 K/uL 0.02   nRBC 0 /100 WBC 0   Differential Method  Automated   Sed Rate 0 - 36 mm/Hr 28   Sodium 136 - 145 mmol/L 139   Potassium 3.5 - 5.1 mmol/L 4.5   Chloride 95 - 110 mmol/L 107   CO2 23 - 29 mmol/L 25   Anion Gap 8 - 16 mmol/L 7 (L)   BUN 6 - 20 mg/dL 15   Creatinine 0.5 - 1.4 mg/dL 0.8   eGFR >60 mL/min/1.73 m^2 >60   Glucose 70 - 110 mg/dL 84   Calcium 8.7 - 10.5 mg/dL 9.7   ALP 40 - 150 U/L 74   PROTEIN TOTAL 6.0 - 8.4 g/dL 7.3   Albumin 3.5 - 5.2 g/dL 3.7   BILIRUBIN TOTAL 0.1 - 1.0 mg/dL 0.3   AST 10 - 40 U/L 17   ALT 10 - 44 U/L 19   CRP 0.0 - 8.2 mg/L 2.4   (H): Data is abnormally high  (L): Data is abnormally low      Well controlled plaque psoriasis and psoriatic arthritis on Tremfya and leflunomide 20 mg daily except severe flare 6 weeks fter tremfya which resolves with next dose.   Check whether   Continue with safety labs every 12 weeks.  Drug induced immunodeficiency due to use of immunosuppressive drugs. Monitor carefully for infections. Advised to get immediate medical care if any infection. Also advised strict adherence to age appropriate vaccinations and cancer screenings with PCP.  Hold Tremfya and Arava if any infection  I have explained all of the above in detail and the patient understands all of the current recommendation(s). I have answered all questions to the best of my ability and to their complete satisfaction.   # Follow up in about 6 months (around 5/20/2025).      Past Medical History:   Diagnosis Date    Acid reflux      Acquired iron deficiency anemia due to decreased absorption 12/10/2020    Anxiety     Arthritis     Cervical radiculopathy 5/9/2023    Depression        Past Surgical History:   Procedure Laterality Date    COLONOSCOPY N/A 02/10/2021    Procedure: COLONOSCOPY;  Surgeon: Angelika Eldridge MD;  Location: [facility];  Service: Endoscopy;  Laterality: N/A;    COSMETIC SURGERY      ESOPHAGOGASTRODUODENOSCOPY N/A 02/10/2021    Procedure: ESOPHAGOGASTRODUODENOSCOPY (EGD);  Surgeon: Angelika Eldridge MD;  Location: [facility];  Service: Endoscopy;  Laterality: N/A;    HYSTEROSCOPY WITH HYDROTHERMAL ABLATION OF ENDOMETRIUM WITH DILATION AND CURETTAGE N/A 4/8/2024    Procedure: HYSTEROSCOPY, WITH DILATION AND CURETTAGE OF UTERUS AND HYDROTHERMAL ENDOMETRIAL ABLATION;  Surgeon: Vaibhav Stinson MD;  Location: [facility];  Service: OB/GYN;  Laterality: N/A;  NOVASURE    INJECTION OF ANESTHETIC AGENT AROUND MEDIAL BRANCH NERVES INNERVATING LUMBAR FACET JOINT Bilateral 6/26/2024    Procedure: Diagnostic Bilateral L3-5 MBB #1 with RN IV sedation;  Surgeon: Dez Lowe MD;  Location: [facility];  Service: Pain Management;  Laterality: Bilateral;    INJECTION OF ANESTHETIC AGENT AROUND MEDIAL BRANCH NERVES INNERVATING LUMBAR FACET JOINT Bilateral 7/17/2024    Procedure: Diagnostic Bilateral L3-5 MBB #2 with RN IV sedation;  Surgeon: Dez Lowe MD;  Location: [facility];  Service: Pain Management;  Laterality: Bilateral;    LAPAROSCOPIC SALPINGECTOMY Bilateral 4/8/2024    Procedure: SALPINGECTOMY, LAPAROSCOPIC;  Surgeon: Vaibhav Stinson MD;  Location: [facility];  Service: OB/GYN;  Laterality: Bilateral;    RADIOFREQUENCY THERMOCOAGULATION Bilateral 8/2/2024    Procedure: Bilateral L3-5 Lumbar RFA;  Surgeon: Dez Lowe MD;  Location: [facility];  Service: Pain Management;  Laterality: Bilateral;    SELECTIVE INJECTION OF ANESTHETIC AGENT AROUND LUMBAR SPINAL NERVE ROOT BY TRANSFORAMINAL APPROACH Right 3/13/2024     Procedure: R sided L4/5 and L5/S1 TFESI;  Surgeon: Dez Lowe MD;  Location: [facility];  Service: Pain Management;  Laterality: Right;    TRANSFORAMINAL EPIDURAL INJECTION OF STEROID Right 9/18/2024    Procedure: Right L4/5 + L5/S1 TF KATHARINE;  Surgeon: Dez Lowe MD;  Location: [facility];  Service: Pain Management;  Laterality: Right;        Social History     Tobacco Use    Smoking status: Former     Current packs/day: 0.50     Average packs/day: 0.5 packs/day for 10.0 years (5.0 ttl pk-yrs)     Types: Cigarettes    Smokeless tobacco: Never   Substance Use Topics    Alcohol use: Yes     Alcohol/week: 3.0 standard drinks of alcohol     Types: 3 Glasses of wine per week     Comment: soc    Drug use: Not Currently     Frequency: 2.0 times per week     Types: Marijuana       Family History   Problem Relation Name Age of Onset    Cancer Mother      Breast cancer Mother      Diabetes Mellitus Maternal Grandfather      Cancer Paternal Grandmother      Psoriasis Father      Arthritis Father      Colon cancer Paternal Aunt      Ovarian cancer Neg Hx      Thrombophilia Neg Hx         Review of patient's allergies indicates:  No Known Allergies    Medication List with Changes/Refills   Current Medications    CELECOXIB (CELEBREX) 200 MG CAPSULE    Take 1 capsule (200 mg total) by mouth 2 (two) times daily as needed for Pain.    ESOMEPRAZOLE (NEXIUM) 40 MG CAPSULE    TAKE ONE CAPSULE BY MOUTH EVERY DAY BEFORE BREAKFAST    FLUOXETINE 40 MG CAPSULE    TAKE ONE CAPSULE BY MOUTH ONCE PER DAY    FOLIC ACID (FOLVITE) 1 MG TABLET    Take 1 tablet (1,000 mcg total) by mouth once daily.    GUSELKUMAB (TREMFYA) 100 MG/ML SYRG    Inject 1 mL (100 mg) into the skin every 8 weeks.    LEFLUNOMIDE (ARAVA) 20 MG TAB    TAKE ONE TABLET BY MOUTH ONCE PER DAY  Strength: 20 mg    ONDANSETRON (ZOFRAN-ODT) 4 MG TBDL    1 tablet Orally every 6 hours as needed for nausea and vomiting    PREGABALIN (LYRICA) 75 MG CAPSULE    Take 1  capsule (75 mg total) by mouth 2 (two) times daily.    VITAMIN D2 1,250 MCG (50,000 UNIT) CAPSULE    TAKE ONE CAPSULE BY MOUTH TWICE A WEEK       Disclaimer: This note was prepared using voice recognition system and is likely to have sound alike errors and is not proof read.  Please call me with any questions.

## 2024-11-20 NOTE — TELEPHONE ENCOUNTER
----- Message from Sual Cui MD sent at 11/20/2024 12:31 PM CST -----  Amy,   Could you please look into her options of getting tremfya every 6 weeks since she always flares 2 weeks prior to her next dose? Thanks.

## 2024-11-29 NOTE — TELEPHONE ENCOUNTER
Unable to submit via CMM since existing approval exists for Tremfya Q8H. Unable to submit phone PA d/t holiday. Obtaining clinical materials to justify Q6week dosing in preparation for phone PA submission.

## 2024-12-04 RX ORDER — GUSELKUMAB 100 MG/ML
INJECTION SUBCUTANEOUS
Qty: 2 ML | Refills: 3 | Status: ACTIVE | OUTPATIENT
Start: 2024-12-04

## 2024-12-04 NOTE — TELEPHONE ENCOUNTER
Outgoing call to plan to submit phone PA for Tremfya 100 mg/mL for #42 days supply instead of #56 days supply.   Rep: Chaparrita, no need to submit PA for [id number] supply. Paid claim goes through on 12/25/24.    NANCY not offered by the plan- 1 ml per rolling 21 days is covered.   Next day that the plan would cover a refill is 12/25/24.     Outgoing call to patient to inform. Left vm requesting call back. Awaiting response.  Updated Rx sent to [facility].

## 2024-12-04 NOTE — TELEPHONE ENCOUNTER
Incoming call from patient. Advised of the info below. Patient voiced understanding and no further questions and was very grateful for the call. [facility] pharmacist Belgica informed.

## 2024-12-16 DIAGNOSIS — L40.59 POLYARTICULAR PSORIATIC ARTHRITIS: ICD-10-CM

## 2024-12-16 RX ORDER — FOLIC ACID 1 MG/1
1000 TABLET ORAL DAILY
Qty: 90 TABLET | Refills: 3 | Status: SHIPPED | OUTPATIENT
Start: 2024-12-16

## 2025-02-03 RX ORDER — FLUOXETINE HYDROCHLORIDE 40 MG/1
CAPSULE ORAL
Qty: 30 CAPSULE | Refills: 6 | Status: SHIPPED | OUTPATIENT
Start: 2025-02-03

## 2025-02-25 ENCOUNTER — RESULTS FOLLOW-UP (OUTPATIENT)
Dept: RHEUMATOLOGY | Facility: CLINIC | Age: 45
End: 2025-02-25

## 2025-02-25 ENCOUNTER — LAB VISIT (OUTPATIENT)
Dept: LAB | Facility: HOSPITAL | Age: 45
End: 2025-02-25
Attending: INTERNAL MEDICINE
Payer: COMMERCIAL

## 2025-02-25 DIAGNOSIS — Z79.899 HIGH RISK MEDICATION USE: ICD-10-CM

## 2025-02-25 DIAGNOSIS — L40.0 PLAQUE PSORIASIS: ICD-10-CM

## 2025-02-25 DIAGNOSIS — Z51.81 ENCOUNTER FOR MEDICATION MONITORING: ICD-10-CM

## 2025-02-25 DIAGNOSIS — L40.59 POLYARTICULAR PSORIATIC ARTHRITIS: ICD-10-CM

## 2025-02-25 LAB
ALBUMIN SERPL BCP-MCNC: 3.8 G/DL (ref 3.5–5.2)
ALP SERPL-CCNC: 89 U/L (ref 40–150)
ALT SERPL W/O P-5'-P-CCNC: 40 U/L (ref 10–44)
ANION GAP SERPL CALC-SCNC: 9 MMOL/L (ref 8–16)
AST SERPL-CCNC: 27 U/L (ref 10–40)
BASOPHILS # BLD AUTO: 0.1 K/UL (ref 0–0.2)
BASOPHILS NFR BLD: 1.2 % (ref 0–1.9)
BILIRUB SERPL-MCNC: 0.4 MG/DL (ref 0.1–1)
BUN SERPL-MCNC: 17 MG/DL (ref 6–20)
CALCIUM SERPL-MCNC: 9.3 MG/DL (ref 8.7–10.5)
CHLORIDE SERPL-SCNC: 106 MMOL/L (ref 95–110)
CO2 SERPL-SCNC: 23 MMOL/L (ref 23–29)
CREAT SERPL-MCNC: 0.8 MG/DL (ref 0.5–1.4)
CRP SERPL-MCNC: 3.5 MG/L (ref 0–8.2)
DIFFERENTIAL METHOD BLD: ABNORMAL
EOSINOPHIL # BLD AUTO: 0.9 K/UL (ref 0–0.5)
EOSINOPHIL NFR BLD: 10.3 % (ref 0–8)
ERYTHROCYTE [DISTWIDTH] IN BLOOD BY AUTOMATED COUNT: 15.4 % (ref 11.5–14.5)
ERYTHROCYTE [SEDIMENTATION RATE] IN BLOOD BY WESTERGREN METHOD: 12 MM/HR (ref 0–20)
EST. GFR  (NO RACE VARIABLE): >60 ML/MIN/1.73 M^2
GLUCOSE SERPL-MCNC: 126 MG/DL (ref 70–110)
HCT VFR BLD AUTO: 38.9 % (ref 37–48.5)
HGB BLD-MCNC: 12.6 G/DL (ref 12–16)
IMM GRANULOCYTES # BLD AUTO: 0.03 K/UL (ref 0–0.04)
IMM GRANULOCYTES NFR BLD AUTO: 0.4 % (ref 0–0.5)
LYMPHOCYTES # BLD AUTO: 2.2 K/UL (ref 1–4.8)
LYMPHOCYTES NFR BLD: 25.7 % (ref 18–48)
MCH RBC QN AUTO: 26.9 PG (ref 27–31)
MCHC RBC AUTO-ENTMCNC: 32.4 G/DL (ref 32–36)
MCV RBC AUTO: 83 FL (ref 82–98)
MONOCYTES # BLD AUTO: 0.4 K/UL (ref 0.3–1)
MONOCYTES NFR BLD: 4.1 % (ref 4–15)
NEUTROPHILS # BLD AUTO: 5 K/UL (ref 1.8–7.7)
NEUTROPHILS NFR BLD: 58.3 % (ref 38–73)
NRBC BLD-RTO: 0 /100 WBC
PLATELET # BLD AUTO: 392 K/UL (ref 150–450)
PMV BLD AUTO: 8.5 FL (ref 9.2–12.9)
POTASSIUM SERPL-SCNC: 3.9 MMOL/L (ref 3.5–5.1)
PROT SERPL-MCNC: 7.3 G/DL (ref 6–8.4)
RBC # BLD AUTO: 4.68 M/UL (ref 4–5.4)
SODIUM SERPL-SCNC: 138 MMOL/L (ref 136–145)
WBC # BLD AUTO: 8.52 K/UL (ref 3.9–12.7)

## 2025-02-25 PROCEDURE — 86140 C-REACTIVE PROTEIN: CPT | Performed by: INTERNAL MEDICINE

## 2025-02-25 PROCEDURE — 80053 COMPREHEN METABOLIC PANEL: CPT | Performed by: INTERNAL MEDICINE

## 2025-02-25 PROCEDURE — 85025 COMPLETE CBC W/AUTO DIFF WBC: CPT | Performed by: INTERNAL MEDICINE

## 2025-02-25 PROCEDURE — 85651 RBC SED RATE NONAUTOMATED: CPT | Performed by: INTERNAL MEDICINE

## 2025-02-25 PROCEDURE — 36415 COLL VENOUS BLD VENIPUNCTURE: CPT | Performed by: INTERNAL MEDICINE

## 2025-03-04 ENCOUNTER — HOSPITAL ENCOUNTER (OUTPATIENT)
Dept: RADIOLOGY | Facility: HOSPITAL | Age: 45
Discharge: HOME | End: 2025-03-04
Attending: NURSE PRACTITIONER
Payer: COMMERCIAL

## 2025-03-04 ENCOUNTER — RESULTS FOLLOW-UP (OUTPATIENT)
Dept: INTERNAL MEDICINE | Facility: CLINIC | Age: 45
End: 2025-03-04

## 2025-03-04 ENCOUNTER — OFFICE VISIT (OUTPATIENT)
Dept: INTERNAL MEDICINE | Facility: CLINIC | Age: 45
End: 2025-03-04
Payer: COMMERCIAL

## 2025-03-04 VITALS
DIASTOLIC BLOOD PRESSURE: 78 MMHG | TEMPERATURE: 98 F | BODY MASS INDEX: 41.02 KG/M2 | OXYGEN SATURATION: 98 % | HEIGHT: 71 IN | HEART RATE: 88 BPM | WEIGHT: 293 LBS | SYSTOLIC BLOOD PRESSURE: 138 MMHG | RESPIRATION RATE: 17 BRPM

## 2025-03-04 DIAGNOSIS — M25.562 ACUTE PAIN OF LEFT KNEE: ICD-10-CM

## 2025-03-04 DIAGNOSIS — M25.562 ACUTE PAIN OF LEFT KNEE: Primary | ICD-10-CM

## 2025-03-04 DIAGNOSIS — L40.59 POLYARTICULAR PSORIATIC ARTHRITIS: ICD-10-CM

## 2025-03-04 DIAGNOSIS — M13.0 POLYARTHRITIS: ICD-10-CM

## 2025-03-04 DIAGNOSIS — M79.662 PAIN AND SWELLING OF LEFT LOWER LEG: ICD-10-CM

## 2025-03-04 DIAGNOSIS — M79.89 PAIN AND SWELLING OF LEFT LOWER LEG: ICD-10-CM

## 2025-03-04 PROCEDURE — 96372 THER/PROPH/DIAG INJ SC/IM: CPT | Mod: S$GLB,,, | Performed by: NURSE PRACTITIONER

## 2025-03-04 PROCEDURE — 1159F MED LIST DOCD IN RCRD: CPT | Mod: CPTII,S$GLB,, | Performed by: NURSE PRACTITIONER

## 2025-03-04 PROCEDURE — 73564 X-RAY EXAM KNEE 4 OR MORE: CPT | Mod: TC,LT

## 2025-03-04 PROCEDURE — 3008F BODY MASS INDEX DOCD: CPT | Mod: CPTII,S$GLB,, | Performed by: NURSE PRACTITIONER

## 2025-03-04 PROCEDURE — 99999 PR PBB SHADOW E&M-EST. PATIENT-LVL V: CPT | Mod: PBBFAC,,, | Performed by: NURSE PRACTITIONER

## 2025-03-04 PROCEDURE — 3075F SYST BP GE 130 - 139MM HG: CPT | Mod: CPTII,S$GLB,, | Performed by: NURSE PRACTITIONER

## 2025-03-04 PROCEDURE — 73564 X-RAY EXAM KNEE 4 OR MORE: CPT | Mod: 26,LT,, | Performed by: RADIOLOGY

## 2025-03-04 PROCEDURE — 99214 OFFICE O/P EST MOD 30 MIN: CPT | Mod: 25,S$GLB,, | Performed by: NURSE PRACTITIONER

## 2025-03-04 PROCEDURE — 3078F DIAST BP <80 MM HG: CPT | Mod: CPTII,S$GLB,, | Performed by: NURSE PRACTITIONER

## 2025-03-04 RX ORDER — TRIAMCINOLONE ACETONIDE 40 MG/ML
60 INJECTION, SUSPENSION INTRA-ARTICULAR; INTRAMUSCULAR
Status: COMPLETED | OUTPATIENT
Start: 2025-03-04 | End: 2025-03-04

## 2025-03-04 RX ADMIN — TRIAMCINOLONE ACETONIDE 60 MG: 40 INJECTION, SUSPENSION INTRA-ARTICULAR; INTRAMUSCULAR at 11:03

## 2025-03-04 NOTE — PROGRESS NOTES
Subjective:       Patient ID: Janice Lawrence is a 44 y.o. female.    Chief Complaint: Knee Pain    HPI    Reports L knee pain/swelling  Admits to excess walking due to parade,but was hurting prior. No injury  Hx of psoriatic arthritis/on meds for this/sees rheum  No hx of DVT or clotting d/o        Past Medical History:   Diagnosis Date    Acid reflux     Acquired iron deficiency anemia due to decreased absorption 12/10/2020    Anxiety     Arthritis     Cervical radiculopathy 5/9/2023    Depression      Past Surgical History:   Procedure Laterality Date    COLONOSCOPY N/A 02/10/2021    Procedure: COLONOSCOPY;  Surgeon: Angelika Eldridge MD;  Location: [facility];  Service: Endoscopy;  Laterality: N/A;    COSMETIC SURGERY      ESOPHAGOGASTRODUODENOSCOPY N/A 02/10/2021    Procedure: ESOPHAGOGASTRODUODENOSCOPY (EGD);  Surgeon: Angelika Eldridge MD;  Location: [facility];  Service: Endoscopy;  Laterality: N/A;    HYSTEROSCOPY WITH HYDROTHERMAL ABLATION OF ENDOMETRIUM WITH DILATION AND CURETTAGE N/A 4/8/2024    Procedure: HYSTEROSCOPY, WITH DILATION AND CURETTAGE OF UTERUS AND HYDROTHERMAL ENDOMETRIAL ABLATION;  Surgeon: Vaibhav Stinson MD;  Location: [facility] OR;  Service: OB/GYN;  Laterality: N/A;  NOVASURE    INJECTION OF ANESTHETIC AGENT AROUND MEDIAL BRANCH NERVES INNERVATING LUMBAR FACET JOINT Bilateral 6/26/2024    Procedure: Diagnostic Bilateral L3-5 MBB #1 with RN IV sedation;  Surgeon: Dez Lowe MD;  Location: [facility];  Service: Pain Management;  Laterality: Bilateral;    INJECTION OF ANESTHETIC AGENT AROUND MEDIAL BRANCH NERVES INNERVATING LUMBAR FACET JOINT Bilateral 7/17/2024    Procedure: Diagnostic Bilateral L3-5 MBB #2 with RN IV sedation;  Surgeon: Dez Lowe MD;  Location: [facility];  Service: Pain Management;  Laterality: Bilateral;    LAPAROSCOPIC SALPINGECTOMY Bilateral 4/8/2024    Procedure: SALPINGECTOMY, LAPAROSCOPIC;  Surgeon: Vaibhav Stinson MD;  Location: [facility]  OR;  Service: OB/GYN;  Laterality: Bilateral;    RADIOFREQUENCY THERMOCOAGULATION Bilateral 8/2/2024    Procedure: Bilateral L3-5 Lumbar RFA;  Surgeon: Dez Lowe MD;  Location: [facility];  Service: Pain Management;  Laterality: Bilateral;    SELECTIVE INJECTION OF ANESTHETIC AGENT AROUND LUMBAR SPINAL NERVE ROOT BY TRANSFORAMINAL APPROACH Right 3/13/2024    Procedure: R sided L4/5 and L5/S1 TFESI;  Surgeon: Dez Lowe MD;  Location: [facility];  Service: Pain Management;  Laterality: Right;    TRANSFORAMINAL EPIDURAL INJECTION OF STEROID Right 9/18/2024    Procedure: Right L4/5 + L5/S1 TF KATHARINE;  Surgeon: Dez Lowe MD;  Location: [facility];  Service: Pain Management;  Laterality: Right;     Social History[1]  Review of patient's allergies indicates:  No Known Allergies  Current Outpatient Medications   Medication Sig    celecoxib (CELEBREX) 200 MG capsule Take 1 capsule (200 mg total) by mouth once daily.    esomeprazole (NEXIUM) 40 MG capsule TAKE ONE CAPSULE BY MOUTH EVERY DAY BEFORE BREAKFAST    FLUoxetine 40 MG capsule TAKE ONE CAPSULE BY MOUTH ONCE PER DAY    folic acid (FOLVITE) 1 MG tablet Take 1 tablet (1,000 mcg total) by mouth once daily.    guselkumab (TREMFYA) 100 mg/mL Syrg Inject 1 mL (100 mg) into the skin every 6 weeks.    leflunomide (ARAVA) 20 MG Tab Take 1 tablet (20 mg total) by mouth once daily.    ondansetron (ZOFRAN-ODT) 4 MG TbDL 1 tablet Orally every 6 hours as needed for nausea and vomiting    pregabalin (LYRICA) 75 MG capsule Take 1 capsule (75 mg total) by mouth 2 (two) times daily.    VITAMIN D2 1,250 mcg (50,000 unit) capsule TAKE ONE CAPSULE BY MOUTH TWICE A WEEK     No current facility-administered medications for this visit.           Review of Systems   Constitutional:  Negative for activity change, appetite change, chills, diaphoresis, fatigue, fever and unexpected weight change.   HENT:  Negative for congestion, ear pain, hearing loss, postnasal drip,  rhinorrhea, sinus pressure, sinus pain, sneezing, sore throat, tinnitus, trouble swallowing and voice change.    Eyes:  Negative for photophobia, pain, discharge and visual disturbance.   Respiratory:  Negative for cough, chest tightness, shortness of breath and wheezing.    Cardiovascular:  Negative for chest pain, palpitations and leg swelling.   Gastrointestinal:  Negative for abdominal distention, abdominal pain, blood in stool, constipation, diarrhea, nausea and vomiting.   Endocrine: Negative for polydipsia and polyuria.   Genitourinary:  Negative for decreased urine volume, difficulty urinating, dysuria, flank pain, frequency, hematuria, menstrual problem and urgency.   Musculoskeletal:  Positive for arthralgias, joint swelling and myalgias. Negative for back pain, neck pain and neck stiffness.   Allergic/Immunologic: Negative for immunocompromised state.   Neurological:  Negative for dizziness, tremors, seizures, syncope, facial asymmetry, speech difficulty, weakness, light-headedness, numbness and headaches.   Hematological:  Negative for adenopathy. Does not bruise/bleed easily.   Psychiatric/Behavioral:  Negative for confusion, dysphoric mood and sleep disturbance.        Objective:      Physical Exam  Vitals reviewed.   Cardiovascular:      Pulses: Normal pulses.   Musculoskeletal:      Left knee: Swelling present. Decreased range of motion. Tenderness present.   Neurological:      Mental Status: She is alert.         Assessment:     Vitals:    03/04/25 1127   BP: 138/78   Pulse: 88   Resp: 17   Temp: 97.6 °F (36.4 °C)         1. Acute pain of left knee    2. Pain and swelling of left lower leg    3. Polyarthritis    4. Polyarticular psoriatic arthritis        Plan:   Acute pain of left knee  -     X-Ray Knee Complete 4 or More Views Left; Future; Expected date: 03/04/2025    Pain and swelling of left lower leg  -     US Lower Extremity Veins Left; Future; Expected date:  03/04/2025    Polyarthritis    Polyarticular psoriatic arthritis    Other orders  -     triamcinolone acetonide injection 60 mg      RICE LLE  Steroid shot  C/w celebrex and rheum meds  F/u with rheum if warranted/consider PT if no improvement.       [1]   Social History  Socioeconomic History    Marital status: Single   Occupational History    Occupation: [employer]   Tobacco Use    Smoking status: Former     Current packs/day: 0.50     Average packs/day: 0.5 packs/day for 10.0 years (5.0 ttl pk-yrs)     Types: Cigarettes    Smokeless tobacco: Never   Substance and Sexual Activity    Alcohol use: Yes     Alcohol/week: 3.0 standard drinks of alcohol     Types: 3 Glasses of wine per week     Comment: soc    Drug use: Not Currently     Frequency: 2.0 times per week     Types: Marijuana    Sexual activity: Yes     Birth control/protection: None     Comment: mut mog   Other Topics Concern    Are you pregnant or think you may be? No    Breast-feeding No     Social Drivers of Health     Financial Resource Strain: Low Risk  (3/4/2025)    Overall Financial Resource Strain (CARDIA)     Difficulty of Paying Living Expenses: Not very hard   Food Insecurity: Food Insecurity Present (3/4/2025)    Hunger Vital Sign     Worried About Running Out of Food in the Last Year: Never true     Ran Out of Food in the Last Year: Sometimes true   Transportation Needs: No Transportation Needs (3/4/2025)    PRAPARE - Transportation     Lack of Transportation (Medical): No     Lack of Transportation (Non-Medical): No   Physical Activity: Insufficiently Active (3/4/2025)    Exercise Vital Sign     Days of Exercise per Week: 3 days     Minutes of Exercise per Session: 30 min   Stress: No Stress Concern Present (3/4/2025)    [employer] - Occupational Stress Questionnaire     Feeling of Stress : Not at all   Housing Stability: Low Risk  (3/4/2025)    Housing Stability Vital Sign     Unable to Pay for Housing in the  Last Year: No     Number of Times Moved in the Last Year: 0     Homeless in the Last Year: No

## 2025-03-10 ENCOUNTER — HOSPITAL ENCOUNTER (OUTPATIENT)
Dept: RADIOLOGY | Facility: HOSPITAL | Age: 45
Discharge: HOME | End: 2025-03-10
Payer: COMMERCIAL

## 2025-03-10 VITALS — BODY MASS INDEX: 41.02 KG/M2 | HEIGHT: 71 IN | WEIGHT: 293 LBS

## 2025-03-10 DIAGNOSIS — Z12.31 ENCOUNTER FOR SCREENING MAMMOGRAM FOR BREAST CANCER: ICD-10-CM

## 2025-03-10 PROCEDURE — 77063 BREAST TOMOSYNTHESIS BI: CPT | Mod: TC

## 2025-03-10 PROCEDURE — 77067 SCR MAMMO BI INCL CAD: CPT | Mod: 26,,, | Performed by: RADIOLOGY

## 2025-03-10 PROCEDURE — 77063 BREAST TOMOSYNTHESIS BI: CPT | Mod: 26,,, | Performed by: RADIOLOGY

## 2025-04-02 DIAGNOSIS — M51.361 DEGENERATION OF INTERVERTEBRAL DISC OF LUMBAR REGION WITH LOWER EXTREMITY PAIN: ICD-10-CM

## 2025-04-02 DIAGNOSIS — M54.16 RIGHT LUMBAR RADICULOPATHY: Primary | ICD-10-CM

## 2025-04-03 RX ORDER — PREGABALIN 75 MG/1
75 CAPSULE ORAL 2 TIMES DAILY
Qty: 60 CAPSULE | Refills: 2 | Status: SHIPPED | OUTPATIENT
Start: 2025-04-03

## 2025-04-03 NOTE — TELEPHONE ENCOUNTER
Pt is requesting for a refill of: pregabalin (LYRICA) 75 MG capsule   Last filled:12/17/24  Last encounter:10/18/24   Up coming apt: 5/13/25  Pharmacy:Prescriptions to [facility]

## 2025-05-10 DIAGNOSIS — E55.9 VITAMIN D DEFICIENCY: ICD-10-CM

## 2025-05-12 RX ORDER — ERGOCALCIFEROL 1.25 MG/1
50000 CAPSULE ORAL
Qty: 24 CAPSULE | Refills: 3 | Status: SHIPPED | OUTPATIENT
Start: 2025-05-12

## 2025-05-19 ENCOUNTER — LAB VISIT (OUTPATIENT)
Dept: LAB | Facility: HOSPITAL | Age: 45
End: 2025-05-19
Attending: INTERNAL MEDICINE
Payer: COMMERCIAL

## 2025-05-19 DIAGNOSIS — L40.59 POLYARTICULAR PSORIATIC ARTHRITIS: ICD-10-CM

## 2025-05-19 DIAGNOSIS — Z79.899 HIGH RISK MEDICATION USE: ICD-10-CM

## 2025-05-19 DIAGNOSIS — L40.0 PLAQUE PSORIASIS: ICD-10-CM

## 2025-05-19 DIAGNOSIS — Z51.81 ENCOUNTER FOR MEDICATION MONITORING: ICD-10-CM

## 2025-05-19 LAB
ABSOLUTE EOSINOPHIL (OHS): 0.83 K/UL
ABSOLUTE MONOCYTE (OHS): 0.51 K/UL (ref 0.3–1)
ABSOLUTE NEUTROPHIL COUNT (OHS): 5.12 K/UL (ref 1.8–7.7)
ALBUMIN SERPL BCP-MCNC: 3.7 G/DL (ref 3.5–5.2)
ALP SERPL-CCNC: 79 UNIT/L (ref 40–150)
ALT SERPL W/O P-5'-P-CCNC: 21 UNIT/L (ref 10–44)
ANION GAP (OHS): 9 MMOL/L (ref 8–16)
AST SERPL-CCNC: 20 UNIT/L (ref 11–45)
BASOPHILS # BLD AUTO: 0.11 K/UL
BASOPHILS NFR BLD AUTO: 1.3 %
BILIRUB SERPL-MCNC: 0.4 MG/DL (ref 0.1–1)
BUN SERPL-MCNC: 15 MG/DL (ref 6–20)
CALCIUM SERPL-MCNC: 9.1 MG/DL (ref 8.7–10.5)
CHLORIDE SERPL-SCNC: 107 MMOL/L (ref 95–110)
CO2 SERPL-SCNC: 21 MMOL/L (ref 23–29)
CREAT SERPL-MCNC: 0.7 MG/DL (ref 0.5–1.4)
CRP SERPL-MCNC: 2.3 MG/L
ERYTHROCYTE [DISTWIDTH] IN BLOOD BY AUTOMATED COUNT: 15.4 % (ref 11.5–14.5)
ERYTHROCYTE [SEDIMENTATION RATE] IN BLOOD: 23 MM/HR
GFR SERPLBLD CREATININE-BSD FMLA CKD-EPI: >60 ML/MIN/1.73/M2
GLUCOSE SERPL-MCNC: 115 MG/DL (ref 70–110)
HCT VFR BLD AUTO: 40 % (ref 37–48.5)
HGB BLD-MCNC: 13.1 GM/DL (ref 12–16)
IMM GRANULOCYTES # BLD AUTO: 0.02 K/UL (ref 0–0.04)
IMM GRANULOCYTES NFR BLD AUTO: 0.2 % (ref 0–0.5)
LYMPHOCYTES # BLD AUTO: 2.04 K/UL (ref 1–4.8)
MCH RBC QN AUTO: 27.3 PG (ref 27–31)
MCHC RBC AUTO-ENTMCNC: 32.8 G/DL (ref 32–36)
MCV RBC AUTO: 84 FL (ref 82–98)
NUCLEATED RBC (/100WBC) (OHS): 0 /100 WBC
PLATELET # BLD AUTO: 402 K/UL (ref 150–450)
PMV BLD AUTO: 8 FL (ref 9.2–12.9)
POTASSIUM SERPL-SCNC: 4.5 MMOL/L (ref 3.5–5.1)
PROT SERPL-MCNC: 7.5 GM/DL (ref 6–8.4)
RBC # BLD AUTO: 4.79 M/UL (ref 4–5.4)
RELATIVE EOSINOPHIL (OHS): 9.6 %
RELATIVE LYMPHOCYTE (OHS): 23.6 % (ref 18–48)
RELATIVE MONOCYTE (OHS): 5.9 % (ref 4–15)
RELATIVE NEUTROPHIL (OHS): 59.4 % (ref 38–73)
SODIUM SERPL-SCNC: 137 MMOL/L (ref 136–145)
WBC # BLD AUTO: 8.63 K/UL (ref 3.9–12.7)

## 2025-05-19 PROCEDURE — 85652 RBC SED RATE AUTOMATED: CPT

## 2025-05-19 PROCEDURE — 82435 ASSAY OF BLOOD CHLORIDE: CPT

## 2025-05-19 PROCEDURE — 85025 COMPLETE CBC W/AUTO DIFF WBC: CPT

## 2025-05-19 PROCEDURE — 86140 C-REACTIVE PROTEIN: CPT

## 2025-05-19 PROCEDURE — 36415 COLL VENOUS BLD VENIPUNCTURE: CPT

## 2025-05-20 ENCOUNTER — OFFICE VISIT (OUTPATIENT)
Dept: RHEUMATOLOGY | Facility: CLINIC | Age: 45
End: 2025-05-20
Payer: COMMERCIAL

## 2025-05-20 ENCOUNTER — PATIENT MESSAGE (OUTPATIENT)
Dept: RHEUMATOLOGY | Facility: CLINIC | Age: 45
End: 2025-05-20

## 2025-05-20 DIAGNOSIS — L40.50 PSORIATIC ARTHRITIS: ICD-10-CM

## 2025-05-20 DIAGNOSIS — Z51.81 ENCOUNTER FOR MEDICATION MONITORING: ICD-10-CM

## 2025-05-20 DIAGNOSIS — E55.9 VITAMIN D DEFICIENCY: ICD-10-CM

## 2025-05-20 DIAGNOSIS — E66.01 OBESITY, MORBID, BMI 40.0-49.9: ICD-10-CM

## 2025-05-20 DIAGNOSIS — L40.0 PLAQUE PSORIASIS: ICD-10-CM

## 2025-05-20 DIAGNOSIS — D84.821 DRUG-INDUCED IMMUNODEFICIENCY: ICD-10-CM

## 2025-05-20 DIAGNOSIS — M67.471 GANGLION CYST OF RIGHT FOOT: ICD-10-CM

## 2025-05-20 DIAGNOSIS — Z79.899 DRUG-INDUCED IMMUNODEFICIENCY: ICD-10-CM

## 2025-05-20 DIAGNOSIS — L40.59 POLYARTICULAR PSORIATIC ARTHRITIS: Primary | ICD-10-CM

## 2025-05-20 PROCEDURE — G2211 COMPLEX E/M VISIT ADD ON: HCPCS | Mod: 95,,, | Performed by: INTERNAL MEDICINE

## 2025-05-20 PROCEDURE — 1160F RVW MEDS BY RX/DR IN RCRD: CPT | Mod: CPTII,95,, | Performed by: INTERNAL MEDICINE

## 2025-05-20 PROCEDURE — 1159F MED LIST DOCD IN RCRD: CPT | Mod: CPTII,95,, | Performed by: INTERNAL MEDICINE

## 2025-05-20 PROCEDURE — 98006 SYNCH AUDIO-VIDEO EST MOD 30: CPT | Mod: 95,,, | Performed by: INTERNAL MEDICINE

## 2025-05-20 RX ORDER — LEFLUNOMIDE 20 MG/1
20 TABLET ORAL DAILY
Qty: 30 TABLET | Refills: 4 | Status: SHIPPED | OUTPATIENT
Start: 2025-05-20

## 2025-05-20 NOTE — PROGRESS NOTES
RHEUMATOLOGY FOLLOW UP - TELE VISIT     The patient location is: [place]  The chief complaint leading to consultation is:  Follow-up for psoriasis and psoriatic arthritis.  Visit type: Virtual visit with synchronous audio and video  Total time spent with patient: 10 minutes  Each patient to whom he or she provides medical services by telemedicine is:  (1) informed of the relationship between the physician and patient and the respective role of any other health care provider with respect to management of the patient; and (2) notified that he or she may decline to receive medical services by telemedicine and may withdraw from such care at any time.    Chief complaints, HPI, ROS, EXAM, Assessment & Plans:-  Janice Nova a 45 y.o. pleasant female seen today for follow-up visit.  She follows up in the Rheumatology Clinic for plaque psoriasis and severe psoriatic arthritis with dactylitis.  After failing multiple medication she is presently on Tremfya and leflunomide.  Dose of Tremfya hours increase to every 6 weeks for active arthritis.  Reports significant improvement since increasing the frequency to every 6 weeks.  No problems with leflunomide.  Taking Celebrex only once daily.  Failed methotrexate.  Rheumatological review of systems negative.  Exam shows no synovitis or dactylitis.  100% fist formation bilateral hands.  No dactylitis.    1. Polyarticular psoriatic arthritis    2. Plaque psoriasis    3. Drug-induced immunodeficiency    4. Encounter for medication monitoring    5. Obesity, morbid, BMI 40.0-49.9    6. Ganglion cyst of right foot    7. Psoriatic arthritis    8. Vitamin D deficiency      Problem List Items Addressed This Visit       Drug-induced immunodeficiency    Encounter for medication monitoring    Ganglion cyst of right foot    Relevant Orders    Ambulatory referral/consult to Podiatry    Obesity, morbid, BMI 40.0-49.9    Plaque psoriasis    Relevant Medications    leflunomide (ARAVA)  20 MG Tab    Polyarticular psoriatic arthritis - Primary    Psoriatic arthritis    Relevant Medications    leflunomide (ARAVA) 20 MG Tab    Vitamin D deficiency    Relevant Orders    Vitamin D      Latest Reference Range & Units 05/19/25 12:08   WBC 3.90 - 12.70 K/uL 8.63   RBC 4.00 - 5.40 M/uL 4.79   Hemoglobin 12.0 - 16.0 gm/dL 13.1   Hematocrit 37.0 - 48.5 % 40.0   MCV 82 - 98 fL 84   MCH 27.0 - 31.0 pg 27.3   MCHC 32.0 - 36.0 g/dL 32.8   RDW 11.5 - 14.5 % 15.4 (H)   Platelet Count 150 - 450 K/uL 402   MPV 9.2 - 12.9 fL 8.0 (L)   Neut % 38 - 73 % 59.4   Lymph % 18 - 48 % 23.6   Mono % 4 - 15 % 5.9   Eos % <=8 % 9.6 (H)   Basophil % <=1.9 % 1.3   Immature Granulocytes 0.0 - 0.5 % 0.2   Gran # (ANC) 1.8 - 7.7 K/uL 5.12   Lymph # 1 - 4.8 K/uL 2.04   Mono # 0.3 - 1 K/uL 0.51   Eos # <=0.5 K/uL 0.83 (H)   Baso # <=0.2 K/uL 0.11   Immature Grans (Abs) 0.00 - 0.04 K/uL 0.02   nRBC <=0 /100 WBC 0   Sed Rate <=36 mm/hr 23   Sodium 136 - 145 mmol/L 137   Potassium 3.5 - 5.1 mmol/L 4.5   Chloride 95 - 110 mmol/L 107   CO2 23 - 29 mmol/L 21 (L)   Anion Gap 8 - 16 mmol/L 9   BUN 6 - 20 mg/dL 15   Creatinine 0.5 - 1.4 mg/dL 0.7   eGFR >60 mL/min/1.73/m2 >60   Glucose 70 - 110 mg/dL 115 (H)   Calcium 8.7 - 10.5 mg/dL 9.1   ALP 40 - 150 unit/L 79   PROTEIN TOTAL 6.0 - 8.4 gm/dL 7.5   Albumin 3.5 - 5.2 g/dL 3.7   BILIRUBIN TOTAL 0.1 - 1.0 mg/dL 0.4   AST 11 - 45 unit/L 20   ALT 10 - 44 unit/L 21   CRP <=8.2 mg/L 2.3   (H): Data is abnormally high  (L): Data is abnormally low      Well controlled plaque psoriasis and psoriatic arthritis on Tremfya and leflunomide 20 mg daily .  Significant improvement since taking Tremfya every 6 weeks.  Have been taking Celebrex only once daily.  Continue Tremfya every 6 weeks and leflunomide 20 mg daily.  Continue with safety labs every 12 weeks.  Worsening cystic swelling right foot suggestive of ganglion cyst.  No tenderness.  She would like to consult with foot surgeon about possible  treatment options.  Referral sent today.  Drug induced immunodeficiency due to use of immunosuppressive drugs. Monitor carefully for infections. Advised to get immediate medical care if any infection. Also advised strict adherence to age appropriate vaccinations and cancer screenings with PCP.  Hold Tremfya and Arava if any infection  Check vitamin-D levels next visit.  Continue twice weekly supplementation  I have explained all of the above in detail and the patient understands all of the current recommendation(s). I have answered all questions to the best of my ability and to their complete satisfaction.   # Follow up in about 6 months (around 11/20/2025).      Past Medical History:   Diagnosis Date    Acid reflux     Acquired iron deficiency anemia due to decreased absorption 12/10/2020    Anxiety     Arthritis     Cervical radiculopathy 5/9/2023    Depression        Past Surgical History:   Procedure Laterality Date    COLONOSCOPY N/A 02/10/2021    Procedure: COLONOSCOPY;  Surgeon: Angelika Eldridge MD;  Location: [facility];  Service: Endoscopy;  Laterality: N/A;    COSMETIC SURGERY      ESOPHAGOGASTRODUODENOSCOPY N/A 02/10/2021    Procedure: ESOPHAGOGASTRODUODENOSCOPY (EGD);  Surgeon: Angelika Eldridge MD;  Location: [facility];  Service: Endoscopy;  Laterality: N/A;    HYSTEROSCOPY WITH HYDROTHERMAL ABLATION OF ENDOMETRIUM WITH DILATION AND CURETTAGE N/A 4/8/2024    Procedure: HYSTEROSCOPY, WITH DILATION AND CURETTAGE OF UTERUS AND HYDROTHERMAL ENDOMETRIAL ABLATION;  Surgeon: Vaibhav Stinson MD;  Location: [facility];  Service: OB/GYN;  Laterality: N/A;  NOVASURE    INJECTION OF ANESTHETIC AGENT AROUND MEDIAL BRANCH NERVES INNERVATING LUMBAR FACET JOINT Bilateral 6/26/2024    Procedure: Diagnostic Bilateral L3-5 MBB #1 with RN IV sedation;  Surgeon: Dez Lowe MD;  Location: [facility];  Service: Pain Management;  Laterality: Bilateral;    INJECTION OF ANESTHETIC AGENT AROUND MEDIAL BRANCH NERVES  INNERVATING LUMBAR FACET JOINT Bilateral 7/17/2024    Procedure: Diagnostic Bilateral L3-5 MBB #2 with RN IV sedation;  Surgeon: Dez Lowe MD;  Location: [facility];  Service: Pain Management;  Laterality: Bilateral;    LAPAROSCOPIC SALPINGECTOMY Bilateral 4/8/2024    Procedure: SALPINGECTOMY, LAPAROSCOPIC;  Surgeon: Vaibhav Stinson MD;  Location: [facility];  Service: OB/GYN;  Laterality: Bilateral;    RADIOFREQUENCY THERMOCOAGULATION Bilateral 8/2/2024    Procedure: Bilateral L3-5 Lumbar RFA;  Surgeon: Dez Lowe MD;  Location: [facility];  Service: Pain Management;  Laterality: Bilateral;    SELECTIVE INJECTION OF ANESTHETIC AGENT AROUND LUMBAR SPINAL NERVE ROOT BY TRANSFORAMINAL APPROACH Right 3/13/2024    Procedure: R sided L4/5 and L5/S1 TFESI;  Surgeon: Dez Lowe MD;  Location: [facility];  Service: Pain Management;  Laterality: Right;    TRANSFORAMINAL EPIDURAL INJECTION OF STEROID Right 9/18/2024    Procedure: Right L4/5 + L5/S1 TF KATHARINE;  Surgeon: Dez Lowe MD;  [facility];  Service: Pain Management;  Laterality: Right;        Social History     Tobacco Use    Smoking status: Former     Current packs/day: 0.50     Average packs/day: 0.5 packs/day for 10.0 years (5.0 ttl pk-yrs)     Types: Cigarettes    Smokeless tobacco: Never   Substance Use Topics    Alcohol use: Yes     Alcohol/week: 3.0 standard drinks of alcohol     Types: 3 Glasses of wine per week     Comment: soc    Drug use: Not Currently     Frequency: 2.0 times per week     Types: Marijuana       Family History   Problem Relation Name Age of Onset    Cancer Mother      Breast cancer Mother      Diabetes Mellitus Maternal Grandfather      Cancer Paternal Grandmother      Psoriasis Father      Arthritis Father      Colon cancer Paternal Aunt      Ovarian cancer Neg Hx      Thrombophilia Neg Hx         Review of patient's allergies indicates:  No Known Allergies    Medication List with Changes/Refills    Current Medications    CELECOXIB (CELEBREX) 200 MG CAPSULE    Take 1 capsule (200 mg total) by mouth once daily.    ERGOCALCIFEROL (ERGOCALCIFEROL) 50,000 UNIT CAP    TAKE ONE CAPSULE BY MOUTH TWICE A WEEK    ESOMEPRAZOLE (NEXIUM) 40 MG CAPSULE    TAKE ONE CAPSULE BY MOUTH EVERY DAY BEFORE BREAKFAST    FLUOXETINE 40 MG CAPSULE    TAKE ONE CAPSULE BY MOUTH ONCE PER DAY    FOLIC ACID (FOLVITE) 1 MG TABLET    Take 1 tablet (1,000 mcg total) by mouth once daily.    GUSELKUMAB (TREMFYA) 100 MG/ML SYRG    Inject 1 mL (100 mg) into the skin every 6 weeks.    ONDANSETRON (ZOFRAN-ODT) 4 MG TBDL    1 tablet Orally every 6 hours as needed for nausea and vomiting    PREGABALIN (LYRICA) 75 MG CAPSULE    Take 1 capsule (75 mg total) by mouth 2 (two) times daily.   Changed and/or Refilled Medications    Modified Medication Previous Medication    LEFLUNOMIDE (ARAVA) 20 MG TAB leflunomide (ARAVA) 20 MG Tab       Take 1 tablet (20 mg total) by mouth once daily.    Take 1 tablet (20 mg total) by mouth once daily.       Disclaimer: This note was prepared using voice recognition system and is likely to have sound alike errors and is not proof read.  Please call me with any questions.

## 2025-06-09 ENCOUNTER — HOSPITAL ENCOUNTER (OUTPATIENT)
Dept: RADIOLOGY | Facility: HOSPITAL | Age: 45
Discharge: HOME | End: 2025-06-09
Attending: PODIATRIST
Payer: COMMERCIAL

## 2025-06-09 ENCOUNTER — OFFICE VISIT (OUTPATIENT)
Dept: PODIATRY | Facility: CLINIC | Age: 45
End: 2025-06-09
Payer: COMMERCIAL

## 2025-06-09 VITALS — BODY MASS INDEX: 41.02 KG/M2 | WEIGHT: 293 LBS | HEIGHT: 71 IN

## 2025-06-09 DIAGNOSIS — M25.841 CYST OF JOINT OF RIGHT HAND: ICD-10-CM

## 2025-06-09 DIAGNOSIS — M67.471 GANGLION CYST OF RIGHT FOOT: Primary | ICD-10-CM

## 2025-06-09 DIAGNOSIS — M67.471 GANGLION CYST OF RIGHT FOOT: ICD-10-CM

## 2025-06-09 PROCEDURE — 73630 X-RAY EXAM OF FOOT: CPT | Mod: TC,LT

## 2025-06-09 PROCEDURE — 3008F BODY MASS INDEX DOCD: CPT | Mod: CPTII,S$GLB,, | Performed by: PODIATRIST

## 2025-06-09 PROCEDURE — 99203 OFFICE O/P NEW LOW 30 MIN: CPT | Mod: 25,S$GLB,, | Performed by: PODIATRIST

## 2025-06-09 PROCEDURE — 76882 US LMTD JT/FCL EVL NVASC XTR: CPT | Mod: 26,RT,, | Performed by: RADIOLOGY

## 2025-06-09 PROCEDURE — 1159F MED LIST DOCD IN RCRD: CPT | Mod: CPTII,S$GLB,, | Performed by: PODIATRIST

## 2025-06-09 PROCEDURE — 1160F RVW MEDS BY RX/DR IN RCRD: CPT | Mod: CPTII,S$GLB,, | Performed by: PODIATRIST

## 2025-06-09 PROCEDURE — 76882 US LMTD JT/FCL EVL NVASC XTR: CPT | Mod: TC,RT

## 2025-06-09 PROCEDURE — 99999 PR PBB SHADOW E&M-EST. PATIENT-LVL IV: CPT | Mod: PBBFAC,,, | Performed by: PODIATRIST

## 2025-06-09 PROCEDURE — 76882 US LMTD JT/FCL EVL NVASC XTR: CPT | Mod: TC,LT

## 2025-06-09 PROCEDURE — 73630 X-RAY EXAM OF FOOT: CPT | Mod: 26,LT,, | Performed by: RADIOLOGY

## 2025-06-09 PROCEDURE — 20600 DRAIN/INJ JOINT/BURSA W/O US: CPT | Mod: LT,S$GLB,, | Performed by: PODIATRIST

## 2025-06-09 NOTE — PROGRESS NOTES
Subjective:     Patient ID: Janice Lawrence is a 45 y.o. female.    Chief Complaint: Ganglion Cyst (LLE Ganglion cyst x2 yrs. Dorsal aspect of foot. Visible . Fixed not moveable. Non diabetic. 0/10 pain. Pt wears sandals no socks. Pt last visit with Dr. See Hughes. Was 5/20/2025.)    Janice is a 45 y.o. female who presents to the podiatry clinic  with complaint of  left foot pain. Onset of the symptoms was several years ago. Precipitating event: none known. Current symptoms include: ability to bear weight, but with some pain. Aggravating factors: walking. Symptoms have been intermittent. Patient has had no prior foot problems. Evaluation to date: none. Treatment to date: none. Patients rates pain 3/10 on pain scale.    Problem List[1]    Medication List with Changes/Refills   Current Medications    CELECOXIB (CELEBREX) 200 MG CAPSULE    Take 1 capsule (200 mg total) by mouth once daily.    ERGOCALCIFEROL (ERGOCALCIFEROL) 50,000 UNIT CAP    TAKE ONE CAPSULE BY MOUTH TWICE A WEEK    FLUOXETINE 40 MG CAPSULE    TAKE ONE CAPSULE BY MOUTH ONCE PER DAY    FOLIC ACID (FOLVITE) 1 MG TABLET    Take 1 tablet (1,000 mcg total) by mouth once daily.    GUSELKUMAB (TREMFYA) 100 MG/ML SYRG    Inject 1 mL (100 mg) into the skin every 6 weeks.    LEFLUNOMIDE (ARAVA) 20 MG TAB    Take 1 tablet (20 mg total) by mouth once daily.    ONDANSETRON (ZOFRAN-ODT) 4 MG TBDL    1 tablet Orally every 6 hours as needed for nausea and vomiting    PREGABALIN (LYRICA) 75 MG CAPSULE    Take 1 capsule (75 mg total) by mouth 2 (two) times daily.   Changed and/or Refilled Medications    Modified Medication Previous Medication    ESOMEPRAZOLE (NEXIUM) 40 MG CAPSULE esomeprazole (NEXIUM) 40 MG capsule       TAKE ONE CAPSULE BY MOUTH EVERY DAY BEFORE BREAKFAST    TAKE ONE CAPSULE BY MOUTH EVERY DAY BEFORE BREAKFAST       Review of patient's allergies indicates:  No Known Allergies    Past Surgical History:   Procedure Laterality Date     COLONOSCOPY N/A 02/10/2021    Procedure: COLONOSCOPY;  Surgeon: Angelika Eldridge MD;  Location: [facility];  Service: Endoscopy;  Laterality: N/A;    COSMETIC SURGERY      ESOPHAGOGASTRODUODENOSCOPY N/A 02/10/2021    Procedure: ESOPHAGOGASTRODUODENOSCOPY (EGD);  Surgeon: Angelika Eldridge MD;  Location: [facility] ENDO;  Service: Endoscopy;  Laterality: N/A;    HYSTEROSCOPY WITH HYDROTHERMAL ABLATION OF ENDOMETRIUM WITH DILATION AND CURETTAGE N/A 4/8/2024    Procedure: HYSTEROSCOPY, WITH DILATION AND CURETTAGE OF UTERUS AND HYDROTHERMAL ENDOMETRIAL ABLATION;  Surgeon: Vaibhav Stinson MD;  Location: [facility] OR;  Service: OB/GYN;  Laterality: N/A;  NOVASURE    INJECTION OF ANESTHETIC AGENT AROUND MEDIAL BRANCH NERVES INNERVATING LUMBAR FACET JOINT Bilateral 6/26/2024    Procedure: Diagnostic Bilateral L3-5 MBB #1 with RN IV sedation;  Surgeon: Dez Lowe MD;  Location: [facility] PAIN MGT;  Service: Pain Management;  Laterality: Bilateral;    INJECTION OF ANESTHETIC AGENT AROUND MEDIAL BRANCH NERVES INNERVATING LUMBAR FACET JOINT Bilateral 7/17/2024    Procedure: Diagnostic Bilateral L3-5 MBB #2 with RN IV sedation;  Surgeon: Dez Lowe MD;  Location: [facility] PAIN MGT;  Service: Pain Management;  Laterality: Bilateral;    LAPAROSCOPIC SALPINGECTOMY Bilateral 4/8/2024    Procedure: SALPINGECTOMY, LAPAROSCOPIC;  Surgeon: Vaibhav Stinson MD;  Location: [facility] OR;  Service: OB/GYN;  Laterality: Bilateral;    RADIOFREQUENCY THERMOCOAGULATION Bilateral 8/2/2024    Procedure: Bilateral L3-5 Lumbar RFA;  Surgeon: Dez Lowe MD;  Location: [facility] PAIN MGT;  Service: Pain Management;  Laterality: Bilateral;    SELECTIVE INJECTION OF ANESTHETIC AGENT AROUND LUMBAR SPINAL NERVE ROOT BY TRANSFORAMINAL APPROACH Right 3/13/2024    Procedure: R sided L4/5 and L5/S1 TFESI;  Surgeon: Dez Lowe MD;  Location: [facility] PAIN MGT;  Service: Pain Management;  Laterality: Right;    TRANSFORAMINAL EPIDURAL INJECTION OF STEROID Right  "9/18/2024    Procedure: Right L4/5 + L5/S1 TF KATHARINE;  Surgeon: Dez Lowe MD;  [facility];  Service: Pain Management;  Laterality: Right;       Family History   Problem Relation Name Age of Onset    Cancer Mother      Breast cancer Mother      Diabetes Mellitus Maternal Grandfather      Cancer Paternal Grandmother      Psoriasis Father      Arthritis Father      Colon cancer Paternal Aunt      Ovarian cancer Neg Hx      Thrombophilia Neg Hx         Social History[2]    Vitals:    06/09/25 0844   Weight: (!) 149.9 kg (330 lb 7.5 oz)   Height: 5' 11" (1.803 m)   PainSc: 0-No pain       Review of Systems   Constitutional:  Negative for chills and fever.   Respiratory:  Negative for shortness of breath.    Cardiovascular:  Negative for chest pain, palpitations, orthopnea, claudication and leg swelling.   Gastrointestinal:  Negative for diarrhea, nausea and vomiting.   Musculoskeletal:  Negative for joint pain.   Skin:  Negative for rash.   Neurological:  Negative for dizziness, tingling, sensory change, focal weakness and weakness.   Psychiatric/Behavioral: Negative.             Objective:       PHYSICAL EXAM: Apperance: Alert and orient in no distress,well developed, and with good attention to grooming and body habits  Patient presents ambulating in sandals  Lower Extremity Physical Exam:  VASCULAR: Dorsalis pedis pulses 2/4 bilateral and Posterior Tibial pulses 2/4 bilateral.   DERMATOLOGICAL: No skin rashes,lesions, or ulcers observed bilateral. Palpable fixated subcutaneous nodule noted to left lateral midfoot. NEUROLOGICAL: Light touch, sharp-dull, proprioception all present and equal bilaterally.    MUSCULOSKELETAL: Muscle strength is 5/5 for foot inverters, everters, plantarflexors, and dorsiflexors. Muscle tone is normal. (+) pain on palpation of left dorsal lateral foot.     TEST RESULTS: Radiographs of left foot/ankle taken reveals no acute abnormality or significant arthritic change. Calcaneal " enthesophytes noted     Ultrasound of left foot/ankle taken reveals there is a 2.8 x 1.5 x 1.2 cm cystic structure along the lateral aspect of the foot in the area of palpable abnormality which may represent a ganglion cyst.           Assessment:       ICD-10-CM ICD-9-CM   1. Ganglion cyst of right foot  M67.471 727.43   2. Cyst of joint of right hand  M25.841 719.84       Plan:   Ganglion cyst of right foot  -     Ambulatory referral/consult to Podiatry  -     X-Ray Foot Complete Left; Future; Expected date: 06/09/2025  -     Cancel: US Soft Tissue, Lower Extremity, Left; Future; Expected date: 06/09/2025    Cyst of joint of right hand  -     US Soft Tissue Upper Extremity, Right; Future; Expected date: 06/09/2025    I counseled the patient on her conditions, regarding findings of my examination, my impressions, and usual treatment plan.   Reviewed ultrasound results in exam room with patient.   Discussed surgical and conservative management of cyst deformity. Conservatively we did discuss padding, needle aspirations/injections, and shoe modifications such as softer shoes with wide toe boxes. Surgically we briefly discussed pre and post operative expectations. The patient elects for conservative management at this time.  Treatment options discussed with patient. Patient would like proceed with needle aspiration to help alleviate pressure pain to area.  Patient understands all potential risks and complications as well as alternatives. No guarantees are given or implied as to the outcome. Verbal consent was obtained.     GANGLIONIC CYST NEEDLE ASPIRATION REPORT     SURGEON: Samira Aguilar DPM   PRE-OP DX: Painful Left Foot Ganglionic Cyst  POST-OP DX: same   PROCEDURE: Needle Aspiration of Painful Ganglionic Cyst  ANESTHESIA: 3cc lidocaine plain  HEMOSTASIS: pressure   EBL: Less than 1cc     A local v-lissette was administered to the left dorsal/lateral midfoot of  3cc of 1% Lidocaine plain. Attention was then directed  to the left dorsal foot to check for adequate anesthesia.   Betadine was applied to area for sterilization. Next utilizing a 18g needle the soft tissue mass was punctured and approximately 1cc of jellylike, thick clear fluid was noted. Consistent with ganglionic cyst. The area was then dressed with a dry, sterile, compressive dressing. Patient tolerated the procedure well.   Patient to return as needed but should call immediately if any signs of infection, such as fever, chills, sweats, increased redness or pain.             Samira Aguilar DPM  [facility]          [1]   Patient Active Problem List  Diagnosis    Plaque psoriasis    Polyarthritis    Low back pain without sciatica    Gastroesophageal reflux disease without esophagitis    Vitamin D deficiency    Psoriatic arthritis    Polyarticular psoriatic arthritis    Obesity, morbid, BMI 40.0-49.9    Tendinopathy of right rotator cuff    Major depressive disorder in partial remission    High risk medication use    Chronic pain of both knees    Immunosuppressed status    Iron deficiency anemia    Fatty liver    Decreased range of motion    Pain    Swelling    Weakness    Anxiety and depression    Eosinophilia    Cervical radiculopathy    Numbness and tingling in both hands    Drug-induced immunodeficiency    Sciatica of right side    Right lumbar radiculopathy    Status post endometrial ablation    Status post bilateral salpingectomy    Encounter for medication monitoring    Lumbar spondylosis    Ganglion cyst of right foot   [2]   Social History  Socioeconomic History    Marital status: Single   Occupational History    Occupation: [employer]   Tobacco Use    Smoking status: Former     Current packs/day: 0.50     Average packs/day: 0.5 packs/day for 10.0 years (5.0 ttl pk-yrs)     Types: Cigarettes    Smokeless tobacco: Never   Substance and Sexual Activity    Alcohol use: Yes     Alcohol/week: 3.0 standard drinks of alcohol     Types: 3 Glasses of wine per  week     Comment: soc    Drug use: Not Currently     Frequency: 2.0 times per week     Types: Marijuana    Sexual activity: Yes     Birth control/protection: None     Comment: mut mog   Other Topics Concern    Are you pregnant or think you may be? No    Breast-feeding No     Social Drivers of Health     Financial Resource Strain: Low Risk  (3/4/2025)    Overall Financial Resource Strain (CARDIA)     Difficulty of Paying Living Expenses: Not very hard   Food Insecurity: Food Insecurity Present (3/4/2025)    Hunger Vital Sign     Worried About Running Out of Food in the Last Year: Never true     Ran Out of Food in the Last Year: Sometimes true   Transportation Needs: No Transportation Needs (3/4/2025)    PRAPARE - Transportation     Lack of Transportation (Medical): No     Lack of Transportation (Non-Medical): No   Physical Activity: Insufficiently Active (3/4/2025)    Exercise Vital Sign     Days of Exercise per Week: 3 days     Minutes of Exercise per Session: 30 min   Stress: No Stress Concern Present (3/4/2025)    [employer] - Occupational Stress Questionnaire     Feeling of Stress : Not at all   Housing Stability: Low Risk  (3/4/2025)    Housing Stability Vital Sign     Unable to Pay for Housing in the Last Year: No     Number of Times Moved in the Last Year: 0     Homeless in the Last Year: No

## 2025-06-16 ENCOUNTER — PATIENT MESSAGE (OUTPATIENT)
Dept: ADMINISTRATIVE | Facility: OTHER | Age: 45
End: 2025-06-16
Payer: COMMERCIAL

## 2025-06-16 RX ORDER — ESOMEPRAZOLE MAGNESIUM 40 MG/1
CAPSULE, DELAYED RELEASE ORAL
Qty: 30 CAPSULE | Refills: 11 | Status: SHIPPED | OUTPATIENT
Start: 2025-06-16

## 2025-07-30 ENCOUNTER — PATIENT MESSAGE (OUTPATIENT)
Dept: ADMINISTRATIVE | Facility: OTHER | Age: 45
End: 2025-07-30
Payer: COMMERCIAL

## 2025-08-18 ENCOUNTER — LAB VISIT (OUTPATIENT)
Dept: LAB | Facility: HOSPITAL | Age: 45
End: 2025-08-18
Attending: INTERNAL MEDICINE
Payer: COMMERCIAL

## 2025-08-18 DIAGNOSIS — L40.59 POLYARTICULAR PSORIATIC ARTHRITIS: ICD-10-CM

## 2025-08-18 DIAGNOSIS — E55.9 VITAMIN D DEFICIENCY: ICD-10-CM

## 2025-08-18 DIAGNOSIS — Z79.899 HIGH RISK MEDICATION USE: ICD-10-CM

## 2025-08-18 DIAGNOSIS — Z51.81 ENCOUNTER FOR MEDICATION MONITORING: ICD-10-CM

## 2025-08-18 DIAGNOSIS — L40.0 PLAQUE PSORIASIS: ICD-10-CM

## 2025-08-18 LAB
25(OH)D3+25(OH)D2 SERPL-MCNC: 25 NG/ML (ref 30–96)
ABSOLUTE EOSINOPHIL (OHS): 0.8 K/UL
ABSOLUTE MONOCYTE (OHS): 0.6 K/UL (ref 0.3–1)
ABSOLUTE NEUTROPHIL COUNT (OHS): 4.88 K/UL (ref 1.8–7.7)
ALBUMIN SERPL BCP-MCNC: 3.6 G/DL (ref 3.5–5.2)
ALP SERPL-CCNC: 89 UNIT/L (ref 40–150)
ALT SERPL W/O P-5'-P-CCNC: 28 UNIT/L (ref 10–44)
ANION GAP (OHS): 8 MMOL/L (ref 8–16)
AST SERPL-CCNC: 33 UNIT/L (ref 11–45)
BASOPHILS # BLD AUTO: 0.09 K/UL
BASOPHILS NFR BLD AUTO: 1 %
BILIRUB SERPL-MCNC: 0.2 MG/DL (ref 0.1–1)
BUN SERPL-MCNC: 14 MG/DL (ref 6–20)
CALCIUM SERPL-MCNC: 8.7 MG/DL (ref 8.7–10.5)
CHLORIDE SERPL-SCNC: 108 MMOL/L (ref 95–110)
CO2 SERPL-SCNC: 23 MMOL/L (ref 23–29)
CREAT SERPL-MCNC: 0.7 MG/DL (ref 0.5–1.4)
CRP SERPL-MCNC: 2.1 MG/L
ERYTHROCYTE [DISTWIDTH] IN BLOOD BY AUTOMATED COUNT: 15.9 % (ref 11.5–14.5)
ERYTHROCYTE [SEDIMENTATION RATE] IN BLOOD: 17 MM/HR
GFR SERPLBLD CREATININE-BSD FMLA CKD-EPI: >60 ML/MIN/1.73/M2
GLUCOSE SERPL-MCNC: 99 MG/DL (ref 70–110)
HCT VFR BLD AUTO: 39.4 % (ref 37–48.5)
HGB BLD-MCNC: 12.9 GM/DL (ref 12–16)
IMM GRANULOCYTES # BLD AUTO: 0.03 K/UL (ref 0–0.04)
IMM GRANULOCYTES NFR BLD AUTO: 0.3 % (ref 0–0.5)
LYMPHOCYTES # BLD AUTO: 2.18 K/UL (ref 1–4.8)
MCH RBC QN AUTO: 27.5 PG (ref 27–31)
MCHC RBC AUTO-ENTMCNC: 32.7 G/DL (ref 32–36)
MCV RBC AUTO: 84 FL (ref 82–98)
NUCLEATED RBC (/100WBC) (OHS): 0 /100 WBC
PLATELET # BLD AUTO: 377 K/UL (ref 150–450)
PMV BLD AUTO: 8.7 FL (ref 9.2–12.9)
POTASSIUM SERPL-SCNC: 4.5 MMOL/L (ref 3.5–5.1)
PROT SERPL-MCNC: 6.7 GM/DL (ref 6–8.4)
RBC # BLD AUTO: 4.69 M/UL (ref 4–5.4)
RELATIVE EOSINOPHIL (OHS): 9.3 %
RELATIVE LYMPHOCYTE (OHS): 25.4 % (ref 18–48)
RELATIVE MONOCYTE (OHS): 7 % (ref 4–15)
RELATIVE NEUTROPHIL (OHS): 57 % (ref 38–73)
SODIUM SERPL-SCNC: 139 MMOL/L (ref 136–145)
WBC # BLD AUTO: 8.58 K/UL (ref 3.9–12.7)

## 2025-08-18 PROCEDURE — 85652 RBC SED RATE AUTOMATED: CPT

## 2025-08-18 PROCEDURE — 82040 ASSAY OF SERUM ALBUMIN: CPT

## 2025-08-18 PROCEDURE — 85025 COMPLETE CBC W/AUTO DIFF WBC: CPT

## 2025-08-18 PROCEDURE — 36415 COLL VENOUS BLD VENIPUNCTURE: CPT

## 2025-08-18 PROCEDURE — 86140 C-REACTIVE PROTEIN: CPT

## 2025-08-18 PROCEDURE — 82306 VITAMIN D 25 HYDROXY: CPT

## (undated) DEVICE — SEALER LIGASURE LAP 37CM 5MM

## (undated) DEVICE — ELECTRODE REM PLYHSV RETURN 9

## (undated) DEVICE — SYR LUER-LOCK STERILE 3ML

## (undated) DEVICE — TROCAR ENDOPATH XCEL 5X100MM

## (undated) DEVICE — MANIPULATOR UTERINE

## (undated) DEVICE — TRAY SKIN SCRUB WET PREMIUM

## (undated) DEVICE — PACK DRAPE PERI/GYN TIBURON

## (undated) DEVICE — GOWN NONREINF SET-IN SLV 2XL

## (undated) DEVICE — GLOVE SENSICARE PI GRN 7

## (undated) DEVICE — CATH URETHRAL RED RUBBER 18FR

## (undated) DEVICE — MANIFOLD 4 PORT

## (undated) DEVICE — GLOVE SENSICARE PI MICRO 6.5

## (undated) DEVICE — SET CYSTO IRR DRP CHMBR 84IN

## (undated) DEVICE — TRAY CATH 1-LYR URIMTR 16FR

## (undated) DEVICE — GLOVE SENSICARE PI GRN 6.5

## (undated) DEVICE — TOWEL OR DISP STRL BLUE 4/PK

## (undated) DEVICE — DRAPE UINDERBUT GRAD PCH

## (undated) DEVICE — CONTAINER SPECIMEN OR STER 4OZ

## (undated) DEVICE — SOL NORMAL USPCA 0.9%

## (undated) DEVICE — TROCAR ENDOPATH XCEL 5MM 15CM

## (undated) DEVICE — COVER LIGHT HANDLE 80/CA

## (undated) DEVICE — NDL PNEUMO INSUFFLATI 120MM

## (undated) DEVICE — DEVICE ABLATION NOVASURE DISP

## (undated) DEVICE — TUBING MEDI-VAC 20FT .25IN

## (undated) DEVICE — GLOVE SENSICARE PI GRN 8

## (undated) DEVICE — DRAPE LAVH SURG 109X109X100IN

## (undated) DEVICE — ADHESIVE DERMABOND ADVANCED

## (undated) DEVICE — APPLICATOR STRL COT 2INNR 6IN

## (undated) DEVICE — APPLICATOR CHLORAPREP ORN 26ML

## (undated) DEVICE — PACK BASIC SETUP SC BR

## (undated) DEVICE — GLOVE SIGNATURE ESSNTL LTX 6

## (undated) DEVICE — KIT ANTIFOG W/SPONG & FLUID

## (undated) DEVICE — SUT MCRYL PLUS 4-0 PS2 27IN

## (undated) DEVICE — SET PNEUMOCLEAR HEAT HUM SE HF

## (undated) DEVICE — DRESSING TELFA N ADH 3X8

## (undated) DEVICE — TROCAR ENDOPATH XCEL 8MM 10CM

## (undated) DEVICE — GOWN POLY REINF BRTH SLV XL

## (undated) DEVICE — CANNULA ENDOPATH XCEL 5X100MM